# Patient Record
Sex: FEMALE | Race: WHITE | NOT HISPANIC OR LATINO | Employment: OTHER | URBAN - NONMETROPOLITAN AREA
[De-identification: names, ages, dates, MRNs, and addresses within clinical notes are randomized per-mention and may not be internally consistent; named-entity substitution may affect disease eponyms.]

---

## 2021-12-03 PROCEDURE — U0004 COV-19 TEST NON-CDC HGH THRU: HCPCS | Performed by: FAMILY MEDICINE

## 2022-01-08 ENCOUNTER — TELEPHONE (OUTPATIENT)
Dept: URGENT CARE | Facility: CLINIC | Age: 62
End: 2022-01-08

## 2022-01-08 DIAGNOSIS — J42 CHRONIC BRONCHITIS, UNSPECIFIED CHRONIC BRONCHITIS TYPE: ICD-10-CM

## 2022-01-08 RX ORDER — IPRATROPIUM BROMIDE AND ALBUTEROL SULFATE 2.5; .5 MG/3ML; MG/3ML
SOLUTION RESPIRATORY (INHALATION)
Qty: 360 ML | Refills: 0 | OUTPATIENT
Start: 2022-01-08 | End: 2022-01-08

## 2022-01-08 RX ORDER — IPRATROPIUM BROMIDE AND ALBUTEROL SULFATE 2.5; .5 MG/3ML; MG/3ML
SOLUTION RESPIRATORY (INHALATION)
Qty: 360 ML | Refills: 0 | Status: ON HOLD | OUTPATIENT
Start: 2022-01-08

## 2022-07-27 ENCOUNTER — HOSPITAL ENCOUNTER (EMERGENCY)
Facility: HOSPITAL | Age: 62
Discharge: HOME | End: 2022-07-27
Attending: EMERGENCY MEDICINE | Admitting: EMERGENCY MEDICINE

## 2022-07-27 VITALS
WEIGHT: 250 LBS | TEMPERATURE: 97.9 F | HEIGHT: 65 IN | SYSTOLIC BLOOD PRESSURE: 227 MMHG | RESPIRATION RATE: 20 BRPM | BODY MASS INDEX: 41.65 KG/M2 | OXYGEN SATURATION: 97 % | DIASTOLIC BLOOD PRESSURE: 92 MMHG | HEART RATE: 92 BPM

## 2022-07-27 DIAGNOSIS — S71.111A LACERATION OF RIGHT THIGH, INITIAL ENCOUNTER: Primary | ICD-10-CM

## 2022-07-27 PROCEDURE — 99282 EMERGENCY DEPT VISIT SF MDM: CPT

## 2022-07-27 PROCEDURE — 0 LIDOCAINE 1 % SOLUTION: Performed by: PHYSICIAN ASSISTANT

## 2022-07-27 RX ORDER — LIDOCAINE HYDROCHLORIDE 10 MG/ML
10 INJECTION, SOLUTION INFILTRATION; PERINEURAL ONCE
Status: COMPLETED | OUTPATIENT
Start: 2022-07-27 | End: 2022-07-27

## 2022-07-27 RX ADMIN — LIDOCAINE HYDROCHLORIDE 10 ML: 10 INJECTION, SOLUTION INFILTRATION; PERINEURAL at 09:48

## 2022-11-17 ENCOUNTER — TRANSCRIBE ORDERS (OUTPATIENT)
Dept: ADMINISTRATIVE | Facility: HOSPITAL | Age: 62
End: 2022-11-17

## 2022-11-17 DIAGNOSIS — R94.5 ABNORMAL RESULTS OF LIVER FUNCTION STUDIES: Primary | ICD-10-CM

## 2023-01-09 ENCOUNTER — HOSPITAL ENCOUNTER (OUTPATIENT)
Dept: ULTRASOUND IMAGING | Facility: HOSPITAL | Age: 63
End: 2023-01-09
Payer: MEDICARE

## 2023-04-04 ENCOUNTER — APPOINTMENT (OUTPATIENT)
Dept: GENERAL RADIOLOGY | Facility: HOSPITAL | Age: 63
DRG: 377 | End: 2023-04-04
Payer: MEDICARE

## 2023-04-04 ENCOUNTER — HOSPITAL ENCOUNTER (INPATIENT)
Facility: HOSPITAL | Age: 63
LOS: 8 days | Discharge: NURSING, REHAB OR LONG-TERM CARE | DRG: 377 | End: 2023-04-12
Attending: EMERGENCY MEDICINE | Admitting: STUDENT IN AN ORGANIZED HEALTH CARE EDUCATION/TRAINING PROGRAM
Payer: MEDICARE

## 2023-04-04 ENCOUNTER — APPOINTMENT (OUTPATIENT)
Dept: CT IMAGING | Facility: HOSPITAL | Age: 63
DRG: 377 | End: 2023-04-04
Payer: MEDICARE

## 2023-04-04 DIAGNOSIS — K74.60 CIRRHOSIS OF LIVER WITH ASCITES, UNSPECIFIED HEPATIC CIRRHOSIS TYPE: ICD-10-CM

## 2023-04-04 DIAGNOSIS — R18.8 CIRRHOSIS OF LIVER WITH ASCITES, UNSPECIFIED HEPATIC CIRRHOSIS TYPE: ICD-10-CM

## 2023-04-04 DIAGNOSIS — J18.9 PNEUMONIA OF LEFT LUNG DUE TO INFECTIOUS ORGANISM, UNSPECIFIED PART OF LUNG: Primary | ICD-10-CM

## 2023-04-04 DIAGNOSIS — K74.69 OTHER CIRRHOSIS OF LIVER: ICD-10-CM

## 2023-04-04 DIAGNOSIS — N18.9 CHRONIC KIDNEY DISEASE, UNSPECIFIED CKD STAGE: ICD-10-CM

## 2023-04-04 DIAGNOSIS — K92.2 GASTROINTESTINAL HEMORRHAGE, UNSPECIFIED GASTROINTESTINAL HEMORRHAGE TYPE: ICD-10-CM

## 2023-04-04 LAB
ABO GROUP BLD: NORMAL
ALBUMIN SERPL-MCNC: 1.6 G/DL (ref 3.5–5.2)
ALBUMIN/GLOB SERPL: 0.3 G/DL
ALP SERPL-CCNC: 100 U/L (ref 39–117)
ALT SERPL W P-5'-P-CCNC: 19 U/L (ref 1–33)
ANION GAP SERPL CALCULATED.3IONS-SCNC: 8.5 MMOL/L (ref 5–15)
AST SERPL-CCNC: 42 U/L (ref 1–32)
BASOPHILS # BLD AUTO: 0.06 10*3/MM3 (ref 0–0.2)
BASOPHILS NFR BLD AUTO: 0.9 % (ref 0–1.5)
BILIRUB SERPL-MCNC: 1 MG/DL (ref 0–1.2)
BUN SERPL-MCNC: 23 MG/DL (ref 8–23)
BUN/CREAT SERPL: 15.8 (ref 7–25)
CALCIUM SPEC-SCNC: 8.2 MG/DL (ref 8.6–10.5)
CHLORIDE SERPL-SCNC: 109 MMOL/L (ref 98–107)
CO2 SERPL-SCNC: 24.5 MMOL/L (ref 22–29)
CREAT SERPL-MCNC: 1.46 MG/DL (ref 0.57–1)
DEPRECATED RDW RBC AUTO: 54.3 FL (ref 37–54)
EGFRCR SERPLBLD CKD-EPI 2021: 40.3 ML/MIN/1.73
EOSINOPHIL # BLD AUTO: 0.2 10*3/MM3 (ref 0–0.4)
EOSINOPHIL NFR BLD AUTO: 3.2 % (ref 0.3–6.2)
ERYTHROCYTE [DISTWIDTH] IN BLOOD BY AUTOMATED COUNT: 16 % (ref 12.3–15.4)
GLOBULIN UR ELPH-MCNC: 4.7 GM/DL
GLUCOSE SERPL-MCNC: 105 MG/DL (ref 65–99)
HCT VFR BLD AUTO: 25.3 % (ref 34–46.6)
HEMOCCULT STL QL: POSITIVE
HGB BLD-MCNC: 8.5 G/DL (ref 12–15.9)
IMM GRANULOCYTES # BLD AUTO: 0.02 10*3/MM3 (ref 0–0.05)
IMM GRANULOCYTES NFR BLD AUTO: 0.3 % (ref 0–0.5)
LIPASE SERPL-CCNC: 15 U/L (ref 13–60)
LYMPHOCYTES # BLD AUTO: 0.7 10*3/MM3 (ref 0.7–3.1)
LYMPHOCYTES NFR BLD AUTO: 11.1 % (ref 19.6–45.3)
MCH RBC QN AUTO: 33.1 PG (ref 26.6–33)
MCHC RBC AUTO-ENTMCNC: 33.6 G/DL (ref 31.5–35.7)
MCV RBC AUTO: 98.4 FL (ref 79–97)
MONOCYTES # BLD AUTO: 0.77 10*3/MM3 (ref 0.1–0.9)
MONOCYTES NFR BLD AUTO: 12.2 % (ref 5–12)
NEUTROPHILS NFR BLD AUTO: 4.57 10*3/MM3 (ref 1.7–7)
NEUTROPHILS NFR BLD AUTO: 72.3 % (ref 42.7–76)
NRBC BLD AUTO-RTO: 0 /100 WBC (ref 0–0.2)
PLATELET # BLD AUTO: 120 10*3/MM3 (ref 140–450)
PMV BLD AUTO: 11.6 FL (ref 6–12)
POTASSIUM SERPL-SCNC: 3.9 MMOL/L (ref 3.5–5.2)
PROT SERPL-MCNC: 6.3 G/DL (ref 6–8.5)
RBC # BLD AUTO: 2.57 10*6/MM3 (ref 3.77–5.28)
RH BLD: POSITIVE
SODIUM SERPL-SCNC: 142 MMOL/L (ref 136–145)
WBC NRBC COR # BLD: 6.32 10*3/MM3 (ref 3.4–10.8)

## 2023-04-04 PROCEDURE — P9047 ALBUMIN (HUMAN), 25%, 50ML: HCPCS | Performed by: INTERNAL MEDICINE

## 2023-04-04 PROCEDURE — 83690 ASSAY OF LIPASE: CPT | Performed by: PHYSICIAN ASSISTANT

## 2023-04-04 PROCEDURE — 82272 OCCULT BLD FECES 1-3 TESTS: CPT | Performed by: PHYSICIAN ASSISTANT

## 2023-04-04 PROCEDURE — 87040 BLOOD CULTURE FOR BACTERIA: CPT | Performed by: FAMILY MEDICINE

## 2023-04-04 PROCEDURE — 80053 COMPREHEN METABOLIC PANEL: CPT | Performed by: PHYSICIAN ASSISTANT

## 2023-04-04 PROCEDURE — 85025 COMPLETE CBC W/AUTO DIFF WBC: CPT | Performed by: PHYSICIAN ASSISTANT

## 2023-04-04 PROCEDURE — 86901 BLOOD TYPING SEROLOGIC RH(D): CPT

## 2023-04-04 PROCEDURE — 25010000002 OCTREOTIDE PER 25 MCG: Performed by: PHYSICIAN ASSISTANT

## 2023-04-04 PROCEDURE — 86920 COMPATIBILITY TEST SPIN: CPT

## 2023-04-04 PROCEDURE — 25010000002 ALBUMIN HUMAN 25% PER 50 ML: Performed by: INTERNAL MEDICINE

## 2023-04-04 PROCEDURE — 85018 HEMOGLOBIN: CPT | Performed by: FAMILY MEDICINE

## 2023-04-04 PROCEDURE — 99222 1ST HOSP IP/OBS MODERATE 55: CPT | Performed by: INTERNAL MEDICINE

## 2023-04-04 PROCEDURE — 86850 RBC ANTIBODY SCREEN: CPT | Performed by: FAMILY MEDICINE

## 2023-04-04 PROCEDURE — 99285 EMERGENCY DEPT VISIT HI MDM: CPT

## 2023-04-04 PROCEDURE — 99223 1ST HOSP IP/OBS HIGH 75: CPT | Performed by: FAMILY MEDICINE

## 2023-04-04 PROCEDURE — 86900 BLOOD TYPING SEROLOGIC ABO: CPT

## 2023-04-04 PROCEDURE — 86900 BLOOD TYPING SEROLOGIC ABO: CPT | Performed by: FAMILY MEDICINE

## 2023-04-04 PROCEDURE — 74176 CT ABD & PELVIS W/O CONTRAST: CPT

## 2023-04-04 PROCEDURE — 86901 BLOOD TYPING SEROLOGIC RH(D): CPT | Performed by: FAMILY MEDICINE

## 2023-04-04 PROCEDURE — 71045 X-RAY EXAM CHEST 1 VIEW: CPT

## 2023-04-04 PROCEDURE — 25010000002 CEFTRIAXONE SODIUM-DEXTROSE 1-3.74 GM-%(50ML) RECONSTITUTED SOLUTION: Performed by: PHYSICIAN ASSISTANT

## 2023-04-04 RX ORDER — CEFTRIAXONE 1 G/50ML
1 INJECTION, SOLUTION INTRAVENOUS EVERY 24 HOURS
Status: DISCONTINUED | OUTPATIENT
Start: 2023-04-05 | End: 2023-04-06

## 2023-04-04 RX ORDER — DEXTROSE MONOHYDRATE 25 G/50ML
25 INJECTION, SOLUTION INTRAVENOUS
Status: DISCONTINUED | OUTPATIENT
Start: 2023-04-04 | End: 2023-04-06

## 2023-04-04 RX ORDER — ACETAMINOPHEN 325 MG/1
650 TABLET ORAL EVERY 4 HOURS PRN
Status: DISCONTINUED | OUTPATIENT
Start: 2023-04-04 | End: 2023-04-12 | Stop reason: HOSPADM

## 2023-04-04 RX ORDER — ALBUMIN (HUMAN) 12.5 G/50ML
25 SOLUTION INTRAVENOUS 3 TIMES DAILY
Status: COMPLETED | OUTPATIENT
Start: 2023-04-04 | End: 2023-04-06

## 2023-04-04 RX ORDER — ACETAMINOPHEN 650 MG/1
650 SUPPOSITORY RECTAL EVERY 4 HOURS PRN
Status: DISCONTINUED | OUTPATIENT
Start: 2023-04-04 | End: 2023-04-12 | Stop reason: HOSPADM

## 2023-04-04 RX ORDER — PANTOPRAZOLE SODIUM 40 MG/10ML
80 INJECTION, POWDER, LYOPHILIZED, FOR SOLUTION INTRAVENOUS ONCE
Status: COMPLETED | OUTPATIENT
Start: 2023-04-04 | End: 2023-04-04

## 2023-04-04 RX ORDER — NICOTINE POLACRILEX 4 MG
15 LOZENGE BUCCAL
Status: DISCONTINUED | OUTPATIENT
Start: 2023-04-04 | End: 2023-04-06

## 2023-04-04 RX ORDER — OCTREOTIDE ACETATE 100 UG/ML
50 INJECTION, SOLUTION INTRAVENOUS; SUBCUTANEOUS ONCE
Status: COMPLETED | OUTPATIENT
Start: 2023-04-04 | End: 2023-04-04

## 2023-04-04 RX ORDER — IPRATROPIUM BROMIDE AND ALBUTEROL SULFATE 2.5; .5 MG/3ML; MG/3ML
3 SOLUTION RESPIRATORY (INHALATION) EVERY 6 HOURS PRN
Status: DISCONTINUED | OUTPATIENT
Start: 2023-04-04 | End: 2023-04-12 | Stop reason: HOSPADM

## 2023-04-04 RX ORDER — INSULIN ASPART 100 [IU]/ML
0-9 INJECTION, SOLUTION INTRAVENOUS; SUBCUTANEOUS
Status: DISCONTINUED | OUTPATIENT
Start: 2023-04-05 | End: 2023-04-06

## 2023-04-04 RX ORDER — ONDANSETRON 2 MG/ML
4 INJECTION INTRAMUSCULAR; INTRAVENOUS EVERY 6 HOURS PRN
Status: DISCONTINUED | OUTPATIENT
Start: 2023-04-04 | End: 2023-04-12 | Stop reason: HOSPADM

## 2023-04-04 RX ORDER — BUPROPION HYDROCHLORIDE 150 MG/1
150 TABLET ORAL DAILY
Status: DISCONTINUED | OUTPATIENT
Start: 2023-04-05 | End: 2023-04-12 | Stop reason: HOSPADM

## 2023-04-04 RX ORDER — ALBUTEROL SULFATE 2.5 MG/3ML
2.5 SOLUTION RESPIRATORY (INHALATION) EVERY 6 HOURS PRN
Status: DISCONTINUED | OUTPATIENT
Start: 2023-04-04 | End: 2023-04-12 | Stop reason: HOSPADM

## 2023-04-04 RX ORDER — SODIUM CHLORIDE 0.9 % (FLUSH) 0.9 %
10 SYRINGE (ML) INJECTION AS NEEDED
Status: DISCONTINUED | OUTPATIENT
Start: 2023-04-04 | End: 2023-04-12 | Stop reason: HOSPADM

## 2023-04-04 RX ORDER — ACETAMINOPHEN 160 MG/5ML
650 SOLUTION ORAL EVERY 4 HOURS PRN
Status: DISCONTINUED | OUTPATIENT
Start: 2023-04-04 | End: 2023-04-12 | Stop reason: HOSPADM

## 2023-04-04 RX ORDER — DONEPEZIL HYDROCHLORIDE 5 MG/1
10 TABLET, FILM COATED ORAL NIGHTLY
Status: DISCONTINUED | OUTPATIENT
Start: 2023-04-04 | End: 2023-04-12 | Stop reason: HOSPADM

## 2023-04-04 RX ORDER — CEFTRIAXONE 1 G/50ML
1 INJECTION, SOLUTION INTRAVENOUS ONCE
Status: COMPLETED | OUTPATIENT
Start: 2023-04-04 | End: 2023-04-04

## 2023-04-04 RX ORDER — ATORVASTATIN CALCIUM 40 MG/1
40 TABLET, FILM COATED ORAL DAILY
Status: DISCONTINUED | OUTPATIENT
Start: 2023-04-05 | End: 2023-04-12 | Stop reason: HOSPADM

## 2023-04-04 RX ADMIN — SODIUM CHLORIDE 1000 ML: 9 INJECTION, SOLUTION INTRAVENOUS at 14:34

## 2023-04-04 RX ADMIN — PANTOPRAZOLE SODIUM 8 MG/HR: 40 INJECTION, POWDER, FOR SOLUTION INTRAVENOUS at 19:00

## 2023-04-04 RX ADMIN — CEFTRIAXONE 1 G: 1 INJECTION, SOLUTION INTRAVENOUS at 17:35

## 2023-04-04 RX ADMIN — PANTOPRAZOLE SODIUM 80 MG: 40 INJECTION, POWDER, FOR SOLUTION INTRAVENOUS at 18:58

## 2023-04-04 RX ADMIN — ALBUMIN (HUMAN) 25 G: 0.25 INJECTION, SOLUTION INTRAVENOUS at 20:50

## 2023-04-04 RX ADMIN — OCTREOTIDE ACETATE 50 MCG: 100 INJECTION, SOLUTION INTRAVENOUS; SUBCUTANEOUS at 18:59

## 2023-04-04 RX ADMIN — OCTREOTIDE ACETATE 25 MCG/HR: 100 INJECTION, SOLUTION INTRAVENOUS; SUBCUTANEOUS at 21:28

## 2023-04-04 NOTE — ED PROVIDER NOTES
Subjective  History of Present Illness:    Chief Complaint:   Chief Complaint   Patient presents with   • Weakness - Generalized      History of Present Illness: Ivania Harris is a 62 y.o. female who presents to the emergency department complaining of generalized weakness and diarrhea with nausea.  Patient presents to the ED via ambulance.  She states she had 1 watery nonbloody diarrheal bowel movement daily for the past 3 days with nausea intermittently and no vomiting.  No abdominal pain.  No urinary symptoms.  She complains mainly of just feeling overall generally weak.  She does have a mild dry cough  Onset: 3 days ago  Duration: Intermittent  Exacerbating / Alleviating factors: None  Associated symptoms: None      Nurses Notes reviewed and agree, including vitals, allergies, social history and prior medical history.     Review of Systems   Constitutional: Negative.         Generalized weakness   HENT: Negative.    Eyes: Negative.    Respiratory: Negative.    Cardiovascular: Negative.    Gastrointestinal: Positive for diarrhea and nausea. Negative for blood in stool.   Genitourinary: Negative.    Musculoskeletal: Negative.    Skin: Negative.    Neurological: Negative.    Psychiatric/Behavioral: Negative.        Past Medical History:   Diagnosis Date   • Anemia    • Diabetes mellitus    • Hyperlipidemia    • Hypertension    • Hypothyroidism    • Short-term memory loss        Allergies:    Patient has no known allergies.      Past Surgical History:   Procedure Laterality Date   • CHOLECYSTECTOMY           Social History     Socioeconomic History   • Marital status:    Tobacco Use   • Smoking status: Never   • Smokeless tobacco: Never   Vaping Use   • Vaping Use: Never used   Substance and Sexual Activity   • Alcohol use: Yes     Comment: a couple margaritas a year   • Drug use: Never   • Sexual activity: Defer         History reviewed. No pertinent family history.    Objective  Physical Exam:  BP  "125/62   Pulse 84   Temp 98.7 °F (37.1 °C) (Oral)   Resp 18   Ht 165.1 cm (65\")   Wt 113 kg (250 lb)   SpO2 99%   BMI 41.60 kg/m²      Physical Exam  Vitals and nursing note reviewed.   Constitutional:       General: She is not in acute distress.     Appearance: Normal appearance. She is obese. She is not ill-appearing, toxic-appearing or diaphoretic.   HENT:      Head: Normocephalic and atraumatic.      Nose: Nose normal.   Eyes:      Extraocular Movements: Extraocular movements intact.   Cardiovascular:      Rate and Rhythm: Normal rate and regular rhythm.      Comments: Systolic murmur noted  Pulmonary:      Effort: Pulmonary effort is normal.   Abdominal:      General: Abdomen is flat.   Musculoskeletal:         General: Normal range of motion.      Cervical back: Normal range of motion.   Skin:     General: Skin is warm and dry.      Comments: Excoriated lower extremities with stasis dermatitis and onychomycosis bilaterally   Neurological:      General: No focal deficit present.      Mental Status: She is alert. Mental status is at baseline.   Psychiatric:         Mood and Affect: Mood normal.         Behavior: Behavior normal.           Procedures    ED Course:    ED Course as of 04/04/23 1909 Tue Apr 04, 2023   1622 Fecal Occult Blood(!): Positive [TM]   1622 ALT (SGPT): 19 [TM]   1727 AST (SGOT)(!): 42 [TM]   1727 Alkaline Phosphatase: 100 [TM]   1727 Total Bilirubin: 1.0 [TM]      ED Course User Index  [TM] Gerardo Rogers PA-C       Lab Results (last 24 hours)     Procedure Component Value Units Date/Time    CBC & Differential [[id number]]  (Abnormal) Collected: 04/04/23 1431    Specimen: Blood Updated: 04/04/23 1437    Narrative:      The following orders were created for panel order CBC & Differential.  Procedure                               Abnormality         Status                     ---------                               -----------         ------                     CBC Auto " Differential[[id number]]        Abnormal            Final result                 Please view results for these tests on the individual orders.    Comprehensive Metabolic Panel [[id number]]  (Abnormal) Collected: 04/04/23 1431    Specimen: Blood Updated: 04/04/23 1500     Glucose 105 mg/dL      BUN 23 mg/dL      Creatinine 1.46 mg/dL      Sodium 142 mmol/L      Potassium 3.9 mmol/L      Chloride 109 mmol/L      CO2 24.5 mmol/L      Calcium 8.2 mg/dL      Total Protein 6.3 g/dL      Albumin 1.6 g/dL      ALT (SGPT) 19 U/L      AST (SGOT) 42 U/L      Alkaline Phosphatase 100 U/L      Total Bilirubin 1.0 mg/dL      Globulin 4.7 gm/dL      A/G Ratio 0.3 g/dL      BUN/Creatinine Ratio 15.8     Anion Gap 8.5 mmol/L      eGFR 40.3 mL/min/1.73     Narrative:      GFR Normal >60  Chronic Kidney Disease <60  Kidney Failure <15      CBC Auto Differential [[id number]]  (Abnormal) Collected: 04/04/23 1431    Specimen: Blood Updated: 04/04/23 1437     WBC 6.32 10*3/mm3      RBC 2.57 10*6/mm3      Hemoglobin 8.5 g/dL      Hematocrit 25.3 %      MCV 98.4 fL      MCH 33.1 pg      MCHC 33.6 g/dL      RDW 16.0 %      RDW-SD 54.3 fl      MPV 11.6 fL      Platelets 120 10*3/mm3      Neutrophil % 72.3 %      Lymphocyte % 11.1 %      Monocyte % 12.2 %      Eosinophil % 3.2 %      Basophil % 0.9 %      Immature Grans % 0.3 %      Neutrophils, Absolute 4.57 10*3/mm3      Lymphocytes, Absolute 0.70 10*3/mm3      Monocytes, Absolute 0.77 10*3/mm3      Eosinophils, Absolute 0.20 10*3/mm3      Basophils, Absolute 0.06 10*3/mm3      Immature Grans, Absolute 0.02 10*3/mm3      nRBC 0.0 /100 WBC     Lipase [[id number]]  (Normal) Collected: 04/04/23 1431    Specimen: Blood Updated: 04/04/23 1742     Lipase 15 U/L     Occult Blood X 1, Stool - Stool, Per Rectum [[id number]]  (Abnormal) Collected: 04/04/23 1505    Specimen: Stool from Per Rectum Updated: 04/04/23 1511     Fecal Occult Blood Positive           CT Abdomen Pelvis Without Contrast    Result  Date: 4/4/2023  FINAL REPORT TECHNIQUE: Routine axial images through the abdomen and pelvis were obtained. CLINICAL HISTORY: diarrhea, abd discomfort FINDINGS: Lower chest: There are left greater than right pleural effusions.  Abdomen:  The gallbladder has been removed.  There is cirrhosis with mild splenomegaly.  There are upper abdominal venous collaterals indicative of portal hypertension.  There are 2 small nonobstructing right renal calculi, the largest measuring 4 mm in diameter.  The other solid abdominal organs and ureters are unremarkable.  The GI tract is unremarkable, including the appendix.  Pelvis: The uterus, ovaries, and urinary bladder are normal.  There is large volume ascites and anasarca.  There is no pelvic or abdominal adenopathy or acute osseous abnormality.     Impression: Cirrhosis with portal hypertension and ascites.  Small nonobstructing right renal calculi.  Bilateral pleural effusions. Authenticated and Electronically Signed by Salomon Cantu M.D. on 04/04/2023 06:20:35 PM    XR Chest 1 View    Result Date: 4/4/2023   PROCEDURE: XR CHEST 1 VW-  HISTORY: generalized weakness  COMPARISON: 12/03/2021.  FINDINGS: The heart is mildly enlarged.. The mediastinum is unremarkable. Decreased inspiratory effort noted with crowding of the lung markings in both lung bases. There is new left basilar airspace disease, possible pneumonia. Right lung is clear. Calcified granuloma identified on the left... There is no pneumothorax.  There are no acute osseous abnormalities.      Impression: New left basilar airspace disease suggesting pneumonia, recommend follow-up..  .  This report was signed and finalized on 4/4/2023 3:16 PM by Katt Muse MD.         BRYCE Harris is a 62 y.o. female who presents to the emergency department for evaluation of generalized weakness, cough, diarrhea    Differential diagnosis includes pneumonia, electrolyte abnormality, dehydration, anemia among other  etiologies.    CBC, CMP, lipase, urinalysis, stool GI PCR, CT scan abdomen pelvis without contrast ordered for further evaluation of the patient's presentation.    Chart review if available included outside testing, previous visits, prior labs, prior imaging, available notes from prior evaluations or visits with specialists, medication list, allergies, past medical history, past surgical history when applicable.    Patient was treated with IV fluids, octreotide, Protonix, Rocephin    Spoke with Dr. Mandujano, GI, recommends octreotide and Protonix bolus and drip of both, we will plan for an EGD in the morning.  Did make him aware of her occult positive stool and hemoglobin of 8.5.  Dr. Altman graciously excepted the patient for admission.    Plan for disposition is patient to the hospital.  Patient/family comfortable with and understanding of the plan.    30 minutes of critical care provided. This time excludes other billable procedures. Time does include preparation of documents, medical consultations, review of old records, and direct bedside care. Patient is at high risk for life-threatening deterioration due to gi bleeding requiring iv protonix and octreotide and admission, gi consultation.         Final diagnoses:   Pneumonia of left lung due to infectious organism, unspecified part of lung   Gastrointestinal hemorrhage, unspecified gastrointestinal hemorrhage type   Other cirrhosis of liver        Gerardo Rogers PA-C  04/04/23 1906       Gerardo Rogers PA-C  04/04/23 1909

## 2023-04-04 NOTE — H&P
[facility]      Name:  Ivania Harris   Age:  62 y.o.  Sex:  female  :  1960  MRN:  [id number]   Visit Number:  [id number]  Admission Date:  2023  Date Of Service:  23  Primary Care Physician:  Alessandro Mercer MD    Chief Complaint:     Generalized weakness, diarrhea    History Of Presenting Illness:      Patient 62 years old female with a past medical history of diabetes mellitus, anemia, hyperlipidemia, hypertension and hypothyroidism who presented to the ER with a chief complaint of generalized weakness along with diarrhea.  Patient reports that she has had watery dark-colored diarrhea over the past 3 days associated with nausea but no vomiting.  Patient denies any abdominal pain or urinary symptoms.  Patient reports feeling weak all over which promoted her to come to the ER.  Patient also reporting mild cough that is nonproductive.  Patient denies any previous diagnosis of liver disease that she is aware of.     On ER evaluation, patient was hemodynamically stable, her work-up showed creatinine of 1.46, hemoglobin of 8.5, platelets of 120.  Hemoccult blood was positive.  Chest x-ray showed New left basilar airspace disease suggesting pneumonia. Cirrhosis with portal hypertension and ascites. Small nonobstructing right renal calculi.  Bilateral pleural effusions.  Case was discussed with gastroenterology Dr. Collado who recommended octreotide and Protonix drip and admission for the patient.  Patient received Rocephin, normal saline bolus in addition to Protonix and octreotide while in the ER.  Hospitalist consulted for admission, further evaluation and treatment.     Review Of Systems:    All systems were reviewed and negative except as mentioned in history of presenting illness, assessment and plan.    Past Medical History: Patient  has a past medical history of Anemia, Diabetes mellitus, Hyperlipidemia, Hypertension, Hypothyroidism, and  Short-term memory loss.    Past Surgical History: Patient  has a past surgical history that includes Cholecystectomy.    Social History: Patient  reports that she has never smoked. She has never used smokeless tobacco. She reports current alcohol use. She reports that she does not use drugs.    Family History:  Patient's family history has been reviewed and found to be noncontributory.    Allergies:      Patient has no known allergies.    Home Medications:    Prior to Admission Medications     Prescriptions Last Dose Informant Patient Reported? Taking?    albuterol sulfate  (90 Base) MCG/ACT inhaler   No No    Inhale 2 puffs Every 4 (Four) Hours As Needed for Wheezing.    Aspirin 325 MG capsule   Yes No    Take 325 mg by mouth Daily.    atorvastatin (LIPITOR) 40 MG tablet   Yes No    Take 40 mg by mouth Daily.    azithromycin (ZITHROMAX) 500 MG tablet   No No    1 po daily    buPROPion XL (WELLBUTRIN XL) 150 MG 24 hr tablet   Yes No    Take 150 mg by mouth Daily.    donepezil (ARICEPT) 10 MG tablet   Yes No    Take 10 mg by mouth Every Night.    insulin aspart (NovoLOG) 100 UNIT/ML injection   Yes No    Inject  under the skin into the appropriate area as directed 3 (Three) Times a Day Before Meals.    insulin glargine (Lantus) 100 UNIT/ML injection   Yes No    Inject 60 Units under the skin into the appropriate area as directed 2 (Two) Times a Day.    ipratropium-albuterol (DUO-NEB) 0.5-2.5 mg/3 ml nebulizer   No No    Nebulize q 4 h prn wheezing / shortness of breath    levothyroxine (SYNTHROID, LEVOTHROID) 100 MCG tablet   Yes No    Take 300 mcg by mouth Daily.    lisinopril (PRINIVIL,ZESTRIL) 10 MG tablet   Yes No    Take 10 mg by mouth Daily.    minocycline (MINOCIN,DYNACIN) 100 MG capsule   Yes No    Take 100 mg by mouth Daily.    multivitamin with minerals (SENIOR MULTIVITAMIN PLUS PO)   Yes No    Take 1 tablet by mouth Daily.    predniSONE (DELTASONE) 20 MG tablet   No No    3 po daily for 3 days, 2 po  "daily for 3 days, 1 po daily for 3 days    SITagliptin (JANUVIA) 50 MG tablet   Yes No    Take 50 mg by mouth Daily.        ED Medications:    Medications   sodium chloride 0.9 % flush 10 mL (has no administration in time range)   pantoprazole (PROTONIX) 40 mg in 100mL NS IVPB (8 mg/hr Intravenous New Bag 4/4/23 1900)   octreotide (sandoSTATIN) 500 mcg in sodium chloride 0.9 % 100 mL (5 mcg/mL) infusion (has no administration in time range)   sodium chloride 0.9 % bolus 1,000 mL (0 mL Intravenous Stopped 4/4/23 1736)   cefTRIAXone (ROCEPHIN) IVPB 1 g/50ml dextrose (premix) (0 g Intravenous Stopped 4/4/23 1845)   pantoprazole (PROTONIX) injection 80 mg (80 mg Intravenous Given 4/4/23 1858)   octreotide (sandoSTATIN) injection 50 mcg (50 mcg Intravenous Given 4/4/23 1859)     Vital Signs:  Temp:  [98.7 °F (37.1 °C)] 98.7 °F (37.1 °C)  Heart Rate:  [74-92] 81  Resp:  [18] 18  BP: ()/(49-64) 128/60        04/04/23  1409   Weight: 113 kg (250 lb)     Body mass index is 41.6 kg/m².    Physical Exam:     Most recent vital Signs: /60   Pulse 81   Temp 98.7 °F (37.1 °C) (Oral)   Resp 18   Ht 165.1 cm (65\")   Wt 113 kg (250 lb)   SpO2 99%   BMI 41.60 kg/m²     Physical Exam  Vitals and nursing note reviewed.   Constitutional:       General: She is not in acute distress.     Appearance: She is obese. She is ill-appearing.   HENT:      Head: Normocephalic and atraumatic.      Right Ear: External ear normal.      Left Ear: External ear normal.      Nose: Nose normal.      Mouth/Throat:      Mouth: Mucous membranes are moist.   Eyes:      Extraocular Movements: Extraocular movements intact.      Conjunctiva/sclera: Conjunctivae normal.      Pupils: Pupils are equal, round, and reactive to light.   Cardiovascular:      Rate and Rhythm: Normal rate and regular rhythm.      Pulses: Normal pulses.      Heart sounds: Normal heart sounds.   Pulmonary:      Effort: Pulmonary effort is normal. No respiratory distress. "      Breath sounds: Normal breath sounds. Decreased air movement present. No wheezing or rhonchi.   Abdominal:      General: Bowel sounds are normal. There is distension.      Palpations: Abdomen is soft.      Tenderness: There is no abdominal tenderness. There is no guarding or rebound.      Comments: Obese abdomen   Musculoskeletal:         General: Normal range of motion.      Cervical back: Normal range of motion and neck supple.      Right lower leg: No edema.      Left lower leg: No edema.   Skin:     General: Skin is warm and dry.      Findings: Rash present.      Comments: Chronic venous stasis in bilateral lower extremities.   Neurological:      General: No focal deficit present.      Mental Status: She is alert and oriented to person, place, and time. Mental status is at baseline.      Motor: No weakness.   Psychiatric:         Mood and Affect: Mood normal.         Behavior: Behavior normal.         Thought Content: Thought content normal.         Laboratory data:    I have reviewed the labs done in the emergency room.    Results from last 7 days   Lab Units 04/04/23  1431   SODIUM mmol/L 142   POTASSIUM mmol/L 3.9   CHLORIDE mmol/L 109*   CO2 mmol/L 24.5   BUN mg/dL 23   CREATININE mg/dL 1.46*   CALCIUM mg/dL 8.2*   BILIRUBIN mg/dL 1.0   ALK PHOS U/L 100   ALT (SGPT) U/L 19   AST (SGOT) U/L 42*   GLUCOSE mg/dL 105*     Results from last 7 days   Lab Units 04/04/23  1431   WBC 10*3/mm3 6.32   HEMOGLOBIN g/dL 8.5*   HEMATOCRIT % 25.3*   PLATELETS 10*3/mm3 120*                     Results from last 7 days   Lab Units 04/04/23  1431   LIPASE U/L 15               Invalid input(s): USDES,  BLOODU, NITRITITE, BACT, EP    Pain Management Panel    There is no flowsheet data to display.         Radiology:    CT Abdomen Pelvis Without Contrast    Result Date: 4/4/2023  FINAL REPORT TECHNIQUE: Routine axial images through the abdomen and pelvis were obtained. CLINICAL HISTORY: diarrhea, abd discomfort FINDINGS: Lower  chest: There are left greater than right pleural effusions.  Abdomen:  The gallbladder has been removed.  There is cirrhosis with mild splenomegaly.  There are upper abdominal venous collaterals indicative of portal hypertension.  There are 2 small nonobstructing right renal calculi, the largest measuring 4 mm in diameter.  The other solid abdominal organs and ureters are unremarkable.  The GI tract is unremarkable, including the appendix.  Pelvis: The uterus, ovaries, and urinary bladder are normal.  There is large volume ascites and anasarca.  There is no pelvic or abdominal adenopathy or acute osseous abnormality.     Cirrhosis with portal hypertension and ascites.  Small nonobstructing right renal calculi.  Bilateral pleural effusions. Authenticated and Electronically Signed by Salomon Cantu M.D. on 04/04/2023 06:20:35 PM    XR Chest 1 View    Result Date: 4/4/2023   PROCEDURE: XR CHEST 1 VW-  HISTORY: generalized weakness  COMPARISON: 12/03/2021.  FINDINGS: The heart is mildly enlarged.. The mediastinum is unremarkable. Decreased inspiratory effort noted with crowding of the lung markings in both lung bases. There is new left basilar airspace disease, possible pneumonia. Right lung is clear. Calcified granuloma identified on the left... There is no pneumothorax.  There are no acute osseous abnormalities.      New left basilar airspace disease suggesting pneumonia, recommend follow-up..  .  This report was signed and finalized on 4/4/2023 3:16 PM by Katt Muse MD.      Assessment:    Left lower lobe pneumonia, unspecified further, POA  Suspected GI bleed, POA  Acute on chronic anemia, POA  Acute kidney injury, POA  Suspected decompensated cirrhosis with ascites  Morbid obesity, BMI 41.60  Thrombocytopenia associated with the cirrhosis  Diabetes mellitus  Hyperlipidemia  Hypertension  Hypothyroidism    Plan:    Patient is admitted for further evaluation and treatment.    Suspected GI bleed  Acute on chronic  anemia  Suspected decompensated cirrhosis with ascites  -Started on Protonix and octreotide drip.  -Dr. Collado consulted, appreciate his recommendations.  -We will hold aspirin  -We will monitor hemoglobin and transfuse as indicated to keep hemoglobin above 7.  - EGD per Dr. Collado recommendations for possible variceal bleed.  -We will keep patient on clear liquid diet.  -Ultrasound check for ascites, will consult with IR in a.m. for paracentesis  -Hepatitis panel and GI panel ordered.    Left lower lobe pneumonia  -On Rocephin  -Blood cultures obtained after patient received first dose of Rocephin in the ER.  Will follow.    SILVINA  -Could be hepatorenal  -Avoid nephrotoxic drugs, holding lisinopril  -Continue to monitor renal function  -Urinalysis pending.    Sliding-scale insulin, will check hemoglobin A1c, continue home meds otherwise as warranted.  Further orders as indicated per clinical course.    Risk Assessment: Moderate to high  DVT Prophylaxis: SCDs, avoid chemoprophylaxis due to suspected GI bleed  Code Status: Full  Diet: Clear liquid diet    Advance Care Planning   ACP discussion was held with the patient during this visit. Patient does not have an advance directive, information provided.       Marcos Altman MD  04/04/23  19:59 EDT    Dictated utilizing Dragon dictation.

## 2023-04-05 ENCOUNTER — ANESTHESIA (OUTPATIENT)
Dept: GASTROENTEROLOGY | Facility: HOSPITAL | Age: 63
DRG: 377 | End: 2023-04-05
Payer: MEDICARE

## 2023-04-05 ENCOUNTER — ANESTHESIA EVENT (OUTPATIENT)
Dept: GASTROENTEROLOGY | Facility: HOSPITAL | Age: 63
DRG: 377 | End: 2023-04-05
Payer: MEDICARE

## 2023-04-05 ENCOUNTER — APPOINTMENT (OUTPATIENT)
Dept: ULTRASOUND IMAGING | Facility: HOSPITAL | Age: 63
DRG: 377 | End: 2023-04-05
Payer: MEDICARE

## 2023-04-05 PROBLEM — K92.2 GASTROINTESTINAL HEMORRHAGE: Status: ACTIVE | Noted: 2023-04-04

## 2023-04-05 PROBLEM — R18.8 CIRRHOSIS OF LIVER WITH ASCITES: Status: ACTIVE | Noted: 2023-04-04

## 2023-04-05 PROBLEM — K74.60 CIRRHOSIS OF LIVER WITH ASCITES: Status: ACTIVE | Noted: 2023-04-04

## 2023-04-05 LAB
ABO GROUP BLD: NORMAL
ALPHA1 GLOB MFR UR ELPH: 126 MG/DL (ref 90–200)
ANION GAP SERPL CALCULATED.3IONS-SCNC: 9.8 MMOL/L (ref 5–15)
APPEARANCE FLD: ABNORMAL
APTT PPP: 21.9 SECONDS (ref 23.5–35.5)
BASOPHILS # BLD AUTO: 0.03 10*3/MM3 (ref 0–0.2)
BASOPHILS NFR BLD AUTO: 1 % (ref 0–1.5)
BLD GP AB SCN SERPL QL: NEGATIVE
BUN SERPL-MCNC: 26 MG/DL (ref 8–23)
BUN/CREAT SERPL: 17 (ref 7–25)
CALCIUM SPEC-SCNC: 8.1 MG/DL (ref 8.6–10.5)
CERULOPLASMIN SERPL-MCNC: 13 MG/DL (ref 19–39)
CHLORIDE SERPL-SCNC: 112 MMOL/L (ref 98–107)
CO2 SERPL-SCNC: 24.2 MMOL/L (ref 22–29)
COLOR FLD: ABNORMAL
CREAT SERPL-MCNC: 1.53 MG/DL (ref 0.57–1)
DEPRECATED RDW RBC AUTO: 51.7 FL (ref 37–54)
EGFRCR SERPLBLD CKD-EPI 2021: 38.1 ML/MIN/1.73
EOSINOPHIL # BLD AUTO: 0.1 10*3/MM3 (ref 0–0.4)
EOSINOPHIL NFR BLD AUTO: 3.5 % (ref 0.3–6.2)
ERYTHROCYTE [DISTWIDTH] IN BLOOD BY AUTOMATED COUNT: 16.3 % (ref 12.3–15.4)
GLUCOSE BLDC GLUCOMTR-MCNC: 102 MG/DL (ref 70–130)
GLUCOSE BLDC GLUCOMTR-MCNC: 105 MG/DL (ref 70–130)
GLUCOSE BLDC GLUCOMTR-MCNC: 109 MG/DL (ref 70–130)
GLUCOSE BLDC GLUCOMTR-MCNC: 114 MG/DL (ref 70–130)
GLUCOSE SERPL-MCNC: 95 MG/DL (ref 65–99)
HBA1C MFR BLD: 5.5 % (ref 4.8–5.6)
HBV SURFACE AB SER RIA-ACNC: NORMAL
HBV SURFACE AG SERPL QL IA: NORMAL
HCT VFR BLD AUTO: 18.7 % (ref 34–46.6)
HCV AB SER DONR QL: NORMAL
HGB BLD-MCNC: 6.7 G/DL (ref 12–15.9)
HGB BLD-MCNC: 7.6 G/DL (ref 12–15.9)
HGB BLD-MCNC: 7.8 G/DL (ref 12–15.9)
HGB BLD-MCNC: 9.1 G/DL (ref 12–15.9)
IMM GRANULOCYTES # BLD AUTO: 0.01 10*3/MM3 (ref 0–0.05)
IMM GRANULOCYTES NFR BLD AUTO: 0.3 % (ref 0–0.5)
INR PPP: 1.48 (ref 0.9–1.1)
LYMPHOCYTES # BLD AUTO: 0.54 10*3/MM3 (ref 0.7–3.1)
LYMPHOCYTES NFR BLD AUTO: 18.9 % (ref 19.6–45.3)
LYMPHOCYTES NFR FLD MANUAL: 84 %
MCH RBC QN AUTO: 35.3 PG (ref 26.6–33)
MCHC RBC AUTO-ENTMCNC: 35.8 G/DL (ref 31.5–35.7)
MCV RBC AUTO: 98.4 FL (ref 79–97)
MONOCYTES # BLD AUTO: 0.4 10*3/MM3 (ref 0.1–0.9)
MONOCYTES NFR BLD AUTO: 14 % (ref 5–12)
NEUTROPHILS NFR BLD AUTO: 1.78 10*3/MM3 (ref 1.7–7)
NEUTROPHILS NFR BLD AUTO: 62.3 % (ref 42.7–76)
NEUTROPHILS NFR FLD MANUAL: 16 %
NRBC BLD AUTO-RTO: 0 /100 WBC (ref 0–0.2)
PLATELET # BLD AUTO: 87 10*3/MM3 (ref 140–450)
PMV BLD AUTO: 11.3 FL (ref 6–12)
POTASSIUM SERPL-SCNC: 3.9 MMOL/L (ref 3.5–5.2)
PROCALCITONIN SERPL-MCNC: 0.37 NG/ML (ref 0–0.25)
PROTHROMBIN TIME: 18.4 SECONDS (ref 12.5–14.5)
RBC # BLD AUTO: 1.9 10*6/MM3 (ref 3.77–5.28)
RBC # FLD AUTO: 9000 /MM3 (ref 0–200000)
RH BLD: POSITIVE
SODIUM SERPL-SCNC: 146 MMOL/L (ref 136–145)
T&S EXPIRATION DATE: NORMAL
WBC # FLD AUTO: 58 /MM3 (ref 0–1000)
WBC NRBC COR # BLD: 2.86 10*3/MM3 (ref 3.4–10.8)

## 2023-04-05 PROCEDURE — 25010000002 OCTREOTIDE PER 25 MCG: Performed by: FAMILY MEDICINE

## 2023-04-05 PROCEDURE — 82103 ALPHA-1-ANTITRYPSIN TOTAL: CPT | Performed by: FAMILY MEDICINE

## 2023-04-05 PROCEDURE — 82042 OTHER SOURCE ALBUMIN QUAN EA: CPT | Performed by: INTERNAL MEDICINE

## 2023-04-05 PROCEDURE — 99232 SBSQ HOSP IP/OBS MODERATE 35: CPT | Performed by: FAMILY MEDICINE

## 2023-04-05 PROCEDURE — 85025 COMPLETE CBC W/AUTO DIFF WBC: CPT | Performed by: FAMILY MEDICINE

## 2023-04-05 PROCEDURE — 85730 THROMBOPLASTIN TIME PARTIAL: CPT | Performed by: FAMILY MEDICINE

## 2023-04-05 PROCEDURE — 87070 CULTURE OTHR SPECIMN AEROBIC: CPT | Performed by: INTERNAL MEDICINE

## 2023-04-05 PROCEDURE — P9016 RBC LEUKOCYTES REDUCED: HCPCS

## 2023-04-05 PROCEDURE — 86381 MITOCHONDRIAL ANTIBODY EACH: CPT | Performed by: FAMILY MEDICINE

## 2023-04-05 PROCEDURE — 86708 HEPATITIS A ANTIBODY: CPT | Performed by: FAMILY MEDICINE

## 2023-04-05 PROCEDURE — 85018 HEMOGLOBIN: CPT | Performed by: FAMILY MEDICINE

## 2023-04-05 PROCEDURE — 82962 GLUCOSE BLOOD TEST: CPT

## 2023-04-05 PROCEDURE — 86038 ANTINUCLEAR ANTIBODIES: CPT | Performed by: FAMILY MEDICINE

## 2023-04-05 PROCEDURE — 84145 PROCALCITONIN (PCT): CPT | Performed by: FAMILY MEDICINE

## 2023-04-05 PROCEDURE — 86900 BLOOD TYPING SEROLOGIC ABO: CPT

## 2023-04-05 PROCEDURE — 76942 ECHO GUIDE FOR BIOPSY: CPT

## 2023-04-05 PROCEDURE — 87205 SMEAR GRAM STAIN: CPT | Performed by: INTERNAL MEDICINE

## 2023-04-05 PROCEDURE — 82390 ASSAY OF CERULOPLASMIN: CPT | Performed by: FAMILY MEDICINE

## 2023-04-05 PROCEDURE — 80048 BASIC METABOLIC PNL TOTAL CA: CPT | Performed by: FAMILY MEDICINE

## 2023-04-05 PROCEDURE — 86015 ACTIN ANTIBODY EACH: CPT | Performed by: FAMILY MEDICINE

## 2023-04-05 PROCEDURE — 84157 ASSAY OF PROTEIN OTHER: CPT | Performed by: INTERNAL MEDICINE

## 2023-04-05 PROCEDURE — P9047 ALBUMIN (HUMAN), 25%, 50ML: HCPCS | Performed by: INTERNAL MEDICINE

## 2023-04-05 PROCEDURE — 87340 HEPATITIS B SURFACE AG IA: CPT | Performed by: FAMILY MEDICINE

## 2023-04-05 PROCEDURE — 83036 HEMOGLOBIN GLYCOSYLATED A1C: CPT | Performed by: FAMILY MEDICINE

## 2023-04-05 PROCEDURE — 89051 BODY FLUID CELL COUNT: CPT | Performed by: INTERNAL MEDICINE

## 2023-04-05 PROCEDURE — 85610 PROTHROMBIN TIME: CPT | Performed by: FAMILY MEDICINE

## 2023-04-05 PROCEDURE — 25010000002 CEFTRIAXONE SODIUM-DEXTROSE 1-3.74 GM-%(50ML) RECONSTITUTED SOLUTION

## 2023-04-05 PROCEDURE — 86803 HEPATITIS C AB TEST: CPT | Performed by: FAMILY MEDICINE

## 2023-04-05 PROCEDURE — 86706 HEP B SURFACE ANTIBODY: CPT | Performed by: FAMILY MEDICINE

## 2023-04-05 PROCEDURE — 36430 TRANSFUSION BLD/BLD COMPNT: CPT

## 2023-04-05 PROCEDURE — 86704 HEP B CORE ANTIBODY TOTAL: CPT | Performed by: FAMILY MEDICINE

## 2023-04-05 PROCEDURE — 25010000002 ALBUMIN HUMAN 25% PER 50 ML: Performed by: INTERNAL MEDICINE

## 2023-04-05 RX ORDER — MORPHINE SULFATE 2 MG/ML
2 INJECTION, SOLUTION INTRAMUSCULAR; INTRAVENOUS EVERY 4 HOURS PRN
Status: DISCONTINUED | OUTPATIENT
Start: 2023-04-05 | End: 2023-04-10

## 2023-04-05 RX ORDER — IBUPROFEN 200 MG
200 TABLET ORAL EVERY 6 HOURS PRN
COMMUNITY
End: 2023-04-12 | Stop reason: HOSPADM

## 2023-04-05 RX ORDER — SODIUM CHLORIDE 9 MG/ML
50 INJECTION, SOLUTION INTRAVENOUS CONTINUOUS
Status: ACTIVE | OUTPATIENT
Start: 2023-04-05 | End: 2023-04-06

## 2023-04-05 RX ADMIN — CEFTRIAXONE 1 G: 1 INJECTION, SOLUTION INTRAVENOUS at 17:02

## 2023-04-05 RX ADMIN — LEVOTHYROXINE SODIUM 300 MCG: 0.05 TABLET ORAL at 12:00

## 2023-04-05 RX ADMIN — PANTOPRAZOLE SODIUM 8 MG/HR: 40 INJECTION, POWDER, FOR SOLUTION INTRAVENOUS at 13:58

## 2023-04-05 RX ADMIN — ALBUMIN (HUMAN) 25 G: 0.25 INJECTION, SOLUTION INTRAVENOUS at 17:45

## 2023-04-05 RX ADMIN — PANTOPRAZOLE SODIUM 8 MG/HR: 40 INJECTION, POWDER, FOR SOLUTION INTRAVENOUS at 20:53

## 2023-04-05 RX ADMIN — BUPROPION HYDROCHLORIDE 150 MG: 150 TABLET, FILM COATED, EXTENDED RELEASE ORAL at 12:00

## 2023-04-05 RX ADMIN — ALBUMIN (HUMAN) 25 G: 0.25 INJECTION, SOLUTION INTRAVENOUS at 20:46

## 2023-04-05 RX ADMIN — ALBUMIN (HUMAN) 25 G: 0.25 INJECTION, SOLUTION INTRAVENOUS at 12:09

## 2023-04-05 RX ADMIN — OCTREOTIDE ACETATE 25 MCG/HR: 100 INJECTION, SOLUTION INTRAVENOUS; SUBCUTANEOUS at 07:27

## 2023-04-05 RX ADMIN — ATORVASTATIN CALCIUM 40 MG: 40 TABLET, FILM COATED ORAL at 12:00

## 2023-04-05 RX ADMIN — OCTREOTIDE ACETATE 25 MCG/HR: 100 INJECTION, SOLUTION INTRAVENOUS; SUBCUTANEOUS at 13:14

## 2023-04-05 RX ADMIN — PANTOPRAZOLE SODIUM 8 MG/HR: 40 INJECTION, POWDER, FOR SOLUTION INTRAVENOUS at 01:32

## 2023-04-05 RX ADMIN — OCTREOTIDE ACETATE 25 MCG/HR: 200 INJECTION, SOLUTION INTRAVENOUS; SUBCUTANEOUS at 20:54

## 2023-04-05 RX ADMIN — SODIUM CHLORIDE 50 ML/HR: 9 INJECTION, SOLUTION INTRAVENOUS at 20:54

## 2023-04-05 RX ADMIN — ACETAMINOPHEN 650 MG: 325 TABLET, FILM COATED ORAL at 12:00

## 2023-04-05 NOTE — THERAPY EVALUATION
Pt on hold for OT evaluation today d/t low hgb 6.7 and pt going down for procedure later today.  Will follow up with pt tomorrow.

## 2023-04-05 NOTE — PROGRESS NOTES
[facility]    Name:  Ivania Harris   Age:  62 y.o.  Sex:  female  :  1960  MRN:  [id number]   Visit Number:  [id number]  Admission Date:  2023  Date Of Service:  23  Primary Care Physician:  Alessandro Mercer MD     LOS: 1 day :    Chief Complaint:      Weakness, diarrhea, concern for GI bleed, liver disease    Subjective:    Patient seen at bedside this morning.  No acute changes overnight.  Discussed likely cirrhosis and possibly having GI bleed related to this.  GI asked to see her in consult and will likely do an EGD.  She has noted abdominal distention.  Discussed her diarrhea was likely blood.    Hospital Course:    The patient is a chronically ill-appearing morbidly obese female who is 62 years old with a medical history of type 2 diabetes, apparent anemia unknown type, hyperlipidemia, hypertension, hypothyroidism, who presented to the emergency room with multiple complaints.    Upon ER work-up, she had a creatinine of 1.46 with a hemoglobin of 8.5 and platelets of 120.  She had positive FOBT testing.  A chest x-ray was possibly showing a left basilar pneumonia.  There was evidence of cirrhosis and portal hypertension with ascites with a nonobstructing right renal calculi.  There was bilateral effusions.  GI was consulted from the emergency room and she was started on octreotide and Protonix.  She received Rocephin and a normal saline bolus in addition.  She was admitted to the hospital service.    Patient had evidence of worsening anemia, was ordered 1 unit PRBCs on morning of 2023.  Plans for EGD today.    Review of Systems:     All systems were reviewed and negative except as mentioned in subjective, assessment and plan.    Vital Signs:    Temp:  [97.9 °F (36.6 °C)-98.7 °F (37.1 °C)] 98 °F (36.7 °C)  Heart Rate:  [74-96] 81  Resp:  [16-18] 18  BP: ()/(37-64) 120/42    Intake and output:    I/O last 3 completed shifts:  In: 100  [IV Piggyback:100]  Out: 150 [Urine:150]  No intake/output data recorded.    Physical Examination:    General Appearance:  Alert and cooperative.  Chronically ill-appearing   Head:  Atraumatic and normocephalic.   Eyes: Conjunctivae and sclerae normal, no icterus. No pallor.   Throat: No oral lesions, no thrush, oral mucosa moist.   Neck: Supple, trachea midline, no thyromegaly.   Lungs:    Crackles at the bases bilaterally.  Nontachypneic   Heart:  Normal S1 and S2, no murmur, no gallop, no rub. No JVD.   Abdomen:    Distended, positive fluid wave, no significant pain upon palpation.  Obese   Extremities: Supple, 1-2+ pitting lower extremity edema, no cyanosis, no clubbing.   Skin: No bleeding or rash.   Neurologic: Alert and oriented x 3. No facial asymmetry. Moves all four limbs. No tremors.      Laboratory results:    Results from last 7 days   Lab Units 04/05/23  0529 04/04/23  1431   SODIUM mmol/L 146* 142   POTASSIUM mmol/L 3.9 3.9   CHLORIDE mmol/L 112* 109*   CO2 mmol/L 24.2 24.5   BUN mg/dL 26* 23   CREATININE mg/dL 1.53* 1.46*   CALCIUM mg/dL 8.1* 8.2*   BILIRUBIN mg/dL  --  1.0   ALK PHOS U/L  --  100   ALT (SGPT) U/L  --  19   AST (SGOT) U/L  --  42*   GLUCOSE mg/dL 95 105*     Results from last 7 days   Lab Units 04/05/23  0529 04/04/23  2315 04/04/23  1431   WBC 10*3/mm3 2.86*  --  6.32   HEMOGLOBIN g/dL 6.7* 9.1* 8.5*   HEMATOCRIT % 18.7*  --  25.3*   PLATELETS 10*3/mm3 87*  --  120*     Results from last 7 days   Lab Units 04/05/23  0529   INR  1.48*             No results for input(s): PHART, ITE6PIM, PO2ART, HFI2HES, BASEEXCESS in the last 8760 hours.   I have reviewed the patient's laboratory results.    Radiology results:    CT Abdomen Pelvis Without Contrast    Result Date: 4/4/2023  FINAL REPORT TECHNIQUE: Routine axial images through the abdomen and pelvis were obtained. CLINICAL HISTORY: diarrhea, abd discomfort FINDINGS: Lower chest: There are left greater than right pleural effusions.   Abdomen:  The gallbladder has been removed.  There is cirrhosis with mild splenomegaly.  There are upper abdominal venous collaterals indicative of portal hypertension.  There are 2 small nonobstructing right renal calculi, the largest measuring 4 mm in diameter.  The other solid abdominal organs and ureters are unremarkable.  The GI tract is unremarkable, including the appendix.  Pelvis: The uterus, ovaries, and urinary bladder are normal.  There is large volume ascites and anasarca.  There is no pelvic or abdominal adenopathy or acute osseous abnormality.     Impression: Cirrhosis with portal hypertension and ascites.  Small nonobstructing right renal calculi.  Bilateral pleural effusions. Authenticated and Electronically Signed by Salomon Cantu M.D. on 04/04/2023 06:20:35 PM    XR Chest 1 View    Result Date: 4/4/2023   PROCEDURE: XR CHEST 1 VW-  HISTORY: generalized weakness  COMPARISON: 12/03/2021.  FINDINGS: The heart is mildly enlarged.. The mediastinum is unremarkable. Decreased inspiratory effort noted with crowding of the lung markings in both lung bases. There is new left basilar airspace disease, possible pneumonia. Right lung is clear. Calcified granuloma identified on the left... There is no pneumothorax.  There are no acute osseous abnormalities.      Impression: New left basilar airspace disease suggesting pneumonia, recommend follow-up..  .  This report was signed and finalized on 4/4/2023 3:16 PM by Katt Muse MD.    I have reviewed the patient's radiology reports.    Medication Review:     I have reviewed the patient's active and prn medications.     Problem List:      Pneumonia of left lung due to infectious organism, unspecified part of lung    Gastrointestinal hemorrhage    Cirrhosis of liver with ascites      Assessment:    Left lower lobe pneumonia, unspecified further, POA  Suspected GI bleed, POA  Acute on chronic anemia, POA  Acute kidney injury, POA  Suspected decompensated cirrhosis with  ascites  Morbid obesity, BMI 41.60  Thrombocytopenia associated with the cirrhosis  Diabetes mellitus  Hyperlipidemia  Hypertension  Hypothyroidism    Plan:    Suspected upper GI bleed  Acute on chronic anemia  Suspected decompensated cirrhosis with ascites, likely nonalcoholic fatty liver disease  Remains on octreotide and Protonix per GI consult.  Holding any anticoagulation and her aspirin.  And given 1 unit of PRBCs thus far we will repeat hemoglobin thereafter.  Will need EGD to rule out variceal bleeding.      Paracentesis has been ordered and we will follow-up on cultures and cell count.  Hepatic testing for causes of cirrhosis of already been ordered by GI.    Left lower lobe pneumonia  -On Rocephin  -Blood cultures obtained after patient received first dose of Rocephin in the ER.  Will follow.  Likely needs SBP prophylaxis regardless.     SILVINA  -Could be hepatorenal  -Avoid nephrotoxic drugs, holding lisinopril  -Continue to monitor renal function  -Urinalysis pending.  Added UDS    Unsure why patient is on Aricept?  No documented dementia per report.  She may actually be dealing with underlying hepatic encephalopathy if she has had confusion before.  May need to be placed on lactulose    Anticipate multiple days    DVT Prophylaxis: SCDs  Code Status: Full  Diet: N.p.o. currently  Discharge Plan: MARILYN Ross DO  04/05/23  11:32 EDT    Dictated utilizing Dragon dictation.

## 2023-04-05 NOTE — ANESTHESIA PREPROCEDURE EVALUATION
Anesthesia Evaluation     Patient summary reviewed and Nursing notes reviewed   no history of anesthetic complications:  NPO Solid Status: > 8 hours  NPO Liquid Status: > 8 hours           Airway   Mallampati: II  TM distance: >3 FB  Neck ROM: full  Possible difficult intubation  Dental - normal exam     Pulmonary - normal exam   (+) pneumonia resolved ,   Cardiovascular - normal exam    Rhythm: regular  Rate: normal    (+) hypertension, hyperlipidemia,       Neuro/Psych- negative ROS  GI/Hepatic/Renal/Endo    (+)  GI bleeding , liver disease cirrhosis, diabetes mellitus using insulin, thyroid problem hypothyroidism    Musculoskeletal (-) negative ROS    Abdominal    Substance History - negative use     OB/GYN negative ob/gyn ROS         Other - negative ROS                       Anesthesia Plan    ASA 3     MAC     (Pt told that intravenous sedation will be used as the primary anesthetic along with local anesthesia if necessary. Every effort will be made to make sure the patient is comfortable.     The patient was told they may or may not have recall for the procedure. It was further explained that if the MAC was not adequate that a general anesthetic with either an LMA or endotracheal tube would be required.     Will proceed with the plan of care.)  intravenous induction     Anesthetic plan, risks, benefits, and alternatives have been provided, discussed and informed consent has been obtained with: patient.        CODE STATUS:    Code Status (Patient has no pulse and is not breathing): CPR (Attempt to Resuscitate)  Medical Interventions (Patient has pulse or is breathing): Full Support

## 2023-04-05 NOTE — CONSULTS
In Patient Consult      Date of Consultation: 2023  Patient Name: Ivania Harris  MRN: [id number]  : 1960     Referring provider: Abhilash Valdivia MD    Primary care provider:  Alessandro Mercer MD    Reason for consultation: Cirrhosis, ascites, suspected GI bleed    History of Present Illness: This is a 62-year-old morbidly obese female patient with a prior history of hypertension, hyperlipidemia, hypothyroidism, diabetes mellitus was seen in the emergency room today on 2023 with complaints of generalized weakness since last few days and, nausea and diarrhea since 1 day duration    Patient states that she has been having loose stools past week or so with the associated nausea without any vomiting.  She did not see any blood in the stool however bowel movement she had in the emergency room was dark as per nursing staff.  She denies any fever chills.  She generally felt very weak and came to emergency room for evaluation.  She has some associated recent cough since few days.  She states that her bowel movement normally will be regular except occasional constipation.  She is currently on aspirin 325 mg p.o. daily    She had some blood work done by PCP's office recently and had a ultrasound ordered for further evaluation that has not been done.  She was not been told of any cirrhosis or chronic liver disease in the past.  No family history of any liver cancer or liver cirrhosis.    No prior history of an EGD or colonoscopy no family history of any colon cancer or GI malignancy.  She is nonalcoholic and non-smoker.    In the emergency room she had a CT abdomen pelvis done which revealed signs of cirrhosis with portal hypertension with a large ascites.  She also had a small nonobstructing right renal calculi and bilateral pleural effusions and possible pneumonia.  Stool occult blood test was positive.  Her hemoglobin was 8.5 g/dL with MCV of 98 and platelets 120,000.  No prior lab  work to compare.  Lab work also revealed creatinine of 1.46 with a BUN of 23.  Her alkaline phosphatase 100 ALT 19 AST 42 total bilirubin 1 albumin is 1.6.  Given her significant CT findings and suspicion of GI bleed and anemia, GI was consulted.      Subjective     Past Medical History:   Diagnosis Date   • Anemia    • Diabetes mellitus    • Hyperlipidemia    • Hypertension    • Hypothyroidism    • Short-term memory loss        Past Surgical History:   Procedure Laterality Date   • CHOLECYSTECTOMY         History reviewed. No pertinent family history.    Social History     Socioeconomic History   • Marital status:    Tobacco Use   • Smoking status: Never   • Smokeless tobacco: Never   Vaping Use   • Vaping Use: Never used   Substance and Sexual Activity   • Alcohol use: Yes     Comment: a couple margaritas a year   • Drug use: Never   • Sexual activity: Defer         Current Facility-Administered Medications:   •  octreotide (sandoSTATIN) 500 mcg in sodium chloride 0.9 % 100 mL (5 mcg/mL) infusion, 25 mcg/hr, Intravenous, Continuous, Meccariello Gerardo Oswaldo PA-C  •  pantoprazole (PROTONIX) 40 mg in 100mL NS IVPB, 8 mg/hr, Intravenous, Continuous, Meccariello Gerardo Oswaldo PA-C, Last Rate: 20 mL/hr at 04/04/23 1900, 8 mg/hr at 04/04/23 1900  •  [COMPLETED] Insert Peripheral IV, , , Once **AND** sodium chloride 0.9 % flush 10 mL, 10 mL, Intravenous, PRN, Meccariello Gerardo Oswaldo, PA-C    Current Outpatient Medications:   •  albuterol sulfate  (90 Base) MCG/ACT inhaler, Inhale 2 puffs Every 4 (Four) Hours As Needed for Wheezing., Disp: 18 g, Rfl: 0  •  Aspirin 325 MG capsule, Take 325 mg by mouth Daily., Disp: , Rfl:   •  atorvastatin (LIPITOR) 40 MG tablet, Take 40 mg by mouth Daily., Disp: , Rfl:   •  azithromycin (ZITHROMAX) 500 MG tablet, 1 po daily, Disp: 10 tablet, Rfl: 0  •  buPROPion XL (WELLBUTRIN XL) 150 MG 24 hr tablet, Take 150 mg by mouth Daily., Disp: , Rfl:   •  donepezil (ARICEPT) 10  MG tablet, Take 10 mg by mouth Every Night., Disp: , Rfl:   •  insulin aspart (NovoLOG) 100 UNIT/ML injection, Inject  under the skin into the appropriate area as directed 3 (Three) Times a Day Before Meals., Disp: , Rfl:   •  insulin glargine (Lantus) 100 UNIT/ML injection, Inject 60 Units under the skin into the appropriate area as directed 2 (Two) Times a Day., Disp: , Rfl:   •  ipratropium-albuterol (DUO-NEB) 0.5-2.5 mg/3 ml nebulizer, Nebulize q 4 h prn wheezing / shortness of breath, Disp: 360 mL, Rfl: 0  •  levothyroxine (SYNTHROID, LEVOTHROID) 100 MCG tablet, Take 300 mcg by mouth Daily., Disp: , Rfl:   •  lisinopril (PRINIVIL,ZESTRIL) 10 MG tablet, Take 10 mg by mouth Daily., Disp: , Rfl:   •  minocycline (MINOCIN,DYNACIN) 100 MG capsule, Take 100 mg by mouth Daily., Disp: , Rfl:   •  multivitamin with minerals (SENIOR MULTIVITAMIN PLUS PO), Take 1 tablet by mouth Daily., Disp: , Rfl:   •  predniSONE (DELTASONE) 20 MG tablet, 3 po daily for 3 days, 2 po daily for 3 days, 1 po daily for 3 days, Disp: 18 tablet, Rfl: 0  •  SITagliptin (JANUVIA) 50 MG tablet, Take 50 mg by mouth Daily., Disp: , Rfl:     No Known Allergies    Review of Systems   Constitutional: Positive for fatigue. Negative for fever.   HENT: Negative for sore throat and trouble swallowing.    Eyes: Negative for visual disturbance.   Respiratory: Negative for cough, chest tightness and shortness of breath.    Cardiovascular: Negative for chest pain, palpitations and leg swelling.   Gastrointestinal: Positive for diarrhea and nausea. Negative for abdominal pain, blood in stool, constipation, vomiting and indigestion.   Endocrine: Negative for polyphagia.   Genitourinary: Negative for dysuria and hematuria.   Musculoskeletal: Negative for back pain, joint swelling and neck pain.   Skin: Negative for rash, skin lesions and wound.   Neurological: Positive for weakness (Generalized weakness). Negative for dizziness, seizures, speech difficulty,  numbness and confusion.   Hematological: Negative for adenopathy. Does not bruise/bleed easily.   Psychiatric/Behavioral: Negative for hallucinations and depressed mood.        The following portions of the patient's history were reviewed and updated as appropriate: allergies, current medications, past family history, past medical history, past social history, past surgical history and problem list.    Objective     Vitals:    04/04/23 1830 04/04/23 1900 04/04/23 1930 04/04/23 1931   BP: 112/50 131/53 128/60    Pulse: 80 80  81   Resp:       Temp:       TempSrc:       SpO2: 97% 99%  99%   Weight:       Height:           Physical Exam  Vitals and nursing note reviewed.   Constitutional:       Appearance: She is well-developed. She is obese.   HENT:      Head: Normocephalic and atraumatic.      Right Ear: External ear normal.      Left Ear: External ear normal.   Eyes:      Comments: Pallor present   Neck:      Thyroid: No thyromegaly.      Trachea: No tracheal deviation.   Cardiovascular:      Rate and Rhythm: Normal rate and regular rhythm.      Heart sounds: No murmur heard.  Pulmonary:      Effort: Pulmonary effort is normal. No respiratory distress.      Breath sounds: Normal breath sounds.   Abdominal:      General: Abdomen is flat. Bowel sounds are normal. There is distension (Moderate abdominal distention partly gaseous in the center).      Palpations: Abdomen is soft. There is no mass.      Tenderness: There is no abdominal tenderness. There is no guarding or rebound.      Hernia: A hernia (Reducible umbilical hernia) is present.      Comments: Difficult to assess for free fluid due to large abdominal wall   Musculoskeletal:         General: Normal range of motion.      Cervical back: Normal range of motion and neck supple.   Skin:     General: Skin is warm and dry.      Findings: Rash present.      Comments: Chronic eczematoid changes in both lower extremities below the knee and also around the groin    Neurological:      General: No focal deficit present.      Mental Status: She is alert and oriented to person, place, and time.      Cranial Nerves: No cranial nerve deficit.      Sensory: No sensory deficit.   Psychiatric:         Mood and Affect: Mood normal.         Behavior: Behavior normal.         Thought Content: Thought content normal.         Judgment: Judgment normal.         Results from last 7 days   Lab Units 04/04/23  1431   SODIUM mmol/L 142   POTASSIUM mmol/L 3.9   CHLORIDE mmol/L 109*   CO2 mmol/L 24.5   BUN mg/dL 23   CREATININE mg/dL 1.46*   CALCIUM mg/dL 8.2*   ALBUMIN g/dL 1.6*   BILIRUBIN mg/dL 1.0   ALK PHOS U/L 100   ALT (SGPT) U/L 19   AST (SGOT) U/L 42*   GLUCOSE mg/dL 105*   WBC 10*3/mm3 6.32   HEMOGLOBIN g/dL 8.5*   PLATELETS 10*3/mm3 120*       Imaging Results (Last 24 Hours)     Procedure Component Value Units Date/Time    CT Abdomen Pelvis Without Contrast [[id number]] Collected: 04/04/23 1820     Updated: 04/04/23 1821    Narrative:      FINAL REPORT    TECHNIQUE:  Routine axial images through the abdomen and pelvis were  obtained.    CLINICAL HISTORY:  diarrhea, abd discomfort    FINDINGS:  Lower chest: There are left greater than right pleural  effusions.  Abdomen:  The gallbladder has been removed.  There  is cirrhosis with mild splenomegaly.  There are upper abdominal  venous collaterals indicative of portal hypertension.  There are  2 small nonobstructing right renal calculi, the largest  measuring 4 mm in diameter.  The other solid abdominal organs  and ureters are unremarkable.  The GI tract is unremarkable,  including the appendix.  Pelvis: The uterus, ovaries, and  urinary bladder are normal.  There is large volume ascites and  anasarca.  There is no pelvic or abdominal adenopathy or acute  osseous abnormality.      Impression:      Cirrhosis with portal hypertension and ascites.  Small  nonobstructing right renal calculi.  Bilateral pleural effusions.    Authenticated and  Electronically Signed by Salomon Cantu M.D. on  04/04/2023 06:20:35 PM    XR Chest 1 View [[id number]] Collected: 04/04/23 1515     Updated: 04/04/23 1518    Narrative:       PROCEDURE: XR CHEST 1 VW-     HISTORY: generalized weakness     COMPARISON: 12/03/2021.     FINDINGS: The heart is mildly enlarged.. The mediastinum is  unremarkable. Decreased inspiratory effort noted with crowding of the  lung markings in both lung bases. There is new left basilar airspace  disease, possible pneumonia. Right lung is clear. Calcified granuloma  identified on the left... There is no pneumothorax.  There are no acute  osseous abnormalities.       Impression:      New left basilar airspace disease suggesting pneumonia,  recommend follow-up..     .     This report was signed and finalized on 4/4/2023 3:16 PM by Katt Muse MD.          Assessment / Plan      Assessment/Recommendations:     1.  Suspected viral illness with cough and gastroenteritis/suspected pneumonia  2.  Suspected decompensated Leo cirrhosis with ascites; MELD 15 (4/2023)  3.  Suspected chronic normocytic anemia  4.  Occult  blood positive stool  5.  Morbid obesity with a BMI 41.60 with a serious comorbidity  6.  Acute kidney injury versus CKD vs hepatorenal syndrome  7.  Severe hypoalbuminemia  8.  Acquired thrombocytopenia associated with the cirrhosis    Patient history is more in favor of possible mild viral illness with a cough loose stool and nausea without any vomiting.  No history suggestive any overt GI bleed.  Patient likely has a chronic anemia and minimal mucosal bleeding with possible melena cannot be ruled out.  No prior hemoglobin to compare.  She does take ibuprofen intermittently and peptic ulcer disease needs to be ruled out    Her creatinine is 1.4.  No prior values to compare.  With a dehydration patient may have some prerenal component and some degree of hepatorenal syndrome suspected.    She appears to have a decompensated Leo cirrhosis with  ascites.  History is not suggestive any variceal bleeding however that cannot be ruled out.  I have discussed the risk and benefits involved with the procedure and patient is agreeable to proceed with the procedure.    She needs a full liver work-up  Patient clinically appears dehydrated with acute kidney injury  Clear liquids p.o.  Gentle IV fluid hydration findable x  IR guided diagnostic and therapeutic paracentesis  Protonix IV for 48 hours  Octreotide drip for now for 48 hours  Albumin 25% 3 times daily for 2 days  Rocephin 1 g IV daily  Transfuse to keep the Hgb more than 7 g/dL  Patient is currently on high-dose aspirin, will hold aspirin.  If patient is stable without any signs of overt GI bleed we will consider EGD on Friday with a possible variceal band ligation if present.  If any overt bleeding we will schedule her for endoscopy anytime interim.        Thank you very much for letting me participate in the care of this patient.  Please do not hesitate to call me if you have any questions.    Jens Mandujano MD  [facility] [place]  4/4/2023  20:09 EDT    Please note that portions of this note may have been completed with a voice recognition program.

## 2023-04-05 NOTE — PAYOR COMM NOTE
"Ivania King (62 y.o. Female)     Date of Birth   1960    Social Security Number       Address   [address] [place] [address] [place], [place] [address]    Home Phone   [phone]    MRN   [id number]       Caodaism   Christincarla    Marital Status                               Admission Date   23    Admission Type   Emergency    Admitting Provider   Marcos Altman MD    Attending Provider   Gabriella Ross DO    Department, Room/Bed   [facility] TELEMETRY        Discharge Date       Discharge Disposition       Discharge Destination                               Attending Provider: Gabriella Ross DO    Allergies: No Known Allergies    Isolation: None   Infection: None   Code Status: CPR    Ht: 165.1 cm (65\")   Wt: 141 kg (310 lb 13.6 oz)    Admission Cmt: None   Principal Problem: Pneumonia of left lung due to infectious organism, unspecified part of lung [J18.9]                 Active Insurance as of 2023     Primary Coverage     Payor Plan Insurance Group Employer/Plan Group    HUMANA MEDICARE REPLACEMENT HUMANA MEDICARE REPLACEMENT [id number]     Payor Plan Address Payor Plan Phone Number Payor Plan Fax Number Effective Dates    [address] [address] [phone]  2021 - None Entered    [facility] [id number]       Subscriber Name Subscriber Birth Date Member ID       IVANIA KING 1960 [id number]                 Emergency Contacts      (Rel.) Home Phone Work Phone Mobile Phone    GENARO BUCHANAN (Brother) [phone] -- --               History & Physical      Marcos Altman MD at [facility]            [facility] HOSPITALIST   HISTORY AND PHYSICAL      Name:  Ivania King   Age:  62 y.o.  Sex:  female  :  1960  MRN:  [id number]   Visit Number:  [id number]  Admission Date:  2023  Date Of Service:  23  Primary Care Physician:  Alessandro Mercer MD    Chief Complaint:     Generalized " weakness, diarrhea    History Of Presenting Illness:      Patient 62 years old female with a past medical history of diabetes mellitus, anemia, hyperlipidemia, hypertension and hypothyroidism who presented to the ER with a chief complaint of generalized weakness along with diarrhea.  Patient reports that she has had watery dark-colored diarrhea over the past 3 days associated with nausea but no vomiting.  Patient denies any abdominal pain or urinary symptoms.  Patient reports feeling weak all over which promoted her to come to the ER.  Patient also reporting mild cough that is nonproductive.  Patient denies any previous diagnosis of liver disease that she is aware of.     On ER evaluation, patient was hemodynamically stable, her work-up showed creatinine of 1.46, hemoglobin of 8.5, platelets of 120.  Hemoccult blood was positive.  Chest x-ray showed New left basilar airspace disease suggesting pneumonia. Cirrhosis with portal hypertension and ascites. Small nonobstructing right renal calculi.  Bilateral pleural effusions.  Case was discussed with gastroenterology Dr. Collado who recommended octreotide and Protonix drip and admission for the patient.  Patient received Rocephin, normal saline bolus in addition to Protonix and octreotide while in the ER.  Hospitalist consulted for admission, further evaluation and treatment.     Review Of Systems:    All systems were reviewed and negative except as mentioned in history of presenting illness, assessment and plan.    Past Medical History: Patient  has a past medical history of Anemia, Diabetes mellitus, Hyperlipidemia, Hypertension, Hypothyroidism, and Short-term memory loss.    Past Surgical History: Patient  has a past surgical history that includes Cholecystectomy.    Social History: Patient  reports that she has never smoked. She has never used smokeless tobacco. She reports current alcohol use. She reports that she does not use drugs.    Family History:  Patient's  family history has been reviewed and found to be noncontributory.    Allergies:      Patient has no known allergies.    Home Medications:    Prior to Admission Medications     Prescriptions Last Dose Informant Patient Reported? Taking?    albuterol sulfate  (90 Base) MCG/ACT inhaler   No No    Inhale 2 puffs Every 4 (Four) Hours As Needed for Wheezing.    Aspirin 325 MG capsule   Yes No    Take 325 mg by mouth Daily.    atorvastatin (LIPITOR) 40 MG tablet   Yes No    Take 40 mg by mouth Daily.    azithromycin (ZITHROMAX) 500 MG tablet   No No    1 po daily    buPROPion XL (WELLBUTRIN XL) 150 MG 24 hr tablet   Yes No    Take 150 mg by mouth Daily.    donepezil (ARICEPT) 10 MG tablet   Yes No    Take 10 mg by mouth Every Night.    insulin aspart (NovoLOG) 100 UNIT/ML injection   Yes No    Inject  under the skin into the appropriate area as directed 3 (Three) Times a Day Before Meals.    insulin glargine (Lantus) 100 UNIT/ML injection   Yes No    Inject 60 Units under the skin into the appropriate area as directed 2 (Two) Times a Day.    ipratropium-albuterol (DUO-NEB) 0.5-2.5 mg/3 ml nebulizer   No No    Nebulize q 4 h prn wheezing / shortness of breath    levothyroxine (SYNTHROID, LEVOTHROID) 100 MCG tablet   Yes No    Take 300 mcg by mouth Daily.    lisinopril (PRINIVIL,ZESTRIL) 10 MG tablet   Yes No    Take 10 mg by mouth Daily.    minocycline (MINOCIN,DYNACIN) 100 MG capsule   Yes No    Take 100 mg by mouth Daily.    multivitamin with minerals (SENIOR MULTIVITAMIN PLUS PO)   Yes No    Take 1 tablet by mouth Daily.    predniSONE (DELTASONE) 20 MG tablet   No No    3 po daily for 3 days, 2 po daily for 3 days, 1 po daily for 3 days    SITagliptin (JANUVIA) 50 MG tablet   Yes No    Take 50 mg by mouth Daily.        ED Medications:    Medications   sodium chloride 0.9 % flush 10 mL (has no administration in time range)   pantoprazole (PROTONIX) 40 mg in 100mL NS IVPB (8 mg/hr Intravenous New Bag 4/4/23 1900)  "  octreotide (sandoSTATIN) 500 mcg in sodium chloride 0.9 % 100 mL (5 mcg/mL) infusion (has no administration in time range)   sodium chloride 0.9 % bolus 1,000 mL (0 mL Intravenous Stopped 4/4/23 1736)   cefTRIAXone (ROCEPHIN) IVPB 1 g/50ml dextrose (premix) (0 g Intravenous Stopped 4/4/23 1845)   pantoprazole (PROTONIX) injection 80 mg (80 mg Intravenous Given 4/4/23 1858)   octreotide (sandoSTATIN) injection 50 mcg (50 mcg Intravenous Given 4/4/23 1859)     Vital Signs:  Temp:  [98.7 °F (37.1 °C)] 98.7 °F (37.1 °C)  Heart Rate:  [74-92] 81  Resp:  [18] 18  BP: ()/(49-64) 128/60        04/04/23  1409   Weight: 113 kg (250 lb)     Body mass index is 41.6 kg/m².    Physical Exam:     Most recent vital Signs: /60   Pulse 81   Temp 98.7 °F (37.1 °C) (Oral)   Resp 18   Ht 165.1 cm (65\")   Wt 113 kg (250 lb)   SpO2 99%   BMI 41.60 kg/m²     Physical Exam  Vitals and nursing note reviewed.   Constitutional:       General: She is not in acute distress.     Appearance: She is obese. She is ill-appearing.   HENT:      Head: Normocephalic and atraumatic.      Right Ear: External ear normal.      Left Ear: External ear normal.      Nose: Nose normal.      Mouth/Throat:      Mouth: Mucous membranes are moist.   Eyes:      Extraocular Movements: Extraocular movements intact.      Conjunctiva/sclera: Conjunctivae normal.      Pupils: Pupils are equal, round, and reactive to light.   Cardiovascular:      Rate and Rhythm: Normal rate and regular rhythm.      Pulses: Normal pulses.      Heart sounds: Normal heart sounds.   Pulmonary:      Effort: Pulmonary effort is normal. No respiratory distress.      Breath sounds: Normal breath sounds. Decreased air movement present. No wheezing or rhonchi.   Abdominal:      General: Bowel sounds are normal. There is distension.      Palpations: Abdomen is soft.      Tenderness: There is no abdominal tenderness. There is no guarding or rebound.      Comments: Obese abdomen "   Musculoskeletal:         General: Normal range of motion.      Cervical back: Normal range of motion and neck supple.      Right lower leg: No edema.      Left lower leg: No edema.   Skin:     General: Skin is warm and dry.      Findings: Rash present.      Comments: Chronic venous stasis in bilateral lower extremities.   Neurological:      General: No focal deficit present.      Mental Status: She is alert and oriented to person, place, and time. Mental status is at baseline.      Motor: No weakness.   Psychiatric:         Mood and Affect: Mood normal.         Behavior: Behavior normal.         Thought Content: Thought content normal.         Laboratory data:    I have reviewed the labs done in the emergency room.    Results from last 7 days   Lab Units 04/04/23  1431   SODIUM mmol/L 142   POTASSIUM mmol/L 3.9   CHLORIDE mmol/L 109*   CO2 mmol/L 24.5   BUN mg/dL 23   CREATININE mg/dL 1.46*   CALCIUM mg/dL 8.2*   BILIRUBIN mg/dL 1.0   ALK PHOS U/L 100   ALT (SGPT) U/L 19   AST (SGOT) U/L 42*   GLUCOSE mg/dL 105*     Results from last 7 days   Lab Units 04/04/23  1431   WBC 10*3/mm3 6.32   HEMOGLOBIN g/dL 8.5*   HEMATOCRIT % 25.3*   PLATELETS 10*3/mm3 120*                     Results from last 7 days   Lab Units 04/04/23  1431   LIPASE U/L 15               Invalid input(s): USDES,  BLOODU, NITRITITE, BACT, EP    Pain Management Panel    There is no flowsheet data to display.         Radiology:    CT Abdomen Pelvis Without Contrast    Result Date: 4/4/2023  FINAL REPORT TECHNIQUE: Routine axial images through the abdomen and pelvis were obtained. CLINICAL HISTORY: diarrhea, abd discomfort FINDINGS: Lower chest: There are left greater than right pleural effusions.  Abdomen:  The gallbladder has been removed.  There is cirrhosis with mild splenomegaly.  There are upper abdominal venous collaterals indicative of portal hypertension.  There are 2 small nonobstructing right renal calculi, the largest measuring 4 mm in  diameter.  The other solid abdominal organs and ureters are unremarkable.  The GI tract is unremarkable, including the appendix.  Pelvis: The uterus, ovaries, and urinary bladder are normal.  There is large volume ascites and anasarca.  There is no pelvic or abdominal adenopathy or acute osseous abnormality.     Cirrhosis with portal hypertension and ascites.  Small nonobstructing right renal calculi.  Bilateral pleural effusions. Authenticated and Electronically Signed by Salomon Cantu M.D. on 04/04/2023 06:20:35 PM    XR Chest 1 View    Result Date: 4/4/2023   PROCEDURE: XR CHEST 1 VW-  HISTORY: generalized weakness  COMPARISON: 12/03/2021.  FINDINGS: The heart is mildly enlarged.. The mediastinum is unremarkable. Decreased inspiratory effort noted with crowding of the lung markings in both lung bases. There is new left basilar airspace disease, possible pneumonia. Right lung is clear. Calcified granuloma identified on the left... There is no pneumothorax.  There are no acute osseous abnormalities.      New left basilar airspace disease suggesting pneumonia, recommend follow-up..  .  This report was signed and finalized on 4/4/2023 3:16 PM by Katt Muse MD.      Assessment:    Left lower lobe pneumonia, unspecified further, POA  Suspected GI bleed, POA  Acute on chronic anemia, POA  Acute kidney injury, POA  Suspected decompensated cirrhosis with ascites  Morbid obesity, BMI 41.60  Thrombocytopenia associated with the cirrhosis  Diabetes mellitus  Hyperlipidemia  Hypertension  Hypothyroidism    Plan:    Patient is admitted for further evaluation and treatment.    Suspected GI bleed  Acute on chronic anemia  Suspected decompensated cirrhosis with ascites  -Started on Protonix and octreotide drip.  -Dr. Collado consulted, appreciate his recommendations.  -We will hold aspirin  -We will monitor hemoglobin and transfuse as indicated to keep hemoglobin above 7.  - EGD per Dr. Collado recommendations for possible  variceal bleed.  -We will keep patient on clear liquid diet.  -Ultrasound check for ascites, will consult with IR in a.m. for paracentesis  -Hepatitis panel and GI panel ordered.    Left lower lobe pneumonia  -On Rocephin  -Blood cultures obtained after patient received first dose of Rocephin in the ER.  Will follow.    SILVINA  -Could be hepatorenal  -Avoid nephrotoxic drugs, holding lisinopril  -Continue to monitor renal function  -Urinalysis pending.    Sliding-scale insulin, will check hemoglobin A1c, continue home meds otherwise as warranted.  Further orders as indicated per clinical course.    Risk Assessment: Moderate to high  DVT Prophylaxis: SCDs, avoid chemoprophylaxis due to suspected GI bleed  Code Status: Full  Diet: Clear liquid diet    Advance Care Planning   ACP discussion was held with the patient during this visit. Patient does not have an advance directive, information provided.      Marcos Altman MD  04/04/23  19:59 EDT    Dictated utilizing Dragon dictation.    Electronically signed by Marcos Altman MD at 04/05/23 0719          Emergency Department Notes      Gerardo Rogers PA-C at 04/04/23 1420          Subjective  History of Present Illness:    Chief Complaint:   Chief Complaint   Patient presents with   • Weakness - Generalized      History of Present Illness: Ivania Harris is a 62 y.o. female who presents to the emergency department complaining of generalized weakness and diarrhea with nausea.  Patient presents to the ED via ambulance.  She states she had 1 watery nonbloody diarrheal bowel movement daily for the past 3 days with nausea intermittently and no vomiting.  No abdominal pain.  No urinary symptoms.  She complains mainly of just feeling overall generally weak.  She does have a mild dry cough  Onset: 3 days ago  Duration: Intermittent  Exacerbating / Alleviating factors: None  Associated symptoms: None      Nurses Notes reviewed and agree, including vitals,  "allergies, social history and prior medical history.     Review of Systems   Constitutional: Negative.         Generalized weakness   HENT: Negative.    Eyes: Negative.    Respiratory: Negative.    Cardiovascular: Negative.    Gastrointestinal: Positive for diarrhea and nausea. Negative for blood in stool.   Genitourinary: Negative.    Musculoskeletal: Negative.    Skin: Negative.    Neurological: Negative.    Psychiatric/Behavioral: Negative.        Past Medical History:   Diagnosis Date   • Anemia    • Diabetes mellitus    • Hyperlipidemia    • Hypertension    • Hypothyroidism    • Short-term memory loss        Allergies:    Patient has no known allergies.      Past Surgical History:   Procedure Laterality Date   • CHOLECYSTECTOMY           Social History     Socioeconomic History   • Marital status:    Tobacco Use   • Smoking status: Never   • Smokeless tobacco: Never   Vaping Use   • Vaping Use: Never used   Substance and Sexual Activity   • Alcohol use: Yes     Comment: a couple margaritas a year   • Drug use: Never   • Sexual activity: Defer         History reviewed. No pertinent family history.    Objective  Physical Exam:  /62   Pulse 84   Temp 98.7 °F (37.1 °C) (Oral)   Resp 18   Ht 165.1 cm (65\")   Wt 113 kg (250 lb)   SpO2 99%   BMI 41.60 kg/m²      Physical Exam  Vitals and nursing note reviewed.   Constitutional:       General: She is not in acute distress.     Appearance: Normal appearance. She is obese. She is not ill-appearing, toxic-appearing or diaphoretic.   HENT:      Head: Normocephalic and atraumatic.      Nose: Nose normal.   Eyes:      Extraocular Movements: Extraocular movements intact.   Cardiovascular:      Rate and Rhythm: Normal rate and regular rhythm.      Comments: Systolic murmur noted  Pulmonary:      Effort: Pulmonary effort is normal.   Abdominal:      General: Abdomen is flat.   Musculoskeletal:         General: Normal range of motion.      Cervical back: " Normal range of motion.   Skin:     General: Skin is warm and dry.      Comments: Excoriated lower extremities with stasis dermatitis and onychomycosis bilaterally   Neurological:      General: No focal deficit present.      Mental Status: She is alert. Mental status is at baseline.   Psychiatric:         Mood and Affect: Mood normal.         Behavior: Behavior normal.           Procedures    ED Course:    ED Course as of 04/04/23 1909 Tue Apr 04, 2023   1622 Fecal Occult Blood(!): Positive [TM]   1622 ALT (SGPT): 19 [TM]   1727 AST (SGOT)(!): 42 [TM]   1727 Alkaline Phosphatase: 100 [TM]   1727 Total Bilirubin: 1.0 [TM]      ED Course User Index  [TM] Gerardo Rogers PA-C       Lab Results (last 24 hours)     Procedure Component Value Units Date/Time    CBC & Differential [[id number]]  (Abnormal) Collected: 04/04/23 1431    Specimen: Blood Updated: 04/04/23 1437    Narrative:      The following orders were created for panel order CBC & Differential.  Procedure                               Abnormality         Status                     ---------                               -----------         ------                     CBC Auto Differential[[id number]]        Abnormal            Final result                 Please view results for these tests on the individual orders.    Comprehensive Metabolic Panel [[id number]]  (Abnormal) Collected: 04/04/23 1431    Specimen: Blood Updated: 04/04/23 1500     Glucose 105 mg/dL      BUN 23 mg/dL      Creatinine 1.46 mg/dL      Sodium 142 mmol/L      Potassium 3.9 mmol/L      Chloride 109 mmol/L      CO2 24.5 mmol/L      Calcium 8.2 mg/dL      Total Protein 6.3 g/dL      Albumin 1.6 g/dL      ALT (SGPT) 19 U/L      AST (SGOT) 42 U/L      Alkaline Phosphatase 100 U/L      Total Bilirubin 1.0 mg/dL      Globulin 4.7 gm/dL      A/G Ratio 0.3 g/dL      BUN/Creatinine Ratio 15.8     Anion Gap 8.5 mmol/L      eGFR 40.3 mL/min/1.73     Narrative:      GFR Normal >60  Chronic  Kidney Disease <60  Kidney Failure <15      CBC Auto Differential [[id number]]  (Abnormal) Collected: 04/04/23 1431    Specimen: Blood Updated: 04/04/23 1437     WBC 6.32 10*3/mm3      RBC 2.57 10*6/mm3      Hemoglobin 8.5 g/dL      Hematocrit 25.3 %      MCV 98.4 fL      MCH 33.1 pg      MCHC 33.6 g/dL      RDW 16.0 %      RDW-SD 54.3 fl      MPV 11.6 fL      Platelets 120 10*3/mm3      Neutrophil % 72.3 %      Lymphocyte % 11.1 %      Monocyte % 12.2 %      Eosinophil % 3.2 %      Basophil % 0.9 %      Immature Grans % 0.3 %      Neutrophils, Absolute 4.57 10*3/mm3      Lymphocytes, Absolute 0.70 10*3/mm3      Monocytes, Absolute 0.77 10*3/mm3      Eosinophils, Absolute 0.20 10*3/mm3      Basophils, Absolute 0.06 10*3/mm3      Immature Grans, Absolute 0.02 10*3/mm3      nRBC 0.0 /100 WBC     Lipase [[id number]]  (Normal) Collected: 04/04/23 1431    Specimen: Blood Updated: 04/04/23 1742     Lipase 15 U/L     Occult Blood X 1, Stool - Stool, Per Rectum [[id number]]  (Abnormal) Collected: 04/04/23 1505    Specimen: Stool from Per Rectum Updated: 04/04/23 1511     Fecal Occult Blood Positive           CT Abdomen Pelvis Without Contrast    Result Date: 4/4/2023  FINAL REPORT TECHNIQUE: Routine axial images through the abdomen and pelvis were obtained. CLINICAL HISTORY: diarrhea, abd discomfort FINDINGS: Lower chest: There are left greater than right pleural effusions.  Abdomen:  The gallbladder has been removed.  There is cirrhosis with mild splenomegaly.  There are upper abdominal venous collaterals indicative of portal hypertension.  There are 2 small nonobstructing right renal calculi, the largest measuring 4 mm in diameter.  The other solid abdominal organs and ureters are unremarkable.  The GI tract is unremarkable, including the appendix.  Pelvis: The uterus, ovaries, and urinary bladder are normal.  There is large volume ascites and anasarca.  There is no pelvic or abdominal adenopathy or acute osseous  abnormality.     Impression: Cirrhosis with portal hypertension and ascites.  Small nonobstructing right renal calculi.  Bilateral pleural effusions. Authenticated and Electronically Signed by Salomon Cantu M.D. on 04/04/2023 06:20:35 PM    XR Chest 1 View    Result Date: 4/4/2023   PROCEDURE: XR CHEST 1 VW-  HISTORY: generalized weakness  COMPARISON: 12/03/2021.  FINDINGS: The heart is mildly enlarged.. The mediastinum is unremarkable. Decreased inspiratory effort noted with crowding of the lung markings in both lung bases. There is new left basilar airspace disease, possible pneumonia. Right lung is clear. Calcified granuloma identified on the left... There is no pneumothorax.  There are no acute osseous abnormalities.      Impression: New left basilar airspace disease suggesting pneumonia, recommend follow-up..  .  This report was signed and finalized on 4/4/2023 3:16 PM by Katt Muse MD.         BRYCE Harris is a 62 y.o. female who presents to the emergency department for evaluation of generalized weakness, cough, diarrhea    Differential diagnosis includes pneumonia, electrolyte abnormality, dehydration, anemia among other etiologies.    CBC, CMP, lipase, urinalysis, stool GI PCR, CT scan abdomen pelvis without contrast ordered for further evaluation of the patient's presentation.    Chart review if available included outside testing, previous visits, prior labs, prior imaging, available notes from prior evaluations or visits with specialists, medication list, allergies, past medical history, past surgical history when applicable.    Patient was treated with IV fluids, octreotide, Protonix, Rocephin    Spoke with Dr. Mandujano GI, recommends octreotide and Protonix bolus and drip of both, we will plan for an EGD in the morning.  Did make him aware of her occult positive stool and hemoglobin of 8.5.  Dr. Altman graciously excepted the patient for admission.    Plan for disposition is patient to the  hospital.  Patient/family comfortable with and understanding of the plan.    30 minutes of critical care provided. This time excludes other billable procedures. Time does include preparation of documents, medical consultations, review of old records, and direct bedside care. Patient is at high risk for life-threatening deterioration due to gi bleeding requiring iv protonix and octreotide and admission, gi consultation.         Final diagnoses:   Pneumonia of left lung due to infectious organism, unspecified part of lung   Gastrointestinal hemorrhage, unspecified gastrointestinal hemorrhage type   Other cirrhosis of liver        Gerardo Rogers PA-C  04/04/23 1906       Gerardo Rogers PA-C  04/04/23 1909      Electronically signed by Gerardo Rogers PA-C at 04/04/23 1909     Joyce Suarez at 04/04/23 1850     Summary:Hospitalist             At this time,  contacted and transferred to YE Carrillo.      Electronically signed by Joyce Suarez at 04/04/23 1850     Andres Jaeger at 04/04/23 1919        Bed assignment will be given once other pt is moved off the floor and room is cleaned     Electronically signed by Andres Jaeger at 04/04/23 1928     Andres Jaeger at 04/04/23 2130        Room 423 assigned     Electronically signed by Andres Jaeger at 04/04/23 2130       Lab Results (last 48 hours)     Procedure Component Value Units Date/Time    Hepatitis C Antibody [[id number]]  (Normal) Collected: 04/05/23 0528    Specimen: Blood Updated: 04/05/23 0707     Hepatitis C Ab Non-Reactive    Narrative:      Results may be falsely decreased if patient taking Biotin.      Procalcitonin [[id number]]  (Abnormal) Collected: 04/05/23 0529    Specimen: Blood Updated: 04/05/23 0706     Procalcitonin 0.37 ng/mL     Narrative:      As a Marker for Sepsis (Non-Neonates):    1. <0.5 ng/mL represents a low risk of severe sepsis and/or septic shock.  2. >2 ng/mL represents a high risk of  "severe sepsis and/or septic shock.    As a Marker for Lower Respiratory Tract Infections that require antibiotic therapy:    PCT on Admission    Antibiotic Therapy       6-12 Hrs later    >0.5                Strongly Recommended  >0.25 - <0.5        Recommended   0.1 - 0.25          Discouraged              Remeasure/reassess PCT  <0.1                Strongly Discouraged     Remeasure/reassess PCT    As 28 day mortality risk marker: \"Change in Procalcitonin Result\" (>80% or <=80%) if Day 0 (or Day 1) and Day 4 values are available. Refer to http://www.[facility]-pct-calculator.com    Change in PCT <=80%  A decrease of PCT levels below or equal to 80% defines a positive change in PCT test result representing a higher risk for 28-day all-cause mortality of patients diagnosed with severe sepsis for septic shock.    Change in PCT >80%  A decrease of PCT levels of more than 80% defines a negative change in PCT result representing a lower risk for 28-day all-cause mortality of patients diagnosed with severe sepsis or septic shock.       CBC & Differential [[id number]]  (Abnormal) Collected: 04/05/23 0529    Specimen: Blood Updated: 04/05/23 0618    Narrative:      The following orders were created for panel order CBC & Differential.  Procedure                               Abnormality         Status                     ---------                               -----------         ------                     CBC Auto Differential[[id number]]        Abnormal            Final result                 Please view results for these tests on the individual orders.    CBC Auto Differential [[id number]]  (Abnormal) Collected: 04/05/23 0529    Specimen: Blood Updated: 04/05/23 0618     WBC 2.86 10*3/mm3      RBC 1.90 10*6/mm3      Hemoglobin 6.7 g/dL      Hematocrit 18.7 %      MCV 98.4 fL      MCH 35.3 pg      MCHC 35.8 g/dL      RDW 16.3 %      RDW-SD 51.7 fl      MPV 11.3 fL      Platelets 87 10*3/mm3      Neutrophil % 62.3 %      " Lymphocyte % 18.9 %      Monocyte % 14.0 %      Eosinophil % 3.5 %      Basophil % 1.0 %      Immature Grans % 0.3 %      Neutrophils, Absolute 1.78 10*3/mm3      Lymphocytes, Absolute 0.54 10*3/mm3      Monocytes, Absolute 0.40 10*3/mm3      Eosinophils, Absolute 0.10 10*3/mm3      Basophils, Absolute 0.03 10*3/mm3      Immature Grans, Absolute 0.01 10*3/mm3      nRBC 0.0 /100 WBC     aPTT [[id number]]  (Abnormal) Collected: 04/05/23 0529    Specimen: Blood Updated: 04/05/23 0618     PTT 21.9 seconds     Protime-INR [[id number]]  (Abnormal) Collected: 04/05/23 0529    Specimen: Blood Updated: 04/05/23 0618     Protime 18.4 Seconds      INR 1.48    Narrative:      Suggested INR therapeutic range for stable oral anticoagulant therapy:    Low Intensity therapy:   1.5-2.0  Moderate Intensity therapy:   2.0-3.0  High Intensity therapy:   2.5-4.0    POC Glucose Once [[id number]]  (Normal) Collected: 04/05/23 0611    Specimen: Blood Updated: 04/05/23 0618     Glucose 105 mg/dL      Comment: Serial Number: [id number]:  [id number]       Hemoglobin A1c [[id number]]  (Normal) Collected: 04/05/23 0528    Specimen: Blood Updated: 04/05/23 0615     Hemoglobin A1C 5.50 %     Narrative:      Hemoglobin A1C Ranges:    Increased Risk for Diabetes  5.7% to 6.4%  Diabetes                     >= 6.5%  Diabetic Goal                < 7.0%    Basic Metabolic Panel [[id number]]  (Abnormal) Collected: 04/05/23 0529    Specimen: Blood Updated: 04/05/23 0607     Glucose 95 mg/dL      BUN 26 mg/dL      Creatinine 1.53 mg/dL      Sodium 146 mmol/L      Potassium 3.9 mmol/L      Chloride 112 mmol/L      CO2 24.2 mmol/L      Calcium 8.1 mg/dL      BUN/Creatinine Ratio 17.0     Anion Gap 9.8 mmol/L      eGFR 38.1 mL/min/1.73     Narrative:      GFR Normal >60  Chronic Kidney Disease <60  Kidney Failure <15      Hepatitis B Surface Antibody [[id number]] Collected: 04/05/23 0528    Specimen: Blood Updated: 04/05/23 0543    Alpha - 1 - Antitrypsin  [[id number]] Collected: 04/05/23 0528    Specimen: Blood Updated: 04/05/23 0543    Ceruloplasmin [[id number]] Collected: 04/05/23 0528    Specimen: Blood Updated: 04/05/23 0543    Antinuclear Antigen Antibody, IFA [[id number]] Collected: 04/05/23 0528    Specimen: Blood Updated: 04/05/23 0543    Anti-Smooth Muscle Antibody Titer [[id number]] Collected: 04/05/23 0528    Specimen: Blood Updated: 04/05/23 0543    Mitochondrial Antibodies, M2 [[id number]] Collected: 04/05/23 0528    Specimen: Blood Updated: 04/05/23 0543    Hepatitis B Core Antibody, Total [[id number]] Collected: 04/05/23 0528    Specimen: Blood Updated: 04/05/23 0543    Hepatitis B Surface Antigen [[id number]] Collected: 04/05/23 0528    Specimen: Blood Updated: 04/05/23 0543    Hepatitis A Antibody, Total [[id number]] Collected: 04/05/23 0528    Specimen: Blood Updated: 04/05/23 0543    Hemoglobin [[id number]]  (Abnormal) Collected: 04/04/23 2315    Specimen: Blood Updated: 04/05/23 0001     Hemoglobin 9.1 g/dL     Blood Culture With WENDY - Blood, Hand, Right [[id number]] Collected: 04/04/23 2300    Specimen: Blood from Hand, Right Updated: 04/04/23 2350    Blood Culture With WENDY - Blood, Hand, Left [[id number]] Collected: 04/04/23 2315    Specimen: Blood from Hand, Left Updated: 04/04/23 2344    Lipase [[id number]]  (Normal) Collected: 04/04/23 1431    Specimen: Blood Updated: 04/04/23 1742     Lipase 15 U/L     Occult Blood X 1, Stool - Stool, Per Rectum [[id number]]  (Abnormal) Collected: 04/04/23 1505    Specimen: Stool from Per Rectum Updated: 04/04/23 1511     Fecal Occult Blood Positive    Comprehensive Metabolic Panel [[id number]]  (Abnormal) Collected: 04/04/23 1431    Specimen: Blood Updated: 04/04/23 1500     Glucose 105 mg/dL      BUN 23 mg/dL      Creatinine 1.46 mg/dL      Sodium 142 mmol/L      Potassium 3.9 mmol/L      Chloride 109 mmol/L      CO2 24.5 mmol/L      Calcium 8.2 mg/dL      Total Protein 6.3 g/dL      Albumin 1.6 g/dL      ALT (SGPT) 19  U/L      AST (SGOT) 42 U/L      Alkaline Phosphatase 100 U/L      Total Bilirubin 1.0 mg/dL      Globulin 4.7 gm/dL      A/G Ratio 0.3 g/dL      BUN/Creatinine Ratio 15.8     Anion Gap 8.5 mmol/L      eGFR 40.3 mL/min/1.73     Narrative:      GFR Normal >60  Chronic Kidney Disease <60  Kidney Failure <15      CBC & Differential [[id number]]  (Abnormal) Collected: 04/04/23 1431    Specimen: Blood Updated: 04/04/23 1437    Narrative:      The following orders were created for panel order CBC & Differential.  Procedure                               Abnormality         Status                     ---------                               -----------         ------                     CBC Auto Differential[[id number]]        Abnormal            Final result                 Please view results for these tests on the individual orders.    CBC Auto Differential [[id number]]  (Abnormal) Collected: 04/04/23 1431    Specimen: Blood Updated: 04/04/23 1437     WBC 6.32 10*3/mm3      RBC 2.57 10*6/mm3      Hemoglobin 8.5 g/dL      Hematocrit 25.3 %      MCV 98.4 fL      MCH 33.1 pg      MCHC 33.6 g/dL      RDW 16.0 %      RDW-SD 54.3 fl      MPV 11.6 fL      Platelets 120 10*3/mm3      Neutrophil % 72.3 %      Lymphocyte % 11.1 %      Monocyte % 12.2 %      Eosinophil % 3.2 %      Basophil % 0.9 %      Immature Grans % 0.3 %      Neutrophils, Absolute 4.57 10*3/mm3      Lymphocytes, Absolute 0.70 10*3/mm3      Monocytes, Absolute 0.77 10*3/mm3      Eosinophils, Absolute 0.20 10*3/mm3      Basophils, Absolute 0.06 10*3/mm3      Immature Grans, Absolute 0.02 10*3/mm3      nRBC 0.0 /100 WBC           Imaging Results (Last 24 Hours)     Procedure Component Value Units Date/Time    CT Abdomen Pelvis Without Contrast [[id number]] Collected: 04/04/23 1820     Updated: 04/04/23 1821    Narrative:      FINAL REPORT    TECHNIQUE:  Routine axial images through the abdomen and pelvis were  obtained.    CLINICAL HISTORY:  diarrhea, abd  discomfort    FINDINGS:  Lower chest: There are left greater than right pleural  effusions.  Abdomen:  The gallbladder has been removed.  There  is cirrhosis with mild splenomegaly.  There are upper abdominal  venous collaterals indicative of portal hypertension.  There are  2 small nonobstructing right renal calculi, the largest  measuring 4 mm in diameter.  The other solid abdominal organs  and ureters are unremarkable.  The GI tract is unremarkable,  including the appendix.  Pelvis: The uterus, ovaries, and  urinary bladder are normal.  There is large volume ascites and  anasarca.  There is no pelvic or abdominal adenopathy or acute  osseous abnormality.      Impression:      Cirrhosis with portal hypertension and ascites.  Small  nonobstructing right renal calculi.  Bilateral pleural effusions.    Authenticated and Electronically Signed by Salomon Cantu M.D. on  04/04/2023 06:20:35 PM    XR Chest 1 View [[id number]] Collected: 04/04/23 1515     Updated: 04/04/23 1518    Narrative:       PROCEDURE: XR CHEST 1 VW-     HISTORY: generalized weakness     COMPARISON: 12/03/2021.     FINDINGS: The heart is mildly enlarged.. The mediastinum is  unremarkable. Decreased inspiratory effort noted with crowding of the  lung markings in both lung bases. There is new left basilar airspace  disease, possible pneumonia. Right lung is clear. Calcified granuloma  identified on the left... There is no pneumothorax.  There are no acute  osseous abnormalities.       Impression:      New left basilar airspace disease suggesting pneumonia,  recommend follow-up..     .     This report was signed and finalized on 4/4/2023 3:16 PM by Katt Muse MD.        Physician Progress Notes (last 24 hours)  Notes from 04/04/23 0955 through 04/05/23 0955   No notes of this type exist for this encounter.         Consult Notes (last 24 hours)  Notes from 04/04/23 0955 through 04/05/23 0955   No notes of this type exist for this encounter.

## 2023-04-05 NOTE — THERAPY EVALUATION
PT order received this date. PT evaluation was held at this time d/t pt's hemoglobin being 6.7 and pt going down for a procedure later today. PT will follow up w/ pt tomorrow.

## 2023-04-05 NOTE — H&P (VIEW-ONLY)
In Patient Consult      Date of Consultation: 2023  Patient Name: Ivania Harris  MRN: [id number]  : 1960     Referring provider: Abhilash Valdivia MD    Primary care provider:  Alessandro Mercer MD    Reason for consultation: Cirrhosis, ascites, suspected GI bleed    History of Present Illness: This is a 62-year-old morbidly obese female patient with a prior history of hypertension, hyperlipidemia, hypothyroidism, diabetes mellitus was seen in the emergency room today on 2023 with complaints of generalized weakness since last few days and, nausea and diarrhea since 1 day duration    Patient states that she has been having loose stools past week or so with the associated nausea without any vomiting.  She did not see any blood in the stool however bowel movement she had in the emergency room was dark as per nursing staff.  She denies any fever chills.  She generally felt very weak and came to emergency room for evaluation.  She has some associated recent cough since few days.  She states that her bowel movement normally will be regular except occasional constipation.  She is currently on aspirin 325 mg p.o. daily    She had some blood work done by PCP's office recently and had a ultrasound ordered for further evaluation that has not been done.  She was not been told of any cirrhosis or chronic liver disease in the past.  No family history of any liver cancer or liver cirrhosis.    No prior history of an EGD or colonoscopy no family history of any colon cancer or GI malignancy.  She is nonalcoholic and non-smoker.    In the emergency room she had a CT abdomen pelvis done which revealed signs of cirrhosis with portal hypertension with a large ascites.  She also had a small nonobstructing right renal calculi and bilateral pleural effusions and possible pneumonia.  Stool occult blood test was positive.  Her hemoglobin was 8.5 g/dL with MCV of 98 and platelets 120,000.  No prior lab  work to compare.  Lab work also revealed creatinine of 1.46 with a BUN of 23.  Her alkaline phosphatase 100 ALT 19 AST 42 total bilirubin 1 albumin is 1.6.  Given her significant CT findings and suspicion of GI bleed and anemia, GI was consulted.      Subjective     Past Medical History:   Diagnosis Date   • Anemia    • Diabetes mellitus    • Hyperlipidemia    • Hypertension    • Hypothyroidism    • Short-term memory loss        Past Surgical History:   Procedure Laterality Date   • CHOLECYSTECTOMY         History reviewed. No pertinent family history.    Social History     Socioeconomic History   • Marital status:    Tobacco Use   • Smoking status: Never   • Smokeless tobacco: Never   Vaping Use   • Vaping Use: Never used   Substance and Sexual Activity   • Alcohol use: Yes     Comment: a couple margaritas a year   • Drug use: Never   • Sexual activity: Defer         Current Facility-Administered Medications:   •  octreotide (sandoSTATIN) 500 mcg in sodium chloride 0.9 % 100 mL (5 mcg/mL) infusion, 25 mcg/hr, Intravenous, Continuous, Meccariello Gerardo Oswaldo PA-C  •  pantoprazole (PROTONIX) 40 mg in 100mL NS IVPB, 8 mg/hr, Intravenous, Continuous, Meccariello Gerardo Oswaldo PA-C, Last Rate: 20 mL/hr at 04/04/23 1900, 8 mg/hr at 04/04/23 1900  •  [COMPLETED] Insert Peripheral IV, , , Once **AND** sodium chloride 0.9 % flush 10 mL, 10 mL, Intravenous, PRN, Meccariello Gerardo Oswaldo, PA-C    Current Outpatient Medications:   •  albuterol sulfate  (90 Base) MCG/ACT inhaler, Inhale 2 puffs Every 4 (Four) Hours As Needed for Wheezing., Disp: 18 g, Rfl: 0  •  Aspirin 325 MG capsule, Take 325 mg by mouth Daily., Disp: , Rfl:   •  atorvastatin (LIPITOR) 40 MG tablet, Take 40 mg by mouth Daily., Disp: , Rfl:   •  azithromycin (ZITHROMAX) 500 MG tablet, 1 po daily, Disp: 10 tablet, Rfl: 0  •  buPROPion XL (WELLBUTRIN XL) 150 MG 24 hr tablet, Take 150 mg by mouth Daily., Disp: , Rfl:   •  donepezil (ARICEPT) 10  MG tablet, Take 10 mg by mouth Every Night., Disp: , Rfl:   •  insulin aspart (NovoLOG) 100 UNIT/ML injection, Inject  under the skin into the appropriate area as directed 3 (Three) Times a Day Before Meals., Disp: , Rfl:   •  insulin glargine (Lantus) 100 UNIT/ML injection, Inject 60 Units under the skin into the appropriate area as directed 2 (Two) Times a Day., Disp: , Rfl:   •  ipratropium-albuterol (DUO-NEB) 0.5-2.5 mg/3 ml nebulizer, Nebulize q 4 h prn wheezing / shortness of breath, Disp: 360 mL, Rfl: 0  •  levothyroxine (SYNTHROID, LEVOTHROID) 100 MCG tablet, Take 300 mcg by mouth Daily., Disp: , Rfl:   •  lisinopril (PRINIVIL,ZESTRIL) 10 MG tablet, Take 10 mg by mouth Daily., Disp: , Rfl:   •  minocycline (MINOCIN,DYNACIN) 100 MG capsule, Take 100 mg by mouth Daily., Disp: , Rfl:   •  multivitamin with minerals (SENIOR MULTIVITAMIN PLUS PO), Take 1 tablet by mouth Daily., Disp: , Rfl:   •  predniSONE (DELTASONE) 20 MG tablet, 3 po daily for 3 days, 2 po daily for 3 days, 1 po daily for 3 days, Disp: 18 tablet, Rfl: 0  •  SITagliptin (JANUVIA) 50 MG tablet, Take 50 mg by mouth Daily., Disp: , Rfl:     No Known Allergies    Review of Systems   Constitutional: Positive for fatigue. Negative for fever.   HENT: Negative for sore throat and trouble swallowing.    Eyes: Negative for visual disturbance.   Respiratory: Negative for cough, chest tightness and shortness of breath.    Cardiovascular: Negative for chest pain, palpitations and leg swelling.   Gastrointestinal: Positive for diarrhea and nausea. Negative for abdominal pain, blood in stool, constipation, vomiting and indigestion.   Endocrine: Negative for polyphagia.   Genitourinary: Negative for dysuria and hematuria.   Musculoskeletal: Negative for back pain, joint swelling and neck pain.   Skin: Negative for rash, skin lesions and wound.   Neurological: Positive for weakness (Generalized weakness). Negative for dizziness, seizures, speech difficulty,  numbness and confusion.   Hematological: Negative for adenopathy. Does not bruise/bleed easily.   Psychiatric/Behavioral: Negative for hallucinations and depressed mood.        The following portions of the patient's history were reviewed and updated as appropriate: allergies, current medications, past family history, past medical history, past social history, past surgical history and problem list.    Objective     Vitals:    04/04/23 1830 04/04/23 1900 04/04/23 1930 04/04/23 1931   BP: 112/50 131/53 128/60    Pulse: 80 80  81   Resp:       Temp:       TempSrc:       SpO2: 97% 99%  99%   Weight:       Height:           Physical Exam  Vitals and nursing note reviewed.   Constitutional:       Appearance: She is well-developed. She is obese.   HENT:      Head: Normocephalic and atraumatic.      Right Ear: External ear normal.      Left Ear: External ear normal.   Eyes:      Comments: Pallor present   Neck:      Thyroid: No thyromegaly.      Trachea: No tracheal deviation.   Cardiovascular:      Rate and Rhythm: Normal rate and regular rhythm.      Heart sounds: No murmur heard.  Pulmonary:      Effort: Pulmonary effort is normal. No respiratory distress.      Breath sounds: Normal breath sounds.   Abdominal:      General: Abdomen is flat. Bowel sounds are normal. There is distension (Moderate abdominal distention partly gaseous in the center).      Palpations: Abdomen is soft. There is no mass.      Tenderness: There is no abdominal tenderness. There is no guarding or rebound.      Hernia: A hernia (Reducible umbilical hernia) is present.      Comments: Difficult to assess for free fluid due to large abdominal wall   Musculoskeletal:         General: Normal range of motion.      Cervical back: Normal range of motion and neck supple.   Skin:     General: Skin is warm and dry.      Findings: Rash present.      Comments: Chronic eczematoid changes in both lower extremities below the knee and also around the groin    Neurological:      General: No focal deficit present.      Mental Status: She is alert and oriented to person, place, and time.      Cranial Nerves: No cranial nerve deficit.      Sensory: No sensory deficit.   Psychiatric:         Mood and Affect: Mood normal.         Behavior: Behavior normal.         Thought Content: Thought content normal.         Judgment: Judgment normal.         Results from last 7 days   Lab Units 04/04/23  1431   SODIUM mmol/L 142   POTASSIUM mmol/L 3.9   CHLORIDE mmol/L 109*   CO2 mmol/L 24.5   BUN mg/dL 23   CREATININE mg/dL 1.46*   CALCIUM mg/dL 8.2*   ALBUMIN g/dL 1.6*   BILIRUBIN mg/dL 1.0   ALK PHOS U/L 100   ALT (SGPT) U/L 19   AST (SGOT) U/L 42*   GLUCOSE mg/dL 105*   WBC 10*3/mm3 6.32   HEMOGLOBIN g/dL 8.5*   PLATELETS 10*3/mm3 120*       Imaging Results (Last 24 Hours)     Procedure Component Value Units Date/Time    CT Abdomen Pelvis Without Contrast [[id number]] Collected: 04/04/23 1820     Updated: 04/04/23 1821    Narrative:      FINAL REPORT    TECHNIQUE:  Routine axial images through the abdomen and pelvis were  obtained.    CLINICAL HISTORY:  diarrhea, abd discomfort    FINDINGS:  Lower chest: There are left greater than right pleural  effusions.  Abdomen:  The gallbladder has been removed.  There  is cirrhosis with mild splenomegaly.  There are upper abdominal  venous collaterals indicative of portal hypertension.  There are  2 small nonobstructing right renal calculi, the largest  measuring 4 mm in diameter.  The other solid abdominal organs  and ureters are unremarkable.  The GI tract is unremarkable,  including the appendix.  Pelvis: The uterus, ovaries, and  urinary bladder are normal.  There is large volume ascites and  anasarca.  There is no pelvic or abdominal adenopathy or acute  osseous abnormality.      Impression:      Cirrhosis with portal hypertension and ascites.  Small  nonobstructing right renal calculi.  Bilateral pleural effusions.    Authenticated and  Electronically Signed by Salomon Cantu M.D. on  04/04/2023 06:20:35 PM    XR Chest 1 View [[id number]] Collected: 04/04/23 1515     Updated: 04/04/23 1518    Narrative:       PROCEDURE: XR CHEST 1 VW-     HISTORY: generalized weakness     COMPARISON: 12/03/2021.     FINDINGS: The heart is mildly enlarged.. The mediastinum is  unremarkable. Decreased inspiratory effort noted with crowding of the  lung markings in both lung bases. There is new left basilar airspace  disease, possible pneumonia. Right lung is clear. Calcified granuloma  identified on the left... There is no pneumothorax.  There are no acute  osseous abnormalities.       Impression:      New left basilar airspace disease suggesting pneumonia,  recommend follow-up..     .     This report was signed and finalized on 4/4/2023 3:16 PM by Katt Muse MD.          Assessment / Plan      Assessment/Recommendations:     1.  Suspected viral illness with cough and gastroenteritis/suspected pneumonia  2.  Suspected decompensated Leo cirrhosis with ascites; MELD 15 (4/2023)  3.  Suspected chronic normocytic anemia  4.  Occult  blood positive stool  5.  Morbid obesity with a BMI 41.60 with a serious comorbidity  6.  Acute kidney injury versus CKD vs hepatorenal syndrome  7.  Severe hypoalbuminemia  8.  Acquired thrombocytopenia associated with the cirrhosis    Patient history is more in favor of possible mild viral illness with a cough loose stool and nausea without any vomiting.  No history suggestive any overt GI bleed.  Patient likely has a chronic anemia and minimal mucosal bleeding with possible melena cannot be ruled out.  No prior hemoglobin to compare.  She does take ibuprofen intermittently and peptic ulcer disease needs to be ruled out    Her creatinine is 1.4.  No prior values to compare.  With a dehydration patient may have some prerenal component and some degree of hepatorenal syndrome suspected.    She appears to have a decompensated Leo cirrhosis with  ascites.  History is not suggestive any variceal bleeding however that cannot be ruled out.  I have discussed the risk and benefits involved with the procedure and patient is agreeable to proceed with the procedure.    She needs a full liver work-up  Patient clinically appears dehydrated with acute kidney injury  Clear liquids p.o.  Gentle IV fluid hydration findable x  IR guided diagnostic and therapeutic paracentesis  Protonix IV for 48 hours  Octreotide drip for now for 48 hours  Albumin 25% 3 times daily for 2 days  Rocephin 1 g IV daily  Transfuse to keep the Hgb more than 7 g/dL  Patient is currently on high-dose aspirin, will hold aspirin.  If patient is stable without any signs of overt GI bleed we will consider EGD on Friday with a possible variceal band ligation if present.  If any overt bleeding we will schedule her for endoscopy anytime interim.        Thank you very much for letting me participate in the care of this patient.  Please do not hesitate to call me if you have any questions.    Jens Mandujano MD  [facility] [place]  4/4/2023  20:09 EDT    Please note that portions of this note may have been completed with a voice recognition program.

## 2023-04-05 NOTE — CASE MANAGEMENT/SOCIAL WORK
Discharge Planning Assessment   [facility]     Patient Name: Ivania Harris  MRN: [id number]  Today's Date: 4/5/2023    Admit Date: 4/4/2023    Plan: pt resting in bed; noted multiple decayed teeth; stated she moved to this state a couple of years ago because her brother lives here and she had fallen at home and unable to get up for three days; nothing broken but issues with blood sugar and just couldn't get up; now lives near him and he is poa, stated would bring copy for chart; stated cares for self at home, does have someone do some housework, private pay, but still drives and cares for self; no financial needs; utilizes a quad cane, and doesn't feel needs any medical equipment or home health   Discharge Needs Assessment     Row Name 04/05/23 1112       Living Environment    People in Home alone    Current Living Arrangements apartment    Primary Care Provided by self    Provides Primary Care For no one    Family Caregiver if Needed sibling(s)    Quality of Family Relationships supportive    Able to Return to Prior Arrangements yes    Living Arrangement Comments plans home on d/c; brother will transport       Resource/Environmental Concerns    Resource/Environmental Concerns none    Transportation Concerns none       Food Insecurity    Within the past 12 months, you worried that your food would run out before you got the money to buy more. Never true    Within the past 12 months, the food you bought just didn't last and you didn't have money to get more. Never true       Transition Planning    Patient/Family Anticipates Transition to home    Patient/Family Anticipated Services at Transition none    Transportation Anticipated family or friend will provide       Discharge Needs Assessment    Readmission Within the Last 30 Days no previous admission in last 30 days    Equipment Currently Used at Home cane, quad tip;other (see comments)  diabetic supplies    Concerns to be Addressed discharge planning     Anticipated Changes Related to Illness none    Equipment Needed After Discharge none    Provided Post Acute Provider List? N/A    Provided Post Acute Provider Quality & Resource List? N/A    Current Discharge Risk lives alone;physical impairment               Discharge Plan     Row Name 04/05/23 1115       Plan    Plan pt resting in bed; noted multiple decayed teeth; stated she moved to this state a couple of years ago because her brother lives here and she had fallen at home and unable to get up for three days; nothing broken but issues with blood sugar and just couldn't get up; now lives near him and he is poa, stated would bring copy for chart; stated cares for self at home, does have someone do some housework, private pay, but still drives and cares for self; no financial needs; utilizes a quad cane, and doesn't feel needs any medical equipment or home health              Continued Care and Services - Admitted Since 4/4/2023    Coordination has not been started for this encounter.          Demographic Summary     Row Name 04/05/23 1109       General Information    Admission Type inpatient    Arrived From emergency department    Referral Source admission list    Reason for Consult discharge planning    Preferred Language English       Contact Information    Permission Granted to Share Info With family/designee  stated brother Jak is POA; and we can speak with if needed               Functional Status     Row Name 04/05/23 1111       Functional Status    Usual Activity Tolerance moderate    Current Activity Tolerance fair       Functional Status, IADL    Medications independent    Meal Preparation independent    Housekeeping assistive person    Laundry independent    Shopping independent    IADL Comments stated she drives and has auto       Mental Status    General Appearance WDL X  teeth decay       Mental Status Summary    Recent Changes in Mental Status/Cognitive Functioning no changes        Employment/    Employment Status retired                  Leonarda Kennedy, RN

## 2023-04-06 LAB
ALBUMIN FLD-MCNC: <0.2 G/DL
AMPHET+METHAMPHET UR QL: NEGATIVE
AMPHETAMINES UR QL: NEGATIVE
ANA SER QL IF: NEGATIVE
BACTERIA UR QL AUTO: ABNORMAL /HPF
BARBITURATES UR QL SCN: NEGATIVE
BENZODIAZ UR QL SCN: NEGATIVE
BH BB BLOOD EXPIRATION DATE: NORMAL
BH BB BLOOD TYPE BARCODE: 6200
BH BB DISPENSE STATUS: NORMAL
BH BB PRODUCT CODE: NORMAL
BH BB UNIT NUMBER: NORMAL
BILIRUB UR QL STRIP: NEGATIVE
BUPRENORPHINE SERPL-MCNC: NEGATIVE NG/ML
CANNABINOIDS SERPL QL: NEGATIVE
CLARITY UR: ABNORMAL
COCAINE UR QL: NEGATIVE
COLOR UR: ABNORMAL
CROSSMATCH INTERPRETATION: NORMAL
GLUCOSE BLDC GLUCOMTR-MCNC: 115 MG/DL (ref 70–130)
GLUCOSE BLDC GLUCOMTR-MCNC: 117 MG/DL (ref 70–130)
GLUCOSE BLDC GLUCOMTR-MCNC: 122 MG/DL (ref 70–130)
GLUCOSE BLDC GLUCOMTR-MCNC: 208 MG/DL (ref 70–130)
GLUCOSE UR STRIP-MCNC: NEGATIVE MG/DL
HAV AB SER QL IA: POSITIVE
HBV CORE AB SERPL QL IA: NEGATIVE
HGB UR QL STRIP.AUTO: ABNORMAL
HYALINE CASTS UR QL AUTO: ABNORMAL /LPF
KETONES UR QL STRIP: ABNORMAL
LEUKOCYTE ESTERASE UR QL STRIP.AUTO: ABNORMAL
METHADONE UR QL SCN: NEGATIVE
MITOCHONDRIA M2 IGG SER-ACNC: <20 UNITS (ref 0–20)
NITRITE UR QL STRIP: NEGATIVE
OPIATES UR QL: NEGATIVE
OXYCODONE UR QL SCN: NEGATIVE
PCP UR QL SCN: NEGATIVE
PH UR STRIP.AUTO: 5.5 [PH] (ref 5–8)
PROPOXYPH UR QL: NEGATIVE
PROT FLD-MCNC: <1 G/DL
PROT UR QL STRIP: ABNORMAL
RBC # UR STRIP: ABNORMAL /HPF
REF LAB TEST METHOD: ABNORMAL
SMA IGG SER-ACNC: 13 UNITS (ref 0–19)
SP GR UR STRIP: 1.02 (ref 1–1.03)
SQUAMOUS #/AREA URNS HPF: ABNORMAL /HPF
TRICYCLICS UR QL SCN: NEGATIVE
UNIT  ABO: NORMAL
UNIT  RH: NORMAL
UROBILINOGEN UR QL STRIP: ABNORMAL
WBC # UR STRIP: ABNORMAL /HPF

## 2023-04-06 PROCEDURE — 43239 EGD BIOPSY SINGLE/MULTIPLE: CPT | Performed by: INTERNAL MEDICINE

## 2023-04-06 PROCEDURE — 25010000002 ALBUMIN HUMAN 25% PER 50 ML: Performed by: INTERNAL MEDICINE

## 2023-04-06 PROCEDURE — P9047 ALBUMIN (HUMAN), 25%, 50ML: HCPCS | Performed by: INTERNAL MEDICINE

## 2023-04-06 PROCEDURE — 25010000002 METOCLOPRAMIDE PER 10 MG: Performed by: NURSE ANESTHETIST, CERTIFIED REGISTERED

## 2023-04-06 PROCEDURE — 25010000002 PROPOFOL 10 MG/ML EMULSION: Performed by: NURSE ANESTHETIST, CERTIFIED REGISTERED

## 2023-04-06 PROCEDURE — 97162 PT EVAL MOD COMPLEX 30 MIN: CPT

## 2023-04-06 PROCEDURE — 88305 TISSUE EXAM BY PATHOLOGIST: CPT

## 2023-04-06 PROCEDURE — 80306 DRUG TEST PRSMV INSTRMNT: CPT | Performed by: FAMILY MEDICINE

## 2023-04-06 PROCEDURE — 97166 OT EVAL MOD COMPLEX 45 MIN: CPT

## 2023-04-06 PROCEDURE — 82962 GLUCOSE BLOOD TEST: CPT

## 2023-04-06 PROCEDURE — 81001 URINALYSIS AUTO W/SCOPE: CPT | Performed by: FAMILY MEDICINE

## 2023-04-06 PROCEDURE — 0DB78ZX EXCISION OF STOMACH, PYLORUS, VIA NATURAL OR ARTIFICIAL OPENING ENDOSCOPIC, DIAGNOSTIC: ICD-10-PCS | Performed by: INTERNAL MEDICINE

## 2023-04-06 PROCEDURE — 99233 SBSQ HOSP IP/OBS HIGH 50: CPT | Performed by: NURSE PRACTITIONER

## 2023-04-06 RX ORDER — LIDOCAINE HYDROCHLORIDE 20 MG/ML
INJECTION, SOLUTION INTRAVENOUS AS NEEDED
Status: DISCONTINUED | OUTPATIENT
Start: 2023-04-06 | End: 2023-04-06 | Stop reason: SURG

## 2023-04-06 RX ORDER — METOCLOPRAMIDE HYDROCHLORIDE 5 MG/ML
INJECTION INTRAMUSCULAR; INTRAVENOUS AS NEEDED
Status: DISCONTINUED | OUTPATIENT
Start: 2023-04-06 | End: 2023-04-06 | Stop reason: SURG

## 2023-04-06 RX ORDER — HYDROCODONE BITARTRATE AND ACETAMINOPHEN 5; 325 MG/1; MG/1
1 TABLET ORAL EVERY 8 HOURS PRN
Status: DISCONTINUED | OUTPATIENT
Start: 2023-04-06 | End: 2023-04-12 | Stop reason: HOSPADM

## 2023-04-06 RX ORDER — ONDANSETRON 2 MG/ML
4 INJECTION INTRAMUSCULAR; INTRAVENOUS ONCE AS NEEDED
Status: DISCONTINUED | OUTPATIENT
Start: 2023-04-06 | End: 2023-04-06 | Stop reason: HOSPADM

## 2023-04-06 RX ORDER — SODIUM CHLORIDE 9 MG/ML
70 INJECTION, SOLUTION INTRAVENOUS CONTINUOUS PRN
Status: DISCONTINUED | OUTPATIENT
Start: 2023-04-06 | End: 2023-04-08

## 2023-04-06 RX ORDER — CEFUROXIME AXETIL 250 MG/1
500 TABLET ORAL EVERY 12 HOURS SCHEDULED
Status: COMPLETED | OUTPATIENT
Start: 2023-04-06 | End: 2023-04-08

## 2023-04-06 RX ORDER — PANTOPRAZOLE SODIUM 40 MG/1
40 TABLET, DELAYED RELEASE ORAL
Status: DISCONTINUED | OUTPATIENT
Start: 2023-04-06 | End: 2023-04-12 | Stop reason: HOSPADM

## 2023-04-06 RX ORDER — KETAMINE HYDROCHLORIDE 50 MG/ML
INJECTION, SOLUTION, CONCENTRATE INTRAMUSCULAR; INTRAVENOUS AS NEEDED
Status: DISCONTINUED | OUTPATIENT
Start: 2023-04-06 | End: 2023-04-06 | Stop reason: SURG

## 2023-04-06 RX ORDER — SODIUM CHLORIDE 9 MG/ML
INJECTION, SOLUTION INTRAVENOUS CONTINUOUS PRN
Status: DISCONTINUED | OUTPATIENT
Start: 2023-04-06 | End: 2023-04-06 | Stop reason: SURG

## 2023-04-06 RX ADMIN — SODIUM CHLORIDE 70 ML/HR: 9 INJECTION, SOLUTION INTRAVENOUS at 06:43

## 2023-04-06 RX ADMIN — ALBUMIN (HUMAN) 25 G: 0.25 INJECTION, SOLUTION INTRAVENOUS at 10:13

## 2023-04-06 RX ADMIN — PANTOPRAZOLE SODIUM 8 MG/HR: 40 INJECTION, POWDER, FOR SOLUTION INTRAVENOUS at 10:29

## 2023-04-06 RX ADMIN — SODIUM CHLORIDE: 9 INJECTION, SOLUTION INTRAVENOUS at 07:39

## 2023-04-06 RX ADMIN — KETAMINE HYDROCHLORIDE 10 MG: 50 INJECTION, SOLUTION INTRAMUSCULAR; INTRAVENOUS at 07:39

## 2023-04-06 RX ADMIN — LIDOCAINE HYDROCHLORIDE 40 MG: 20 INJECTION, SOLUTION INTRAVENOUS at 07:39

## 2023-04-06 RX ADMIN — ATORVASTATIN CALCIUM 40 MG: 40 TABLET, FILM COATED ORAL at 10:18

## 2023-04-06 RX ADMIN — ALBUMIN (HUMAN) 25 G: 0.25 INJECTION, SOLUTION INTRAVENOUS at 15:26

## 2023-04-06 RX ADMIN — HYDROCODONE BITARTRATE AND ACETAMINOPHEN 1 TABLET: 5; 325 TABLET ORAL at 13:58

## 2023-04-06 RX ADMIN — PROPOFOL 100 MCG/KG/MIN: 10 INJECTION, EMULSION INTRAVENOUS at 07:39

## 2023-04-06 RX ADMIN — HYDROCODONE BITARTRATE AND ACETAMINOPHEN 1 TABLET: 5; 325 TABLET ORAL at 03:15

## 2023-04-06 RX ADMIN — CEFUROXIME AXETIL 500 MG: 250 TABLET ORAL at 21:41

## 2023-04-06 RX ADMIN — BUPROPION HYDROCHLORIDE 150 MG: 150 TABLET, FILM COATED, EXTENDED RELEASE ORAL at 10:19

## 2023-04-06 RX ADMIN — LEVOTHYROXINE SODIUM 300 MCG: 0.05 TABLET ORAL at 10:18

## 2023-04-06 RX ADMIN — METOCLOPRAMIDE 10 MG: 5 INJECTION, SOLUTION INTRAMUSCULAR; INTRAVENOUS at 07:39

## 2023-04-06 RX ADMIN — DONEPEZIL HYDROCHLORIDE 10 MG: 5 TABLET ORAL at 21:28

## 2023-04-06 RX ADMIN — PANTOPRAZOLE SODIUM 8 MG/HR: 40 INJECTION, POWDER, FOR SOLUTION INTRAVENOUS at 01:58

## 2023-04-06 RX ADMIN — PANTOPRAZOLE SODIUM 40 MG: 40 TABLET, DELAYED RELEASE ORAL at 14:44

## 2023-04-06 RX ADMIN — CEFUROXIME AXETIL 500 MG: 250 TABLET ORAL at 14:44

## 2023-04-06 NOTE — THERAPY EVALUATION
Patient Name: Ivania Harris  : 1960    MRN: [id number]                              Today's Date: 2023       Admit Date: 2023    Visit Dx:     ICD-10-CM ICD-9-CM   1. Pneumonia of left lung due to infectious organism, unspecified part of lung  J18.9 486   2. Gastrointestinal hemorrhage, unspecified gastrointestinal hemorrhage type  K92.2 578.9   3. Other cirrhosis of liver  K74.69 571.5   4. Cirrhosis of liver with ascites, unspecified hepatic cirrhosis type  K74.60 571.5    R18.8      Patient Active Problem List   Diagnosis   • Pneumonia of left lung due to infectious organism, unspecified part of lung   • Gastrointestinal hemorrhage   • Cirrhosis of liver with ascites     Past Medical History:   Diagnosis Date   • Anemia    • Diabetes mellitus    • Hyperlipidemia    • Hypertension    • Hypothyroidism    • Impaired mobility    • Short-term memory loss      Past Surgical History:   Procedure Laterality Date   • CHOLECYSTECTOMY        General Information     Row Name 23 164          Physical Therapy Time and Intention    Document Type evaluation (P)   -LG     Mode of Treatment physical therapy (P)   -LG     Row Name 23 1642          General Information    Patient Profile Reviewed yes (P)   -LG     Prior Level of Function independent:;ADL's;all household mobility;driving (P)   -LG     Existing Precautions/Restrictions fall (P)   -LG     Barriers to Rehab previous functional deficit (P)   -LG     Row Name 23 164          Living Environment    People in Home alone (P)   -LG     Row Name 23 1642          Home Main Entrance    Number of Stairs, Main Entrance none (P)   -LG     Row Name 23 1642          Stairs Within Home, Primary    Number of Stairs, Within Home, Primary none (P)   -LG     Row Name 23 164          Cognition    Orientation Status (Cognition) oriented x 4 (P)   -LG     Row Name 23 1642          Safety Issues, Functional Mobility    Safety  Issues Affecting Function (Mobility) friction/shear risk;safety precaution awareness;insight into deficits/self-awareness;safety precautions follow-through/compliance;awareness of need for assistance (P)   -LG     Impairments Affecting Function (Mobility) balance;postural/trunk control;endurance/activity tolerance;shortness of breath;strength (P)   -LG           User Key  (r) = Recorded By, (t) = Taken By, (c) = Cosigned By    Initials Name Provider Type    LG Haydee Red, PT Student PT Student               Mobility     Row Name 04/06/23 1643          Bed Mobility    Bed Mobility supine-sit;sit-supine (P)   -LG     Supine-Sit [place] (Bed Mobility) maximum assist (25% patient effort);2 person assist (P)   -LG     Sit-Supine [place] (Bed Mobility) maximum assist (25% patient effort);2 person assist (P)   -LG     Assistive Device (Bed Mobility) bed rails;draw sheet;head of bed elevated (P)   -LG     [facility] Name 04/06/23 1643          Bed-Chair Transfer    Bed-Chair [place] (Transfers) unable to assess (P)   -LG     [facility] Name 04/06/23 1643          Sit-Stand Transfer    Sit-Stand [place] (Transfers) unable to assess (P)   -LG     [facility] Name 04/06/23 1643          Gait/Stairs (Locomotion)    [place] Level (Gait) unable to assess (P)   -LG     [place] Level (Stairs) unable to assess (P)   -LG           User Key  (r) = Recorded By, (t) = Taken By, (c) = Cosigned By    Initials Name Provider Type    Haydee Persaud, PT Student PT Student               Obj/Interventions     Row Name 04/06/23 1645          Range of Motion Comprehensive    General Range of Motion bilateral lower extremity ROM WFL (P)   -LG     Row Name 04/06/23 1645          Strength Comprehensive (MMT)    General Manual Muscle Testing (MMT) Assessment lower extremity strength deficits identified (P)   -LG     Comment, General Manual Muscle Testing (MMT) Assessment BLE grossly -3/5 MMT (P)   -LG     [facility] Name 04/06/23 1645           Balance    Balance Assessment sitting static balance (P)   -LG     Static Sitting Balance moderate assist (P)   -LG     Position, Sitting Balance unsupported;sitting edge of bed (P)   -LG           User Key  (r) = Recorded By, (t) = Taken By, (c) = Cosigned By    Initials Name Provider Type    Haydee Persaud, PT Student PT Student               Goals/Plan     Row Name 04/06/23 1654          Bed Mobility Goal 1 (PT)    Activity/Assistive Device (Bed Mobility Goal 1, PT) bed mobility activities, all (P)   -LG     [place] Level/Cues Needed (Bed Mobility Goal 1, PT) contact guard required (P)   -LG     Time Frame (Bed Mobility Goal 1, PT) long term goal (LTG);2 weeks (P)   -LG     Row Name 04/06/23 1654          Transfer Goal 1 (PT)    Activity/Assistive Device (Transfer Goal 1, PT) bed-to-chair/chair-to-bed;sit-to-stand/stand-to-sit;walker, rolling (P)   -LG     [place] Level/Cues Needed (Transfer Goal 1, PT) contact guard required (P)   -LG     Time Frame (Transfer Goal 1, PT) long term goal (LTG);2 weeks (P)   -LG     Row Name 04/06/23 1654          Gait Training Goal 1 (PT)    Activity/Assistive Device (Gait Training Goal 1, PT) gait (walking locomotion);assistive device use;increase endurance/gait distance;decrease fall risk;walker, rolling (P)   -LG     [place] Level (Gait Training Goal 1, PT) contact guard required (P)   -LG     Distance (Gait Training Goal 1, PT) 50' (P)   -LG     Time Frame (Gait Training Goal 1, PT) long term goal (LTG);2 weeks (P)   -LG     Row Name 04/06/23 1654          Patient Education Goal (PT)    Activity (Patient Education Goal, PT) BLE ther ex AROM 15 reps (P)   -LG     [place]/Cues/Accuracy (Memory Goal 2, PT) demonstrates adequately (P)   -LG     Time Frame (Patient Education Goal, PT) short term goal (STG);1 week (P)   -LG     Row Name 04/06/23 1654          Therapy Assessment/Plan (PT)    Planned Therapy Interventions (PT) balance training;ROM (range  of motion);neuromuscular re-education;bed mobility training;gait training;strengthening;stretching;transfer training;postural re-education;patient/family education;home exercise program;stair training (P)   -LG           User Key  (r) = Recorded By, (t) = Taken By, (c) = Cosigned By    Initials Name Provider Type    LG Haydee Red, PT Student PT Student               Clinical Impression     Row Name 04/06/23 1645          Pain    Pretreatment Pain Rating 0/10 - no pain (P)   -LG     Posttreatment Pain Rating 0/10 - no pain (P)   -LG     Row Name 04/06/23 1645          Plan of Care Review    Plan of Care Reviewed With patient;sibling (P)   -LG     Progress no change (P)   -LG     Outcome Evaluation Pt was agreeable and participated in IE this date. Pt was supine in the bed on room air w/ brother present in the room for IE. Pt reports she lives alone in an apartment at baseline. Pt reports she is independent w/ ADL's and household mobility at baseline. Pt reports she uses a quad cane for household mobility at baseline. Pt reports she has a shower bench she also uses. Pt perfomed supine to sit w/ Max A x2. Pt was unable to scoot to the EOB and required Max A x2 for scooting. Pt sat EOB ~2 minutes. Pt was Max A x2 for performing sit to supine. Pt would benefit from PT during inpatient stay in order to increase BLE strength, endurance, and functional mobility. PT would recommend STR at discharge before returning to independent living. (P)   -LG     Row Name 04/06/23 1645          Therapy Assessment/Plan (PT)    Patient/Family Therapy Goals Statement (PT) To get stronger (P)   -LG     Rehab Potential (PT) fair, will monitor progress closely (P)   -LG     Criteria for Skilled Interventions Met (PT) yes;meets criteria (P)   -LG     Therapy Frequency (PT) 5 times/wk (P)   -LG     Row Name 04/06/23 1645          Vital Signs    O2 Delivery Pre Treatment room air (P)   -LG     O2 Delivery Intra Treatment room air (P)   -LG      O2 Delivery Post Treatment room air (P)   -LG     Pre Patient Position Supine (P)   -LG     Intra Patient Position Sitting (P)   -LG     Post Patient Position Supine (P)   -LG     Row Name 04/06/23 1645          Positioning and Restraints    Pre-Treatment Position in bed (P)   -LG     Post Treatment Position bed (P)   -LG     In Bed supine;with family/caregiver;notified nsg;call light within reach;encouraged to call for assist (P)   -LG           User Key  (r) = Recorded By, (t) = Taken By, (c) = Cosigned By    Initials Name Provider Type    Haydee Persaud, PT Student PT Student               Outcome Measures     Row Name 04/06/23 1655 04/06/23 0930       How much help from another person do you currently need...    Turning from your back to your side while in flat bed without using bedrails? 2 (P)   -LG 3  -KJ    Moving from lying on back to sitting on the side of a flat bed without bedrails? 2 (P)   -LG 1  -KJ    Moving to and from a bed to a chair (including a wheelchair)? 1 (P)   -LG 1  -KJ    Standing up from a chair using your arms (e.g., wheelchair, bedside chair)? 1 (P)   -LG 1  -KJ    Climbing 3-5 steps with a railing? 1 (P)   -LG 1  -KJ    To walk in hospital room? 1 (P)   -LG 1  -KJ    AM-PAC 6 Clicks Score (PT) 8 (P)   -LG 8  -KJ          User Key  (r) = Recorded By, (t) = Taken By, (c) = Cosigned By    Initials Name Provider Type    Joann Rosas, RN Registered Nurse    Haydee Persaud, PT Student PT Student                             Physical Therapy Education     Title: PT OT SLP Therapies (In Progress)     Topic: Physical Therapy (In Progress)     Point: Mobility training (Done)     Learning Progress Summary           Patient AYAH Vásquez VU by GRETCHEN at 4/6/2023 3532    Comment: Importance of mobility                   Point: Home exercise program (Not Started)     Learner Progress:  Not documented in this visit.          Point: Body mechanics (Not Started)     Learner Progress:  Not  documented in this visit.          Point: Precautions (Not Started)     Learner Progress:  Not documented in this visit.                      User Key     Initials Effective Dates Name Provider Type Discipline    LG 12/29/22 -  Haydee Red, PT Student PT Student PT              PT Recommendation and Plan  Planned Therapy Interventions (PT): (P) balance training, ROM (range of motion), neuromuscular re-education, bed mobility training, gait training, strengthening, stretching, transfer training, postural re-education, patient/family education, home exercise program, stair training  Plan of Care Reviewed With: (P) patient, sibling  Progress: (P) no change  Outcome Evaluation: (P) Pt was agreeable and participated in IE this date. Pt was supine in the bed on room air w/ brother present in the room for IE. Pt reports she lives alone in an apartment at baseline. Pt reports she is independent w/ ADL's and household mobility at baseline. Pt reports she uses a quad cane for household mobility at baseline. Pt reports she has a shower bench she also uses. Pt perfomed supine to sit w/ Max A x2. Pt was unable to scoot to the EOB and required Max A x2 for scooting. Pt sat EOB ~2 minutes. Pt was Max A x2 for performing sit to supine. Pt would benefit from PT during inpatient stay in order to increase BLE strength, endurance, and functional mobility. PT would recommend STR at discharge before returning to independent living.     Time Calculation:    PT Charges     Row Name 04/06/23 165             Time Calculation    Start Time 1130 (P)   -LG      PT Received On 04/06/23 (P)   -LG      PT Goal Re-Cert Due Date 04/16/23 (P)   -LG         Untimed Charges    PT Eval/Re-eval Minutes 55 (P)   -LG         Total Minutes    Untimed Charges Total Minutes 55 (P)   -LG       Total Minutes 55 (P)   -LG            User Key  (r) = Recorded By, (t) = Taken By, (c) = Cosigned By    Initials Name Provider Type    LG Haydee Red, PT  Student PT Student              Therapy Charges for Today     Code Description Service Date Service Provider Modifiers Qty    24899642751 HC PT EVAL MOD COMPLEXITY 4 4/6/2023 Haydee Red, PT Student GP 1          PT G-Codes  AM-PAC 6 Clicks Score (PT): (P) 8  PT Discharge Summary  Anticipated Discharge Disposition (PT): (P) skilled nursing facility, inpatient rehabilitation facility    Haydee Red, PT Student  4/6/2023

## 2023-04-06 NOTE — CASE MANAGEMENT/SOCIAL WORK
Discharge Planning Assessment  [facility]     Patient Name: Ivania Harris  MRN: [id number]  Today's Date: 4/6/2023    Admit Date: 4/4/2023    Plan: pt resting in bed; noted multiple decayed teeth; stated she moved to this state a couple of years ago because her brother lives here and she had fallen at home and unable to get up for three days; nothing broken but issues with blood sugar and just couldn't get up; now lives near him and he is poa, stated would bring copy for chart; stated cares for self at home, does have someone do some housework, private pay, but still drives and cares for self; no financial needs; utilizes a quad cane, and doesn't feel needs any medical equipment or home health   Discharge Needs Assessment    No documentation.                Discharge Plan     Row Name 04/06/23 1436       Plan    Plan Comments patient  reuesting  short term rehab.   [facility] [place]  first choice  after that  whoever has a private room.     Private room is  a must for her              Continued Care and Services - Admitted Since 4/4/2023     Destination     Service Provider Request Status Selected Services Address Phone Fax Patient Preferred    [facility] Pending - [facility] N/A [address] [address] [phone] --    [facility] Pending - Request Sent N/A [address] [facility] [address] [phone] --    [facility] Pending - Request Sent N/A [address] [facility] [address] [phone] --    [facility] Pending - Request Sent N/A [address] [facility] [address] [phone] --    [facility] Pending - Request Sent N/A [address] [facility] [address] [phone] --              Expected Discharge Date and Time     Expected Discharge Date Expected Discharge Time    Apr 9, 2023           Demographic Summary    No documentation.                Functional Status    No documentation.                Psychosocial    No documentation.                Abuse/Neglect    No documentation.                Legal    No documentation.                Substance Abuse    No documentation.                Patient Forms    No documentation.                   Nancy Boston RN

## 2023-04-06 NOTE — PLAN OF CARE
Problem: Adult Inpatient Plan of Care  Goal: Plan of Care Review  Outcome: Ongoing, Progressing  Flowsheets  Taken 4/6/2023 1540 by Joann Lynch RN  Progress: improving  Taken 4/6/2023 0830 by Rosa Liriano RN  Plan of Care Reviewed With: patient  Goal: Patient-Specific Goal (Individualized)  Outcome: Ongoing, Progressing  Goal: Absence of Hospital-Acquired Illness or Injury  Outcome: Ongoing, Progressing  Intervention: Identify and Manage Fall Risk  Recent Flowsheet Documentation  Taken 4/6/2023 1537 by Joann Lynch RN  Safety Promotion/Fall Prevention:   activity supervised   assistive device/personal items within reach   clutter free environment maintained   fall prevention program maintained   lighting adjusted   toileting scheduled   safety round/check completed   room organization consistent   nonskid shoes/slippers when out of bed  Taken 4/6/2023 1437 by Joann Lynch RN  Safety Promotion/Fall Prevention:   activity supervised   clutter free environment maintained   assistive device/personal items within reach   fall prevention program maintained   lighting adjusted   toileting scheduled   safety round/check completed   room organization consistent   nonskid shoes/slippers when out of bed  Taken 4/6/2023 1210 by Joann Lynch RN  Safety Promotion/Fall Prevention:   activity supervised   assistive device/personal items within reach   clutter free environment maintained   fall prevention program maintained   toileting scheduled   safety round/check completed   room organization consistent   lighting adjusted  Taken 4/6/2023 0930 by Joann Lynch RN  Safety Promotion/Fall Prevention:   activity supervised   assistive device/personal items within reach   clutter free environment maintained   fall prevention program maintained   lighting adjusted   safety round/check completed   toileting scheduled   room organization consistent  Taken 4/6/2023 0800 by Joann Lynch RN  Safety Promotion/Fall  Prevention: patient off unit  Intervention: Prevent Skin Injury  Recent Flowsheet Documentation  Taken 4/6/2023 1537 by Joann Lynch RN  Body Position: position changed independently  Taken 4/6/2023 1437 by Joann Lynch RN  Body Position: position changed independently  Skin Protection:   adhesive use limited   tubing/devices free from skin contact   protective footwear used   incontinence pads utilized  Taken 4/6/2023 1210 by Joann Lynch RN  Body Position: position changed independently  Taken 4/6/2023 0930 by Joann Lynch RN  Body Position: position changed independently  Skin Protection:   adhesive use limited   tubing/devices free from skin contact   protective footwear used  Intervention: Prevent and Manage VTE (Venous Thromboembolism) Risk  Recent Flowsheet Documentation  Taken 4/6/2023 1537 by Joann Lynch RN  Activity Management: activity encouraged  Taken 4/6/2023 1437 by Joann Lynch RN  Activity Management: bedrest  VTE Prevention/Management:   bilateral   sequential compression devices off   patient refused intervention  Taken 4/6/2023 1210 by Joann Lynch RN  Activity Management: bedrest  Taken 4/6/2023 1013 by Joann Lynch RN  Activity Management: (Pt cannot get out of bed, pt evaluated.) unable to complete activity (see comments)  Taken 4/6/2023 0930 by Joann Lynch RN  Activity Management: bedrest  Intervention: Prevent Infection  Recent Flowsheet Documentation  Taken 4/6/2023 1537 by Joann Lynch RN  Infection Prevention:   single patient room provided   rest/sleep promoted   hand hygiene promoted   equipment surfaces disinfected   environmental surveillance performed  Taken 4/6/2023 1437 by Joann Lynch RN  Infection Prevention:   single patient room provided   rest/sleep promoted   hand hygiene promoted   equipment surfaces disinfected   environmental surveillance performed  Taken 4/6/2023 1210 by Joann Lynch RN  Infection Prevention:   single patient room provided    rest/sleep promoted   hand hygiene promoted   equipment surfaces disinfected   environmental surveillance performed  Goal: Optimal Comfort and Wellbeing  Outcome: Ongoing, Progressing  Intervention: Monitor Pain and Promote Comfort  Recent Flowsheet Documentation  Taken 4/6/2023 1537 by Joann Lynch RN  Pain Management Interventions:   position adjusted   pillow support provided  Taken 4/6/2023 1210 by Joann Lynch RN  Pain Management Interventions:   position adjusted   pillow support provided  Taken 4/6/2023 0930 by Joann Lynch RN  Pain Management Interventions:   position adjusted   pillow support provided  Intervention: Provide Person-Centered Care  Recent Flowsheet Documentation  Taken 4/6/2023 0930 by Joann Lynch RN  Trust Relationship/Rapport:   care explained   choices provided   emotional support provided   empathic listening provided   questions answered   questions encouraged   reassurance provided   thoughts/feelings acknowledged  Goal: Readiness for Transition of Care  Outcome: Ongoing, Progressing     Problem: Fall Injury Risk  Goal: Absence of Fall and Fall-Related Injury  Outcome: Ongoing, Progressing  Intervention: Identify and Manage Contributors  Recent Flowsheet Documentation  Taken 4/6/2023 1537 by Joann Lynch RN  Medication Review/Management: medications reviewed  Self-Care Promotion:   independence encouraged   BADL personal objects within reach   BADL personal routines maintained   meal set-up provided  Taken 4/6/2023 1437 by Joann Lynch RN  Medication Review/Management: medications reviewed  Taken 4/6/2023 1210 by Joann Lynch RN  Medication Review/Management: medications reviewed  Self-Care Promotion:   BADL personal objects within reach   independence encouraged   BADL personal routines maintained   meal set-up provided  Taken 4/6/2023 0930 by Joann Lynch RN  Medication Review/Management: medications reviewed  Self-Care Promotion:   independence encouraged   BADL  personal objects within reach   BADL personal routines maintained   meal set-up provided  Intervention: Promote Injury-Free Environment  Recent Flowsheet Documentation  Taken 4/6/2023 1537 by Joann Lynch RN  Safety Promotion/Fall Prevention:   activity supervised   assistive device/personal items within reach   clutter free environment maintained   fall prevention program maintained   lighting adjusted   toileting scheduled   safety round/check completed   room organization consistent   nonskid shoes/slippers when out of bed  Taken 4/6/2023 1437 by Joann Lynch RN  Safety Promotion/Fall Prevention:   activity supervised   clutter free environment maintained   assistive device/personal items within reach   fall prevention program maintained   lighting adjusted   toileting scheduled   safety round/check completed   room organization consistent   nonskid shoes/slippers when out of bed  Taken 4/6/2023 1210 by Joann Lynch RN  Safety Promotion/Fall Prevention:   activity supervised   assistive device/personal items within reach   clutter free environment maintained   fall prevention program maintained   toileting scheduled   safety round/check completed   room organization consistent   lighting adjusted  Taken 4/6/2023 0930 by Joann Lynch RN  Safety Promotion/Fall Prevention:   activity supervised   assistive device/personal items within reach   clutter free environment maintained   fall prevention program maintained   lighting adjusted   safety round/check completed   toileting scheduled   room organization consistent  Taken 4/6/2023 0800 by Joann Lynch RN  Safety Promotion/Fall Prevention: patient off unit     Problem: Skin Injury Risk Increased  Goal: Skin Health and Integrity  Outcome: Ongoing, Progressing  Intervention: Optimize Skin Protection  Recent Flowsheet Documentation  Taken 4/6/2023 1537 by Joann Lynch RN  Head of Bed (HOB) Positioning: HOB elevated  Taken 4/6/2023 1437 by Joann Lynch  RN  Head of Bed ([facility]) Positioning: [facility] elevated  Pressure Reduction Devices: pressure-redistributing mattress utilized  Skin Protection:   adhesive use limited   tubing/devices free from skin contact   protective footwear used   incontinence pads utilized  Taken 4/6/2023 1210 by Joann Lynch RN  Head of Bed ([facility]) Positioning: HOB elevated  Taken 4/6/2023 0930 by Joann Lynch RN  Head of Bed ([facility]) Positioning: [facility] elevated  Skin Protection:   adhesive use limited   tubing/devices free from skin contact   protective footwear used   Goal Outcome Evaluation:      VSS. Pt NSR on telemetry. Pt maintaining o2 >90% on RA. Medications administered per MAR. Pt completed EGD during shift. Discharge is pending.      Progress: improving

## 2023-04-06 NOTE — PROGRESS NOTES
[facility]    Name:  Ivania Harris   Age:  62 y.o.  Sex:  female  :  1960  MRN:  [id number]   Visit Number:  [id number]  Admission Date:  2023  Date Of Service:  23  Primary Care Physician:  Alessandro Mercer MD     LOS: 2 days :    Chief Complaint:      Weakness, diarrhea, concern for GI bleed, liver disease    Subjective:  Reports feeling better, no complaints     Hospital Course:    The patient is a chronically ill-appearing morbidly obese female who is 62 years old with a medical history of type 2 diabetes, apparent anemia unknown type, hyperlipidemia, hypertension, hypothyroidism, who presented to the emergency room with multiple complaints.    Upon ER work-up, she had a creatinine of 1.46 with a hemoglobin of 8.5 and platelets of 120.  She had positive FOBT testing.  A chest x-ray was possibly showing a left basilar pneumonia.  There was evidence of cirrhosis and portal hypertension with ascites with a nonobstructing right renal calculi.  There was bilateral effusions.  GI was consulted from the emergency room and she was started on octreotide and Protonix.  She received Rocephin and a normal saline bolus in addition.  She was admitted to the hospital service.    Patient had evidence of worsening anemia, was ordered 1 unit PRBCs on morning of 2023.   EGD on     Review of Systems:     All systems were reviewed and negative except as mentioned in subjective, assessment and plan.    Vital Signs:    Temp:  [98.1 °F (36.7 °C)-99.6 °F (37.6 °C)] 98.5 °F (36.9 °C)  Heart Rate:  [75-95] 75  Resp:  [15-18] 16  BP: (105-150)/(47-64) 129/47    Intake and output:    I/O last 3 completed shifts:  In: 400 [Blood:300; IV Piggyback:100]  Out: 650 [Urine:650]  I/O this shift:  In: 680 [P.O.:480; I.V.:200]  Out: 50 [Urine:50]    Physical Examination:    General Appearance:  Alert and cooperative.  Chronically ill-appearing   Head:  Atraumatic and  normocephalic.   Eyes: Conjunctivae and sclerae normal, no icterus. No pallor.       Neck: Supple, trachea midline, no thyromegaly.   Lungs:    CTAB, normal WOB   Heart:  Normal S1 and S2, no murmur, no gallop, no rub. No JVD.   Abdomen:    Distended, no tenderness   Extremities: Supple, 1-2+ pitting lower extremity edema,    Skin: Both feet very dry with callus on feet   Neurologic: Alert and oriented x 3. No focal deficits     Laboratory results:    Results from last 7 days   Lab Units 04/05/23  0529 04/04/23  1431   SODIUM mmol/L 146* 142   POTASSIUM mmol/L 3.9 3.9   CHLORIDE mmol/L 112* 109*   CO2 mmol/L 24.2 24.5   BUN mg/dL 26* 23   CREATININE mg/dL 1.53* 1.46*   CALCIUM mg/dL 8.1* 8.2*   BILIRUBIN mg/dL  --  1.0   ALK PHOS U/L  --  100   ALT (SGPT) U/L  --  19   AST (SGOT) U/L  --  42*   GLUCOSE mg/dL 95 105*     Results from last 7 days   Lab Units 04/05/23  2202 04/05/23  1331 04/05/23  0529 04/04/23  2315 04/04/23  1431   WBC 10*3/mm3  --   --  2.86*  --  6.32   HEMOGLOBIN g/dL 7.6* 7.8* 6.7*   < > 8.5*   HEMATOCRIT %  --   --  18.7*  --  25.3*   PLATELETS 10*3/mm3  --   --  87*  --  120*    < > = values in this interval not displayed.     Results from last 7 days   Lab Units 04/05/23  0529   INR  1.48*         Results from last 7 days   Lab Units 04/04/23  2315 04/04/23  2300   BLOODCX  No growth at 24 hours No growth at 24 hours     No results for input(s): PHART, VMQ9RMW, PO2ART, JZC9AEP, BASEEXCESS in the last 8760 hours.   I have reviewed the patient's laboratory results.    Radiology results:    CT Abdomen Pelvis Without Contrast    Result Date: 4/4/2023  FINAL REPORT TECHNIQUE: Routine axial images through the abdomen and pelvis were obtained. CLINICAL HISTORY: diarrhea, abd discomfort FINDINGS: Lower chest: There are left greater than right pleural effusions.  Abdomen:  The gallbladder has been removed.  There is cirrhosis with mild splenomegaly.  There are upper abdominal venous collaterals  indicative of portal hypertension.  There are 2 small nonobstructing right renal calculi, the largest measuring 4 mm in diameter.  The other solid abdominal organs and ureters are unremarkable.  The GI tract is unremarkable, including the appendix.  Pelvis: The uterus, ovaries, and urinary bladder are normal.  There is large volume ascites and anasarca.  There is no pelvic or abdominal adenopathy or acute osseous abnormality.     Impression: Cirrhosis with portal hypertension and ascites.  Small nonobstructing right renal calculi.  Bilateral pleural effusions. Authenticated and Electronically Signed by Salomon Cantu M.D. on 04/04/2023 06:20:35 PM    XR Chest 1 View    Result Date: 4/4/2023   PROCEDURE: XR CHEST 1 VW-  HISTORY: generalized weakness  COMPARISON: 12/03/2021.  FINDINGS: The heart is mildly enlarged.. The mediastinum is unremarkable. Decreased inspiratory effort noted with crowding of the lung markings in both lung bases. There is new left basilar airspace disease, possible pneumonia. Right lung is clear. Calcified granuloma identified on the left... There is no pneumothorax.  There are no acute osseous abnormalities.      Impression: New left basilar airspace disease suggesting pneumonia, recommend follow-up..  .  This report was signed and finalized on 4/4/2023 3:16 PM by Katt Muse MD.    US Paracentesis    Result Date: 4/5/2023  ULTRASOUND-GUIDED PARACENTESIS  HISTORY: Ascites.  FINDINGS: After informed consent was obtained and time-out procedure performed, the patient was placed sitting upright in the ultrasound suite. Fluid was localized in the right lower quadrant under ultrasound guidance and marked on the skin appropriately. The patient was then prepped and draped in the usual sterile fashion and the skin was anesthetized with 1% lidocaine.  An ultrasound-guided paracentesis was then performed using a Turkel needle. The pocket was localized and needle was introduced. Approximately 2 to 3 cc of  fluid was obtained and the no more could be removed. The pocket was read localized and a new site was anesthetized and small incision was made. Surgical needle was introduced and approximately 8 cc of clear yellow fluid was removed. Catheter was then advanced and needle was removed and no more fluid could be obtained. Findings discussed with the patient. The patient tolerated the procedure well and there were no immediate complications.      Impression: Technically difficult ultrasound guided right lower quadrant paracentesis, as discussed above with approximately 10 cc of fluid removed in total; this was sent to the laboratory for analysis..  This report was signed and finalized on 4/5/2023 4:06 PM by Katt Muse MD.    I have reviewed the patient's radiology reports.    Medication Review:     I have reviewed the patient's active and prn medications.     Problem List:      Pneumonia of left lung due to infectious organism, unspecified part of lung    Gastrointestinal hemorrhage    Cirrhosis of liver with ascites      Assessment:    Left lower lobe pneumonia, unspecified further, POA  Suspected GI bleed, POA  Acute on chronic anemia, POA  Acute kidney injury, POA  Suspected decompensated cirrhosis with ascites  Morbid obesity, BMI 41.60  Thrombocytopenia associated with the cirrhosis  Diabetes mellitus  Hyperlipidemia  Hypertension  Hypothyroidism    Plan:    Suspected upper GI bleed  Acute on chronic anemia  Suspected decompensated cirrhosis with ascites, likely nonalcoholic fatty liver disease  -- GI following  -- EGD 4/6 with Dr. Mandujano.   -- PPI daily  -- Avoid NSAIDS  -- Hold ASA for 2 weeks  -- [place]ills daily  -- Repeat upper endoscopy in 6 months for surveillance. Outpatient GI follow up  -- ok to stop IV ocetrotide and IV PPI  --Hepatic testing for causes of cirrhosis of already been ordered by GI.  -- peritoneum fluid with no growth    Left lower lobe pneumonia  -On Rocephin, transition to oral  ceftin  -Blood cultures obtained after patient received first dose of Rocephin in the ER.  Will follow.      SILVINA  -Could be hepatorenal  -Avoid nephrotoxic drugs, holding lisinopril  -Continue to monitor renal function  -UA with pyria, no culture, already on cephalosporin    Debility  -- lives alone, PT/OT recommends rehab, patient agreeable     DVT Prophylaxis: SCDs  Code Status: Full  Diet: N.p.o. currently  Discharge Plan: SNF in 2-3 days    MITRA Deleon  04/06/23  13:45 EDT    Dictated utilizing Dragon dictation.

## 2023-04-06 NOTE — THERAPY EVALUATION
Patient Name: Ivania Harris  : 1960    MRN: [id number]                              Today's Date: 2023       Admit Date: 2023    Visit Dx:     ICD-10-CM ICD-9-CM   1. Pneumonia of left lung due to infectious organism, unspecified part of lung  J18.9 486   2. Gastrointestinal hemorrhage, unspecified gastrointestinal hemorrhage type  K92.2 578.9   3. Other cirrhosis of liver  K74.69 571.5   4. Cirrhosis of liver with ascites, unspecified hepatic cirrhosis type  K74.60 571.5    R18.8      Patient Active Problem List   Diagnosis   • Pneumonia of left lung due to infectious organism, unspecified part of lung   • Gastrointestinal hemorrhage   • Cirrhosis of liver with ascites     Past Medical History:   Diagnosis Date   • Anemia    • Diabetes mellitus    • Hyperlipidemia    • Hypertension    • Hypothyroidism    • Impaired mobility    • Short-term memory loss      Past Surgical History:   Procedure Laterality Date   • CHOLECYSTECTOMY        General Information     Row Name 23          OT Time and Intention    Document Type evaluation  -     Mode of Treatment occupational therapy  -     Row Name 23          General Information    Patient Profile Reviewed yes  -     Prior Level of Function independent:;ADL's;all household mobility  -     Existing Precautions/Restrictions fall  -     Barriers to Rehab previous functional deficit  -     Row Name 23          Occupational Profile    Reason for Services/Referral (Occupational Profile) ADL decline  -     Row Name 23          Living Environment    People in Home alone  -     Row Name 23 180          Home Main Entrance    Number of Stairs, Main Entrance none  -     Row Name 23 180          Stairs Within Home, Primary    Number of Stairs, Within Home, Primary none  -     Row Name 23          Cognition    Orientation Status (Cognition) oriented x 4  -     Row Name 23  1809          Safety Issues, Functional Mobility    Safety Issues Affecting Function (Mobility) awareness of need for assistance;insight into deficits/self-awareness;friction/shear risk;safety precaution awareness;safety precautions follow-through/compliance  -     Impairments Affecting Function (Mobility) balance;postural/trunk control;endurance/activity tolerance;shortness of breath;strength  -           User Key  (r) = Recorded By, (t) = Taken By, (c) = Cosigned By    Initials Name Provider Type     Kala Landa Occupational Therapist                 Mobility/ADL's     Row Name 04/06/23 1809          Bed Mobility    Bed Mobility supine-sit;sit-supine;scooting/bridging  -     Scooting/Bridging [place] (Bed Mobility) maximum assist (25% patient effort);2 person assist  -     Supine-Sit [place] (Bed Mobility) maximum assist (25% patient effort);2 person assist  -     Sit-Supine [place] (Bed Mobility) maximum assist (25% patient effort);2 person assist  -     Assistive Device (Bed Mobility) bed rails;draw sheet;head of bed elevated  -[facility] Name 04/06/23 1809          Bed-Chair Transfer    Bed-Chair [place] (Transfers) unable to assess  -[facility] Name 04/06/23 1809          Sit-Stand Transfer    Sit-Stand [place] (Transfers) unable to assess  -[facility] Name 04/06/23 1809          Activities of Daily Living    BADL Assessment/Intervention bathing;upper body dressing;lower body dressing;grooming;feeding;toileting  -[facility] Name 04/06/23 1809          Bathing Assessment/Intervention    [place] Level (Bathing) maximum assist (25% patient effort)  -[facility] Name 04/06/23 1809          Upper Body Dressing Assessment/Training    [place] Level (Upper Body Dressing) minimum assist (75% patient effort)  -[facility] Name 04/06/23 1809          Lower Body Dressing Assessment/Training    [place] Level (Lower Body Dressing) maximum assist (25% patient effort)   -[facility] Name 04/06/23 1809          Grooming Assessment/Training    [place] Level (Grooming) set up  -[facility] Name 04/06/23 1809          Self-Feeding Assessment/Training    [place] Level (Feeding) set up  -[facility] Name 04/06/23 1809          Toileting Assessment/Training    [place] Level (Toileting) maximum assist (25% patient effort)  -           User Key  (r) = Recorded By, (t) = Taken By, (c) = Cosigned By    Initials Name Provider Type     Kala Landa Occupational Therapist               Obj/Interventions     [facility] Name 04/06/23 1810          Sensory Assessment (Somatosensory)    Sensory Assessment (Somatosensory) sensation intact  -[facility] Name 04/06/23 1810          Vision Assessment/Intervention    Visual Impairment/Limitations WFL  -[facility] Name 04/06/23 1810          Range of Motion Comprehensive    General Range of Motion bilateral upper extremity ROM [facility]  -AH     Row Name 04/06/23 1810          Strength Comprehensive (MMT)    Comment, General Manual Muscle Testing (MMT) Assessment BUE [facility]           User Key  (r) = Recorded By, (t) = Taken By, (c) = Cosigned By    Initials Name Provider Type     Kala Landa Occupational Therapist               Goals/Plan     Row Name 04/06/23 1817          Bed Mobility Goal 1 (OT)    Activity/Assistive Device (Bed Mobility Goal 1, OT) bed mobility activities, all  -     [place] Level/Cues Needed (Bed Mobility Goal 1, OT) minimum assist (75% or more patient effort)  -     Time Frame (Bed Mobility Goal 1, OT) by discharge  -     Progress/Outcomes (Bed Mobility Goal 1, OT) goal ongoing  -AH     Row Name 04/06/23 1817          Transfer Goal 1 (OT)    Activity/Assistive Device (Transfer Goal 1, OT) sit-to-stand/stand-to-sit;walker, rolling  -     [place] Level/Cues Needed (Transfer Goal 1, OT) minimum assist (75% or more patient effort)  -     Time Frame (Transfer Goal 1, OT) long term goal (LTG);5 days  -      Progress/Outcome (Transfer Goal 1, OT) goal ongoing  -[facility] Name 04/06/23 1817          Bathing Goal 1 (OT)    Activity/Device (Bathing Goal 1, OT) bathing skills, all  -     [place] Level/Cues Needed (Bathing Goal 1, OT) moderate assist (50-74% patient effort)  -     Time Frame (Bathing Goal 1, OT) by discharge  -     Progress/Outcomes (Bathing Goal 1, OT) goal ongoing  -[facility] Name 04/06/23 1817          Dressing Goal 1 (OT)    Activity/Device (Dressing Goal 1, OT) lower body dressing;reacher;sock-aid  -     [place]/Cues Needed (Dressing Goal 1, OT) minimum assist (75% or more patient effort)  -     Time Frame (Dressing Goal 1, OT) long term goal (LTG);1 week  -     Progress/Outcome (Dressing Goal 1, OT) goal ongoing  -[facility] Name 04/06/23 1817          Strength Goal 1 (OT)    Strength Goal 1 (OT) Pt will perform UB strengthening ex using theraband for resistance.  -     Time Frame (Strength Goal 1, OT) by discharge  -     Progress/Outcome (Strength Goal 1, OT) goal ongoing  -[facility] Name 04/06/23 1817          Therapy Assessment/Plan (OT)    Planned Therapy Interventions (OT) activity tolerance training;BADL retraining;patient/caregiver education/training;transfer/mobility retraining;strengthening exercise  -           User Key  (r) = Recorded By, (t) = Taken By, (c) = Cosigned By    Initials Name Provider Type     Kala Landa Occupational Therapist               Clinical Impression     [facility] Name 04/06/23 1810          Pain Assessment    Pretreatment Pain Rating 0/10 - no pain  -     Posttreatment Pain Rating 0/10 - no pain  -     Pain Intervention(s) Repositioned;Ambulation/increased activity  -[facility] Name 04/06/23 1810          Plan of Care Review    Plan of Care Reviewed With patient  -     Progress no change  -     Outcome Evaluation Pt seen for OT evaluation today.  Pt presents with weakness and decreased independence with self care and functional  mobility tasks.  Pt max assist of 2 to sit eob, with mod assist for sitting balance.  Pt is in a bariatric bed which sits high in the air and she was unable to get her feet to the ground.  Pt is expected to benefit from skilled OT to improve her strength activity tolerance and independence with ADL tasks.  Pt needs inpatient rehab upon d/c from [facility].  -     Row Name 04/06/23 1810          Therapy Assessment/Plan (OT)    Patient/Family Therapy Goal Statement (OT) d/c rehab  -     Rehab Potential (OT) good, to achieve stated therapy goals  -     Criteria for Skilled Therapeutic Interventions Met (OT) yes;skilled treatment is necessary  -     Therapy Frequency (OT) 3 times/wk  -     Row Name 04/06/23 1810          Therapy Plan Review/Discharge Plan (OT)    Anticipated Discharge Disposition (OT) inpatient rehabilitation facility  -     Row Name 04/06/23 1810          Positioning and Restraints    Pre-Treatment Position in bed  -     Post Treatment Position bed  -     In Bed supine;call light within reach;encouraged to call for assist;with family/caregiver  -           User Key  (r) = Recorded By, (t) = Taken By, (c) = Cosigned By    Initials Name Provider Type    Kala Carr Occupational Therapist               Outcome Measures     Row Name 04/06/23 1818          How much help from another is currently needed...    Putting on and taking off regular lower body clothing? 2  -AH     Bathing (including washing, rinsing, and drying) 2  -AH     Toileting (which includes using toilet bed pan or urinal) 2  -AH     Putting on and taking off regular upper body clothing 3  -AH     Taking care of personal grooming (such as brushing teeth) 3  -AH     Eating meals 4  -AH     AM-PAC 6 Clicks Score (OT) 16  -     Row Name 04/06/23 1655 04/06/23 0930       How much help from another person do you currently need...    Turning from your back to your side while in flat bed without using bedrails? 2  -JR (r) LG (t)  JR (c) 3  -KJ    Moving from lying on back to sitting on the side of a flat bed without bedrails? 2  -JR (r) LG (t) JR (c) 1  -KJ    Moving to and from a bed to a chair (including a wheelchair)? 1  -JR (r) LG (t) JR (c) 1  -KJ    Standing up from a chair using your arms (e.g., wheelchair, bedside chair)? 1  -JR (r) LG (t) JR (c) 1  -KJ    Climbing 3-5 steps with a railing? 1  -JR (r) LG (t) JR (c) 1  -KJ    To walk in hospital room? 1  -JR (r) LG (t) JR (c) 1  -KJ    AM-PAC 6 Clicks Score (PT) 8  -JR (r) LG (t) 8  -KJ    Row Name 04/06/23 1818          Functional Assessment    Outcome Measure Options AM-PAC 6 Clicks Daily Activity (OT)  -           User Key  (r) = Recorded By, (t) = Taken By, (c) = Cosigned By    Initials Name Provider Type     Kala Landa Occupational Therapist    Mary Bowens, PT Physical Therapist    Joann Rosas, RN Registered Nurse    Haydee Persaud, PT Student PT Student                Occupational Therapy Education     Title: PT OT SLP Therapies (In Progress)     Topic: Occupational Therapy (In Progress)     Point: ADL training (Done)     Description:   Instruct learner(s) on proper safety adaptation and remediation techniques during self care or transfers.   Instruct in proper use of assistive devices.              Learning Progress Summary           Patient Acceptance, E,TB, VU by  at 4/6/2023 8609    Comment: Role of OT/POC                   Point: Home exercise program (Not Started)     Description:   Instruct learner(s) on appropriate technique for monitoring, assisting and/or progressing therapeutic exercises/activities.              Learner Progress:  Not documented in this visit.          Point: Precautions (Not Started)     Description:   Instruct learner(s) on prescribed precautions during self-care and functional transfers.              Learner Progress:  Not documented in this visit.          Point: Body mechanics (Not Started)     Description:   Instruct  learner(s) on proper positioning and spine alignment during self-care, functional mobility activities and/or exercises.              Learner Progress:  Not documented in this visit.                      User Key     Initials Effective Dates Name Provider Type Discipline     06/16/21 -  Kala Landa Occupational Therapist OT              OT Recommendation and Plan  Planned Therapy Interventions (OT): activity tolerance training, BADL retraining, patient/caregiver education/training, transfer/mobility retraining, strengthening exercise  Therapy Frequency (OT): 3 times/wk  Plan of Care Review  Plan of Care Reviewed With: patient  Progress: no change  Outcome Evaluation: Pt seen for OT evaluation today.  Pt presents with weakness and decreased independence with self care and functional mobility tasks.  Pt max assist of 2 to sit eob, with mod assist for sitting balance.  Pt is in a bariatric bed which sits high in the air and she was unable to get her feet to the ground.  Pt is expected to benefit from skilled OT to improve her strength activity tolerance and independence with ADL tasks.  Pt needs inpatient rehab upon d/c from [facility].     Time Calculation:    Time Calculation- OT     Row Name 04/06/23 1819             Time Calculation- OT    OT Start Time 1031  -AH      OT Received On 04/06/23  -      OT Goal Re-Cert Due Date 04/16/23  -         Untimed Charges    OT Eval/Re-eval Minutes 65  -AH         Total Minutes    Untimed Charges Total Minutes 65  -AH       Total Minutes 65  -AH            User Key  (r) = Recorded By, (t) = Taken By, (c) = Cosigned By    Initials Name Provider Type     Kala Landa Occupational Therapist              Therapy Charges for Today     Code Description Service Date Service Provider Modifiers Qty    [id number] HC OT EVAL MOD COMPLEXITY 4 4/6/2023 Kala Landa GO 1               Kala Landa  4/6/2023

## 2023-04-06 NOTE — PLAN OF CARE
Goal Outcome Evaluation:  Plan of Care Reviewed With: patient        Progress: no change  Outcome Evaluation: Pt seen for OT evaluation today.  Pt presents with weakness and decreased independence with self care and functional mobility tasks.  Pt max assist of 2 to sit eob, with mod assist for sitting balance.  Pt is in a bariatric bed which sits high in the air and she was unable to get her feet to the ground.  Pt is expected to benefit from skilled OT to improve her strength activity tolerance and independence with ADL tasks.  Pt needs inpatient rehab upon d/c from [facility].

## 2023-04-06 NOTE — ANESTHESIA POSTPROCEDURE EVALUATION
Patient: Ivania Harris    Procedure Summary     Date: 04/06/23 Room / Location: [facility] ENDOSCOPY 2 / [facility] ENDOSCOPY    Anesthesia Start: 0731 Anesthesia Stop: 0752    Procedure: ESOPHAGOGASTRODUODENOSCOPY WITH BIOPSY Diagnosis:       Gastrointestinal hemorrhage, unspecified gastrointestinal hemorrhage type      Cirrhosis of liver with ascites, unspecified hepatic cirrhosis type      (Gastrointestinal hemorrhage, unspecified gastrointestinal hemorrhage type [K92.2])      (Cirrhosis of liver with ascites, unspecified hepatic cirrhosis type [K74.60, R18.8])    Surgeons: Jens Mandujano MD Provider: Chip Young CRNA    Anesthesia Type: MAC ASA Status: 3          Anesthesia Type: MAC    Vitals  No vitals data found for the desired time range.          Post Anesthesia Care and Evaluation    Patient location during evaluation: PACU  Patient participation: complete - patient participated  Level of consciousness: awake and alert  Pain score: 0  Pain management: satisfactory to patient    Airway patency: patent  Anesthetic complications: No anesthetic complications  PONV Status: none  Cardiovascular status: acceptable and stable  Respiratory status: acceptable, nonlabored ventilation, spontaneous ventilation and unassisted  Hydration status: acceptable    Comments: Vitals signs as noted in nursing documentation as per protocol.

## 2023-04-06 NOTE — DISCHARGE PLACEMENT REQUEST
"Ivania King (62 y.o. Female)     Date of Birth   1960    Social Security Number       Address   [address] [place] [address] [place], [place] [address]    Home Phone   [phone]    MRN   [id number]       Jainism   Gini    Marital Status                               Admission Date   23    Admission Type   Emergency    Admitting Provider   Marcos Altman MD    Attending Provider   Gabriella Ross DO    Department, Room/Bed   [facility] TELEMETRY        Discharge Date       Discharge Disposition       Discharge Destination                               Attending Provider: Gabriella Ross DO    Allergies: No Known Allergies    Isolation: None   Infection: None   Code Status: CPR    Ht: 165.1 cm (65\")   Wt: 142 kg (313 lb 0.9 oz)    Admission Cmt: None   Principal Problem: Pneumonia of left lung due to infectious organism, unspecified part of lung [J18.9]                 Active Insurance as of 2023     Primary Coverage     Payor Plan Insurance Group Employer/Plan Group    HUMANA MEDICARE REPLACEMENT HUMANA MEDICARE REPLACEMENT [id number]     Payor Plan Address Payor Plan Phone Number Payor Plan Fax Number Effective Dates    [address] [address] [phone]  2021 - None Entered    [facility] [id number]       Subscriber Name Subscriber Birth Date Member ID       IVANIA KING 1960 [id number]                 Emergency Contacts      (Rel.) Home Phone Work Phone Mobile Phone    GENARO BUCHANAN (Brother) [phone] -- --               History & Physical      Jens Mandujano MD at 23 0738          H&P reviewed. The patient was examined and there are no changes to the H&P.          Electronically signed by Jens Mandujano MD at 23 0738   Source Note               In Patient Consult      Date of Consultation: 2023  Patient Name: Ivania King  MRN: [id number]  : 1960 "     Referring provider: Abhilash Valdivia MD    Primary care provider:  Alessandro Mercer MD    Reason for consultation: Cirrhosis, ascites, suspected GI bleed    History of Present Illness: This is a 62-year-old morbidly obese female patient with a prior history of hypertension, hyperlipidemia, hypothyroidism, diabetes mellitus was seen in the emergency room today on 04/04/2023 with complaints of generalized weakness since last few days and, nausea and diarrhea since 1 day duration    Patient states that she has been having loose stools past week or so with the associated nausea without any vomiting.  She did not see any blood in the stool however bowel movement she had in the emergency room was dark as per nursing staff.  She denies any fever chills.  She generally felt very weak and came to emergency room for evaluation.  She has some associated recent cough since few days.  She states that her bowel movement normally will be regular except occasional constipation.  She is currently on aspirin 325 mg p.o. daily    She had some blood work done by PCP's office recently and had a ultrasound ordered for further evaluation that has not been done.  She was not been told of any cirrhosis or chronic liver disease in the past.  No family history of any liver cancer or liver cirrhosis.    No prior history of an EGD or colonoscopy no family history of any colon cancer or GI malignancy.  She is nonalcoholic and non-smoker.    In the emergency room she had a CT abdomen pelvis done which revealed signs of cirrhosis with portal hypertension with a large ascites.  She also had a small nonobstructing right renal calculi and bilateral pleural effusions and possible pneumonia.  Stool occult blood test was positive.  Her hemoglobin was 8.5 g/dL with MCV of 98 and platelets 120,000.  No prior lab work to compare.  Lab work also revealed creatinine of 1.46 with a BUN of 23.  Her alkaline phosphatase 100 ALT 19 AST 42 total  bilirubin 1 albumin is 1.6.  Given her significant CT findings and suspicion of GI bleed and anemia, GI was consulted.      Subjective     Past Medical History:   Diagnosis Date   • Anemia    • Diabetes mellitus    • Hyperlipidemia    • Hypertension    • Hypothyroidism    • Short-term memory loss        Past Surgical History:   Procedure Laterality Date   • CHOLECYSTECTOMY         History reviewed. No pertinent family history.    Social History     Socioeconomic History   • Marital status:    Tobacco Use   • Smoking status: Never   • Smokeless tobacco: Never   Vaping Use   • Vaping Use: Never used   Substance and Sexual Activity   • Alcohol use: Yes     Comment: a couple margaritas a year   • Drug use: Never   • Sexual activity: Defer         Current Facility-Administered Medications:   •  octreotide (sandoSTATIN) 500 mcg in sodium chloride 0.9 % 100 mL (5 mcg/mL) infusion, 25 mcg/hr, Intravenous, Continuous, MeccarielGerardo winter PA-C  •  pantoprazole (PROTONIX) 40 mg in 100mL NS IVPB, 8 mg/hr, Intravenous, Continuous, MeccarielGerardo winter PA-C, Last Rate: 20 mL/hr at 04/04/23 1900, 8 mg/hr at 04/04/23 1900  •  [COMPLETED] Insert Peripheral IV, , , Once **AND** sodium chloride 0.9 % flush 10 mL, 10 mL, Intravenous, PRN, MeccarielGerardo winter PA-C    Current Outpatient Medications:   •  albuterol sulfate  (90 Base) MCG/ACT inhaler, Inhale 2 puffs Every 4 (Four) Hours As Needed for Wheezing., Disp: 18 g, Rfl: 0  •  Aspirin 325 MG capsule, Take 325 mg by mouth Daily., Disp: , Rfl:   •  atorvastatin (LIPITOR) 40 MG tablet, Take 40 mg by mouth Daily., Disp: , Rfl:   •  azithromycin (ZITHROMAX) 500 MG tablet, 1 po daily, Disp: 10 tablet, Rfl: 0  •  buPROPion XL (WELLBUTRIN XL) 150 MG 24 hr tablet, Take 150 mg by mouth Daily., Disp: , Rfl:   •  donepezil (ARICEPT) 10 MG tablet, Take 10 mg by mouth Every Night., Disp: , Rfl:   •  insulin aspart (NovoLOG) 100 UNIT/ML injection, Inject  under  the skin into the appropriate area as directed 3 (Three) Times a Day Before Meals., Disp: , Rfl:   •  insulin glargine (Lantus) 100 UNIT/ML injection, Inject 60 Units under the skin into the appropriate area as directed 2 (Two) Times a Day., Disp: , Rfl:   •  ipratropium-albuterol (DUO-NEB) 0.5-2.5 mg/3 ml nebulizer, Nebulize q 4 h prn wheezing / shortness of breath, Disp: 360 mL, Rfl: 0  •  levothyroxine (SYNTHROID, LEVOTHROID) 100 MCG tablet, Take 300 mcg by mouth Daily., Disp: , Rfl:   •  lisinopril (PRINIVIL,ZESTRIL) 10 MG tablet, Take 10 mg by mouth Daily., Disp: , Rfl:   •  minocycline (MINOCIN,DYNACIN) 100 MG capsule, Take 100 mg by mouth Daily., Disp: , Rfl:   •  multivitamin with minerals (SENIOR MULTIVITAMIN PLUS PO), Take 1 tablet by mouth Daily., Disp: , Rfl:   •  predniSONE (DELTASONE) 20 MG tablet, 3 po daily for 3 days, 2 po daily for 3 days, 1 po daily for 3 days, Disp: 18 tablet, Rfl: 0  •  SITagliptin (JANUVIA) 50 MG tablet, Take 50 mg by mouth Daily., Disp: , Rfl:     No Known Allergies    Review of Systems   Constitutional: Positive for fatigue. Negative for fever.   HENT: Negative for sore throat and trouble swallowing.    Eyes: Negative for visual disturbance.   Respiratory: Negative for cough, chest tightness and shortness of breath.    Cardiovascular: Negative for chest pain, palpitations and leg swelling.   Gastrointestinal: Positive for diarrhea and nausea. Negative for abdominal pain, blood in stool, constipation, vomiting and indigestion.   Endocrine: Negative for polyphagia.   Genitourinary: Negative for dysuria and hematuria.   Musculoskeletal: Negative for back pain, joint swelling and neck pain.   Skin: Negative for rash, skin lesions and wound.   Neurological: Positive for weakness (Generalized weakness). Negative for dizziness, seizures, speech difficulty, numbness and confusion.   Hematological: Negative for adenopathy. Does not bruise/bleed easily.   Psychiatric/Behavioral:  Negative for hallucinations and depressed mood.        The following portions of the patient's history were reviewed and updated as appropriate: allergies, current medications, past family history, past medical history, past social history, past surgical history and problem list.    Objective     Vitals:    04/04/23 1830 04/04/23 1900 04/04/23 1930 04/04/23 1931   BP: 112/50 131/53 128/60    Pulse: 80 80  81   Resp:       Temp:       TempSrc:       SpO2: 97% 99%  99%   Weight:       Height:           Physical Exam  Vitals and nursing note reviewed.   Constitutional:       Appearance: She is well-developed. She is obese.   HENT:      Head: Normocephalic and atraumatic.      Right Ear: External ear normal.      Left Ear: External ear normal.   Eyes:      Comments: Pallor present   Neck:      Thyroid: No thyromegaly.      Trachea: No tracheal deviation.   Cardiovascular:      Rate and Rhythm: Normal rate and regular rhythm.      Heart sounds: No murmur heard.  Pulmonary:      Effort: Pulmonary effort is normal. No respiratory distress.      Breath sounds: Normal breath sounds.   Abdominal:      General: Abdomen is flat. Bowel sounds are normal. There is distension (Moderate abdominal distention partly gaseous in the center).      Palpations: Abdomen is soft. There is no mass.      Tenderness: There is no abdominal tenderness. There is no guarding or rebound.      Hernia: A hernia (Reducible umbilical hernia) is present.      Comments: Difficult to assess for free fluid due to large abdominal wall   Musculoskeletal:         General: Normal range of motion.      Cervical back: Normal range of motion and neck supple.   Skin:     General: Skin is warm and dry.      Findings: Rash present.      Comments: Chronic eczematoid changes in both lower extremities below the knee and also around the groin   Neurological:      General: No focal deficit present.      Mental Status: She is alert and oriented to person, place, and time.       Cranial Nerves: No cranial nerve deficit.      Sensory: No sensory deficit.   Psychiatric:         Mood and Affect: Mood normal.         Behavior: Behavior normal.         Thought Content: Thought content normal.         Judgment: Judgment normal.         Results from last 7 days   Lab Units 04/04/23  1431   SODIUM mmol/L 142   POTASSIUM mmol/L 3.9   CHLORIDE mmol/L 109*   CO2 mmol/L 24.5   BUN mg/dL 23   CREATININE mg/dL 1.46*   CALCIUM mg/dL 8.2*   ALBUMIN g/dL 1.6*   BILIRUBIN mg/dL 1.0   ALK PHOS U/L 100   ALT (SGPT) U/L 19   AST (SGOT) U/L 42*   GLUCOSE mg/dL 105*   WBC 10*3/mm3 6.32   HEMOGLOBIN g/dL 8.5*   PLATELETS 10*3/mm3 120*       Imaging Results (Last 24 Hours)     Procedure Component Value Units Date/Time    CT Abdomen Pelvis Without Contrast [[id number]] Collected: 04/04/23 1820     Updated: 04/04/23 1821    Narrative:      FINAL REPORT    TECHNIQUE:  Routine axial images through the abdomen and pelvis were  obtained.    CLINICAL HISTORY:  diarrhea, abd discomfort    FINDINGS:  Lower chest: There are left greater than right pleural  effusions.  Abdomen:  The gallbladder has been removed.  There  is cirrhosis with mild splenomegaly.  There are upper abdominal  venous collaterals indicative of portal hypertension.  There are  2 small nonobstructing right renal calculi, the largest  measuring 4 mm in diameter.  The other solid abdominal organs  and ureters are unremarkable.  The GI tract is unremarkable,  including the appendix.  Pelvis: The uterus, ovaries, and  urinary bladder are normal.  There is large volume ascites and  anasarca.  There is no pelvic or abdominal adenopathy or acute  osseous abnormality.      Impression:      Cirrhosis with portal hypertension and ascites.  Small  nonobstructing right renal calculi.  Bilateral pleural effusions.    Authenticated and Electronically Signed by Salomon Cantu M.D. on  04/04/2023 06:20:35 PM    XR Chest 1 View [[id number]] Collected: 04/04/23 2605      Updated: 04/04/23 1518    Narrative:       PROCEDURE: XR CHEST 1 VW-     HISTORY: generalized weakness     COMPARISON: 12/03/2021.     FINDINGS: The heart is mildly enlarged.. The mediastinum is  unremarkable. Decreased inspiratory effort noted with crowding of the  lung markings in both lung bases. There is new left basilar airspace  disease, possible pneumonia. Right lung is clear. Calcified granuloma  identified on the left... There is no pneumothorax.  There are no acute  osseous abnormalities.       Impression:      New left basilar airspace disease suggesting pneumonia,  recommend follow-up..     .     This report was signed and finalized on 4/4/2023 3:16 PM by Katt Muse MD.          Assessment / Plan      Assessment/Recommendations:     1.  Suspected viral illness with cough and gastroenteritis/suspected pneumonia  2.  Suspected decompensated Leo cirrhosis with ascites; MELD 15 (4/2023)  3.  Suspected chronic normocytic anemia  4.  Occult  blood positive stool  5.  Morbid obesity with a BMI 41.60 with a serious comorbidity  6.  Acute kidney injury versus CKD vs hepatorenal syndrome  7.  Severe hypoalbuminemia  8.  Acquired thrombocytopenia associated with the cirrhosis    Patient history is more in favor of possible mild viral illness with a cough loose stool and nausea without any vomiting.  No history suggestive any overt GI bleed.  Patient likely has a chronic anemia and minimal mucosal bleeding with possible melena cannot be ruled out.  No prior hemoglobin to compare.  She does take ibuprofen intermittently and peptic ulcer disease needs to be ruled out    Her creatinine is 1.4.  No prior values to compare.  With a dehydration patient may have some prerenal component and some degree of hepatorenal syndrome suspected.    She appears to have a decompensated Leo cirrhosis with ascites.  History is not suggestive any variceal bleeding however that cannot be ruled out.  I have discussed the risk and  benefits involved with the procedure and patient is agreeable to proceed with the procedure.    She needs a full liver work-up  Patient clinically appears dehydrated with acute kidney injury  Clear liquids p.o.  Gentle IV fluid hydration findable x  IR guided diagnostic and therapeutic paracentesis  Protonix IV for 48 hours  Octreotide drip for now for 48 hours  Albumin 25% 3 times daily for 2 days  Rocephin 1 g IV daily  Transfuse to keep the Hgb more than 7 g/dL  Patient is currently on high-dose aspirin, will hold aspirin.  If patient is stable without any signs of overt GI bleed we will consider EGD on Friday with a possible variceal band ligation if present.  If any overt bleeding we will schedule her for endoscopy anytime interim.        Thank you very much for letting me participate in the care of this patient.  Please do not hesitate to call me if you have any questions.    Jens Mandujano MD  [facility] [place]  2023  20:09 EDT    Please note that portions of this note may have been completed with a voice recognition program.     Electronically signed by Jens Mandujano MD at 23             Jens Mandujano MD at 23               In Patient Consult      Date of Consultation: 2023  Patient Name: Ivania Harris  MRN: [id number]  : 1960     Referring provider: Abhilash Vladivia MD    Primary care provider:  Alessandro Mercer MD    Reason for consultation: Cirrhosis, ascites, suspected GI bleed    History of Present Illness: This is a 62-year-old morbidly obese female patient with a prior history of hypertension, hyperlipidemia, hypothyroidism, diabetes mellitus was seen in the emergency room today on 2023 with complaints of generalized weakness since last few days and, nausea and diarrhea since 1 day duration    Patient states that she has been having loose stools past week or so with the associated nausea without any  vomiting.  She did not see any blood in the stool however bowel movement she had in the emergency room was dark as per nursing staff.  She denies any fever chills.  She generally felt very weak and came to emergency room for evaluation.  She has some associated recent cough since few days.  She states that her bowel movement normally will be regular except occasional constipation.  She is currently on aspirin 325 mg p.o. daily    She had some blood work done by PCP's office recently and had a ultrasound ordered for further evaluation that has not been done.  She was not been told of any cirrhosis or chronic liver disease in the past.  No family history of any liver cancer or liver cirrhosis.    No prior history of an EGD or colonoscopy no family history of any colon cancer or GI malignancy.  She is nonalcoholic and non-smoker.    In the emergency room she had a CT abdomen pelvis done which revealed signs of cirrhosis with portal hypertension with a large ascites.  She also had a small nonobstructing right renal calculi and bilateral pleural effusions and possible pneumonia.  Stool occult blood test was positive.  Her hemoglobin was 8.5 g/dL with MCV of 98 and platelets 120,000.  No prior lab work to compare.  Lab work also revealed creatinine of 1.46 with a BUN of 23.  Her alkaline phosphatase 100 ALT 19 AST 42 total bilirubin 1 albumin is 1.6.  Given her significant CT findings and suspicion of GI bleed and anemia, GI was consulted.      Subjective     Past Medical History:   Diagnosis Date   • Anemia    • Diabetes mellitus    • Hyperlipidemia    • Hypertension    • Hypothyroidism    • Short-term memory loss        Past Surgical History:   Procedure Laterality Date   • CHOLECYSTECTOMY         History reviewed. No pertinent family history.    Social History     Socioeconomic History   • Marital status:    Tobacco Use   • Smoking status: Never   • Smokeless tobacco: Never   Vaping Use   • Vaping Use: Never  used   Substance and Sexual Activity   • Alcohol use: Yes     Comment: a couple margaritas a year   • Drug use: Never   • Sexual activity: Defer         Current Facility-Administered Medications:   •  octreotide (sandoSTATIN) 500 mcg in sodium chloride 0.9 % 100 mL (5 mcg/mL) infusion, 25 mcg/hr, Intravenous, Continuous, MeccarielGerardo winter PA-C  •  pantoprazole (PROTONIX) 40 mg in 100mL NS IVPB, 8 mg/hr, Intravenous, Continuous, MeccarielGerardo winter PA-BAKARI, Last Rate: 20 mL/hr at 04/04/23 1900, 8 mg/hr at 04/04/23 1900  •  [COMPLETED] Insert Peripheral IV, , , Once **AND** sodium chloride 0.9 % flush 10 mL, 10 mL, Intravenous, PRN, MecGerardo andrea PA-C    Current Outpatient Medications:   •  albuterol sulfate  (90 Base) MCG/ACT inhaler, Inhale 2 puffs Every 4 (Four) Hours As Needed for Wheezing., Disp: 18 g, Rfl: 0  •  Aspirin 325 MG capsule, Take 325 mg by mouth Daily., Disp: , Rfl:   •  atorvastatin (LIPITOR) 40 MG tablet, Take 40 mg by mouth Daily., Disp: , Rfl:   •  azithromycin (ZITHROMAX) 500 MG tablet, 1 po daily, Disp: 10 tablet, Rfl: 0  •  buPROPion XL (WELLBUTRIN XL) 150 MG 24 hr tablet, Take 150 mg by mouth Daily., Disp: , Rfl:   •  donepezil (ARICEPT) 10 MG tablet, Take 10 mg by mouth Every Night., Disp: , Rfl:   •  insulin aspart (NovoLOG) 100 UNIT/ML injection, Inject  under the skin into the appropriate area as directed 3 (Three) Times a Day Before Meals., Disp: , Rfl:   •  insulin glargine (Lantus) 100 UNIT/ML injection, Inject 60 Units under the skin into the appropriate area as directed 2 (Two) Times a Day., Disp: , Rfl:   •  ipratropium-albuterol (DUO-NEB) 0.5-2.5 mg/3 ml nebulizer, Nebulize q 4 h prn wheezing / shortness of breath, Disp: 360 mL, Rfl: 0  •  levothyroxine (SYNTHROID, LEVOTHROID) 100 MCG tablet, Take 300 mcg by mouth Daily., Disp: , Rfl:   •  lisinopril (PRINIVIL,ZESTRIL) 10 MG tablet, Take 10 mg by mouth Daily., Disp: , Rfl:   •  minocycline  (MINOCIN,DYNACIN) 100 MG capsule, Take 100 mg by mouth Daily., Disp: , Rfl:   •  multivitamin with minerals (SENIOR MULTIVITAMIN PLUS PO), Take 1 tablet by mouth Daily., Disp: , Rfl:   •  predniSONE (DELTASONE) 20 MG tablet, 3 po daily for 3 days, 2 po daily for 3 days, 1 po daily for 3 days, Disp: 18 tablet, Rfl: 0  •  SITagliptin (JANUVIA) 50 MG tablet, Take 50 mg by mouth Daily., Disp: , Rfl:     No Known Allergies    Review of Systems   Constitutional: Positive for fatigue. Negative for fever.   HENT: Negative for sore throat and trouble swallowing.    Eyes: Negative for visual disturbance.   Respiratory: Negative for cough, chest tightness and shortness of breath.    Cardiovascular: Negative for chest pain, palpitations and leg swelling.   Gastrointestinal: Positive for diarrhea and nausea. Negative for abdominal pain, blood in stool, constipation, vomiting and indigestion.   Endocrine: Negative for polyphagia.   Genitourinary: Negative for dysuria and hematuria.   Musculoskeletal: Negative for back pain, joint swelling and neck pain.   Skin: Negative for rash, skin lesions and wound.   Neurological: Positive for weakness (Generalized weakness). Negative for dizziness, seizures, speech difficulty, numbness and confusion.   Hematological: Negative for adenopathy. Does not bruise/bleed easily.   Psychiatric/Behavioral: Negative for hallucinations and depressed mood.        The following portions of the patient's history were reviewed and updated as appropriate: allergies, current medications, past family history, past medical history, past social history, past surgical history and problem list.    Objective     Vitals:    04/04/23 1830 04/04/23 1900 04/04/23 1930 04/04/23 1931   BP: 112/50 131/53 128/60    Pulse: 80 80  81   Resp:       Temp:       TempSrc:       SpO2: 97% 99%  99%   Weight:       Height:           Physical Exam  Vitals and nursing note reviewed.   Constitutional:       Appearance: She is  well-developed. She is obese.   HENT:      Head: Normocephalic and atraumatic.      Right Ear: External ear normal.      Left Ear: External ear normal.   Eyes:      Comments: Pallor present   Neck:      Thyroid: No thyromegaly.      Trachea: No tracheal deviation.   Cardiovascular:      Rate and Rhythm: Normal rate and regular rhythm.      Heart sounds: No murmur heard.  Pulmonary:      Effort: Pulmonary effort is normal. No respiratory distress.      Breath sounds: Normal breath sounds.   Abdominal:      General: Abdomen is flat. Bowel sounds are normal. There is distension (Moderate abdominal distention partly gaseous in the center).      Palpations: Abdomen is soft. There is no mass.      Tenderness: There is no abdominal tenderness. There is no guarding or rebound.      Hernia: A hernia (Reducible umbilical hernia) is present.      Comments: Difficult to assess for free fluid due to large abdominal wall   Musculoskeletal:         General: Normal range of motion.      Cervical back: Normal range of motion and neck supple.   Skin:     General: Skin is warm and dry.      Findings: Rash present.      Comments: Chronic eczematoid changes in both lower extremities below the knee and also around the groin   Neurological:      General: No focal deficit present.      Mental Status: She is alert and oriented to person, place, and time.      Cranial Nerves: No cranial nerve deficit.      Sensory: No sensory deficit.   Psychiatric:         Mood and Affect: Mood normal.         Behavior: Behavior normal.         Thought Content: Thought content normal.         Judgment: Judgment normal.         Results from last 7 days   Lab Units 04/04/23  1431   SODIUM mmol/L 142   POTASSIUM mmol/L 3.9   CHLORIDE mmol/L 109*   CO2 mmol/L 24.5   BUN mg/dL 23   CREATININE mg/dL 1.46*   CALCIUM mg/dL 8.2*   ALBUMIN g/dL 1.6*   BILIRUBIN mg/dL 1.0   ALK PHOS U/L 100   ALT (SGPT) U/L 19   AST (SGOT) U/L 42*   GLUCOSE mg/dL 105*   WBC 10*3/mm3  6.32   HEMOGLOBIN g/dL 8.5*   PLATELETS 10*3/mm3 120*       Imaging Results (Last 24 Hours)     Procedure Component Value Units Date/Time    CT Abdomen Pelvis Without Contrast [[id number]] Collected: 04/04/23 1820     Updated: 04/04/23 1821    Narrative:      FINAL REPORT    TECHNIQUE:  Routine axial images through the abdomen and pelvis were  obtained.    CLINICAL HISTORY:  diarrhea, abd discomfort    FINDINGS:  Lower chest: There are left greater than right pleural  effusions.  Abdomen:  The gallbladder has been removed.  There  is cirrhosis with mild splenomegaly.  There are upper abdominal  venous collaterals indicative of portal hypertension.  There are  2 small nonobstructing right renal calculi, the largest  measuring 4 mm in diameter.  The other solid abdominal organs  and ureters are unremarkable.  The GI tract is unremarkable,  including the appendix.  Pelvis: The uterus, ovaries, and  urinary bladder are normal.  There is large volume ascites and  anasarca.  There is no pelvic or abdominal adenopathy or acute  osseous abnormality.      Impression:      Cirrhosis with portal hypertension and ascites.  Small  nonobstructing right renal calculi.  Bilateral pleural effusions.    Authenticated and Electronically Signed by Salomon Cantu M.D. on  04/04/2023 06:20:35 PM    XR Chest 1 View [[id number]] Collected: 04/04/23 1515     Updated: 04/04/23 1518    Narrative:       PROCEDURE: XR CHEST 1 VW-     HISTORY: generalized weakness     COMPARISON: 12/03/2021.     FINDINGS: The heart is mildly enlarged.. The mediastinum is  unremarkable. Decreased inspiratory effort noted with crowding of the  lung markings in both lung bases. There is new left basilar airspace  disease, possible pneumonia. Right lung is clear. Calcified granuloma  identified on the left... There is no pneumothorax.  There are no acute  osseous abnormalities.       Impression:      New left basilar airspace disease suggesting pneumonia,  recommend  follow-up..     .     This report was signed and finalized on 4/4/2023 3:16 PM by Katt Muse MD.          Assessment / Plan      Assessment/Recommendations:     1.  Suspected viral illness with cough and gastroenteritis/suspected pneumonia  2.  Suspected decompensated Leo cirrhosis with ascites; MELD 15 (4/2023)  3.  Suspected chronic normocytic anemia  4.  Occult  blood positive stool  5.  Morbid obesity with a BMI 41.60 with a serious comorbidity  6.  Acute kidney injury versus CKD vs hepatorenal syndrome  7.  Severe hypoalbuminemia  8.  Acquired thrombocytopenia associated with the cirrhosis    Patient history is more in favor of possible mild viral illness with a cough loose stool and nausea without any vomiting.  No history suggestive any overt GI bleed.  Patient likely has a chronic anemia and minimal mucosal bleeding with possible melena cannot be ruled out.  No prior hemoglobin to compare.  She does take ibuprofen intermittently and peptic ulcer disease needs to be ruled out    Her creatinine is 1.4.  No prior values to compare.  With a dehydration patient may have some prerenal component and some degree of hepatorenal syndrome suspected.    She appears to have a decompensated Leo cirrhosis with ascites.  History is not suggestive any variceal bleeding however that cannot be ruled out.  I have discussed the risk and benefits involved with the procedure and patient is agreeable to proceed with the procedure.    She needs a full liver work-up  Patient clinically appears dehydrated with acute kidney injury  Clear liquids p.o.  Gentle IV fluid hydration findable x  IR guided diagnostic and therapeutic paracentesis  Protonix IV for 48 hours  Octreotide drip for now for 48 hours  Albumin 25% 3 times daily for 2 days  Rocephin 1 g IV daily  Transfuse to keep the Hgb more than 7 g/dL  Patient is currently on high-dose aspirin, will hold aspirin.  If patient is stable without any signs of overt GI bleed we will  consider EGD on Friday with a possible variceal band ligation if present.  If any overt bleeding we will schedule her for endoscopy anytime interim.        Thank you very much for letting me participate in the care of this patient.  Please do not hesitate to call me if you have any questions.    Jens Mandujano MD  [facility]  2023  20:09 EDT    Please note that portions of this note may have been completed with a voice recognition program.     Electronically signed by Jens Mandujano MD at 23     Marcos Altman MD at 23            [facility]   HISTORY AND PHYSICAL      Name:  Ivania Harris   Age:  62 y.o.  Sex:  female  :  1960  MRN:  [id number]   Visit Number:  [id number]  Admission Date:  2023  Date Of Service:  23  Primary Care Physician:  Alessandro Mercer MD    Chief Complaint:     Generalized weakness, diarrhea    History Of Presenting Illness:      Patient 62 years old female with a past medical history of diabetes mellitus, anemia, hyperlipidemia, hypertension and hypothyroidism who presented to the ER with a chief complaint of generalized weakness along with diarrhea.  Patient reports that she has had watery dark-colored diarrhea over the past 3 days associated with nausea but no vomiting.  Patient denies any abdominal pain or urinary symptoms.  Patient reports feeling weak all over which promoted her to come to the ER.  Patient also reporting mild cough that is nonproductive.  Patient denies any previous diagnosis of liver disease that she is aware of.     On ER evaluation, patient was hemodynamically stable, her work-up showed creatinine of 1.46, hemoglobin of 8.5, platelets of 120.  Hemoccult blood was positive.  Chest x-ray showed New left basilar airspace disease suggesting pneumonia. Cirrhosis with portal hypertension and ascites. Small nonobstructing right renal calculi.  Bilateral pleural  effusions.  Case was discussed with gastroenterology Dr. Collado who recommended octreotide and Protonix drip and admission for the patient.  Patient received Rocephin, normal saline bolus in addition to Protonix and octreotide while in the ER.  Hospitalist consulted for admission, further evaluation and treatment.     Review Of Systems:    All systems were reviewed and negative except as mentioned in history of presenting illness, assessment and plan.    Past Medical History: Patient  has a past medical history of Anemia, Diabetes mellitus, Hyperlipidemia, Hypertension, Hypothyroidism, and Short-term memory loss.    Past Surgical History: Patient  has a past surgical history that includes Cholecystectomy.    Social History: Patient  reports that she has never smoked. She has never used smokeless tobacco. She reports current alcohol use. She reports that she does not use drugs.    Family History:  Patient's family history has been reviewed and found to be noncontributory.    Allergies:      Patient has no known allergies.    Home Medications:    Prior to Admission Medications     Prescriptions Last Dose Informant Patient Reported? Taking?    albuterol sulfate  (90 Base) MCG/ACT inhaler   No No    Inhale 2 puffs Every 4 (Four) Hours As Needed for Wheezing.    Aspirin 325 MG capsule   Yes No    Take 325 mg by mouth Daily.    atorvastatin (LIPITOR) 40 MG tablet   Yes No    Take 40 mg by mouth Daily.    azithromycin (ZITHROMAX) 500 MG tablet   No No    1 po daily    buPROPion XL (WELLBUTRIN XL) 150 MG 24 hr tablet   Yes No    Take 150 mg by mouth Daily.    donepezil (ARICEPT) 10 MG tablet   Yes No    Take 10 mg by mouth Every Night.    insulin aspart (NovoLOG) 100 UNIT/ML injection   Yes No    Inject  under the skin into the appropriate area as directed 3 (Three) Times a Day Before Meals.    insulin glargine (Lantus) 100 UNIT/ML injection   Yes No    Inject 60 Units under the skin into the appropriate area as  "directed 2 (Two) Times a Day.    ipratropium-albuterol (DUO-NEB) 0.5-2.5 mg/3 ml nebulizer   No No    Nebulize q 4 h prn wheezing / shortness of breath    levothyroxine (SYNTHROID, LEVOTHROID) 100 MCG tablet   Yes No    Take 300 mcg by mouth Daily.    lisinopril (PRINIVIL,ZESTRIL) 10 MG tablet   Yes No    Take 10 mg by mouth Daily.    minocycline (MINOCIN,DYNACIN) 100 MG capsule   Yes No    Take 100 mg by mouth Daily.    multivitamin with minerals (SENIOR MULTIVITAMIN PLUS PO)   Yes No    Take 1 tablet by mouth Daily.    predniSONE (DELTASONE) 20 MG tablet   No No    3 po daily for 3 days, 2 po daily for 3 days, 1 po daily for 3 days    SITagliptin (JANUVIA) 50 MG tablet   Yes No    Take 50 mg by mouth Daily.        ED Medications:    Medications   sodium chloride 0.9 % flush 10 mL (has no administration in time range)   pantoprazole (PROTONIX) 40 mg in 100mL NS IVPB (8 mg/hr Intravenous New Bag 4/4/23 1900)   octreotide (sandoSTATIN) 500 mcg in sodium chloride 0.9 % 100 mL (5 mcg/mL) infusion (has no administration in time range)   sodium chloride 0.9 % bolus 1,000 mL (0 mL Intravenous Stopped 4/4/23 1736)   cefTRIAXone (ROCEPHIN) IVPB 1 g/50ml dextrose (premix) (0 g Intravenous Stopped 4/4/23 1845)   pantoprazole (PROTONIX) injection 80 mg (80 mg Intravenous Given 4/4/23 1858)   octreotide (sandoSTATIN) injection 50 mcg (50 mcg Intravenous Given 4/4/23 1859)     Vital Signs:  Temp:  [98.7 °F (37.1 °C)] 98.7 °F (37.1 °C)  Heart Rate:  [74-92] 81  Resp:  [18] 18  BP: ()/(49-64) 128/60        04/04/23  1409   Weight: 113 kg (250 lb)     Body mass index is 41.6 kg/m².    Physical Exam:     Most recent vital Signs: /60   Pulse 81   Temp 98.7 °F (37.1 °C) (Oral)   Resp 18   Ht 165.1 cm (65\")   Wt 113 kg (250 lb)   SpO2 99%   BMI 41.60 kg/m²     Physical Exam  Vitals and nursing note reviewed.   Constitutional:       General: She is not in acute distress.     Appearance: She is obese. She is " ill-appearing.   HENT:      Head: Normocephalic and atraumatic.      Right Ear: External ear normal.      Left Ear: External ear normal.      Nose: Nose normal.      Mouth/Throat:      Mouth: Mucous membranes are moist.   Eyes:      Extraocular Movements: Extraocular movements intact.      Conjunctiva/sclera: Conjunctivae normal.      Pupils: Pupils are equal, round, and reactive to light.   Cardiovascular:      Rate and Rhythm: Normal rate and regular rhythm.      Pulses: Normal pulses.      Heart sounds: Normal heart sounds.   Pulmonary:      Effort: Pulmonary effort is normal. No respiratory distress.      Breath sounds: Normal breath sounds. Decreased air movement present. No wheezing or rhonchi.   Abdominal:      General: Bowel sounds are normal. There is distension.      Palpations: Abdomen is soft.      Tenderness: There is no abdominal tenderness. There is no guarding or rebound.      Comments: Obese abdomen   Musculoskeletal:         General: Normal range of motion.      Cervical back: Normal range of motion and neck supple.      Right lower leg: No edema.      Left lower leg: No edema.   Skin:     General: Skin is warm and dry.      Findings: Rash present.      Comments: Chronic venous stasis in bilateral lower extremities.   Neurological:      General: No focal deficit present.      Mental Status: She is alert and oriented to person, place, and time. Mental status is at baseline.      Motor: No weakness.   Psychiatric:         Mood and Affect: Mood normal.         Behavior: Behavior normal.         Thought Content: Thought content normal.         Laboratory data:    I have reviewed the labs done in the emergency room.    Results from last 7 days   Lab Units 04/04/23  1431   SODIUM mmol/L 142   POTASSIUM mmol/L 3.9   CHLORIDE mmol/L 109*   CO2 mmol/L 24.5   BUN mg/dL 23   CREATININE mg/dL 1.46*   CALCIUM mg/dL 8.2*   BILIRUBIN mg/dL 1.0   ALK PHOS U/L 100   ALT (SGPT) U/L 19   AST (SGOT) U/L 42*   GLUCOSE  mg/dL 105*     Results from last 7 days   Lab Units 04/04/23  1431   WBC 10*3/mm3 6.32   HEMOGLOBIN g/dL 8.5*   HEMATOCRIT % 25.3*   PLATELETS 10*3/mm3 120*                     Results from last 7 days   Lab Units 04/04/23  1431   LIPASE U/L 15               Invalid input(s): USDES,  BLOODU, NITRITITE, BACT, EP    Pain Management Panel    There is no flowsheet data to display.         Radiology:    CT Abdomen Pelvis Without Contrast    Result Date: 4/4/2023  FINAL REPORT TECHNIQUE: Routine axial images through the abdomen and pelvis were obtained. CLINICAL HISTORY: diarrhea, abd discomfort FINDINGS: Lower chest: There are left greater than right pleural effusions.  Abdomen:  The gallbladder has been removed.  There is cirrhosis with mild splenomegaly.  There are upper abdominal venous collaterals indicative of portal hypertension.  There are 2 small nonobstructing right renal calculi, the largest measuring 4 mm in diameter.  The other solid abdominal organs and ureters are unremarkable.  The GI tract is unremarkable, including the appendix.  Pelvis: The uterus, ovaries, and urinary bladder are normal.  There is large volume ascites and anasarca.  There is no pelvic or abdominal adenopathy or acute osseous abnormality.     Cirrhosis with portal hypertension and ascites.  Small nonobstructing right renal calculi.  Bilateral pleural effusions. Authenticated and Electronically Signed by Salomon Cantu M.D. on 04/04/2023 06:20:35 PM    XR Chest 1 View    Result Date: 4/4/2023   PROCEDURE: XR CHEST 1 VW-  HISTORY: generalized weakness  COMPARISON: 12/03/2021.  FINDINGS: The heart is mildly enlarged.. The mediastinum is unremarkable. Decreased inspiratory effort noted with crowding of the lung markings in both lung bases. There is new left basilar airspace disease, possible pneumonia. Right lung is clear. Calcified granuloma identified on the left... There is no pneumothorax.  There are no acute osseous abnormalities.      New  left basilar airspace disease suggesting pneumonia, recommend follow-up..  .  This report was signed and finalized on 4/4/2023 3:16 PM by Katt Muse MD.      Assessment:    Left lower lobe pneumonia, unspecified further, POA  Suspected GI bleed, POA  Acute on chronic anemia, POA  Acute kidney injury, POA  Suspected decompensated cirrhosis with ascites  Morbid obesity, BMI 41.60  Thrombocytopenia associated with the cirrhosis  Diabetes mellitus  Hyperlipidemia  Hypertension  Hypothyroidism    Plan:    Patient is admitted for further evaluation and treatment.    Suspected GI bleed  Acute on chronic anemia  Suspected decompensated cirrhosis with ascites  -Started on Protonix and octreotide drip.  -Dr. Collado consulted, appreciate his recommendations.  -We will hold aspirin  -We will monitor hemoglobin and transfuse as indicated to keep hemoglobin above 7.  - EGD per Dr. Collado recommendations for possible variceal bleed.  -We will keep patient on clear liquid diet.  -Ultrasound check for ascites, will consult with IR in a.m. for paracentesis  -Hepatitis panel and GI panel ordered.    Left lower lobe pneumonia  -On Rocephin  -Blood cultures obtained after patient received first dose of Rocephin in the ER.  Will follow.    SILVINA  -Could be hepatorenal  -Avoid nephrotoxic drugs, holding lisinopril  -Continue to monitor renal function  -Urinalysis pending.    Sliding-scale insulin, will check hemoglobin A1c, continue home meds otherwise as warranted.  Further orders as indicated per clinical course.    Risk Assessment: Moderate to high  DVT Prophylaxis: SCDs, avoid chemoprophylaxis due to suspected GI bleed  Code Status: Full  Diet: Clear liquid diet    Advance Care Planning   ACP discussion was held with the patient during this visit. Patient does not have an advance directive, information provided.      Marcos Altman MD  04/04/23  19:59 EDT    Dictated utilizing Dragon dictation.    Electronically signed by  Marcos Altman MD at 04/05/23 0719         Current Facility-Administered Medications   Medication Dose Route Frequency Provider Last Rate Last Admin   • acetaminophen (TYLENOL) tablet 650 mg  650 mg Oral Q4H PRN Jens Mandujano MD   650 mg at 04/05/23 1200    Or   • acetaminophen (TYLENOL) 160 MG/5ML solution 650 mg  650 mg Oral Q4H PRN Jens Mandujano MD        Or   • acetaminophen (TYLENOL) suppository 650 mg  650 mg Rectal Q4H PRN Jens Mandujano MD       • albumin human 25 % IV SOLN 25 g  25 g Intravenous TID Jens Mandujano MD 0 mL/hr at 04/04/23 2127 25 g at 04/06/23 1013   • albuterol (PROVENTIL) nebulizer solution 0.083% 2.5 mg/3mL  2.5 mg Nebulization Q6H PRN Jens Mandujano MD       • atorvastatin (LIPITOR) tablet 40 mg  40 mg Oral Daily Jens Mandujano MD   40 mg at 04/06/23 1018   • buPROPion XL (WELLBUTRIN XL) 24 hr tablet 150 mg  150 mg Oral Daily Jens Mandujano MD   150 mg at 04/06/23 1019   • cefuroxime (CEFTIN) tablet 500 mg  500 mg Oral Q12H Esperanza Garrison APRN       • donepezil (ARICEPT) tablet 10 mg  10 mg Oral Nightly Jens Mandujano MD       • HYDROcodone-acetaminophen (NORCO) 5-325 MG per tablet 1 tablet  1 tablet Oral Q8H PRN Jens Mandujano MD   1 tablet at 04/06/23 1358   • ipratropium-albuterol (DUO-NEB) nebulizer solution 3 mL  3 mL Nebulization Q6H PRN Jens Mandujano MD       • levothyroxine (SYNTHROID, LEVOTHROID) tablet 300 mcg  300 mcg Oral Daily Jens Mandujano MD   300 mcg at 04/06/23 1018   • Morphine sulfate (PF) injection 2 mg  2 mg Intravenous Q4H PRN Jens Mandujano MD       • ondansetron (ZOFRAN) injection 4 mg  4 mg Intravenous Q6H PRN Jens Mandujano MD       • pantoprazole (PROTONIX) EC tablet 40 mg  40 mg Oral Q AM Esperanza Garrison, APRN       • Pharmacy to dose ceftriaxone   Does not apply Continuous PRN Jens Mandujano MD       • sodium chloride 0.9 % flush 10 mL   10 mL Intravenous PRN Jens Mandujano MD       • sodium chloride 0.9 % infusion  70 mL/hr Intravenous Continuous PRN Jens Mandujano MD 70 mL/hr at 04/06/23 1021 70 mL/hr at 04/06/23 1021     Physical Therapy Notes (last 24 hours)  Notes from 04/05/23 1428 through 04/06/23 1428   No notes exist for this encounter.            Occupational Therapy Notes (last 72 hours)      Kala Landa at 04/05/23 1148        Pt on hold for OT evaluation today d/t low hgb 6.7 and pt going down for procedure later today.  Will follow up with pt tomorrow.    Electronically signed by Kala Landa at 04/05/23 7226

## 2023-04-06 NOTE — PLAN OF CARE
Goal Outcome Evaluation:  Plan of Care Reviewed With: (P) patient, sibling        Progress: (P) no change  Outcome Evaluation: (P) Pt was agreeable and participated in IE this date. Pt was supine in the bed on room air w/ brother present in the room for IE. Pt reports she lives alone in an apartment at baseline. Pt reports she is independent w/ ADL's and household mobility at baseline. Pt reports she uses a quad cane for household mobility at baseline. Pt reports she has a shower bench she also uses. Pt perfomed supine to sit w/ Max A x2. Pt was unable to scoot to the EOB and required Max A x2 for scooting. Pt sat EOB ~2 minutes. Pt was Max A x2 for performing sit to supine. Pt would benefit from PT during inpatient stay in order to increase BLE strength, endurance, and functional mobility. PT would recommend STR at discharge before returning to independent living.

## 2023-04-07 LAB
ALBUMIN SERPL-MCNC: 2.8 G/DL (ref 3.5–5.2)
ALBUMIN/GLOB SERPL: 0.8 G/DL
ALP SERPL-CCNC: 77 U/L (ref 39–117)
ALT SERPL W P-5'-P-CCNC: 19 U/L (ref 1–33)
ANION GAP SERPL CALCULATED.3IONS-SCNC: 8.4 MMOL/L (ref 5–15)
AST SERPL-CCNC: 55 U/L (ref 1–32)
BASOPHILS # BLD AUTO: 0.04 10*3/MM3 (ref 0–0.2)
BASOPHILS NFR BLD AUTO: 0.7 % (ref 0–1.5)
BILIRUB SERPL-MCNC: 1 MG/DL (ref 0–1.2)
BUN SERPL-MCNC: 29 MG/DL (ref 8–23)
BUN/CREAT SERPL: 16.9 (ref 7–25)
CALCIUM SPEC-SCNC: 8.1 MG/DL (ref 8.6–10.5)
CHLORIDE SERPL-SCNC: 109 MMOL/L (ref 98–107)
CO2 SERPL-SCNC: 23.6 MMOL/L (ref 22–29)
CREAT SERPL-MCNC: 1.72 MG/DL (ref 0.57–1)
DEPRECATED RDW RBC AUTO: 56.7 FL (ref 37–54)
EGFRCR SERPLBLD CKD-EPI 2021: 33.1 ML/MIN/1.73
EOSINOPHIL # BLD AUTO: 0.19 10*3/MM3 (ref 0–0.4)
EOSINOPHIL NFR BLD AUTO: 3.4 % (ref 0.3–6.2)
ERYTHROCYTE [DISTWIDTH] IN BLOOD BY AUTOMATED COUNT: 17 % (ref 12.3–15.4)
GLOBULIN UR ELPH-MCNC: 3.6 GM/DL
GLUCOSE BLDC GLUCOMTR-MCNC: 172 MG/DL (ref 70–130)
GLUCOSE BLDC GLUCOMTR-MCNC: 191 MG/DL (ref 70–130)
GLUCOSE BLDC GLUCOMTR-MCNC: 199 MG/DL (ref 70–130)
GLUCOSE SERPL-MCNC: 160 MG/DL (ref 65–99)
HCT VFR BLD AUTO: 25.2 % (ref 34–46.6)
HGB BLD-MCNC: 8.5 G/DL (ref 12–15.9)
IMM GRANULOCYTES # BLD AUTO: 0.03 10*3/MM3 (ref 0–0.05)
IMM GRANULOCYTES NFR BLD AUTO: 0.5 % (ref 0–0.5)
IRON 24H UR-MRATE: 22 MCG/DL (ref 37–145)
IRON SATN MFR SERPL: 21 % (ref 20–50)
LYMPHOCYTES # BLD AUTO: 0.65 10*3/MM3 (ref 0.7–3.1)
LYMPHOCYTES NFR BLD AUTO: 11.6 % (ref 19.6–45.3)
MCH RBC QN AUTO: 32.8 PG (ref 26.6–33)
MCHC RBC AUTO-ENTMCNC: 33.7 G/DL (ref 31.5–35.7)
MCV RBC AUTO: 97.3 FL (ref 79–97)
MONOCYTES # BLD AUTO: 0.72 10*3/MM3 (ref 0.1–0.9)
MONOCYTES NFR BLD AUTO: 12.9 % (ref 5–12)
NEUTROPHILS NFR BLD AUTO: 3.97 10*3/MM3 (ref 1.7–7)
NEUTROPHILS NFR BLD AUTO: 70.9 % (ref 42.7–76)
NRBC BLD AUTO-RTO: 0 /100 WBC (ref 0–0.2)
PLATELET # BLD AUTO: 81 10*3/MM3 (ref 140–450)
PMV BLD AUTO: 11.4 FL (ref 6–12)
POTASSIUM SERPL-SCNC: 3.7 MMOL/L (ref 3.5–5.2)
PROT SERPL-MCNC: 6.4 G/DL (ref 6–8.5)
RBC # BLD AUTO: 2.59 10*6/MM3 (ref 3.77–5.28)
REF LAB TEST METHOD: NORMAL
SODIUM SERPL-SCNC: 141 MMOL/L (ref 136–145)
TIBC SERPL-MCNC: 107 MCG/DL (ref 298–536)
TRANSFERRIN SERPL-MCNC: 72 MG/DL (ref 200–360)
WBC NRBC COR # BLD: 5.6 10*3/MM3 (ref 3.4–10.8)

## 2023-04-07 PROCEDURE — 83540 ASSAY OF IRON: CPT | Performed by: INTERNAL MEDICINE

## 2023-04-07 PROCEDURE — 84466 ASSAY OF TRANSFERRIN: CPT | Performed by: INTERNAL MEDICINE

## 2023-04-07 PROCEDURE — 85025 COMPLETE CBC W/AUTO DIFF WBC: CPT | Performed by: NURSE PRACTITIONER

## 2023-04-07 PROCEDURE — 97530 THERAPEUTIC ACTIVITIES: CPT

## 2023-04-07 PROCEDURE — 97112 NEUROMUSCULAR REEDUCATION: CPT

## 2023-04-07 PROCEDURE — 94799 UNLISTED PULMONARY SVC/PX: CPT

## 2023-04-07 PROCEDURE — 82962 GLUCOSE BLOOD TEST: CPT

## 2023-04-07 PROCEDURE — 80053 COMPREHEN METABOLIC PANEL: CPT | Performed by: NURSE PRACTITIONER

## 2023-04-07 PROCEDURE — 99232 SBSQ HOSP IP/OBS MODERATE 35: CPT | Performed by: STUDENT IN AN ORGANIZED HEALTH CARE EDUCATION/TRAINING PROGRAM

## 2023-04-07 PROCEDURE — 63710000001 INSULIN ASPART PER 5 UNITS: Performed by: STUDENT IN AN ORGANIZED HEALTH CARE EDUCATION/TRAINING PROGRAM

## 2023-04-07 RX ORDER — INSULIN ASPART 100 [IU]/ML
0-14 INJECTION, SOLUTION INTRAVENOUS; SUBCUTANEOUS
Status: DISCONTINUED | OUTPATIENT
Start: 2023-04-07 | End: 2023-04-12 | Stop reason: HOSPADM

## 2023-04-07 RX ORDER — NICOTINE POLACRILEX 4 MG
15 LOZENGE BUCCAL
Status: DISCONTINUED | OUTPATIENT
Start: 2023-04-07 | End: 2023-04-12 | Stop reason: HOSPADM

## 2023-04-07 RX ORDER — DEXTROSE MONOHYDRATE 25 G/50ML
25 INJECTION, SOLUTION INTRAVENOUS
Status: DISCONTINUED | OUTPATIENT
Start: 2023-04-07 | End: 2023-04-12 | Stop reason: HOSPADM

## 2023-04-07 RX ADMIN — Medication 10 ML: at 21:10

## 2023-04-07 RX ADMIN — ATORVASTATIN CALCIUM 40 MG: 40 TABLET, FILM COATED ORAL at 10:18

## 2023-04-07 RX ADMIN — BUPROPION HYDROCHLORIDE 150 MG: 150 TABLET, FILM COATED, EXTENDED RELEASE ORAL at 10:18

## 2023-04-07 RX ADMIN — INSULIN ASPART 3 UNITS: 100 INJECTION, SOLUTION INTRAVENOUS; SUBCUTANEOUS at 16:49

## 2023-04-07 RX ADMIN — DONEPEZIL HYDROCHLORIDE 10 MG: 5 TABLET ORAL at 21:10

## 2023-04-07 RX ADMIN — CEFUROXIME AXETIL 500 MG: 250 TABLET ORAL at 21:09

## 2023-04-07 RX ADMIN — LEVOTHYROXINE SODIUM 300 MCG: 0.05 TABLET ORAL at 10:17

## 2023-04-07 RX ADMIN — CEFUROXIME AXETIL 500 MG: 250 TABLET ORAL at 11:04

## 2023-04-07 RX ADMIN — INSULIN ASPART 3 UNITS: 100 INJECTION, SOLUTION INTRAVENOUS; SUBCUTANEOUS at 11:04

## 2023-04-07 RX ADMIN — Medication 10 ML: at 10:18

## 2023-04-07 RX ADMIN — PANTOPRAZOLE SODIUM 40 MG: 40 TABLET, DELAYED RELEASE ORAL at 06:38

## 2023-04-07 NOTE — PROGRESS NOTES
[facility]    Name:  Ivania Harris   Age:  62 y.o.  Sex:  female  :  1960  MRN:  [id number]   Visit Number:  [id number]  Admission Date:  2023  Date Of Service:  23  Primary Care Physician:  Alessandro Mercer MD     LOS: 3 days :    Chief Complaint:      Follow-up; generalized weakness, diarrhea    Subjective:    Feeling much better today.  Asked for regular consistency for her meat, did not want to ground meat.  Thorough discussion with patient and  at bedside of GI findings, plan to follow-up with GI for likely cirrhosis.  He denied any abdominal pain.  Patient also reported she may have a history of CKD, was told that her creatinine was elevated.  She has not followed with a nephrologist.    Hospital Course:    The patient is a 62-year-old woman with past medical history of obesity BMI 53, type 2 diabetes, apparent anemia unknown type, hyperlipidemia, hypertension, hypothyroidism, who presented to the emergency room with concern for generalized weakness and diarrhea for 3 days with no vomiting.     Upon ER work-up, she had a creatinine of 1.46 with a hemoglobin of 8.5 and platelets of 120.  She had positive FOBT testing.  A chest x-ray was possibly showing a left basilar pneumonia.  There was evidence of cirrhosis and portal hypertension with ascites with a nonobstructing right renal calculi.  There was bilateral effusions.  GI was consulted from the emergency room and she was started on octreotide and Protonix.  She received Rocephin and a normal saline bolus in addition.  She was admitted to the hospital service.     Patient had evidence of worsening anemia, was ordered 1 unit PRBCs on morning of 2023.      EGD  by gastroenterology-Dr. Mandujano; no gross lesions entire esophagus, no varices, small amount of food residue in stomach suggesting gastroparesis, nonbleeding gastric ulcer with a flat pigmented spot, nonbleeding gastric  ulcer with no stigmata of bleeding, ulcer scarring gastric antrum, erythematous mucosa gastric body, antrum, prepyloric region of stomach biopsied, mild portal hypertensive gastropathy, normal duodenal portions, no current bleeding, likely NSAID/ASA induced ulcer.    Gastroenterology recommends low fiber diet, low-salt small meals, PPI daily, avoid NSAIDs, hold ASA 2 weeks, iron pills daily, await pathology results, repeat upper endoscopy in 6 months for surveillance, return to GI office in 8 weeks.    Review of Systems:     All systems were reviewed and negative except as mentioned in subjective, assessment and plan.    Vital Signs:    Temp:  [97.9 °F (36.6 °C)-98.3 °F (36.8 °C)] 97.9 °F (36.6 °C)  Heart Rate:  [70-80] 73  Resp:  [16-20] 18  BP: (126-170)/(42-62) 170/62    Intake and output:    I/O last 3 completed shifts:  In: 3317 [P.O.:840; I.V.:2477]  Out: 650 [Urine:650]  I/O this shift:  In: 240 [P.O.:240]  Out: -     Physical Examination:    General Appearance:  Alert and cooperative.  Obese   Head:  Atraumatic and normocephalic.   Eyes: Conjunctivae and sclerae normal, no icterus. No pallor.   Throat: No oral lesions, no thrush, oral mucosa moist.   Neck: Supple, trachea midline, no thyromegaly.   Lungs:   Breath sounds heard bilaterally equally.  No wheezing or crackles. No Pleural rub or bronchial breathing.   Heart:  Normal S1 and S2, no murmur, no gallop, no rub. No JVD.   Abdomen:    Obese.  Normal bowel sounds, no masses, no organomegaly. Soft, nontender, nondistended, no rebound tenderness.   Extremities:  Dark chronic venous insufficiency and scaled changes bilateral lower extremities with trace edema   Skin:  Jaundice.   Neurologic: Alert and oriented x 3. No facial asymmetry. Moves all four limbs. No tremors.      Laboratory results:    Results from last 7 days   Lab Units 04/07/23  0520 04/05/23  0529 04/04/23  1431   SODIUM mmol/L 141 146* 142   POTASSIUM mmol/L 3.7 3.9 3.9   CHLORIDE mmol/L  109* 112* 109*   CO2 mmol/L 23.6 24.2 24.5   BUN mg/dL 29* 26* 23   CREATININE mg/dL 1.72* 1.53* 1.46*   CALCIUM mg/dL 8.1* 8.1* 8.2*   BILIRUBIN mg/dL 1.0  --  1.0   ALK PHOS U/L 77  --  100   ALT (SGPT) U/L 19  --  19   AST (SGOT) U/L 55*  --  42*   GLUCOSE mg/dL 160* 95 105*     Results from last 7 days   Lab Units 04/07/23  0520 04/05/23  2202 04/05/23  1331 04/05/23  0529 04/04/23  2315 04/04/23  1431   WBC 10*3/mm3 5.60  --   --  2.86*  --  6.32   HEMOGLOBIN g/dL 8.5* 7.6* 7.8* 6.7*   < > 8.5*   HEMATOCRIT % 25.2*  --   --  18.7*  --  25.3*   PLATELETS 10*3/mm3 81*  --   --  87*  --  120*    < > = values in this interval not displayed.     Results from last 7 days   Lab Units 04/05/23  0529   INR  1.48*         Results from last 7 days   Lab Units 04/04/23  2315 04/04/23  2300   BLOODCX  No growth at 2 days No growth at 2 days     No results for input(s): PHART, JJA8UED, PO2ART, DMI3XUR, BASEEXCESS in the last 8760 hours.   I have reviewed the patient's laboratory results.    Radiology results:    US Paracentesis    Result Date: 4/5/2023  ULTRASOUND-GUIDED PARACENTESIS  HISTORY: Ascites.  FINDINGS: After informed consent was obtained and time-out procedure performed, the patient was placed sitting upright in the ultrasound suite. Fluid was localized in the right lower quadrant under ultrasound guidance and marked on the skin appropriately. The patient was then prepped and draped in the usual sterile fashion and the skin was anesthetized with 1% lidocaine.  An ultrasound-guided paracentesis was then performed using a Turkel needle. The pocket was localized and needle was introduced. Approximately 2 to 3 cc of fluid was obtained and the no more could be removed. The pocket was read localized and a new site was anesthetized and small incision was made. Surgical needle was introduced and approximately 8 cc of clear yellow fluid was removed. Catheter was then advanced and needle was removed and no more fluid could  be obtained. Findings discussed with the patient. The patient tolerated the procedure well and there were no immediate complications.      Impression: Technically difficult ultrasound guided right lower quadrant paracentesis, as discussed above with approximately 10 cc of fluid removed in total; this was sent to the laboratory for analysis..  This report was signed and finalized on 4/5/2023 4:06 PM by Katt Muse MD.    I have reviewed the patient's radiology reports.    Medication Review:     I have reviewed the patient's active and prn medications.     Problem List:      Pneumonia of left lung due to infectious organism, unspecified part of lung    Gastrointestinal hemorrhage    Cirrhosis of liver with ascites      Assessment/Plan:    Afebrile, vital signs stable on room air.  Appeared comfortable during encounter.  Family at bedside.  Creatinine increased to 1.72, patient reported that she may have a history of CKD.  Consulting nephrology.  She may need to follow with a nephrologist here, she is only been in [place] for a year and a half.  Hemoglobin stable 8.5    Suspected upper GI bleed  Acute on chronic anemia  Suspected decompensated cirrhosis with ascites, likely nonalcoholic fatty liver disease  -- GI following  -- EGD 4/6 with Dr. Mandujano.   -- PPI daily  -- Avoid NSAIDS  -- Hold ASA for 2 weeks  -- Iron Pills daily  -- Repeat upper endoscopy in 6 months for surveillance. Outpatient GI follow up 8-week  -- ok to stop IV ocetrotide and IV PPI  --Hepatic testing for causes of cirrhosis of already been ordered by GI.  -- peritoneum fluid with no growth  -Suspected gastroparesis     Left lower lobe pneumonia  -On Rocephin, transition to oral ceftin  -Blood cultures obtained after patient received first dose of Rocephin in the ER.  Will follow.      SILVINA  -Could be hepatorenal  -Avoid nephrotoxic drugs, holding lisinopril  -Continue to monitor renal function  -UA with pyria, no culture, already on  cephalosporin  -Nephrology consultation, recommendations appreciated.     Debility  -- lives alone, PT/OT recommends rehab, patient agreeable     Wound care  Per wound care consult; dry flaking skin on back, hyperkeratotic skin to bilateral lower extremities and lower panus. Skin folds moist red. Left lower skin fold with redness, blisters and open blister. Was not able to lay patient flat to assess all skin folds due to patient complaints of difficulty breathing.  Orders written per standing order sets for dry skin and red skin folds.   Recommendations for care include a turning schedule, barrier cream twice daily and prn after cleansing, using dry sheets in folds, float heels off bed with pillows, encourage increase mobility as appropriate and tolerated to reduce pressure, for dry skin apply lotion/cream and a nutrition consult for dietary needs.      DVT Prophylaxis: SCDs  Code Status: Full  Diet:  Carbohydrate controlled diet, low-sodium, fluid restriction  Discharge Plan:  Ongoing renal evaluation and treatment, SNF when stable and available    Brian Joseph Kerley, DO  04/07/23  12:25 EDT    Dictated utilizing Dragon dictation.

## 2023-04-07 NOTE — PLAN OF CARE
Goal Outcome Evaluation:  Plan of Care Reviewed With: patient, son        Progress: improving  Outcome Evaluation: Patient willing to participate in therapy.  Patient was minAx2 for bed mobility tasks of rolling, scooting, and bridging and modAx2 for sit to stand X2.  Patient required moderate verbal cuing for positioning and for deep breathing to decrease anxiety following therapy.  Continue with OT focusing on increasing ADL independence.

## 2023-04-07 NOTE — PLAN OF CARE
Goal Outcome Evaluation:  Plan of Care Reviewed With: patient, family        Progress: improving  Outcome Evaluation: Pt agreeable to therapy. Performed bed mob supine to sit min A for B LE OOB, mod A to come to upright sitting position, sat EOB x8 mins without LOB, sit <->stand x2 reps with RW and mod A x2. Attempted side stepping without success. Performed sit to supine with mod A for B LE and min A for UE with VC, scooting to center of bed min A. Reviewed B LE AP, QS, glut sets and heelslides with pt. Con't with PT POC and progress as tolerated

## 2023-04-07 NOTE — NURSING NOTE
Seen for wound consult. Pleasant cooperative lady with dry flaking skin on back, hyperkeratotic skin to bilateral lower extremities and lower panus. Skin folds moist red. Left lower skin fold with redness, blisters and open blister. Was not able to lay patient flat to assess all skin folds due to patient complaints of difficulty breathing.  Orders written per standing order sets for dry skin and red skin folds.   Recommendations for care include a turning schedule, barrier cream twice daily and prn after cleansing, using dry sheets in folds, float heels off bed with pillows, encourage increase mobility as appropriate and tolerated to reduce pressure, for dry skin apply lotion/cream and a nutrition consult for dietary needs. Staff to contact provider and re-consult wound nurse for new skin issues or lack of improvement with current recommendations. Thank you for the consult. If you have questions or concerns do not hesitate to contact me.

## 2023-04-07 NOTE — THERAPY TREATMENT NOTE
Patient Name: Ivania Harris  : 1960    MRN: [id number]                              Today's Date: 2023       Admit Date: 2023    Visit Dx:     ICD-10-CM ICD-9-CM   1. Pneumonia of left lung due to infectious organism, unspecified part of lung  J18.9 486   2. Gastrointestinal hemorrhage, unspecified gastrointestinal hemorrhage type  K92.2 578.9   3. Other cirrhosis of liver  K74.69 571.5   4. Cirrhosis of liver with ascites, unspecified hepatic cirrhosis type  K74.60 571.5    R18.8      Patient Active Problem List   Diagnosis   • Pneumonia of left lung due to infectious organism, unspecified part of lung   • Gastrointestinal hemorrhage   • Cirrhosis of liver with ascites     Past Medical History:   Diagnosis Date   • Anemia    • Diabetes mellitus    • Hyperlipidemia    • Hypertension    • Hypothyroidism    • Impaired mobility    • Short-term memory loss      Past Surgical History:   Procedure Laterality Date   • CHOLECYSTECTOMY     • ENDOSCOPY W/ BANDING N/A 2023    Procedure: ESOPHAGOGASTRODUODENOSCOPY WITH BIOPSY;  Surgeon: Jens Mandujano MD;  Location: [facility] ENDOSCOPY;  Service: Gastroenterology;  Laterality: N/A;      General Information     Row Name 23 1228          OT Time and Intention    Document Type therapy note (daily note)  -TL     Mode of Treatment occupational therapy  -TL     Row Name 23 1228          General Information    Patient Profile Reviewed yes  -TL     Existing Precautions/Restrictions fall  -TL     Barriers to Rehab previous functional deficit  -TL     Row Name 23 1228          Cognition    Orientation Status (Cognition) oriented x 4  -TL     Row Name 23 1228          Safety Issues, Functional Mobility    Safety Issues Affecting Function (Mobility) awareness of need for assistance;friction/shear risk;insight into deficits/self-awareness;safety precautions follow-through/compliance;safety precaution awareness  -TL           User Key   (r) = Recorded By, (t) = Taken By, (c) = Cosigned By    Initials Name Provider Type    TL Savage, Toshia, OTR Occupational Therapist                 Mobility/ADL's     Row Name 04/07/23 1229          Bed Mobility    Bed Mobility bed mobility (all) activities  -TL     All Activities, Wolfe (Bed Mobility) minimum assist (75% patient effort)  -TL     Scooting/Bridging Wolfe (Bed Mobility) minimum assist (75% patient effort);2 person assist  -TL     Supine-Sit Wolfe (Bed Mobility) minimum assist (75% patient effort);2 person assist  -TL     Sit-Supine Wolfe (Bed Mobility) minimum assist (75% patient effort);2 person assist  -TL     Assistive Device (Bed Mobility) bed rails;head of bed elevated;draw sheet  -TL     Row Name 04/07/23 1241 04/07/23 1229       Transfers    Transfers -- stand-sit transfer;sit-stand transfer  -TL    Comment, (Transfers) Patient completed sit to stand X 2 EOB to stadning with modA X 2.  -TL Patient completed sit to stand X 2 EOB to standing with Geoff X 2.  -TL    Row Name 04/07/23 1229          Sit-Stand Transfer    Sit-Stand Wolfe (Transfers) minimum assist (75% patient effort);2 person assist  -TL     Assistive Device (Sit-Stand Transfers) walker, front-wheeled  -TL     Row Name 04/07/23 1229          Stand-Sit Transfer    Stand-Sit Wolfe (Transfers) minimum assist (75% patient effort);2 person assist  -TL     Assistive Device (Stand-Sit Transfers) walker, front-wheeled  -TL     Comment, (Stand-Sit Transfer) Patient scooted a few steps towards the head of bed to get in better position to come from sitting to supine.  -TL     Row Name 04/07/23 1229          Lower Body Dressing Assessment/Training    Wolfe Level (Lower Body Dressing) maximum assist (25% patient effort)  -TL     Position (Lower Body Dressing) supine  -TL           User Key  (r) = Recorded By, (t) = Taken By, (c) = Cosigned By    Initials Name Provider Type    TL Savage, Toshia, OTR  Occupational Therapist               Obj/Interventions    No documentation.                Goals/Plan    No documentation.                Clinical Impression     Row Name 04/07/23 1234          Pain Assessment    Pretreatment Pain Rating 0/10 - no pain  -TL     Row Name 04/07/23 1234          Plan of Care Review    Plan of Care Reviewed With patient;son  -TL     Progress improving  -TL     Outcome Evaluation Patient willing to participate in therapy.  Patient was minAx2 for bed mobility tasks of rolling, scooting, and bridging and modAx2 for sit to stand X2.  Patient required moderate verbal cuing for positioning and for deep breathing to decrease anxiety following therapy.  Continue with OT focusing on increasing ADL independence.  -TL     Row Name 04/07/23 1234          Therapy Assessment/Plan (OT)    Rehab Potential (OT) good, to achieve stated therapy goals  -TL     Criteria for Skilled Therapeutic Interventions Met (OT) yes  -TL     Row Name 04/07/23 1234          Therapy Plan Review/Discharge Plan (OT)    Anticipated Discharge Disposition (OT) inpatient rehabilitation facility  -TL     Row Name 04/07/23 1234          Vital Signs    Pre SpO2 (%) 98  -TL     O2 Delivery Pre Treatment room air  -TL     Intra SpO2 (%) 92  -TL     O2 Delivery Intra Treatment room air  -TL     Post SpO2 (%) 97  -TL     O2 Delivery Post Treatment room air  -TL     Pre Patient Position Supine  -TL     Intra Patient Position Standing  -TL     Post Patient Position Supine  -TL     Row Name 04/07/23 1234          Positioning and Restraints    Pre-Treatment Position in bed  -TL     Post Treatment Position bed  -TL     In Bed call light within reach;encouraged to call for assist;with family/caregiver  -TL           User Key  (r) = Recorded By, (t) = Taken By, (c) = Cosigned By    Initials Name Provider Type    TL Toshia Wan OTR Occupational Therapist               Outcome Measures     Row Name 04/07/23 1243          How much help from  another is currently needed...    Putting on and taking off regular lower body clothing? 2  -TL     Bathing (including washing, rinsing, and drying) 2  -TL     Toileting (which includes using toilet bed pan or urinal) 2  -TL     Putting on and taking off regular upper body clothing 3  -TL     Taking care of personal grooming (such as brushing teeth) 3  -TL     Eating meals 4  -TL     AM-PAC 6 Clicks Score (OT) 16  -TL     Row Name 04/07/23 1243          Functional Assessment    Outcome Measure Options AM-PAC 6 Clicks Daily Activity (OT)  -TL           User Key  (r) = Recorded By, (t) = Taken By, (c) = Cosigned By    Initials Name Provider Type    TL Toshia Wan OTR Occupational Therapist                Occupational Therapy Education     Title: PT OT SLP Therapies (In Progress)     Topic: Occupational Therapy (In Progress)     Point: ADL training (Done)     Description:   Instruct learner(s) on proper safety adaptation and remediation techniques during self care or transfers.   Instruct in proper use of assistive devices.              Learning Progress Summary           Patient Acceptance, E,TB, VU by  at 4/6/2023 1585    Comment: Role of OT/POC                   Point: Home exercise program (Not Started)     Description:   Instruct learner(s) on appropriate technique for monitoring, assisting and/or progressing therapeutic exercises/activities.              Learner Progress:  Not documented in this visit.          Point: Precautions (Done)     Description:   Instruct learner(s) on prescribed precautions during self-care and functional transfers.              Learning Progress Summary           Patient Acceptance, E,D, DU,NR by  at 4/7/2023 1244    Comment: Patient educated on deep breathing with movement to decrease anxiety but required moderate cuing.   Family Acceptance, E,D, DU,NR by  at 4/7/2023 1244    Comment: Patient educated on deep breathing with movement to decrease anxiety but required moderate  cuing.                   Point: Body mechanics (Done)     Description:   Instruct learner(s) on proper positioning and spine alignment during self-care, functional mobility activities and/or exercises.              Learning Progress Summary           Patient Acceptance, E,D, DU,NR by  at 4/7/2023 1244    Comment: Patient educated on deep breathing with movement to decrease anxiety but required moderate cuing.   Family Acceptance, E,D, DU,NR by  at 4/7/2023 1244    Comment: Patient educated on deep breathing with movement to decrease anxiety but required moderate cuing.                               User Key     Initials Effective Dates Name Provider Type Discipline     06/16/21 -  Kala Landa Occupational Therapist OT     06/16/21 -  Toshia Wan OTR Occupational Therapist OT              OT Recommendation and Plan     Plan of Care Review  Plan of Care Reviewed With: patient, son  Progress: improving  Outcome Evaluation: Patient willing to participate in therapy.  Patient was minAx2 for bed mobility tasks of rolling, scooting, and bridging and modAx2 for sit to stand X2.  Patient required moderate verbal cuing for positioning and for deep breathing to decrease anxiety following therapy.  Continue with OT focusing on increasing ADL independence.     Time Calculation:    Time Calculation- OT     Row Name 04/07/23 1245             Time Calculation- OT    OT Start Time 0949  -TL      OT Stop Time 1023  -TL      OT Time Calculation (min) 34 min  -TL         Timed Charges    57104 - OT Therapeutic Activity Minutes 34  -TL         Total Minutes    Timed Charges Total Minutes 34  -TL       Total Minutes 34  -TL            User Key  (r) = Recorded By, (t) = Taken By, (c) = Cosigned By    Initials Name Provider Type     Toshia Wan OTR Occupational Therapist              Therapy Charges for Today     Code Description Service Date Service Provider Modifiers Qty    42113262127  OT THERAPEUTIC ACT EA 15 MIN  4/7/2023 Toshia Wan, OTR GO 2               Toshia Wan, OTR  4/7/2023

## 2023-04-07 NOTE — PLAN OF CARE
Goal Outcome Evaluation:         VSS. Pt NSR on telemetry. Pt maintaining o2 >90% on RA. Medications administered per MAR. FSBS monitored per orders, insulin administered per MAR. Discharge is pending.

## 2023-04-07 NOTE — CONSULTS
"Adult Nutrition  Assessment/PES    Patient Name:  Ivania Harris  YOB: 1960  MRN: [id number]  Admit Date:  4/4/2023    Assessment Date:  4/7/2023    Comments:      1. Continue current diet order as medically appropriate.   2. Encourage PO intake to meet 50% of estimated needs.   3. Will order Boost Glucose Control and Arginaid daily.   4. Encourage intake of supplement ordered.     RD to follow-up and available PRN.    Reason for Assessment     Row Name 04/07/23 1410          Reason for Assessment    Reason For Assessment identified at risk by screening criteria;nurse/nurse practitioner consult;per organizational policy     Diagnosis pulmonary disease;liver disease     Identified At Risk by Screening Criteria large or nonhealing wound, burn or pressure injury                  Labs/Tests/Procedures/Meds     Row Name 04/07/23 1410          Labs/Procedures/Meds    Lab Results Reviewed reviewed, pertinent     Lab Results Comments High: glucose, BUN, Cr        Medications    Pertinent Medications Reviewed reviewed, pertinent     Pertinent Medications Comments levothyroxine, insulin, protonix                Physical Findings     Row Name 04/07/23 1411          Physical Findings    Overall Physical Appearance morbidly obese, coccyx wound                Estimated/Assessed Needs - Anthropometrics     Row Name 04/07/23 1411          Anthropometrics    Age for Calculations 62     Height for Calculation 1.651 m (5' 5\")     Weight for Calculation 145 kg (319 lb)        Estimated/Assessed Needs    Additional Documentation Estimated Calorie Needs (Group);KCAL/KG (Group);Protein Requirements (Group);Fluid Requirements (Group)        Estimated Calorie Needs    Estimated Calorie Requirement (kcal/day) 2025        KCAL/KG    KCAL/KG 25 Kcal/Kg (kcal);14 Kcal/Kg (kcal)     14 Kcal/Kg (kcal) 2025.758     25 Kcal/Kg (kcal) 3617.425        Protein Requirements    Weight Used For Protein Calculations 84.4 kg (186 lb)  "    Est Protein Requirement Amount (gms/kg) 1.2 gm protein     Estimated Protein Requirements (gms/day) 101.24        Fluid Requirements    Fluid Requirements (mL/day) 1500  fluid restriction     RDA Method (mL) 1500                Nutrition Prescription Ordered     Row Name 04/07/23 1418          Nutrition Prescription PO    Current PO Diet Regular     Fluid Consistency Thin     Common Modifiers Renal;Fluid Restriction;Consistent Carbohydrate;Low Sodium     Fluid Restriction mL per Tray 250 mL     Fluid Restriction mL per Day 1500 mL                Evaluation of Received Nutrient/Fluid Intake     Row Name 04/07/23 1419          Intake Assessment    Energy/Calorie Requirement Assessment meeting needs     Protein Requirement Assessment meeting needs        PO Evaluation    Number of Meals 3     % PO Intake 58                   Problem/Interventions:   Problem 1     Row Name 04/07/23 1419          Nutrition Diagnoses Problem 1    Problem 1 Overweight/Obesity     Etiology (related to) --  diet and physical activity imbalance     Signs/Symptoms (evidenced by) BMI     BMI Greater than 40                      Intervention Goal     Row Name 04/07/23 1419          Intervention Goal    General Maintain nutrition;Improved nutrition related lab(s);Reduce/improve symptoms;Meet nutritional needs for age/condition;Disease management/therapy     PO Establish PO;Tolerate PO;Increase intake;Maintain intake;Continue positive trend;Meet estimated needs     Weight Maintain weight                Nutrition Intervention     Row Name 04/07/23 1428          Nutrition Intervention    RD/Tech Action Follow Tx progress;Care plan reviewd;Recommend/ordered;Encourage intake;Await begin PO     Recommended/Ordered Supplement                Nutrition Prescription     Row Name 04/07/23 1428          Nutrition Prescription PO    PO Prescription Begin/change supplement     Supplement Boost Glucose Control  arginaid     Supplement Frequency 2 times a  day;Daily     New PO Prescription Ordered? Yes        Other Orders    Obtain Weight Daily     Obtain Weight Ordered? No, recommended     Supplement Vitamin mineral supplement     Supplement Ordered? No, recommended     Labs Mg++;Na+;K+     Labs Ordered? No, recommended                Education/Evaluation     Row Name 04/07/23 1429          Education    Education Will Instruct as appropriate        Monitor/Evaluation    Monitor Per protocol;PO intake;Supplement intake;Pertinent labs;Weight;Skin status;Symptoms                 Electronically signed by:  Marjorie Waller RD  04/07/23 14:30 EDT

## 2023-04-07 NOTE — CONSULTS
"Patient: Ivania Harris  Procedure(s):  ESOPHAGOGASTRODUODENOSCOPY WITH BIOPSY  Anesthesia type: Monitored Anesthesia Care    Patient location: Telemetry/Step Down Unit  Last vitals: /68   Pulse 73   Temp 97.9 °F (36.6 °C) (Oral)   Resp 18   Ht 165.1 cm (65\")   Wt (!) 145 kg (319 lb 0.1 oz)   SpO2 96%   BMI 53.09 kg/m²   Level of consciousness: awake, alert and oriented    Post-anesthesia pain: adequate analgesia  Airway patency: patent  Respiratory: unassisted  Cardiovascular: stable and blood pressure at baseline  Hydration: euvolemic    Anesthetic complications: no  "

## 2023-04-07 NOTE — CASE MANAGEMENT/SOCIAL WORK
CM met with pt and spouse at bedside. IMM signed. Their facility preference is [facility], followed by [facility]. VM left with [facility] to confirm referral received and is being reviewed.

## 2023-04-07 NOTE — PLAN OF CARE
Goal Outcome Evaluation:  Plan of Care Reviewed With: patient        Progress: no change  Outcome Evaluation: no acute events during shift. VSS, SR on tele. 2L nc. no other changes in pt condition. will continue to monitor.

## 2023-04-07 NOTE — DISCHARGE PLACEMENT REQUEST
"Ivania King (62 y.o. Female)     Date of Birth   1960    Social Security Number       Address   [address] [place] [address] [place] [address]    Home Phone   [phone]    MRN   [id number]       Caodaism   Nawilfridrene    Marital Status                               Admission Date   4/4/23    Admission Type   Emergency    Admitting Provider   Marcos Altman MD    Attending Provider   Kerley, Brian Joseph, DO    Department, Room/Bed   [facility] TELEMETRY 4, 423/1       Discharge Date       Discharge Disposition       Discharge Destination                               Attending Provider: Kerley, Brian Joseph, DO    Allergies: No Known Allergies    Isolation: None   Infection: None   Code Status: CPR    Ht: 165.1 cm (65\")   Wt: 145 kg (319 lb 0.1 oz)    Admission Cmt: None   Principal Problem: Pneumonia of left lung due to infectious organism, unspecified part of lung [J18.9]                 Active Insurance as of 4/4/2023     Primary Coverage     Payor Plan Insurance Group Employer/Plan Group    HUMANA MEDICARE REPLACEMENT HUMANA MEDICARE REPLACEMENT [id number]     Payor Plan Address Payor Plan Phone Number Payor Plan Fax Number Effective Dates    [address] [address] [phone]  12/1/2021 - None Entered    [facility] [id number]       Subscriber Name Subscriber Birth Date Member ID       IVANIA KING 1960 [id number]                 Emergency Contacts      (Rel.) Home Phone Work Phone Mobile Phone    GENARO BUCHANAN (Brother) [phone] -- --            Prior to Admission Medications     Prescriptions Last Dose Informant Patient Reported? Taking?    albuterol sulfate  (90 Base) MCG/ACT inhaler Not Taking  No No    Inhale 2 puffs Every 4 (Four) Hours As Needed for Wheezing.    Patient not taking:  Reported on 4/5/2023    aspirin 81 MG EC tablet 4/3/2023  Yes Yes    Take 1 tablet by mouth Daily.    atorvastatin (LIPITOR) 20 MG tablet 4/3/2023  " Yes Yes    Take 1 tablet by mouth Daily.    buPROPion XL (WELLBUTRIN XL) 150 MG 24 hr tablet 4/3/2023  Yes Yes    Take 1 tablet by mouth Daily.    donepezil (ARICEPT) 10 MG tablet 4/3/2023  Yes Yes    Take 1 tablet by mouth Every Night.    ibuprofen (ADVIL,MOTRIN) 200 MG tablet Past Month  Yes Yes    Take 1 tablet by mouth Every 6 (Six) Hours As Needed for Mild Pain.    insulin aspart (novoLOG) 100 UNIT/ML injection   Yes Yes    Inject  under the skin into the appropriate area as directed 3 (Three) Times a Day Before Meals. Sliding Scale as needed    insulin glargine (Lantus) 100 UNIT/ML injection   Yes Yes    Inject 60 Units under the skin into the appropriate area as directed 2 (Two) Times a Day.    ipratropium-albuterol (DUO-NEB) 0.5-2.5 mg/3 ml nebulizer Not Taking  No No    Nebulize q 4 h prn wheezing / shortness of breath    Patient not taking:  Reported on 4/5/2023    levothyroxine (SYNTHROID, LEVOTHROID) 100 MCG tablet 4/3/2023  Yes Yes    Take 3 tablets by mouth Daily.    lisinopril (PRINIVIL,ZESTRIL) 10 MG tablet 4/3/2023  Yes Yes    Take 1 tablet by mouth Daily.    minocycline (MINOCIN,DYNACIN) 100 MG capsule Unknown  Yes No    Take 1 capsule by mouth Daily.    multivitamin with minerals tablet tablet   Yes Yes    Take 1 tablet by mouth Daily.    SITagliptin (JANUVIA) 50 MG tablet 4/3/2023  Yes Yes    Take 1 tablet by mouth Daily.             Physical Therapy Notes (last 24 hours)      Haydee Red, PT Student at 04/06/23 6581  Version 1 of 1    Attestation signed by Mary Ford, PT at 04/06/23 2901    I attest to the accuracy and completeness of this note.               Goal Outcome Evaluation:  Plan of Care Reviewed With: (P) patient, sibling        Progress: (P) no change  Outcome Evaluation: (P) Pt was agreeable and participated in IE this date. Pt was supine in the bed on room air w/ brother present in the room for IE. Pt reports she lives alone in an apartment at baseline. Pt reports she is  independent w/ ADL's and household mobility at baseline. Pt reports she uses a quad cane for household mobility at baseline. Pt reports she has a shower bench she also uses. Pt perfomed supine to sit w/ Max A x2. Pt was unable to scoot to the EOB and required Max A x2 for scooting. Pt sat EOB ~2 minutes. Pt was Max A x2 for performing sit to supine. Pt would benefit from PT during inpatient stay in order to increase BLE strength, endurance, and functional mobility. PT would recommend STR at discharge before returning to independent living.    Electronically signed by Mary Ford, PT at 23 1921     Haydee Red, PT Student at 23 6080  Version 1 of 1    Attestation signed by Mary Ford PT at 23 6127    I attest to the accuracy and completeness of this note.                 Patient Name: Ivania Harris  : 1960    MRN: [id number]                              Today's Date: 2023       Admit Date: 2023    Visit Dx:     ICD-10-CM ICD-9-CM   1. Pneumonia of left lung due to infectious organism, unspecified part of lung  J18.9 486   2. Gastrointestinal hemorrhage, unspecified gastrointestinal hemorrhage type  K92.2 578.9   3. Other cirrhosis of liver  K74.69 571.5   4. Cirrhosis of liver with ascites, unspecified hepatic cirrhosis type  K74.60 571.5    R18.8      Patient Active Problem List   Diagnosis   • Pneumonia of left lung due to infectious organism, unspecified part of lung   • Gastrointestinal hemorrhage   • Cirrhosis of liver with ascites     Past Medical History:   Diagnosis Date   • Anemia    • Diabetes mellitus    • Hyperlipidemia    • Hypertension    • Hypothyroidism    • Impaired mobility    • Short-term memory loss      Past Surgical History:   Procedure Laterality Date   • CHOLECYSTECTOMY        General Information     Row Name 23 7471          Physical Therapy Time and Intention    Document Type evaluation (P)   -LG     Mode of Treatment physical therapy  (P)   -LG     Row Name 04/06/23 1642          General Information    Patient Profile Reviewed yes (P)   -LG     Prior Level of Function independent:;ADL's;all household mobility;driving (P)   -LG     Existing Precautions/Restrictions fall (P)   -LG     Barriers to Rehab previous functional deficit (P)   -LG     Row Name 04/06/23 1642          Living Environment    People in Home alone (P)   -LG     Row Name 04/06/23 1642          Home Main Entrance    Number of Stairs, Main Entrance none (P)   -LG     Row Name 04/06/23 1642          Stairs Within Home, Primary    Number of Stairs, Within Home, Primary none (P)   -LG     Row Name 04/06/23 1642          Cognition    Orientation Status (Cognition) oriented x 4 (P)   -LG     [facility] Name 04/06/23 1642          Safety Issues, Functional Mobility    Safety Issues Affecting Function (Mobility) friction/shear risk;safety precaution awareness;insight into deficits/self-awareness;safety precautions follow-through/compliance;awareness of need for assistance (P)   -LG     Impairments Affecting Function (Mobility) balance;postural/trunk control;endurance/activity tolerance;shortness of breath;strength (P)   -LG           User Key  (r) = Recorded By, (t) = Taken By, (c) = Cosigned By    Initials Name Provider Type    LG Haydee Red, PT Student PT Student               Mobility     [facility] Name 04/06/23 1643          Bed Mobility    Bed Mobility supine-sit;sit-supine (P)   -LG     Supine-Sit [place] (Bed Mobility) maximum assist (25% patient effort);2 person assist (P)   -LG     Sit-Supine [place] (Bed Mobility) maximum assist (25% patient effort);2 person assist (P)   -LG     Assistive Device (Bed Mobility) bed rails;draw sheet;head of bed elevated (P)   -LG     Row Name 04/06/23 1643          Bed-Chair Transfer    Bed-Chair [place] (Transfers) unable to assess (P)   -LG     Row Name 04/06/23 1643          Sit-Stand Transfer    Sit-Stand [place] (Transfers)  unable to assess (P)   -LG     Row Name 04/06/23 1643          Gait/Stairs (Locomotion)    [place] Level (Gait) unable to assess (P)   -LG     [place] Level (Stairs) unable to assess (P)   -LG           User Key  (r) = Recorded By, (t) = Taken By, (c) = Cosigned By    Initials Name Provider Type    Haydee Persaud, PT Student PT Student               Obj/Interventions     [facility] Name 04/06/23 1645          Range of Motion Comprehensive    General Range of Motion bilateral lower extremity ROM WFL (P)   -LG     Row Name 04/06/23 1645          Strength Comprehensive (MMT)    General Manual Muscle Testing (MMT) Assessment lower extremity strength deficits identified (P)   -LG     Comment, General Manual Muscle Testing (MMT) Assessment BLE grossly -3/5 MMT (P)   -LG     Row Name 04/06/23 1645          Balance    Balance Assessment sitting static balance (P)   -LG     Static Sitting Balance moderate assist (P)   -LG     Position, Sitting Balance unsupported;sitting edge of bed (P)   -LG           User Key  (r) = Recorded By, (t) = Taken By, (c) = Cosigned By    Initials Name Provider Type    Haydee Persaud, PT Student PT Student               Goals/Plan     Row Name 04/06/23 1654          Bed Mobility Goal 1 (PT)    Activity/Assistive Device (Bed Mobility Goal 1, PT) bed mobility activities, all (P)   -LG     [place] Level/Cues Needed (Bed Mobility Goal 1, PT) contact guard required (P)   -LG     Time Frame (Bed Mobility Goal 1, PT) long term goal (LTG);2 weeks (P)   -LG     Row Name 04/06/23 1654          Transfer Goal 1 (PT)    Activity/Assistive Device (Transfer Goal 1, PT) bed-to-chair/chair-to-bed;sit-to-stand/stand-to-sit;walker, rolling (P)   -LG     [place] Level/Cues Needed (Transfer Goal 1, PT) contact guard required (P)   -LG     Time Frame (Transfer Goal 1, PT) long term goal (LTG);2 weeks (P)   -LG     Row Name 04/06/23 1654          Gait Training Goal 1 (PT)    Activity/Assistive  Device (Gait Training Goal 1, PT) gait (walking locomotion);assistive device use;increase endurance/gait distance;decrease fall risk;walker, rolling (P)   -LG     [place] Level (Gait Training Goal 1, PT) contact guard required (P)   -LG     Distance (Gait Training Goal 1, PT) 50' (P)   -LG     Time Frame (Gait Training Goal 1, PT) long term goal (LTG);2 weeks (P)   -LG     Row Name 04/06/23 1650          Patient Education Goal (PT)    Activity (Patient Education Goal, PT) BLE ther ex AROM 15 reps (P)   -LG     [place]/Cues/Accuracy (Memory Goal 2, PT) demonstrates adequately (P)   -LG     Time Frame (Patient Education Goal, PT) short term goal (STG);1 week (P)   -LG     Row Name 04/06/23 8842          Therapy Assessment/Plan (PT)    Planned Therapy Interventions (PT) balance training;ROM (range of motion);neuromuscular re-education;bed mobility training;gait training;strengthening;stretching;transfer training;postural re-education;patient/family education;home exercise program;stair training (P)   -LG           User Key  (r) = Recorded By, (t) = Taken By, (c) = Cosigned By    Initials Name Provider Type    LG Haydee Red, PT Student PT Student               Clinical Impression     Row Name 04/06/23 1642          Pain    Pretreatment Pain Rating 0/10 - no pain (P)   -LG     Posttreatment Pain Rating 0/10 - no pain (P)   -LG     Row Name 04/06/23 1643          Plan of Care Review    Plan of Care Reviewed With patient;sibling (P)   -LG     Progress no change (P)   -LG     Outcome Evaluation Pt was agreeable and participated in IE this date. Pt was supine in the bed on room air w/ brother present in the room for IE. Pt reports she lives alone in an apartment at baseline. Pt reports she is independent w/ ADL's and household mobility at baseline. Pt reports she uses a quad cane for household mobility at baseline. Pt reports she has a shower bench she also uses. Pt perfomed supine to sit w/ Max A x2. Pt was  unable to scoot to the EOB and required Max A x2 for scooting. Pt sat EOB ~2 minutes. Pt was Max A x2 for performing sit to supine. Pt would benefit from PT during inpatient stay in order to increase BLE strength, endurance, and functional mobility. PT would recommend STR at discharge before returning to independent living. (P)   -LG     Row Name 04/06/23 1645          Therapy Assessment/Plan (PT)    Patient/Family Therapy Goals Statement (PT) To get stronger (P)   -LG     Rehab Potential (PT) fair, will monitor progress closely (P)   -LG     Criteria for Skilled Interventions Met (PT) yes;meets criteria (P)   -LG     Therapy Frequency (PT) 5 times/wk (P)   -LG     Row Name 04/06/23 1645          Vital Signs    O2 Delivery Pre Treatment room air (P)   -LG     O2 Delivery Intra Treatment room air (P)   -LG     O2 Delivery Post Treatment room air (P)   -LG     Pre Patient Position Supine (P)   -LG     Intra Patient Position Sitting (P)   -LG     Post Patient Position Supine (P)   -LG     Row Name 04/06/23 164          Positioning and Restraints    Pre-Treatment Position in bed (P)   -LG     Post Treatment Position bed (P)   -LG     In Bed supine;with family/caregiver;notified nsg;call light within reach;encouraged to call for assist (P)   -LG           User Key  (r) = Recorded By, (t) = Taken By, (c) = Cosigned By    Initials Name Provider Type    LG Haydee Red, PT Student PT Student               Outcome Measures     Row Name 04/06/23 1655 04/06/23 0930       How much help from another person do you currently need...    Turning from your back to your side while in flat bed without using bedrails? 2 (P)   -LG 3  -KJ    Moving from lying on back to sitting on the side of a flat bed without bedrails? 2 (P)   -LG 1  -KJ    Moving to and from a bed to a chair (including a wheelchair)? 1 (P)   -LG 1  -KJ    Standing up from a chair using your arms (e.g., wheelchair, bedside chair)? 1 (P)   -LG 1  -KJ    Climbing 3-5  steps with a railing? 1 (P)   - 1  -KJ    To walk in hospital room? 1 (P)   - 1  -KJ    AM-PAC 6 Clicks Score (PT) 8 (P)   - 8  -KJ          User Key  (r) = Recorded By, (t) = Taken By, (c) = Cosigned By    Initials Name Provider Type    Joann Rosas, RN Registered Nurse    Haydee Persaud, PT Student PT Student                             Physical Therapy Education     Title: PT OT SLP Therapies (In Progress)     Topic: Physical Therapy (In Progress)     Point: Mobility training (Done)     Learning Progress Summary           Patient Acceptance, E, VU by  at 4/6/2023 8080    Comment: Importance of mobility                   Point: Home exercise program (Not Started)     Learner Progress:  Not documented in this visit.          Point: Body mechanics (Not Started)     Learner Progress:  Not documented in this visit.          Point: Precautions (Not Started)     Learner Progress:  Not documented in this visit.                      User Key     Initials Effective Dates Name Provider Type Discipline     12/29/22 -  Haydee Red, PT Student PT Student PT              PT Recommendation and Plan  Planned Therapy Interventions (PT): (P) balance training, ROM (range of motion), neuromuscular re-education, bed mobility training, gait training, strengthening, stretching, transfer training, postural re-education, patient/family education, home exercise program, stair training  Plan of Care Reviewed With: (P) patient, sibling  Progress: (P) no change  Outcome Evaluation: (P) Pt was agreeable and participated in IE this date. Pt was supine in the bed on room air w/ brother present in the room for IE. Pt reports she lives alone in an apartment at baseline. Pt reports she is independent w/ ADL's and household mobility at baseline. Pt reports she uses a quad cane for household mobility at baseline. Pt reports she has a shower bench she also uses. Pt perfomed supine to sit w/ Max A x2. Pt was unable to scoot to  the EOB and required Max A x2 for scooting. Pt sat EOB ~2 minutes. Pt was Max A x2 for performing sit to supine. Pt would benefit from PT during inpatient stay in order to increase BLE strength, endurance, and functional mobility. PT would recommend STR at discharge before returning to independent living.     Time Calculation:    PT Charges     Row Name 04/06/23 1656             Time Calculation    Start Time 1130 (P)   -LG      PT Received On 04/06/23 (P)   -LG      PT Goal Re-Cert Due Date 04/16/23 (P)   -LG         Untimed Charges    PT Eval/Re-eval Minutes 55 (P)   -LG         Total Minutes    Untimed Charges Total Minutes 55 (P)   -LG       Total Minutes 55 (P)   -LG            User Key  (r) = Recorded By, (t) = Taken By, (c) = Cosigned By    Initials Name Provider Type    LG Haydee Red, PT Student PT Student              Therapy Charges for Today     Code Description Service Date Service Provider Modifiers Qty    02751993412 HC PT EVAL MOD COMPLEXITY 4 4/6/2023 Haydee Red, PT Student GP 1          PT G-Codes  AM-PAC 6 Clicks Score (PT): (P) 8  PT Discharge Summary  Anticipated Discharge Disposition (PT): (P) skilled nursing facility, inpatient rehabilitation facility    Haydee Red, PT Student  4/6/2023      Electronically signed by Mary Ford, PT at 04/06/23 4186     Taisha La, PTA at 04/07/23 1527  Version 1 of 1       Goal Outcome Evaluation:  Plan of Care Reviewed With: patient, family        Progress: improving  Outcome Evaluation: Pt agreeable to therapy. Performed bed mob supine to sit min A for B LE OOB, mod A to come to upright sitting position, sat EOB x8 mins without LOB, sit <->stand x2 reps with RW and mod A x2. Attempted side stepping without success. Performed sit to supine with mod A for B LE and min A for UE with VC, scooting to center of bed min A. Reviewed B LE AP, QS, glut sets and heelslides with pt. Con't with PT POC and progress as tolerated    Electronically  signed by Taisha La PTA at 23 1527     Taisha La PTA at 23 1528  Version 1 of 1         Patient Name: Ivania Harris  : 1960    MRN: [id number]                              Today's Date: 2023       Admit Date: 2023    Visit Dx:     ICD-10-CM ICD-9-CM   1. Pneumonia of left lung due to infectious organism, unspecified part of lung  J18.9 486   2. Gastrointestinal hemorrhage, unspecified gastrointestinal hemorrhage type  K92.2 578.9   3. Other cirrhosis of liver  K74.69 571.5   4. Cirrhosis of liver with ascites, unspecified hepatic cirrhosis type  K74.60 571.5    R18.8      Patient Active Problem List   Diagnosis   • Pneumonia of left lung due to infectious organism, unspecified part of lung   • Gastrointestinal hemorrhage   • Cirrhosis of liver with ascites     Past Medical History:   Diagnosis Date   • Anemia    • Diabetes mellitus    • Hyperlipidemia    • Hypertension    • Hypothyroidism    • Impaired mobility    • Short-term memory loss      Past Surgical History:   Procedure Laterality Date   • CHOLECYSTECTOMY     • ENDOSCOPY W/ BANDING N/A 2023    Procedure: ESOPHAGOGASTRODUODENOSCOPY WITH BIOPSY;  Surgeon: Jens Mandujano MD;  Location[facility] ENDOSCOPY;  Service: Gastroenterology;  Laterality: N/A;      General Information     Row Name 23 1514          Physical Therapy Time and Intention    Document Type therapy note (daily note)  -CC     Mode of Treatment physical therapy  -CC     Row Name 23 1514          General Information    Patient Profile Reviewed yes  -CC     Existing Precautions/Restrictions fall  -CC     Row Name 23 1514          Safety Issues, Functional Mobility    Safety Issues Affecting Function (Mobility) awareness of need for assistance;friction/shear risk;insight into deficits/self-awareness;safety precautions follow-through/compliance  -CC     Impairments Affecting Function (Mobility) balance;postural/trunk  control;endurance/activity tolerance;shortness of breath;strength  -           User Key  (r) = Recorded By, (t) = Taken By, (c) = Cosigned By    Initials Name Provider Type     Taisha La, SHARMILA Physical Therapist Assistant               Mobility     Row Name 04/07/23 1517          Bed Mobility    Bed Mobility supine-sit;sit-supine;scooting/bridging  -     Scooting/Bridging [place] (Bed Mobility) minimum assist (75% patient effort);2 person assist;moderate assist (50% patient effort)  -CC     Supine-Sit [place] (Bed Mobility) minimum assist (75% patient effort);2 person assist  -CC     Sit-Supine [place] (Bed Mobility) 2 person assist;moderate assist (50% patient effort)  -     Assistive Device (Bed Mobility) bed rails;head of bed elevated;draw sheet  -     Comment, (Bed Mobility) Min A for B LE OOB, mod A to come to upright sitting position  -     Row Name 04/07/23 1517          Transfers    Comment, (Transfers) Sit <->stand x2 reps with mod A x2  -     Row Name 04/07/23 1517          Sit-Stand Transfer    Sit-Stand [place] (Transfers) 2 person assist;moderate assist (50% patient effort)  -     Assistive Device (Sit-Stand Transfers) walker, front-wheeled  -           User Key  (r) = Recorded By, (t) = Taken By, (c) = Cosigned By    Initials Name Provider Type     Taisha La, SHARMILA Physical Therapist Assistant               Obj/Interventions    No documentation.                Goals/Plan    No documentation.                Clinical Impression     Row Name 04/07/23 1519          Pain    Pretreatment Pain Rating 0/10 - no pain  -CC     Posttreatment Pain Rating 0/10 - no pain  -     Additional Documentation Pain Scale: Numbers Pre/Post-Treatment (Group)  -     Row Name 04/07/23 1519          Plan of Care Review    Plan of Care Reviewed With patient;family  -     Progress improving  -     Outcome Evaluation Pt agreeable to therapy. Performed bed mob supine to  sit min A for B LE OOB, mod A to come to upright sitting position, sat EOB x8 mins without LOB, sit <->stand x2 reps with RW and mod A x2. Attempted side stepping without success. Performed sit to supine with mod A for B LE and min A for UE with VC, scooting to center of bed min A. Reviewed B LE AP, QS, glut sets and heelslides with pt. Con't with PT POC and progress as tolerated  -CC     Row Name 04/07/23 1519          Positioning and Restraints    Pre-Treatment Position in bed  -CC     Post Treatment Position bed  -CC     In Bed supine;fowlers;call light within reach;encouraged to call for assist  -CC           User Key  (r) = Recorded By, (t) = Taken By, (c) = Cosigned By    Initials Name Provider Type    Taisha Alvarado, SHARMILA Physical Therapist Assistant               Outcome Measures     Row Name 04/07/23 1525          How much help from another person do you currently need...    Turning from your back to your side while in flat bed without using bedrails? 2  -CC     Moving from lying on back to sitting on the side of a flat bed without bedrails? 2  -CC     Moving to and from a bed to a chair (including a wheelchair)? 1  -CC     Standing up from a chair using your arms (e.g., wheelchair, bedside chair)? 2  -CC     Climbing 3-5 steps with a railing? 1  -CC     To walk in hospital room? 1  -CC     AM-PAC 6 Clicks Score (PT) 9  -CC     Highest level of mobility 3 --> Sat at edge of bed  -CC     Row Name 04/07/23 1525 04/07/23 1243       Functional Assessment    Outcome Measure Options AM-PAC 6 Clicks Basic Mobility (PT)  -CC AM-PAC 6 Clicks Daily Activity (OT)  -TL          User Key  (r) = Recorded By, (t) = Taken By, (c) = Cosigned By    Initials Name Provider Type    Toshia Patiño OTR Occupational Therapist    Taisha Alvarado PTA Physical Therapist Assistant                             Physical Therapy Education     Title: PT OT SLP Therapies (In Progress)     Topic: Physical Therapy (In Progress)      Point: Mobility training (Done)     Learning Progress Summary           Patient Acceptance, E, VU by  at 4/6/2023 1656    Comment: Importance of mobility                   Point: Home exercise program (Not Started)     Learner Progress:  Not documented in this visit.          Point: Body mechanics (Done)     Learning Progress Summary           Patient Acceptance, E,TB, VU,NR by  at 4/7/2023 1526    Comment: Upright posture in standing                   Point: Precautions (Not Started)     Learner Progress:  Not documented in this visit.                      User Key     Initials Effective Dates Name Provider Type Discipline    CC 06/16/21 -  Taisha La PTA Physical Therapist Assistant PT     12/29/22 -  Haydee Red, PT Student PT Student PT              PT Recommendation and Plan     Plan of Care Reviewed With: patient, family  Progress: improving  Outcome Evaluation: Pt agreeable to therapy. Performed bed mob supine to sit min A for B LE OOB, mod A to come to upright sitting position, sat EOB x8 mins without LOB, sit <->stand x2 reps with RW and mod A x2. Attempted side stepping without success. Performed sit to supine with mod A for B LE and min A for UE with VC, scooting to center of bed min A. Reviewed B LE AP, QS, glut sets and heelslides with pt. Con't with PT POC and progress as tolerated     Time Calculation:    PT Charges     Row Name 04/07/23 1527             Time Calculation    Start Time 0947  -CC      Stop Time 1030  -CC      Time Calculation (min) 43 min  -CC      PT Received On 04/07/23  -CC      PT Goal Re-Cert Due Date 04/16/23  -CC         Timed Charges    80111 -  PT Neuromuscular Reeducation Minutes 10  -CC      77328 - PT Therapeutic Activity Minutes 33  -CC         Total Minutes    Timed Charges Total Minutes 43  -CC       Total Minutes 43  -CC            User Key  (r) = Recorded By, (t) = Taken By, (c) = Cosigned By    Initials Name Provider Type    CC Taisha La PTA  Physical Therapist Assistant              Therapy Charges for Today     Code Description Service Date Service Provider Modifiers Qty    39226111653 HC PT NEUROMUSC RE EDUCATION EA 15 MIN 2023 Taisha La PTA GP 1    95987781283 HC PT THERAPEUTIC ACT EA 15 MIN 2023 Taisha La PTA GP 2          PT G-Codes  Outcome Measure Options: AM-PAC 6 Clicks Basic Mobility (PT)  AM-PAC 6 Clicks Score (PT): 9  AM-PAC 6 Clicks Score (OT): 16       Taisha La PTA  2023      Electronically signed by Taisha La PTA at 23 1528          Occupational Therapy Notes (last 24 hours)      Toshia Wan OTR at 23 1246          Patient Name: Ivania Harris  : 1960    MRN: [id number]                              Today's Date: 2023       Admit Date: 2023    Visit Dx:     ICD-10-CM ICD-9-CM   1. Pneumonia of left lung due to infectious organism, unspecified part of lung  J18.9 486   2. Gastrointestinal hemorrhage, unspecified gastrointestinal hemorrhage type  K92.2 578.9   3. Other cirrhosis of liver  K74.69 571.5   4. Cirrhosis of liver with ascites, unspecified hepatic cirrhosis type  K74.60 571.5    R18.8      Patient Active Problem List   Diagnosis   • Pneumonia of left lung due to infectious organism, unspecified part of lung   • Gastrointestinal hemorrhage   • Cirrhosis of liver with ascites     Past Medical History:   Diagnosis Date   • Anemia    • Diabetes mellitus    • Hyperlipidemia    • Hypertension    • Hypothyroidism    • Impaired mobility    • Short-term memory loss      Past Surgical History:   Procedure Laterality Date   • CHOLECYSTECTOMY     • ENDOSCOPY W/ BANDING N/A 2023    Procedure: ESOPHAGOGASTRODUODENOSCOPY WITH BIOPSY;  Surgeon: Jens Mandujano MD;  Location: [facility] ENDOSCOPY;  Service: Gastroenterology;  Laterality: N/A;      General Information     Row Name 23 1228          OT Time and Intention    Document Type therapy note (daily  note)  -TL     Mode of Treatment occupational therapy  -TL     Row Name 04/07/23 1228          General Information    Patient Profile Reviewed yes  -TL     Existing Precautions/Restrictions fall  -TL     Barriers to Rehab previous functional deficit  -TL     Row Name 04/07/23 1228          Cognition    Orientation Status (Cognition) oriented x 4  -TL     Row Name 04/07/23 1228          Safety Issues, Functional Mobility    Safety Issues Affecting Function (Mobility) awareness of need for assistance;friction/shear risk;insight into deficits/self-awareness;safety precautions follow-through/compliance;safety precaution awareness  -TL           User Key  (r) = Recorded By, (t) = Taken By, (c) = Cosigned By    Initials Name Provider Type    TL Long, Toshia, OTR Occupational Therapist                 Mobility/ADL's     Row Name 04/07/23 1229          Bed Mobility    Bed Mobility bed mobility (all) activities  -TL     All Activities, [place] (Bed Mobility) minimum assist (75% patient effort)  -TL     Scooting/Bridging [place] (Bed Mobility) minimum assist (75% patient effort);2 person assist  -TL     Supine-Sit [place] (Bed Mobility) minimum assist (75% patient effort);2 person assist  -TL     Sit-Supine [place] (Bed Mobility) minimum assist (75% patient effort);2 person assist  -TL     Assistive Device (Bed Mobility) bed rails;head of bed elevated;draw sheet  -TL     Row Name 04/07/23 1241 04/07/23 1229       Transfers    Transfers -- stand-sit transfer;sit-stand transfer  -TL    Comment, (Transfers) Patient completed sit to stand X 2 EOB to stadning with modA X 2.  -TL Patient completed sit to stand X 2 EOB to standing with Geoff X 2.  -TL    Row Name 04/07/23 1229          Sit-Stand Transfer    Sit-Stand [place] (Transfers) minimum assist (75% patient effort);2 person assist  -TL     Assistive Device (Sit-Stand Transfers) walker, front-wheeled  -TL     Row Name 04/07/23 1229          Stand-Sit  Transfer    Stand-Sit [place] (Transfers) minimum assist (75% patient effort);2 person assist  -TL     Assistive Device (Stand-Sit Transfers) walker, front-wheeled  -TL     Comment, (Stand-Sit Transfer) Patient scooted a few steps towards the head of bed to get in better position to come from sitting to supine.  -TL     Row Name 04/07/23 1229          Lower Body Dressing Assessment/Training    [place] Level (Lower Body Dressing) maximum assist (25% patient effort)  -TL     Position (Lower Body Dressing) supine  -TL           User Key  (r) = Recorded By, (t) = Taken By, (c) = Cosigned By    Initials Name Provider Type    TL Long, Toshia, OTR Occupational Therapist               Obj/Interventions    No documentation.                Goals/Plan    No documentation.                Clinical Impression     Row Name 04/07/23 1234          Pain Assessment    Pretreatment Pain Rating 0/10 - no pain  -TL     Row Name 04/07/23 1234          Plan of Care Review    Plan of Care Reviewed With patient;son  -TL     Progress improving  -TL     Outcome Evaluation Patient willing to participate in therapy.  Patient was minAx2 for bed mobility tasks of rolling, scooting, and bridging and modAx2 for sit to stand X2.  Patient required moderate verbal cuing for positioning and for deep breathing to decrease anxiety following therapy.  Continue with OT focusing on increasing ADL independence.  -TL     Row Name 04/07/23 1234          Therapy Assessment/Plan (OT)    Rehab Potential (OT) good, to achieve stated therapy goals  -TL     Criteria for Skilled Therapeutic Interventions Met (OT) yes  -TL     Row Name 04/07/23 1234          Therapy Plan Review/Discharge Plan (OT)    Anticipated Discharge Disposition (OT) inpatient rehabilitation facility  -TL     Row Name 04/07/23 1234          Vital Signs    Pre SpO2 (%) 98  -TL     O2 Delivery Pre Treatment room air  -TL     Intra SpO2 (%) 92  -TL     O2 Delivery Intra Treatment room air   -TL     Post SpO2 (%) 97  -TL     O2 Delivery Post Treatment room air  -TL     Pre Patient Position Supine  -TL     Intra Patient Position Standing  -TL     Post Patient Position Supine  -TL     Row Name 04/07/23 1234          Positioning and Restraints    Pre-Treatment Position in bed  -TL     Post Treatment Position bed  -TL     In Bed call light within reach;encouraged to call for assist;with family/caregiver  -TL           User Key  (r) = Recorded By, (t) = Taken By, (c) = Cosigned By    Initials Name Provider Type    Toshia Patiño OTR Occupational Therapist               Outcome Measures     Row Name 04/07/23 1243          How much help from another is currently needed...    Putting on and taking off regular lower body clothing? 2  -TL     Bathing (including washing, rinsing, and drying) 2  -TL     Toileting (which includes using toilet bed pan or urinal) 2  -TL     Putting on and taking off regular upper body clothing 3  -TL     Taking care of personal grooming (such as brushing teeth) 3  -TL     Eating meals 4  -TL     AM-PAC 6 Clicks Score (OT) 16  -TL     Row Name 04/07/23 1243          Functional Assessment    Outcome Measure Options AM-PAC 6 Clicks Daily Activity (OT)  -TL           User Key  (r) = Recorded By, (t) = Taken By, (c) = Cosigned By    Initials Name Provider Type    Toshia Patiño OTR Occupational Therapist                Occupational Therapy Education     Title: PT OT SLP Therapies (In Progress)     Topic: Occupational Therapy (In Progress)     Point: ADL training (Done)     Description:   Instruct learner(s) on proper safety adaptation and remediation techniques during self care or transfers.   Instruct in proper use of assistive devices.              Learning Progress Summary           Patient Acceptance, E,TB, VU by  at 4/6/2023 9239    Comment: Role of OT/POC                   Point: Home exercise program (Not Started)     Description:   Instruct learner(s) on appropriate technique for  monitoring, assisting and/or progressing therapeutic exercises/activities.              Learner Progress:  Not documented in this visit.          Point: Precautions (Done)     Description:   Instruct learner(s) on prescribed precautions during self-care and functional transfers.              Learning Progress Summary           Patient Acceptance, E,D, DU,NR by TL at 4/7/2023 1244    Comment: Patient educated on deep breathing with movement to decrease anxiety but required moderate cuing.   Family Acceptance, E,D, DU,NR by TL at 4/7/2023 1244    Comment: Patient educated on deep breathing with movement to decrease anxiety but required moderate cuing.                   Point: Body mechanics (Done)     Description:   Instruct learner(s) on proper positioning and spine alignment during self-care, functional mobility activities and/or exercises.              Learning Progress Summary           Patient Acceptance, E,D, DU,NR by TL at 4/7/2023 1244    Comment: Patient educated on deep breathing with movement to decrease anxiety but required moderate cuing.   Family Acceptance, E,D, DU,NR by TL at 4/7/2023 1244    Comment: Patient educated on deep breathing with movement to decrease anxiety but required moderate cuing.                               User Key     Initials Effective Dates Name Provider Type Discipline     06/16/21 -  Kala Landa Occupational Therapist OT     06/16/21 -  Toshia Wan OTR Occupational Therapist OT              OT Recommendation and Plan     Plan of Care Review  Plan of Care Reviewed With: patient, son  Progress: improving  Outcome Evaluation: Patient willing to participate in therapy.  Patient was minAx2 for bed mobility tasks of rolling, scooting, and bridging and modAx2 for sit to stand X2.  Patient required moderate verbal cuing for positioning and for deep breathing to decrease anxiety following therapy.  Continue with OT focusing on increasing ADL independence.     Time Calculation:     Time Calculation- OT     Row Name 23 1245             Time Calculation- OT    OT Start Time 0949  -TL      OT Stop Time 1023  -TL      OT Time Calculation (min) 34 min  -TL         Timed Charges    51146 - OT Therapeutic Activity Minutes 34  -TL         Total Minutes    Timed Charges Total Minutes 34  -TL       Total Minutes 34  -TL            User Key  (r) = Recorded By, (t) = Taken By, (c) = Cosigned By    Initials Name Provider Type    TL Toshia Wan OTR Occupational Therapist              Therapy Charges for Today     Code Description Service Date Service Provider Modifiers Qty    09320602458  OT THERAPEUTIC ACT EA 15 MIN 2023 Toshia Wan OTR GO 2               JAXON Morgan  2023    Electronically signed by Toshia Wan OTR at 23 1246     Toshia Wan OTR at 23 1245        Goal Outcome Evaluation:  Plan of Care Reviewed With: patient, son        Progress: improving  Outcome Evaluation: Patient willing to participate in therapy.  Patient was minAx2 for bed mobility tasks of rolling, scooting, and bridging and modAx2 for sit to stand X2.  Patient required moderate verbal cuing for positioning and for deep breathing to decrease anxiety following therapy.  Continue with OT focusing on increasing ADL independence.    Electronically signed by Toshia Wan OTR at 23 1245     Kala Landa at 23 1820          Patient Name: Ivania Harris  : 1960    MRN: [id number]                              Today's Date: 2023       Admit Date: 2023    Visit Dx:     ICD-10-CM ICD-9-CM   1. Pneumonia of left lung due to infectious organism, unspecified part of lung  J18.9 486   2. Gastrointestinal hemorrhage, unspecified gastrointestinal hemorrhage type  K92.2 578.9   3. Other cirrhosis of liver  K74.69 571.5   4. Cirrhosis of liver with ascites, unspecified hepatic cirrhosis type  K74.60 571.5    R18.8      Patient Active Problem List   Diagnosis   • Pneumonia of left  lung due to infectious organism, unspecified part of lung   • Gastrointestinal hemorrhage   • Cirrhosis of liver with ascites     Past Medical History:   Diagnosis Date   • Anemia    • Diabetes mellitus    • Hyperlipidemia    • Hypertension    • Hypothyroidism    • Impaired mobility    • Short-term memory loss      Past Surgical History:   Procedure Laterality Date   • CHOLECYSTECTOMY        General Information     [facility] Name 04/06/23 1809          OT Time and Intention    Document Type evaluation  -     Mode of Treatment occupational therapy  -[facility] Name 04/06/23 1809          General Information    Patient Profile Reviewed yes  -     Prior Level of Function independent:;ADL's;all household mobility  -     Existing Precautions/Restrictions fall  -     Barriers to Rehab previous functional deficit  -     Row Name 04/06/23 1809          Occupational Profile    Reason for Services/Referral (Occupational Profile) ADL decline  -[facility] Name 04/06/23 1809          Living Environment    People in Home alone  -[facility] Name 04/06/23 1809          Home Main Entrance    Number of Stairs, Main Entrance none  -[facility] Name 04/06/23 1809          Stairs Within Home, Primary    Number of Stairs, Within Home, Primary none  -[facility] Name 04/06/23 1809          Cognition    Orientation Status (Cognition) oriented x 4  -[facility] Name 04/06/23 1809          Safety Issues, Functional Mobility    Safety Issues Affecting Function (Mobility) awareness of need for assistance;insight into deficits/self-awareness;friction/shear risk;safety precaution awareness;safety precautions follow-through/compliance  -     Impairments Affecting Function (Mobility) balance;postural/trunk control;endurance/activity tolerance;shortness of breath;strength  -           User Key  (r) = Recorded By, (t) = Taken By, (c) = Cosigned By    Initials Name Provider Type     Kala Landa Occupational Therapist                  Mobility/ADL's     [facility] Name 04/06/23 1809          Bed Mobility    Bed Mobility supine-sit;sit-supine;scooting/bridging  -     Scooting/Bridging [place] (Bed Mobility) maximum assist (25% patient effort);2 person assist  -     Supine-Sit [place] (Bed Mobility) maximum assist (25% patient effort);2 person assist  -     Sit-Supine [place] (Bed Mobility) maximum assist (25% patient effort);2 person assist  -     Assistive Device (Bed Mobility) bed rails;draw sheet;head of bed elevated  -[facility] Name 04/06/23 1809          Bed-Chair Transfer    Bed-Chair [place] (Transfers) unable to assess  -AH     Row Name 04/06/23 1809          Sit-Stand Transfer    Sit-Stand [place] (Transfers) unable to assess  -AH     Row Name 04/06/23 1809          Activities of Daily Living    BADL Assessment/Intervention bathing;upper body dressing;lower body dressing;grooming;feeding;toileting  -AH     Row Name 04/06/23 1809          Bathing Assessment/Intervention    [place] Level (Bathing) maximum assist (25% patient effort)  -[facility] Name 04/06/23 1809          Upper Body Dressing Assessment/Training    [place] Level (Upper Body Dressing) minimum assist (75% patient effort)  -[facility] Name 04/06/23 1809          Lower Body Dressing Assessment/Training    [place] Level (Lower Body Dressing) maximum assist (25% patient effort)  -AH     Row Name 04/06/23 1809          Grooming Assessment/Training    [place] Level (Grooming) set up  -AH     Row Name 04/06/23 1809          Self-Feeding Assessment/Training    [place] Level (Feeding) set up  -AH     Row Name 04/06/23 1809          Toileting Assessment/Training    [place] Level (Toileting) maximum assist (25% patient effort)  -           User Key  (r) = Recorded By, (t) = Taken By, (c) = Cosigned By    Initials Name Provider Type     Kala Landa Occupational Therapist               Obj/Interventions     [facility] Name 04/06/23  1810          Sensory Assessment (Somatosensory)    Sensory Assessment (Somatosensory) sensation intact  -AH     Row Name 04/06/23 1810          Vision Assessment/Intervention    Visual Impairment/Limitations WFL  -AH     Row Name 04/06/23 1810          Range of Motion Comprehensive    General Range of Motion bilateral upper extremity ROM WFL  -AH     Row Name 04/06/23 1810          Strength Comprehensive (MMT)    Comment, General Manual Muscle Testing (MMT) Assessment [facility]           User Key  (r) = Recorded By, (t) = Taken By, (c) = Cosigned By    Initials Name Provider Type    Kala Carr Occupational Therapist               Goals/Plan     Row Name 04/06/23 1817          Bed Mobility Goal 1 (OT)    Activity/Assistive Device (Bed Mobility Goal 1, OT) bed mobility activities, all  -     [place] Level/Cues Needed (Bed Mobility Goal 1, OT) minimum assist (75% or more patient effort)  -     Time Frame (Bed Mobility Goal 1, OT) by discharge  -     Progress/Outcomes (Bed Mobility Goal 1, OT) goal ongoing  -AH     Row Name 04/06/23 1817          Transfer Goal 1 (OT)    Activity/Assistive Device (Transfer Goal 1, OT) sit-to-stand/stand-to-sit;walker, rolling  -     [place] Level/Cues Needed (Transfer Goal 1, OT) minimum assist (75% or more patient effort)  -     Time Frame (Transfer Goal 1, OT) long term goal (LTG);5 days  -     Progress/Outcome (Transfer Goal 1, OT) goal ongoing  -AH     Row Name 04/06/23 1817          Bathing Goal 1 (OT)    Activity/Device (Bathing Goal 1, OT) bathing skills, all  -     [place] Level/Cues Needed (Bathing Goal 1, OT) moderate assist (50-74% patient effort)  -     Time Frame (Bathing Goal 1, OT) by discharge  -     Progress/Outcomes (Bathing Goal 1, OT) goal ongoing  -AH     Row Name 04/06/23 1817          Dressing Goal 1 (OT)    Activity/Device (Dressing Goal 1, OT) lower body dressing;reacher;sock-aid  -     [place]/Cues Needed  (Dressing Goal 1, OT) minimum assist (75% or more patient effort)  -     Time Frame (Dressing Goal 1, OT) long term goal (LTG);1 week  -     Progress/Outcome (Dressing Goal 1, OT) goal ongoing  -     Row Name 04/06/23 1817          Strength Goal 1 (OT)    Strength Goal 1 (OT) Pt will perform UB strengthening ex using theraband for resistance.  -     Time Frame (Strength Goal 1, OT) by discharge  -     Progress/Outcome (Strength Goal 1, OT) goal ongoing  -     Row Name 04/06/23 1817          Therapy Assessment/Plan (OT)    Planned Therapy Interventions (OT) activity tolerance training;BADL retraining;patient/caregiver education/training;transfer/mobility retraining;strengthening exercise  -           User Key  (r) = Recorded By, (t) = Taken By, (c) = Cosigned By    Initials Name Provider Type     Kala Landa Occupational Therapist               Clinical Impression     Row Name 04/06/23 1810          Pain Assessment    Pretreatment Pain Rating 0/10 - no pain  -     Posttreatment Pain Rating 0/10 - no pain  -     Pain Intervention(s) Repositioned;Ambulation/increased activity  -[facility] Name 04/06/23 1810          Plan of Care Review    Plan of Care Reviewed With patient  -     Progress no change  -     Outcome Evaluation Pt seen for OT evaluation today.  Pt presents with weakness and decreased independence with self care and functional mobility tasks.  Pt max assist of 2 to sit eob, with mod assist for sitting balance.  Pt is in a bariatric bed which sits high in the air and she was unable to get her feet to the ground.  Pt is expected to benefit from skilled OT to improve her strength activity tolerance and independence with ADL tasks.  Pt needs inpatient rehab upon d/c from [facility].  -     Row Name 04/06/23 1810          Therapy Assessment/Plan (OT)    Patient/Family Therapy Goal Statement (OT) d/c rehab  -     Rehab Potential (OT) good, to achieve stated therapy goals  -     Criteria  for Skilled Therapeutic Interventions Met (OT) yes;skilled treatment is necessary  -     Therapy Frequency (OT) 3 times/wk  -     Row Name 04/06/23 1810          Therapy Plan Review/Discharge Plan (OT)    Anticipated Discharge Disposition (OT) inpatient rehabilitation facility  -     Row Name 04/06/23 1810          Positioning and Restraints    Pre-Treatment Position in bed  -     Post Treatment Position bed  -     In Bed supine;call light within reach;encouraged to call for assist;with family/caregiver  -           User Key  (r) = Recorded By, (t) = Taken By, (c) = Cosigned By    Initials Name Provider Type    Kala Carr Occupational Therapist               Outcome Measures     Row Name 04/06/23 1818          How much help from another is currently needed...    Putting on and taking off regular lower body clothing? 2  -AH     Bathing (including washing, rinsing, and drying) 2  -AH     Toileting (which includes using toilet bed pan or urinal) 2  -AH     Putting on and taking off regular upper body clothing 3  -AH     Taking care of personal grooming (such as brushing teeth) 3  -AH     Eating meals 4  -AH     AM-PAC 6 Clicks Score (OT) 16  -     Row Name 04/06/23 1655 04/06/23 0930       How much help from another person do you currently need...    Turning from your back to your side while in flat bed without using bedrails? 2  -JR (r) LG (t) JR (c) 3  -KJ    Moving from lying on back to sitting on the side of a flat bed without bedrails? 2  -JR (r) LG (t) JR (c) 1  -KJ    Moving to and from a bed to a chair (including a wheelchair)? 1  -JR (r) LG (t) JR (c) 1  -KJ    Standing up from a chair using your arms (e.g., wheelchair, bedside chair)? 1  -JR (r) LG (t) JR (c) 1  -KJ    Climbing 3-5 steps with a railing? 1  -JR (r) LG (t) JR (c) 1  -KJ    To walk in hospital room? 1  -JR (r) LG (t) JR (c) 1  -KJ    AM-PAC 6 Clicks Score (PT) 8  -JR (r) LG (t) 8  -KJ    Row Name 04/06/23 1818           Functional Assessment    Outcome Measure Options AM-PAC 6 Clicks Daily Activity (OT)  -           User Key  (r) = Recorded By, (t) = Taken By, (c) = Cosigned By    Initials Name Provider Type     Kala Landa Occupational Therapist    Mary Bowens, PT Physical Therapist    Joann Rosas, RN Registered Nurse    Haydee Persaud, PT Student PT Student                Occupational Therapy Education     Title: PT OT SLP Therapies (In Progress)     Topic: Occupational Therapy (In Progress)     Point: ADL training (Done)     Description:   Instruct learner(s) on proper safety adaptation and remediation techniques during self care or transfers.   Instruct in proper use of assistive devices.              Learning Progress Summary           Patient Acceptance, E,TB, VU by  at 4/6/2023 8139    Comment: Role of OT/POC                   Point: Home exercise program (Not Started)     Description:   Instruct learner(s) on appropriate technique for monitoring, assisting and/or progressing therapeutic exercises/activities.              Learner Progress:  Not documented in this visit.          Point: Precautions (Not Started)     Description:   Instruct learner(s) on prescribed precautions during self-care and functional transfers.              Learner Progress:  Not documented in this visit.          Point: Body mechanics (Not Started)     Description:   Instruct learner(s) on proper positioning and spine alignment during self-care, functional mobility activities and/or exercises.              Learner Progress:  Not documented in this visit.                      User Key     Initials Effective Dates Name Provider Type [facility] 06/16/21 -  Kala Landa Occupational Therapist OT              OT Recommendation and Plan  Planned Therapy Interventions (OT): activity tolerance training, BADL retraining, patient/caregiver education/training, transfer/mobility retraining, strengthening exercise  Therapy Frequency  (OT): 3 times/wk  Plan of Care Review  Plan of Care Reviewed With: patient  Progress: no change  Outcome Evaluation: Pt seen for OT evaluation today.  Pt presents with weakness and decreased independence with self care and functional mobility tasks.  Pt max assist of 2 to sit eob, with mod assist for sitting balance.  Pt is in a bariatric bed which sits high in the air and she was unable to get her feet to the ground.  Pt is expected to benefit from skilled OT to improve her strength activity tolerance and independence with ADL tasks.  Pt needs inpatient rehab upon d/c from [facility].     Time Calculation:    Time Calculation- OT     Row Name 04/06/23 1819             Time Calculation- OT    OT Start Time 1031  -AH      OT Received On 04/06/23  -      OT Goal Re-Cert Due Date 04/16/23  -         Untimed Charges    OT Eval/Re-eval Minutes 65  -AH         Total Minutes    Untimed Charges Total Minutes 65  -AH       Total Minutes 65  -AH            User Key  (r) = Recorded By, (t) = Taken By, (c) = Cosigned By    Initials Name Provider Type    Kala Carr Occupational Therapist              Therapy Charges for Today     Code Description Service Date Service Provider Modifiers Qty    66079918407  OT EVAL MOD COMPLEXITY 4 4/6/2023 Kala Landa GO 1               Kala Landa  4/6/2023    Electronically signed by Kala Landa at 04/06/23 1820     Kala Landa at 04/06/23 1819        Goal Outcome Evaluation:  Plan of Care Reviewed With: patient        Progress: no change  Outcome Evaluation: Pt seen for OT evaluation today.  Pt presents with weakness and decreased independence with self care and functional mobility tasks.  Pt max assist of 2 to sit eob, with mod assist for sitting balance.  Pt is in a bariatric bed which sits high in the air and she was unable to get her feet to the ground.  Pt is expected to benefit from skilled OT to improve her strength activity tolerance and independence with ADL tasks.   Pt needs inpatient rehab upon d/c from [facility].    Electronically signed by Kala Landa at 04/06/23 7610

## 2023-04-07 NOTE — THERAPY TREATMENT NOTE
Patient Name: Ivania Harris  : 1960    MRN: [id number]                              Today's Date: 2023       Admit Date: 2023    Visit Dx:     ICD-10-CM ICD-9-CM   1. Pneumonia of left lung due to infectious organism, unspecified part of lung  J18.9 486   2. Gastrointestinal hemorrhage, unspecified gastrointestinal hemorrhage type  K92.2 578.9   3. Other cirrhosis of liver  K74.69 571.5   4. Cirrhosis of liver with ascites, unspecified hepatic cirrhosis type  K74.60 571.5    R18.8      Patient Active Problem List   Diagnosis   • Pneumonia of left lung due to infectious organism, unspecified part of lung   • Gastrointestinal hemorrhage   • Cirrhosis of liver with ascites     Past Medical History:   Diagnosis Date   • Anemia    • Diabetes mellitus    • Hyperlipidemia    • Hypertension    • Hypothyroidism    • Impaired mobility    • Short-term memory loss      Past Surgical History:   Procedure Laterality Date   • CHOLECYSTECTOMY     • ENDOSCOPY W/ BANDING N/A 2023    Procedure: ESOPHAGOGASTRODUODENOSCOPY WITH BIOPSY;  Surgeon: Jens Mandujano MD;  Location: [facility] ENDOSCOPY;  Service: Gastroenterology;  Laterality: N/A;      General Information     Row Name 23 1514          Physical Therapy Time and Intention    Document Type therapy note (daily note)  -CC     Mode of Treatment physical therapy  -CC     Row Name 23 1514          General Information    Patient Profile Reviewed yes  -CC     Existing Precautions/Restrictions fall  -CC     Row Name 23 1514          Safety Issues, Functional Mobility    Safety Issues Affecting Function (Mobility) awareness of need for assistance;friction/shear risk;insight into deficits/self-awareness;safety precautions follow-through/compliance  -CC     Impairments Affecting Function (Mobility) balance;postural/trunk control;endurance/activity tolerance;shortness of breath;strength  -CC           User Key  (r) = Recorded By, (t) = Taken  By, (c) = Cosigned By    Initials Name Provider Type    Taisha Alavrado, SHARMILA Physical Therapist Assistant               Mobility     Row Name 04/07/23 1517          Bed Mobility    Bed Mobility supine-sit;sit-supine;scooting/bridging  -     Scooting/Bridging [place] (Bed Mobility) minimum assist (75% patient effort);2 person assist;moderate assist (50% patient effort)  -     Supine-Sit [place] (Bed Mobility) minimum assist (75% patient effort);2 person assist  -CC     Sit-Supine [place] (Bed Mobility) 2 person assist;moderate assist (50% patient effort)  -     Assistive Device (Bed Mobility) bed rails;head of bed elevated;draw sheet  -     Comment, (Bed Mobility) Min A for B LE OOB, mod A to come to upright sitting position  -     Row Name 04/07/23 1517          Transfers    Comment, (Transfers) Sit <->stand x2 reps with mod A x2  -     Row Name 04/07/23 1517          Sit-Stand Transfer    Sit-Stand [place] (Transfers) 2 person assist;moderate assist (50% patient effort)  -     Assistive Device (Sit-Stand Transfers) walker, front-wheeled  -           User Key  (r) = Recorded By, (t) = Taken By, (c) = Cosigned By    Initials Name Provider Type     Taisha La, SHARMILA Physical Therapist Assistant               Obj/Interventions    No documentation.                Goals/Plan    No documentation.                Clinical Impression     Row Name 04/07/23 1519          Pain    Pretreatment Pain Rating 0/10 - no pain  -     Posttreatment Pain Rating 0/10 - no pain  -     Additional Documentation Pain Scale: Numbers Pre/Post-Treatment (Group)  -     Row Name 04/07/23 1519          Plan of Care Review    Plan of Care Reviewed With patient;family  -     Progress improving  -     Outcome Evaluation Pt agreeable to therapy. Performed bed mob supine to sit min A for B LE OOB, mod A to come to upright sitting position, sat EOB x8 mins without LOB, sit <->stand x2 reps with RW  and mod A x2. Attempted side stepping without success. Performed sit to supine with mod A for B LE and min A for UE with VC, scooting to center of bed min A. Reviewed B LE AP, QS, glut sets and heelslides with pt. Con't with PT POC and progress as tolerated  -CC     Row Name 04/07/23 1519          Positioning and Restraints    Pre-Treatment Position in bed  -CC     Post Treatment Position bed  -CC     In Bed supine;fowlers;call light within reach;encouraged to call for assist  -CC           User Key  (r) = Recorded By, (t) = Taken By, (c) = Cosigned By    Initials Name Provider Type    CC Taisha La, SHARMILA Physical Therapist Assistant               Outcome Measures     Row Name 04/07/23 1525          How much help from another person do you currently need...    Turning from your back to your side while in flat bed without using bedrails? 2  -CC     Moving from lying on back to sitting on the side of a flat bed without bedrails? 2  -CC     Moving to and from a bed to a chair (including a wheelchair)? 1  -CC     Standing up from a chair using your arms (e.g., wheelchair, bedside chair)? 2  -CC     Climbing 3-5 steps with a railing? 1  -CC     To walk in hospital room? 1  -CC     AM-PAC 6 Clicks Score (PT) 9  -CC     Highest level of mobility 3 --> Sat at edge of bed  -CC     Row Name 04/07/23 1525 04/07/23 1243       Functional Assessment    Outcome Measure Options AM-PAC 6 Clicks Basic Mobility (PT)  -CC AM-PAC 6 Clicks Daily Activity (OT)  -TL          User Key  (r) = Recorded By, (t) = Taken By, (c) = Cosigned By    Initials Name Provider Type    TL Toshia Wan, OTR Occupational Therapist    CC Taisha La PTA Physical Therapist Assistant                             Physical Therapy Education     Title: PT OT SLP Therapies (In Progress)     Topic: Physical Therapy (In Progress)     Point: Mobility training (Done)     Learning Progress Summary           Patient Acceptance, E, VU by LG at 4/6/2023 2937     Comment: Importance of mobility                   Point: Home exercise program (Not Started)     Learner Progress:  Not documented in this visit.          Point: Body mechanics (Done)     Learning Progress Summary           Patient Acceptance, E,TB, VU,NR by  at 4/7/2023 1526    Comment: Upright posture in standing                   Point: Precautions (Not Started)     Learner Progress:  Not documented in this visit.                      User Key     Initials Effective Dates Name Provider Type Discipline     06/16/21 -  Taisha La PTA Physical Therapist Assistant PT    LG 12/29/22 -  Haydee Red, PT Student PT Student PT              PT Recommendation and Plan     Plan of Care Reviewed With: patient, family  Progress: improving  Outcome Evaluation: Pt agreeable to therapy. Performed bed mob supine to sit min A for B LE OOB, mod A to come to upright sitting position, sat EOB x8 mins without LOB, sit <->stand x2 reps with RW and mod A x2. Attempted side stepping without success. Performed sit to supine with mod A for B LE and min A for UE with VC, scooting to center of bed min A. Reviewed B LE AP, QS, glut sets and heelslides with pt. Con't with PT POC and progress as tolerated     Time Calculation:    PT Charges     Row Name 04/07/23 1527             Time Calculation    Start Time 0947  -CC      Stop Time 1030  -CC      Time Calculation (min) 43 min  -CC      PT Received On 04/07/23  -CC      PT Goal Re-Cert Due Date 04/16/23  -CC         Timed Charges    15523 -  PT Neuromuscular Reeducation Minutes 10  -CC      53116 - PT Therapeutic Activity Minutes 33  -CC         Total Minutes    Timed Charges Total Minutes 43  -CC       Total Minutes 43  -CC            User Key  (r) = Recorded By, (t) = Taken By, (c) = Cosigned By    Initials Name Provider Type     Taisha La PTA Physical Therapist Assistant              Therapy Charges for Today     Code Description Service Date Service Provider  Modifiers Qty    57176378195 HC PT NEUROMUSC RE EDUCATION EA 15 MIN 4/7/2023 Taisha La, PTA GP 1    94058596094 HC PT THERAPEUTIC ACT EA 15 MIN 4/7/2023 Taisha La, SHARMILA GP 2          PT G-Codes  Outcome Measure Options: AM-PAC 6 Clicks Basic Mobility (PT)  AM-PAC 6 Clicks Score (PT): 9  AM-PAC 6 Clicks Score (OT): 16       Taisha La PTA  4/7/2023

## 2023-04-08 PROBLEM — R53.81 DEBILITY: Status: ACTIVE | Noted: 2023-04-08

## 2023-04-08 PROBLEM — G47.30 SLEEP APNEA: Status: ACTIVE | Noted: 2023-04-08

## 2023-04-08 PROBLEM — I10 ESSENTIAL HYPERTENSION: Status: ACTIVE | Noted: 2023-04-08

## 2023-04-08 PROBLEM — I87.2 STASIS DERMATITIS OF BOTH LEGS: Status: ACTIVE | Noted: 2023-04-08

## 2023-04-08 PROBLEM — E78.5 DYSLIPIDEMIA: Status: ACTIVE | Noted: 2023-04-08

## 2023-04-08 PROBLEM — E03.9 HYPOTHYROIDISM (ACQUIRED): Status: ACTIVE | Noted: 2023-04-08

## 2023-04-08 PROBLEM — D50.9 IRON DEFICIENCY ANEMIA: Status: ACTIVE | Noted: 2023-04-08

## 2023-04-08 PROBLEM — E11.9 TYPE 2 DIABETES MELLITUS: Status: ACTIVE | Noted: 2023-04-08

## 2023-04-08 PROBLEM — N17.9 AKI (ACUTE KIDNEY INJURY): Status: ACTIVE | Noted: 2023-04-08

## 2023-04-08 PROBLEM — N18.9 CKD (CHRONIC KIDNEY DISEASE): Status: ACTIVE | Noted: 2023-04-08

## 2023-04-08 LAB
ANION GAP SERPL CALCULATED.3IONS-SCNC: 8.9 MMOL/L (ref 5–15)
BACTERIA FLD CULT: NORMAL
BASOPHILS # BLD AUTO: 0.05 10*3/MM3 (ref 0–0.2)
BASOPHILS NFR BLD AUTO: 0.9 % (ref 0–1.5)
BUN SERPL-MCNC: 27 MG/DL (ref 8–23)
BUN/CREAT SERPL: 15.8 (ref 7–25)
CALCIUM SPEC-SCNC: 7.7 MG/DL (ref 8.6–10.5)
CHLORIDE SERPL-SCNC: 111 MMOL/L (ref 98–107)
CO2 SERPL-SCNC: 23.1 MMOL/L (ref 22–29)
CREAT SERPL-MCNC: 1.71 MG/DL (ref 0.57–1)
CREAT UR-MCNC: 118.1 MG/DL
DEPRECATED RDW RBC AUTO: 52.9 FL (ref 37–54)
EGFRCR SERPLBLD CKD-EPI 2021: 33.3 ML/MIN/1.73
EOSINOPHIL # BLD AUTO: 0.28 10*3/MM3 (ref 0–0.4)
EOSINOPHIL NFR BLD AUTO: 5.1 % (ref 0.3–6.2)
ERYTHROCYTE [DISTWIDTH] IN BLOOD BY AUTOMATED COUNT: 16.8 % (ref 12.3–15.4)
GLUCOSE BLDC GLUCOMTR-MCNC: 148 MG/DL (ref 70–130)
GLUCOSE BLDC GLUCOMTR-MCNC: 155 MG/DL (ref 70–130)
GLUCOSE BLDC GLUCOMTR-MCNC: 161 MG/DL (ref 70–130)
GLUCOSE BLDC GLUCOMTR-MCNC: 180 MG/DL (ref 70–130)
GLUCOSE SERPL-MCNC: 149 MG/DL (ref 65–99)
GRAM STN SPEC: NORMAL
GRAM STN SPEC: NORMAL
HCT VFR BLD AUTO: 22.6 % (ref 34–46.6)
HGB BLD-MCNC: 8 G/DL (ref 12–15.9)
IMM GRANULOCYTES # BLD AUTO: 0.02 10*3/MM3 (ref 0–0.05)
IMM GRANULOCYTES NFR BLD AUTO: 0.4 % (ref 0–0.5)
LYMPHOCYTES # BLD AUTO: 0.84 10*3/MM3 (ref 0.7–3.1)
LYMPHOCYTES NFR BLD AUTO: 15.4 % (ref 19.6–45.3)
MCH RBC QN AUTO: 33.3 PG (ref 26.6–33)
MCHC RBC AUTO-ENTMCNC: 35.4 G/DL (ref 31.5–35.7)
MCV RBC AUTO: 94.2 FL (ref 79–97)
MONOCYTES # BLD AUTO: 0.84 10*3/MM3 (ref 0.1–0.9)
MONOCYTES NFR BLD AUTO: 15.4 % (ref 5–12)
NEUTROPHILS NFR BLD AUTO: 3.41 10*3/MM3 (ref 1.7–7)
NEUTROPHILS NFR BLD AUTO: 62.8 % (ref 42.7–76)
NRBC BLD AUTO-RTO: 0 /100 WBC (ref 0–0.2)
PLATELET # BLD AUTO: 91 10*3/MM3 (ref 140–450)
PMV BLD AUTO: 11.2 FL (ref 6–12)
POTASSIUM SERPL-SCNC: 3.7 MMOL/L (ref 3.5–5.2)
PROT ?TM UR-MCNC: 154.5 MG/DL
PROT/CREAT UR: 1308.2 MG/G CREA (ref 0–200)
RBC # BLD AUTO: 2.4 10*6/MM3 (ref 3.77–5.28)
SODIUM SERPL-SCNC: 143 MMOL/L (ref 136–145)
WBC NRBC COR # BLD: 5.44 10*3/MM3 (ref 3.4–10.8)

## 2023-04-08 PROCEDURE — 85025 COMPLETE CBC W/AUTO DIFF WBC: CPT | Performed by: STUDENT IN AN ORGANIZED HEALTH CARE EDUCATION/TRAINING PROGRAM

## 2023-04-08 PROCEDURE — 94640 AIRWAY INHALATION TREATMENT: CPT

## 2023-04-08 PROCEDURE — 97110 THERAPEUTIC EXERCISES: CPT

## 2023-04-08 PROCEDURE — 94761 N-INVAS EAR/PLS OXIMETRY MLT: CPT

## 2023-04-08 PROCEDURE — 82570 ASSAY OF URINE CREATININE: CPT | Performed by: INTERNAL MEDICINE

## 2023-04-08 PROCEDURE — 82962 GLUCOSE BLOOD TEST: CPT

## 2023-04-08 PROCEDURE — 25010000002 FUROSEMIDE PER 20 MG: Performed by: INTERNAL MEDICINE

## 2023-04-08 PROCEDURE — 94799 UNLISTED PULMONARY SVC/PX: CPT

## 2023-04-08 PROCEDURE — 80048 BASIC METABOLIC PNL TOTAL CA: CPT | Performed by: STUDENT IN AN ORGANIZED HEALTH CARE EDUCATION/TRAINING PROGRAM

## 2023-04-08 PROCEDURE — 84156 ASSAY OF PROTEIN URINE: CPT | Performed by: INTERNAL MEDICINE

## 2023-04-08 PROCEDURE — 94762 N-INVAS EAR/PLS OXIMTRY CONT: CPT

## 2023-04-08 PROCEDURE — 99232 SBSQ HOSP IP/OBS MODERATE 35: CPT | Performed by: STUDENT IN AN ORGANIZED HEALTH CARE EDUCATION/TRAINING PROGRAM

## 2023-04-08 PROCEDURE — 63710000001 INSULIN ASPART PER 5 UNITS: Performed by: STUDENT IN AN ORGANIZED HEALTH CARE EDUCATION/TRAINING PROGRAM

## 2023-04-08 PROCEDURE — 94760 N-INVAS EAR/PLS OXIMETRY 1: CPT

## 2023-04-08 RX ORDER — IRON POLYSACCHARIDE COMPLEX 150 MG
150 CAPSULE ORAL DAILY
Status: DISCONTINUED | OUTPATIENT
Start: 2023-04-08 | End: 2023-04-12 | Stop reason: HOSPADM

## 2023-04-08 RX ORDER — SPIRONOLACTONE 25 MG/1
25 TABLET ORAL DAILY
Status: DISCONTINUED | OUTPATIENT
Start: 2023-04-08 | End: 2023-04-10

## 2023-04-08 RX ORDER — FUROSEMIDE 10 MG/ML
40 INJECTION INTRAMUSCULAR; INTRAVENOUS EVERY 8 HOURS
Status: COMPLETED | OUTPATIENT
Start: 2023-04-08 | End: 2023-04-08

## 2023-04-08 RX ORDER — HYDROXYZINE HYDROCHLORIDE 25 MG/1
50 TABLET, FILM COATED ORAL EVERY 6 HOURS PRN
Status: DISCONTINUED | OUTPATIENT
Start: 2023-04-08 | End: 2023-04-12 | Stop reason: HOSPADM

## 2023-04-08 RX ADMIN — SPIRONOLACTONE 25 MG: 25 TABLET, FILM COATED ORAL at 12:37

## 2023-04-08 RX ADMIN — CEFUROXIME AXETIL 500 MG: 250 TABLET ORAL at 10:32

## 2023-04-08 RX ADMIN — ATORVASTATIN CALCIUM 40 MG: 40 TABLET, FILM COATED ORAL at 10:32

## 2023-04-08 RX ADMIN — INSULIN ASPART 3 UNITS: 100 INJECTION, SOLUTION INTRAVENOUS; SUBCUTANEOUS at 18:23

## 2023-04-08 RX ADMIN — ALBUTEROL SULFATE 2.5 MG: 2.5 SOLUTION RESPIRATORY (INHALATION) at 00:35

## 2023-04-08 RX ADMIN — Medication 150 MG: at 12:37

## 2023-04-08 RX ADMIN — Medication 10 ML: at 12:38

## 2023-04-08 RX ADMIN — BUPROPION HYDROCHLORIDE 150 MG: 150 TABLET, FILM COATED, EXTENDED RELEASE ORAL at 10:32

## 2023-04-08 RX ADMIN — INSULIN ASPART 3 UNITS: 100 INJECTION, SOLUTION INTRAVENOUS; SUBCUTANEOUS at 12:37

## 2023-04-08 RX ADMIN — Medication 10 ML: at 10:41

## 2023-04-08 RX ADMIN — FUROSEMIDE 40 MG: 10 INJECTION, SOLUTION INTRAMUSCULAR; INTRAVENOUS at 21:26

## 2023-04-08 RX ADMIN — FUROSEMIDE 40 MG: 10 INJECTION, SOLUTION INTRAMUSCULAR; INTRAVENOUS at 12:37

## 2023-04-08 RX ADMIN — LEVOTHYROXINE SODIUM 300 MCG: 0.05 TABLET ORAL at 10:41

## 2023-04-08 RX ADMIN — DONEPEZIL HYDROCHLORIDE 10 MG: 5 TABLET ORAL at 21:27

## 2023-04-08 RX ADMIN — HYDROXYZINE HYDROCHLORIDE 50 MG: 25 TABLET ORAL at 21:26

## 2023-04-08 RX ADMIN — HYDROXYZINE HYDROCHLORIDE 50 MG: 25 TABLET ORAL at 13:19

## 2023-04-08 RX ADMIN — PANTOPRAZOLE SODIUM 40 MG: 40 TABLET, DELAYED RELEASE ORAL at 06:34

## 2023-04-08 RX ADMIN — CEFUROXIME AXETIL 500 MG: 250 TABLET ORAL at 21:26

## 2023-04-08 RX ADMIN — IPRATROPIUM BROMIDE AND ALBUTEROL SULFATE 3 ML: .5; 3 SOLUTION RESPIRATORY (INHALATION) at 09:04

## 2023-04-08 RX ADMIN — Medication 10 ML: at 10:32

## 2023-04-08 RX ADMIN — Medication 10 ML: at 21:27

## 2023-04-08 NOTE — THERAPY TREATMENT NOTE
Patient Name: Ivania Harris  : 1960    MRN: [id number]                              Today's Date: 2023       Admit Date: 2023    Visit Dx:     ICD-10-CM ICD-9-CM   1. Pneumonia of left lung due to infectious organism, unspecified part of lung  J18.9 486   2. Gastrointestinal hemorrhage, unspecified gastrointestinal hemorrhage type  K92.2 578.9   3. Other cirrhosis of liver  K74.69 571.5   4. Cirrhosis of liver with ascites, unspecified hepatic cirrhosis type  K74.60 571.5    R18.8      Patient Active Problem List   Diagnosis   • Pneumonia of left lung due to infectious organism, unspecified part of lung   • Gastrointestinal hemorrhage   • Cirrhosis of liver with ascites   • Sleep apnea   • Iron deficiency anemia   • SILVINA (acute kidney injury)   • CKD (chronic kidney disease)   • Stasis dermatitis of both legs   • Debility   • Type 2 diabetes mellitus   • Essential hypertension   • Dyslipidemia   • Hypothyroidism (acquired)     Past Medical History:   Diagnosis Date   • Anemia    • Diabetes mellitus    • Hyperlipidemia    • Hypertension    • Hypothyroidism    • Impaired mobility    • Short-term memory loss      Past Surgical History:   Procedure Laterality Date   • CHOLECYSTECTOMY     • ENDOSCOPY W/ BANDING N/A 2023    Procedure: ESOPHAGOGASTRODUODENOSCOPY WITH BIOPSY;  Surgeon: Jens Mandujano MD;  Location: [facility] ENDOSCOPY;  Service: Gastroenterology;  Laterality: N/A;      General Information     Row Name 23          Physical Therapy Time and Intention    Document Type therapy note (daily note)  -CC     Mode of Treatment physical therapy  -CC     Row Name 23 172          General Information    Patient Profile Reviewed yes  -CC     Existing Precautions/Restrictions fall  -CC     Row Name 23 172          Safety Issues, Functional Mobility    Safety Issues Affecting Function (Mobility) awareness of need for assistance;insight into  deficits/self-awareness;safety precautions follow-through/compliance;sequencing abilities  -CC     Impairments Affecting Function (Mobility) balance;postural/trunk control;endurance/activity tolerance;shortness of breath;strength  -CC           User Key  (r) = Recorded By, (t) = Taken By, (c) = Cosigned By    Initials Name Provider Type    CC Taisha La, SHARMILA Physical Therapist Assistant               Mobility    No documentation.                Obj/Interventions    No documentation.                Goals/Plan    No documentation.                Clinical Impression     Row Name 04/08/23 1727          Pain    Pretreatment Pain Rating 0/10 - no pain  -CC     Posttreatment Pain Rating 0/10 - no pain  -CC     Additional Documentation Pain Scale: Numbers Pre/Post-Treatment (Group)  -CC     Row Name 04/08/23 1727          Plan of Care Review    Plan of Care Reviewed With patient  -CC     Progress no change  -CC     Outcome Evaluation Pt agreeable to ex d/t lasix given. Performed B LE ex in supine AP,QS, GS, 1x20 reps, hip abd, heelslides, SAQ 1x15 reps with occ VC for optimal alignment for correct mm recruitment. Con't with PT POC and progress as tolerated  -CC     [facility] Name 04/08/23 1727          Positioning and Restraints    Pre-Treatment Position in bed  -CC     Post Treatment Position bed  -CC     In Bed supine;call light within reach;encouraged to call for assist  -CC           User Key  (r) = Recorded By, (t) = Taken By, (c) = Cosigned By    Initials Name Provider Type    CC Taisha La PTA Physical Therapist Assistant               Outcome Measures     Row Name 04/08/23 7053          How much help from another person do you currently need...    Turning from your back to your side while in flat bed without using bedrails? 2  -CC     Moving from lying on back to sitting on the side of a flat bed without bedrails? 2  -CC     Moving to and from a bed to a chair (including a wheelchair)? 1  -CC     Standing  up from a chair using your arms (e.g., wheelchair, bedside chair)? 2  -CC     Climbing 3-5 steps with a railing? 1  -CC     To walk in hospital room? 1  -CC     AM-PAC 6 Clicks Score (PT) 9  -CC     Highest level of mobility 3 --> Sat at edge of bed  -     Row Name 04/08/23 1730          Functional Assessment    Outcome Measure Options AM-PAC 6 Clicks Basic Mobility (PT)  -           User Key  (r) = Recorded By, (t) = Taken By, (c) = Cosigned By    Initials Name Provider Type    CC Taisha La PTA Physical Therapist Assistant                             Physical Therapy Education     Title: PT OT SLP Therapies (In Progress)     Topic: Physical Therapy (In Progress)     Point: Mobility training (Done)     Learning Progress Summary           Patient Acceptance, E, VU by  at 4/6/2023 1656    Comment: Importance of mobility                   Point: Home exercise program (Done)     Learning Progress Summary           Patient Acceptance, E,TB, VU by  at 4/8/2023 1731    Comment: Perform B LE ex btw therapy sessions                   Point: Body mechanics (Done)     Learning Progress Summary           Patient Acceptance, E,TB, VU,NR by  at 4/7/2023 1526    Comment: Upright posture in standing                   Point: Precautions (Not Started)     Learner Progress:  Not documented in this visit.                      User Key     Initials Effective Dates Name Provider Type Discipline     06/16/21 -  Taisha La PTA Physical Therapist Assistant PT     12/29/22 -  Haydee Red, BALJEET Student PT Student PT              PT Recommendation and Plan     Plan of Care Reviewed With: patient  Progress: no change  Outcome Evaluation: Pt agreeable to ex d/t lasix given. Performed B LE ex in supine AP,QS, GS, 1x20 reps, hip abd, heelslides, SAQ 1x15 reps with occ VC for optimal alignment for correct mm recruitment. Con't with PT POC and progress as tolerated     Time Calculation:    PT Charges     Row Name  04/08/23 1731             Time Calculation    Start Time 1331  -CC      Stop Time 1349  -CC      Time Calculation (min) 18 min  -CC      PT Received On 04/08/23  -CC      PT Goal Re-Cert Due Date 04/16/23  -CC         Timed Charges    54951 - PT Therapeutic Exercise Minutes 18  -CC         Total Minutes    Timed Charges Total Minutes 18  -CC       Total Minutes 18  -CC            User Key  (r) = Recorded By, (t) = Taken By, (c) = Cosigned By    Initials Name Provider Type    CC Taisha La PTA Physical Therapist Assistant              Therapy Charges for Today     Code Description Service Date Service Provider Modifiers Qty    98262336571 HC PT NEUROMUSC RE EDUCATION EA 15 MIN 4/7/2023 Taisha La, SHARMILA GP 1    65615424293 HC PT THERAPEUTIC ACT EA 15 MIN 4/7/2023 Taisha La PTA GP 2    63116805009 HC PT THER PROC EA 15 MIN 4/8/2023 Taisha La, SHARMILA GP 1          PT G-Codes  Outcome Measure Options: AM-PAC 6 Clicks Basic Mobility (PT)  AM-PAC 6 Clicks Score (PT): 9  AM-PAC 6 Clicks Score (OT): 16       Taisha La PTA  4/8/2023

## 2023-04-08 NOTE — PROGRESS NOTES
[facility]    Name:  Ivania Harris   Age:  62 y.o.  Sex:  female  :  1960  MRN:  [id number]   Visit Number:  [id number]  Admission Date:  2023  Date Of Service:  23  Primary Care Physician:  Alessandro Mercer MD     LOS: 4 days :    Chief Complaint:      Follow-up; generalized weakness, diarrhea    Subjective:    Feeling much better today.  Eating okay, slept okay.  Says she got a breathing treatment overnight which helped a lot.  Denied history of smoking or COPD.  Discussed how she will have evaluation by nephrology, possibly establish for CKD.  Awaiting to hear regarding rehab.  Brother at bedside.  All questions answered.    Hospital Course:    The patient is a 62-year-old woman with past medical history of obesity BMI 53, type 2 diabetes, apparent anemia unknown type, hyperlipidemia, hypertension, hypothyroidism, who presented to the emergency room with concern for generalized weakness and diarrhea for 3 days with no vomiting.     Upon ER work-up, she had a creatinine of 1.46 with a hemoglobin of 8.5 and platelets of 120.  She had positive FOBT testing.  A chest x-ray was possibly showing a left basilar pneumonia.  There was evidence of cirrhosis and portal hypertension with ascites with a nonobstructing right renal calculi.  There was bilateral effusions.  GI was consulted from the emergency room and she was started on octreotide and Protonix.  She received Rocephin and a normal saline bolus in addition.  She was admitted to the hospital service.     Patient had evidence of worsening anemia, was ordered 1 unit PRBCs on morning of 2023.      EGD  by gastroenterology-Dr. Mandujano; no gross lesions entire esophagus, no varices, small amount of food residue in stomach suggesting gastroparesis, nonbleeding gastric ulcer with a flat pigmented spot, nonbleeding gastric ulcer with no stigmata of bleeding, ulcer scarring gastric antrum,  erythematous mucosa gastric body, antrum, prepyloric region of stomach biopsied, mild portal hypertensive gastropathy, normal duodenal portions, no current bleeding, likely NSAID/ASA induced ulcer.    Gastroenterology recommends low fiber diet, low-salt small meals, PPI daily, avoid NSAIDs, hold ASA 2 weeks, iron pills daily, await pathology results, repeat upper endoscopy in 6 months for surveillance, return to GI office in 8 weeks.    Completed 5-day treatment for left lower lobe pneumonia, low suspicion for true active infection, may have been chronic CXR changes.    Nephrology consulted for SILVINA on suspected CKD.    STR pending.    Wound care  Per wound care consult; dry flaking skin on back, hyperkeratotic skin to bilateral lower extremities and lower panus. Skin folds moist red. Left lower skin fold with redness, blisters and open blister. Was not able to lay patient flat to assess all skin folds due to patient complaints of difficulty breathing.  Orders written per standing order sets for dry skin and red skin folds.   Recommendations for care include a turning schedule, barrier cream twice daily and prn after cleansing, using dry sheets in folds, float heels off bed with pillows, encourage increase mobility as appropriate and tolerated to reduce pressure, for dry skin apply lotion/cream and a nutrition consult for dietary needs.     Review of Systems:     All systems were reviewed and negative except as mentioned in subjective, assessment and plan.    Vital Signs:    Temp:  [97.7 °F (36.5 °C)-98 °F (36.7 °C)] 97.8 °F (36.6 °C)  Heart Rate:  [73-80] 75  Resp:  [18-24] 18  BP: (141-162)/(47-68) 158/58    Intake and output:    I/O last 3 completed shifts:  In: 1320 [P.O.:1320]  Out: 800 [Urine:800]  I/O this shift:  In: 240 [P.O.:240]  Out: 200 [Urine:200]    Physical Examination:    General Appearance:  Alert and cooperative.  Obese   Head:  Atraumatic and normocephalic.   Eyes: Conjunctivae and sclerae normal,  no icterus. No pallor.   Throat: No oral lesions, no thrush, oral mucosa moist.   Neck: Supple, trachea midline, no thyromegaly.   Lungs:   Breath sounds heard bilaterally equally.  No wheezing or crackles. No Pleural rub or bronchial breathing.   Heart:  Normal S1 and S2, no murmur, no gallop, no rub. No JVD.   Abdomen:    Obese.  Normal bowel sounds, no masses, no organomegaly. Soft, nontender, nondistended, no rebound tenderness.   Extremities:  Dark chronic venous insufficiency and scaled changes bilateral lower extremities with trace edema   Skin:  Jaundice.   Neurologic: Alert and oriented x 3. No facial asymmetry. Moves all four limbs. No tremors.      Laboratory results:    Results from last 7 days   Lab Units 04/08/23  0542 04/07/23  0520 04/05/23  0529 04/04/23  1431   SODIUM mmol/L 143 141 146* 142   POTASSIUM mmol/L 3.7 3.7 3.9 3.9   CHLORIDE mmol/L 111* 109* 112* 109*   CO2 mmol/L 23.1 23.6 24.2 24.5   BUN mg/dL 27* 29* 26* 23   CREATININE mg/dL 1.71* 1.72* 1.53* 1.46*   CALCIUM mg/dL 7.7* 8.1* 8.1* 8.2*   BILIRUBIN mg/dL  --  1.0  --  1.0   ALK PHOS U/L  --  77  --  100   ALT (SGPT) U/L  --  19  --  19   AST (SGOT) U/L  --  55*  --  42*   GLUCOSE mg/dL 149* 160* 95 105*     Results from last 7 days   Lab Units 04/08/23  0542 04/07/23  0520 04/05/23  2202 04/05/23  1331 04/05/23  0529   WBC 10*3/mm3 5.44 5.60  --   --  2.86*   HEMOGLOBIN g/dL 8.0* 8.5* 7.6*   < > 6.7*   HEMATOCRIT % 22.6* 25.2*  --   --  18.7*   PLATELETS 10*3/mm3 91* 81*  --   --  87*    < > = values in this interval not displayed.     Results from last 7 days   Lab Units 04/05/23  0529   INR  1.48*         Results from last 7 days   Lab Units 04/04/23  2315 04/04/23  2300   BLOODCX  No growth at 3 days No growth at 3 days     No results for input(s): PHART, EVH9JGA, PO2ART, URM7RFU, BASEEXCESS in the last 8760 hours.   I have reviewed the patient's laboratory results.    Radiology results:    No radiology results from the last 24  hrs  I have reviewed the patient's radiology reports.    Medication Review:     I have reviewed the patient's active and prn medications.     Problem List:      Pneumonia of left lung due to infectious organism, unspecified part of lung    Gastrointestinal hemorrhage    Cirrhosis of liver with ascites      Assessment/Plan:    Afebrile, vital signs stable on room air.  Needs low level oxygen while sleeping.  Creatinine 1.71, may have plateaued.  Hemoglobin stable at 8.0.  Appeared well at bedside, pleasantly conversational.    Sleep apnea, suspected  Oxygen while sleeping.  Recommend outpatient sleep study.    Iron deficiency  Continue iron pills daily.  Iron 22, iron saturation 21, transferrin 72, TIBC 107.    Suspected upper GI bleed, resolved  Acute on chronic anemia, resolved  Suspected decompensated cirrhosis with ascites, likely nonalcoholic fatty liver disease  -- GI following  -- EGD 4/6 with Dr. Mandujano.   -- PPI daily  -- Avoid NSAIDS  -- Hold ASA for 2 weeks  -- Iron Pills daily  -- Repeat upper endoscopy in 6 months for surveillance. Outpatient GI follow up 8-week  -- ok to stop IV ocetrotide and IV PPI  --Hepatic testing for causes of cirrhosis of already been ordered by GI.  -- peritoneum fluid with no growth  -Suspected gastroparesis     Left lower lobe pneumonia, resolved  -On Rocephin, transition to oral ceftin, completed 5-day treatment.  -Blood cultures obtained after patient received first dose of Rocephin in the ER.  Will follow.      SILVINA  -Could be hepatorenal  -Avoid nephrotoxic drugs, holding lisinopril  -Continue to monitor renal function  -UA with pyria, no culture, already on cephalosporin  -Nephrology consultation, recommendations appreciated.     Debility  -- lives alone, PT/OT recommends rehab, patient agreeable     Wound care  Per wound care consult; dry flaking skin on back, hyperkeratotic skin to bilateral lower extremities and lower panus. Skin folds moist red. Left lower skin fold  with redness, blisters and open blister. Was not able to lay patient flat to assess all skin folds due to patient complaints of difficulty breathing.  Orders written per standing order sets for dry skin and red skin folds.   Recommendations for care include a turning schedule, barrier cream twice daily and prn after cleansing, using dry sheets in folds, float heels off bed with pillows, encourage increase mobility as appropriate and tolerated to reduce pressure, for dry skin apply lotion/cream and a nutrition consult for dietary needs.      DVT Prophylaxis: SCDs  Code Status: Full  Diet:  Carbohydrate controlled diet, low-sodium, fluid restriction  Discharge Plan:  Ongoing renal evaluation and treatment, SNF when stable and available    Brian Joseph Kerley, DO  04/08/23  12:22 EDT    Dictated utilizing Dragon dictation.

## 2023-04-08 NOTE — PLAN OF CARE
Goal Outcome Evaluation:  Plan of Care Reviewed With: patient        Progress: no change  Outcome Evaluation: Pt agreeable to ex d/t lasix given. Performed B LE ex in supine AP,QS, GS, 1x20 reps, hip abd, heelslides, SAQ 1x15 reps with occ VC for optimal alignment for correct mm recruitment. Con't with PT POC and progress as tolerated

## 2023-04-08 NOTE — PLAN OF CARE
Goal Outcome Evaluation:  Plan of Care Reviewed With: patient        Progress: improving  Outcome Evaluation: no acute events during shift. VSS, SR on tele. 1L nc. PRN breathing tx. no other changes in pt condition. will continue to monitor.

## 2023-04-08 NOTE — PAYOR COMM NOTE
"To:  [employer]  From: Cecile Dennis RN  Phone: [phone]  Fax: [phone]  NPI: [id number]  TIN: [id number]  Member ID: [id number]  MRN: [id number]    Ivania King (62 y.o. Female)     Date of Birth   1960    Social Security Number       Address   [address] [facility] [address] [place] [address]    Home Phone   [phone]    MRN   [id number]       Restorationism   Nazarene    Marital Status                               Admission Date   4/4/23    Admission Type   Emergency    Admitting Provider   Marcos Altman MD    Attending Provider   Kerley, Brian Joseph, DO    Department, Room/Bed   [facility] TELEMETRY 4, 423/1       Discharge Date       Discharge Disposition       Discharge Destination                               Attending Provider: Kerley, Brian Joseph, DO    Allergies: No Known Allergies    Isolation: None   Infection: None   Code Status: CPR    Ht: 165.1 cm (65\")   Wt: 147 kg (324 lb 8.3 oz)    Admission Cmt: None   Principal Problem: Pneumonia of left lung due to infectious organism, unspecified part of lung [J18.9]                 Active Insurance as of 4/4/2023     Primary Coverage     Payor Plan Insurance Group Employer/Plan Group    HU[employer]NA MEDICARE REPLACEMENT HU[employer]NA MEDICARE REPLACEMENT [id number]     Payor Plan Address Payor Plan Phone Number Payor Plan Fax Number Effective Dates    [address] [address] [phone]  12/1/2021 - None Entered    [facility] [id number]       Subscriber Name Subscriber Birth Date Member ID       IVANIA KING 1960 [id number]                 Emergency Contacts      (Rel.) Home Phone Work Phone Mobile Phone    GENARO BUCHANAN (Brother) [phone] -- --            Vital Signs (last day)     Date/Time Temp Temp src Pulse Resp BP Patient Position SpO2    04/08/23 1235 -- -- 78 -- -- -- 95    04/08/23 1052 -- -- 75 -- -- -- 95    04/08/23 0904 -- -- 73 18 -- -- 99    04/08/23 0722 97.8 (36.6) Oral 73 22 158/58 Lying " 98    04/08/23 0340 97.7 (36.5) Oral 80 24 151/47 Lying 94    04/08/23 0039 98 (36.7) Oral 76 20 152/56 Lying 95    04/08/23 0035 -- -- -- 20 -- -- 95    04/07/23 2300 -- -- -- -- -- -- 95    04/07/23 2111 -- -- -- -- -- -- 96    04/07/23 2008 97.9 (36.6) Oral 79 18 141/56 Lying 94    04/07/23 1500 97.9 (36.6) Oral 77 18 155/61 Lying 95    04/07/23 1259 -- -- -- -- 162/68 -- --    04/07/23 1100 97.9 (36.6) Oral 73 18 170/62 Lying 96    04/07/23 0700 97.9 (36.6) Oral 73 18 151/51 Lying 96    04/07/23 0300 98.3 (36.8) Axillary 77 18 130/62 Lying 96          Current Facility-Administered Medications   Medication Dose Route Frequency Provider Last Rate Last Admin   • acetaminophen (TYLENOL) tablet 650 mg  650 mg Oral Q4H PRN Jens Mandujano MD   650 mg at 04/05/23 1200    Or   • acetaminophen (TYLENOL) 160 MG/5ML solution 650 mg  650 mg Oral Q4H PRN Jens Mandujano MD        Or   • acetaminophen (TYLENOL) suppository 650 mg  650 mg Rectal Q4H PRN Jens Mandujano MD       • albuterol (PROVENTIL) nebulizer solution 0.083% 2.5 mg/3mL  2.5 mg Nebulization Q6H PRN Jens Mandujano MD   2.5 mg at 04/08/23 0035   • atorvastatin (LIPITOR) tablet 40 mg  40 mg Oral Daily Jens Mandujano MD   40 mg at 04/08/23 1032   • buPROPion XL (WELLBUTRIN XL) 24 hr tablet 150 mg  150 mg Oral Daily Jens Mandujano MD   150 mg at 04/08/23 1032   • cefuroxime (CEFTIN) tablet 500 mg  500 mg Oral Q12H Kerley, Brian Joseph, DO   500 mg at 04/08/23 1032   • dextrose (D50W) (25 g/50 mL) IV injection 25 g  25 g Intravenous Q15 Min PRN Kerley, Brian Joseph, DO       • dextrose (GLUTOSE) oral gel 15 g  15 g Oral Q15 Min PRN Kerley, Brian Joseph, DO       • donepezil (ARICEPT) tablet 10 mg  10 mg Oral Nightly Jens Mandujano MD   10 mg at 04/07/23 2110   • furosemide (LASIX) injection 40 mg  40 mg Intravenous Q8H Denis Ellis MD, FASN   40 mg at 04/08/23 1237   • glucagon (human recombinant) (GLUCAGEN  DIAGNOSTIC) injection 1 mg  1 mg Intramuscular Q15 Min PRN Kerley, Brian Joseph, DO       • HYDROcodone-acetaminophen (NORCO) 5-325 MG per tablet 1 tablet  1 tablet Oral Q8H PRN Jens Mandujano MD   1 tablet at 04/06/23 1358   • hydrOXYzine (ATARAX) tablet 50 mg  50 mg Oral Q6H PRN Kerley, Brian Joseph, DO   50 mg at 04/08/23 1319   • Insulin Aspart (novoLOG) injection 0-14 Units  0-14 Units Subcutaneous TID AC Kerley, Brian Joseph, DO   3 Units at 04/08/23 1237   • ipratropium-albuterol (DUO-NEB) nebulizer solution 3 mL  3 mL Nebulization Q6H PRN Jens Mandujano MD   3 mL at 04/08/23 0904   • iron polysaccharides (NIFEREX) capsule 150 mg  150 mg Oral Daily Denis Ellis MD, FASN   150 mg at 04/08/23 1237   • levothyroxine (SYNTHROID, LEVOTHROID) tablet 300 mcg  300 mcg Oral Daily Jens Mandujano MD   300 mcg at 04/08/23 1041   • Morphine sulfate (PF) injection 2 mg  2 mg Intravenous Q4H PRN Jens Mandujano MD       • ondansetron (ZOFRAN) injection 4 mg  4 mg Intravenous Q6H PRN Jens Mandujano MD       • pantoprazole (PROTONIX) EC tablet 40 mg  40 mg Oral Q AM Esperanza Garrison APRN   40 mg at 04/08/23 0634   • Pharmacy to dose ceftriaxone   Does not apply Continuous PRN Jens Mandujano MD       • sodium chloride 0.9 % flush 10 mL  10 mL Intravenous PRN Jens Mandujano MD   10 mL at 04/08/23 1238   • spironolactone (ALDACTONE) tablet 25 mg  25 mg Oral Daily Denis Ellis MD, FASN   25 mg at 04/08/23 1237     Lab Results (last 24 hours)     Procedure Component Value Units Date/Time    Protein / Creatinine Ratio, Urine - Urine, Clean Catch [[id number]]  (Abnormal) Collected: 04/08/23 0305    Specimen: Urine, Clean Catch Updated: 04/08/23 1245     Protein/Creatinine Ratio, Urine 1,308.2 mg/G Crea      Creatinine, Urine 118.1 mg/dL      Total Protein, Urine 154.5 mg/dL     POC Glucose Once [[id number]]  (Abnormal) Collected: 04/08/23 1042    Specimen: Blood Updated:  04/08/23 1048     Glucose 180 mg/dL      Comment: Serial Number: [id number]:  [id number]       Body Fluid Culture - Body Fluid, Peritoneum [[id number]] Collected: 04/05/23 1549    Specimen: Body Fluid from Peritoneum Updated: 04/08/23 0744     Body Fluid Culture No growth at 3 days     Gram Stain Rare (1+) WBCs per low power field      No organisms seen    POC Glucose Once [[id number]]  (Abnormal) Collected: 04/08/23 0624    Specimen: Blood Updated: 04/08/23 0626     Glucose 148 mg/dL      Comment: Serial Number: [id number]:  [id number]       Basic Metabolic Panel [[id number]]  (Abnormal) Collected: 04/08/23 0542    Specimen: Blood Updated: 04/08/23 0613     Glucose 149 mg/dL      BUN 27 mg/dL      Creatinine 1.71 mg/dL      Sodium 143 mmol/L      Potassium 3.7 mmol/L      Chloride 111 mmol/L      CO2 23.1 mmol/L      Calcium 7.7 mg/dL      BUN/Creatinine Ratio 15.8     Anion Gap 8.9 mmol/L      eGFR 33.3 mL/min/1.73     Narrative:      GFR Normal >60  Chronic Kidney Disease <60  Kidney Failure <15      CBC & Differential [[id number]]  (Abnormal) Collected: 04/08/23 0542    Specimen: Blood Updated: 04/08/23 0555    Narrative:      The following orders were created for panel order CBC & Differential.  Procedure                               Abnormality         Status                     ---------                               -----------         ------                     CBC Auto Differential[[id number]]        Abnormal            Final result                 Please view results for these tests on the individual orders.    CBC Auto Differential [[id number]]  (Abnormal) Collected: 04/08/23 0542    Specimen: Blood Updated: 04/08/23 0555     WBC 5.44 10*3/mm3      RBC 2.40 10*6/mm3      Hemoglobin 8.0 g/dL      Hematocrit 22.6 %      MCV 94.2 fL      MCH 33.3 pg      MCHC 35.4 g/dL      RDW 16.8 %      RDW-SD 52.9 fl      MPV 11.2 fL      Platelets 91 10*3/mm3      Neutrophil % 62.8 %      Lymphocyte % 15.4 %       Monocyte % 15.4 %      Eosinophil % 5.1 %      Basophil % 0.9 %      Immature Grans % 0.4 %      Neutrophils, Absolute 3.41 10*3/mm3      Lymphocytes, Absolute 0.84 10*3/mm3      Monocytes, Absolute 0.84 10*3/mm3      Eosinophils, Absolute 0.28 10*3/mm3      Basophils, Absolute 0.05 10*3/mm3      Immature Grans, Absolute 0.02 10*3/mm3      nRBC 0.0 /100 WBC     Blood Culture With WENDY - Blood, Hand, Right [[id number]]  (Normal) Collected: 23 2300    Specimen: Blood from Hand, Right Updated: 23 0000     Blood Culture No growth at 3 days    Blood Culture With WENDY - Blood, Hand, Left [[id number]]  (Normal) Collected: 235    Specimen: Blood from Hand, Left Updated: 23 2345     Blood Culture No growth at 3 days    Iron Profile [[id number]]  (Abnormal) Collected: 23    Specimen: Blood Updated: 239     Iron 22 mcg/dL      Iron Saturation 21 %      Transferrin 72 mg/dL      TIBC 107 mcg/dL     POC Glucose Once [[id number]]  (Abnormal) Collected: 23    Specimen: Blood Updated: 23 2045     Glucose 172 mg/dL      Comment: Serial Number: [id number]:  [id number]       POC Glucose Once [[id number]]  (Abnormal) Collected: 23 1534    Specimen: Blood Updated: 23 1544     Glucose 199 mg/dL      Comment: Serial Number: [id number]:  [id number]       TISSUE EXAM, [facility] (LOU,COR,[facility]) [[id number]] Collected: 23 0745    Specimen: Tissue from Gastric, Antrum Updated: 23 1454     Reference Lab Report --     [facility]  [address] [place] [address]  Phone: [phone] or [phone]  Fax: [phone]  Jak Baldwin M.D., Medical Director    PATIENT NAME                                     LABORATORY NO.  [id number]   BRYANNA KING                            [id number]  [id number]                                 AGE                    SEX   SSN              CLIENT REF #  [facility]             "        62        1960           [id number]      [id number]    [address]                        REQUESTING M.D.           ATTENDING M.D.         COPY TO.  [address]                                [place] [address]                         ANDRA  DATE COLLECTED            DATE RECEIVED          DATE REPORTED  04/06/2023 04/06/2023 04/07/2023    DIAGNOSIS:  ANTRUM AND BODY, BIOPSY:  Antral and oxyntic mucosa with no significant histopathology.  No H. pylori organisms are identified on H&E-stained sections.      CLINICAL HISTORY:  Gastrointestinal hemorrhage, unspecified gastrointestinal hemorrhage type,  cirrhosis of,liver ascites, unspecified hepatic cirrhosis type    SPECIMENS RECEIVED:  ANTRUM AND BODY, BIOPSY    MICROSCOPIC DESCRIPTION:  Tissue blocks are prepared and slides are examined microscopically on all  specimens. See diagnosis for details.    Professional interpretation rendered by Munir Bustamante M.D., F.C.A.P. at [facility] [employer], [address] [place] [address].    GROSS DESCRIPTION:  Labeled \"gastric antrum and body\".  Consists of multiple pieces of tan soft tissue  measuring 0.4 x 0.4 x 0.2 cm in aggregate and is submitted entirely in 1 block.  [facility]    REVIEWED, DIAGNOSED AND ELECTRONICALLY  SIGNED BY:    Munir Bustamante M.D., F.C.A.P.  CPT CODES:  86169          Imaging Results (Last 24 Hours)     ** No results found for the last 24 hours. **        Orders (last 24 hrs)      Start     Ordered    04/09/23 0600  Renal Function Panel  Daily       04/08/23 1221    04/08/23 1315  spironolactone (ALDACTONE) tablet 25 mg  Daily         04/08/23 1219    04/08/23 1315  furosemide (LASIX) injection 40 mg  Every 8 Hours         04/08/23 1220    04/08/23 1315  iron polysaccharides (NIFEREX) capsule 150 mg  Daily         04/08/23 1220    04/08/23 1242  hydrOXYzine (ATARAX) tablet 50 mg  Every 6 Hours PRN         " 04/08/23 1242    04/08/23 1226  Discontinue Cardiac Monitoring  Once         04/08/23 1229    04/08/23 1049  POC Glucose Once  PROCEDURE ONCE         04/08/23 1042    04/08/23 0641  Inpatient Nephrology Consult  Once        Specialty:  Nephrology  Provider:  Denis Ellis MD, FASN    04/08/23 0641    04/08/23 0627  POC Glucose Once  PROCEDURE ONCE         04/08/23 0624    04/08/23 0600  Basic Metabolic Panel  Daily,   Status:  Canceled       04/07/23 1316    04/08/23 0600  CBC & Differential  Daily       04/07/23 1316    04/08/23 0600  CBC Auto Differential  PROCEDURE ONCE         04/07/23 2203    04/07/23 2234  Iron Profile  Once         04/07/23 2224    04/07/23 2225  Protein / Creatinine Ratio, Urine - Urine, Clean Catch  Once         04/07/23 2224    04/07/23 2046  POC Glucose Once  PROCEDURE ONCE         04/07/23 2016    04/07/23 1800  Dietary Nutrition Supplements Boost Glucose Control (Glucerna Shake)  Daily With Dinner       04/07/23 1438    04/07/23 1545  POC Glucose Once  PROCEDURE ONCE         04/07/23 1534    04/07/23 1439  Dietary Nutrition Supplements Arginaid  Daily With Lunch       04/07/23 1438    04/07/23 1130  Insulin Aspart (novoLOG) injection 0-14 Units  3 Times Daily Before Meals         04/07/23 1005    04/07/23 1100  POC Glucose TID AC  3 Times Daily Before Meals       04/07/23 1005    04/07/23 1040  Skin Care  Daily       04/07/23 1039    04/07/23 1036  Apply Moisture Barrier to All Bony Prominences  Every Shift       04/07/23 1036    04/07/23 1005  glucagon (human recombinant) (GLUCAGEN DIAGNOSTIC) injection 1 mg  Every 15 Minutes PRN         04/07/23 1005    04/07/23 1005  dextrose (GLUTOSE) oral gel 15 g  Every 15 Minutes PRN         04/07/23 1005    04/07/23 1005  dextrose (D50W) (25 g/50 mL) IV injection 25 g  Every 15 Minutes PRN         04/07/23 1005    04/06/23 1430  cefuroxime (CEFTIN) tablet 500 mg  Every 12 Hours Scheduled         04/06/23 1342    04/06/23 1345  pantoprazole  (PROTONIX) EC tablet 40 mg  Every Early Morning         04/06/23 1253    04/06/23 0640  sodium chloride 0.9 % infusion  Continuous PRN,   Status:  Discontinued         04/06/23 0640    04/06/23 0305  HYDROcodone-acetaminophen (NORCO) 5-325 MG per tablet 1 tablet  Every 8 Hours PRN         04/06/23 0305    04/05/23 1326  Morphine sulfate (PF) injection 2 mg  Every 4 Hours PRN         04/05/23 1326    04/05/23 0900  atorvastatin (LIPITOR) tablet 40 mg  Daily         04/04/23 2222 04/05/23 0900  buPROPion XL (WELLBUTRIN XL) 24 hr tablet 150 mg  Daily         04/04/23 2222 04/05/23 0900  levothyroxine (SYNTHROID, LEVOTHROID) tablet 300 mcg  Daily         04/04/23 2222 04/05/23 0600  Incentive Spirometry  Every 4 Hours While Awake       04/04/23 2222 04/05/23 0000  Vital Signs  Every 4 Hours       04/04/23 2222 04/05/23 0000  Strict Intake & Output  Every 4 Hours       04/04/23 2222 04/04/23 2315  donepezil (ARICEPT) tablet 10 mg  Nightly         04/04/23 2222 04/04/23 2223  Daily Weights  Daily       04/04/23 2222 04/04/23 2215  Pharmacy to dose ceftriaxone  Continuous PRN         04/04/23 2222 04/04/23 2215  ondansetron (ZOFRAN) injection 4 mg  Every 6 Hours PRN         04/04/23 2222 04/04/23 2215  acetaminophen (TYLENOL) tablet 650 mg  Every 4 Hours PRN        See Hyperspace for full Linked Orders Report.    04/04/23 2222 04/04/23 2215  acetaminophen (TYLENOL) 160 MG/5ML solution 650 mg  Every 4 Hours PRN        See Hyperspace for full Linked Orders Report.    04/04/23 2222 04/04/23 2215  acetaminophen (TYLENOL) suppository 650 mg  Every 4 Hours PRN        See Hyperspace for full Linked Orders Report.    04/04/23 2222 04/04/23 2213  Ambulate Patient  Every Shift       04/04/23 2222 04/04/23 2211  ipratropium-albuterol (DUO-NEB) nebulizer solution 3 mL  Every 6 Hours PRN         04/04/23 2222    04/04/23 2209  albuterol (PROVENTIL) nebulizer solution 0.083% 2.5 mg/3mL  Every 6  Hours PRN         04/04/23 2222    04/04/23 1416  sodium chloride 0.9 % flush 10 mL  As Needed        See Hyperspace for full Linked Orders Report.    04/04/23 1418    Unscheduled  Oxygen Therapy- Nasal Cannula; 2 LPM; Titrate for SPO2: equal to or greater than, per policy, 90%  Continuous PRN       04/06/23 0851    Unscheduled  Pulse Oximetry, Continuous  Continuous PRN       04/06/23 0851    Unscheduled  Follow Hypoglycemia Standing Orders For Blood Glucose <70 & Notify Provider of Treatment  As Needed      Comments: Follow Hypoglycemia Orders As Outlined in Process Instructions (Open Order Report to View Full Instructions)  Notify Provider Any Time Hypoglycemia Treatment is Administered    04/07/23 1005    Unscheduled  Apply Moisture Barrier After Any Incontinence  As Needed       04/07/23 1036    Unscheduled  Perineal Dermatitis Care PRN  As Needed      Comments: - Gently Cleanse Area With Perineal Foam Cleanser or Gentle Perineal Cleanser  - Pat Dry  - Gently Apply Protective Moisture Barrier BID & After Each Incontinence Episode  - Notify Wound Care if No Improvement After 3 Days    04/07/23 1036    Unscheduled  Red Rash Within Skin Fold Care PRN  As Needed      Comments: - Notify Provider of Severe Rashes Within Skin Folds That May Require Antifungal Cream or Powder (Order Required)  - Wash Skin Folds With Soap & Water, Pat Dry  - Apply Light Coating of Appropriate Cream (Z Guard or Miconazole) or Powder (Miconazole) - If Ordered  - Keep Area Dry With Absorbent Material (Pillow Case, Dry Wash Cloth, Gauze), Change Every 12 Hours & As Needed  - Consult Wound Care if Rash Does Not Improve After 3 Days    04/07/23 1036    --  SCANNED - TELEMETRY           04/04/23 0000    --  SCANNED - TELEMETRY           04/04/23 0000    --  ibuprofen (ADVIL,MOTRIN) 200 MG tablet  Every 6 Hours PRN         04/05/23 1340    --  SCANNED - TELEMETRY           04/04/23 0000    --  SCANNED - TELEMETRY           04/04/23 0000    --   SCANNED - TELEMETRY           23 0000    --  UPPER GI ENDOSCOPY         23 0707    --  SCANNED - TELEMETRY           23 0000    --  SCANNED - TELEMETRY           23 0000    --  SCANNED - TELEMETRY           23 0000    --  SCANNED - TELEMETRY           23 0000    --  SCANNED - TELEMETRY           23 0000    --  SCANNED - TELEMETRY           23 0000    --  SCANNED - TELEMETRY           23 0000                   Physician Progress Notes (last 24 hours)      Kerley, Brian Joseph, DO at 23 1222              [facility]    [facility]ESS [facility]    Name:  Ivania Harris   Age:  62 y.o.  Sex:  female  :  1960  MRN:  [id number]   Visit Number:  [id number]  Admission Date:  2023  Date Of Service:  23  Primary Care Physician:  Alessandro Mercer MD     LOS: 4 days :    Chief Complaint:      Follow-up; generalized weakness, diarrhea    Subjective:    Feeling much better today.  Eating okay, slept okay.  Says she got a breathing treatment overnight which helped a lot.  Denied history of smoking or COPD.  Discussed how she will have evaluation by nephrology, possibly establish for CKD.  Awaiting to hear regarding rehab.  Brother at bedside.  All questions answered.    Hospital Course:    The patient is a 62-year-old woman with past medical history of obesity BMI 53, type 2 diabetes, apparent anemia unknown type, hyperlipidemia, hypertension, hypothyroidism, who presented to the emergency room with concern for generalized weakness and diarrhea for 3 days with no vomiting.     Upon ER work-up, she had a creatinine of 1.46 with a hemoglobin of 8.5 and platelets of 120.  She had positive FOBT testing.  A chest x-ray was possibly showing a left basilar pneumonia.  There was evidence of cirrhosis and portal hypertension with ascites with a nonobstructing right renal calculi.  There was bilateral effusions.  GI was consulted from the  emergency room and she was started on octreotide and Protonix.  She received Rocephin and a normal saline bolus in addition.  She was admitted to the hospital service.     Patient had evidence of worsening anemia, was ordered 1 unit PRBCs on morning of 4/5/2023.      EGD 4/6 by gastroenterology-Dr. Mandujano; no gross lesions entire esophagus, no varices, small amount of food residue in stomach suggesting gastroparesis, nonbleeding gastric ulcer with a flat pigmented spot, nonbleeding gastric ulcer with no stigmata of bleeding, ulcer scarring gastric antrum, erythematous mucosa gastric body, antrum, prepyloric region of stomach biopsied, mild portal hypertensive gastropathy, normal duodenal portions, no current bleeding, likely NSAID/ASA induced ulcer.    Gastroenterology recommends low fiber diet, low-salt small meals, PPI daily, avoid NSAIDs, hold ASA 2 weeks, iron pills daily, await pathology results, repeat upper endoscopy in 6 months for surveillance, return to GI office in 8 weeks.    Completed 5-day treatment for left lower lobe pneumonia, low suspicion for true active infection, may have been chronic CXR changes.    Nephrology consulted for SILVINA on suspected CKD.    STR pending.    Wound care  Per wound care consult; dry flaking skin on back, hyperkeratotic skin to bilateral lower extremities and lower panus. Skin folds moist red. Left lower skin fold with redness, blisters and open blister. Was not able to lay patient flat to assess all skin folds due to patient complaints of difficulty breathing.  Orders written per standing order sets for dry skin and red skin folds.   Recommendations for care include a turning schedule, barrier cream twice daily and prn after cleansing, using dry sheets in folds, float heels off bed with pillows, encourage increase mobility as appropriate and tolerated to reduce pressure, for dry skin apply lotion/cream and a nutrition consult for dietary needs.     Review of Systems:      All systems were reviewed and negative except as mentioned in subjective, assessment and plan.    Vital Signs:    Temp:  [97.7 °F (36.5 °C)-98 °F (36.7 °C)] 97.8 °F (36.6 °C)  Heart Rate:  [73-80] 75  Resp:  [18-24] 18  BP: (141-162)/(47-68) 158/58    Intake and output:    I/O last 3 completed shifts:  In: 1320 [P.O.:1320]  Out: 800 [Urine:800]  I/O this shift:  In: 240 [P.O.:240]  Out: 200 [Urine:200]    Physical Examination:    General Appearance:  Alert and cooperative.  Obese   Head:  Atraumatic and normocephalic.   Eyes: Conjunctivae and sclerae normal, no icterus. No pallor.   Throat: No oral lesions, no thrush, oral mucosa moist.   Neck: Supple, trachea midline, no thyromegaly.   Lungs:   Breath sounds heard bilaterally equally.  No wheezing or crackles. No Pleural rub or bronchial breathing.   Heart:  Normal S1 and S2, no murmur, no gallop, no rub. No JVD.   Abdomen:    Obese.  Normal bowel sounds, no masses, no organomegaly. Soft, nontender, nondistended, no rebound tenderness.   Extremities:  Dark chronic venous insufficiency and scaled changes bilateral lower extremities with trace edema   Skin:  Jaundice.   Neurologic: Alert and oriented x 3. No facial asymmetry. Moves all four limbs. No tremors.      Laboratory results:    Results from last 7 days   Lab Units 04/08/23  0542 04/07/23  0520 04/05/23  0529 04/04/23  1431   SODIUM mmol/L 143 141 146* 142   POTASSIUM mmol/L 3.7 3.7 3.9 3.9   CHLORIDE mmol/L 111* 109* 112* 109*   CO2 mmol/L 23.1 23.6 24.2 24.5   BUN mg/dL 27* 29* 26* 23   CREATININE mg/dL 1.71* 1.72* 1.53* 1.46*   CALCIUM mg/dL 7.7* 8.1* 8.1* 8.2*   BILIRUBIN mg/dL  --  1.0  --  1.0   ALK PHOS U/L  --  77  --  100   ALT (SGPT) U/L  --  19  --  19   AST (SGOT) U/L  --  55*  --  42*   GLUCOSE mg/dL 149* 160* 95 105*     Results from last 7 days   Lab Units 04/08/23  0542 04/07/23  0520 04/05/23  2202 04/05/23  1331 04/05/23  0529   WBC 10*3/mm3 5.44 5.60  --   --  2.86*   HEMOGLOBIN g/dL  8.0* 8.5* 7.6*   < > 6.7*   HEMATOCRIT % 22.6* 25.2*  --   --  18.7*   PLATELETS 10*3/mm3 91* 81*  --   --  87*    < > = values in this interval not displayed.     Results from last 7 days   Lab Units 04/05/23  0529   INR  1.48*         Results from last 7 days   Lab Units 04/04/23  2315 04/04/23  2300   BLOODCX  No growth at 3 days No growth at 3 days     No results for input(s): PHART, PJJ7AVQ, PO2ART, WYG7STK, BASEEXCESS in the last 8760 hours.   I have reviewed the patient's laboratory results.    Radiology results:    No radiology results from the last 24 hrs  I have reviewed the patient's radiology reports.    Medication Review:     I have reviewed the patient's active and prn medications.     Problem List:      Pneumonia of left lung due to infectious organism, unspecified part of lung    Gastrointestinal hemorrhage    Cirrhosis of liver with ascites      Assessment/Plan:    Afebrile, vital signs stable on room air.  Needs low level oxygen while sleeping.  Creatinine 1.71, may have plateaued.  Hemoglobin stable at 8.0.  Appeared well at bedside, pleasantly conversational.    Sleep apnea, suspected  Oxygen while sleeping.  Recommend outpatient sleep study.    Iron deficiency  Continue iron pills daily.  Iron 22, iron saturation 21, transferrin 72, TIBC 107.    Suspected upper GI bleed, resolved  Acute on chronic anemia, resolved  Suspected decompensated cirrhosis with ascites, likely nonalcoholic fatty liver disease  -- GI following  -- EGD 4/6 with Dr. Mandujano.   -- PPI daily  -- Avoid NSAIDS  -- Hold ASA for 2 weeks  -- Iron Pills daily  -- Repeat upper endoscopy in 6 months for surveillance. Outpatient GI follow up 8-week  -- ok to stop IV ocetrotide and IV PPI  --Hepatic testing for causes of cirrhosis of already been ordered by GI.  -- peritoneum fluid with no growth  -Suspected gastroparesis     Left lower lobe pneumonia, resolved  -On Rocephin, transition to oral ceftin, completed 5-day  treatment.  -Blood cultures obtained after patient received first dose of Rocephin in the ER.  Will follow.      SILVINA  -Could be hepatorenal  -Avoid nephrotoxic drugs, holding lisinopril  -Continue to monitor renal function  -UA with pyria, no culture, already on cephalosporin  -Nephrology consultation, recommendations appreciated.     Debility  -- lives alone, PT/OT recommends rehab, patient agreeable     Wound care  Per wound care consult; dry flaking skin on back, hyperkeratotic skin to bilateral lower extremities and lower panus. Skin folds moist red. Left lower skin fold with redness, blisters and open blister. Was not able to lay patient flat to assess all skin folds due to patient complaints of difficulty breathing.  Orders written per standing order sets for dry skin and red skin folds.   Recommendations for care include a turning schedule, barrier cream twice daily and prn after cleansing, using dry sheets in folds, float heels off bed with pillows, encourage increase mobility as appropriate and tolerated to reduce pressure, for dry skin apply lotion/cream and a nutrition consult for dietary needs.      DVT Prophylaxis: SCDs  Code Status: Full  Diet:  Carbohydrate controlled diet, low-sodium, fluid restriction  Discharge Plan:  Ongoing renal evaluation and treatment, SNF when stable and available    Brian Joseph Kerley, DO  04/08/23  12:22 EDT    Dictated utilizing Dragon dictation.      Electronically signed by Kerley, Brian Joseph, DO at 04/08/23 1231          Consult Notes (last 24 hours)      Denis Ellis MD, FASN at 04/08/23 0953      Consult Orders    1. Inpatient Nephrology Consult [[id number]] ordered by Kerley, Brian Joseph, DO at 04/08/23 0641    2. Inpatient Nephrology Consult [[id number]] ordered by Kerley, Brian Joseph, DO at 04/07/23 1045                       [facility]      Nephrology Consultation      Referring Provider:   Abhilash Valdivia, *    Reason for  Consultation:  Acute Kidney Injury and associated problems.    Subjective:  Chief complaint   Chief Complaint   Patient presents with   • Weakness - Generalized     History of present illness:    Patient is 62-year-old morbidly obese female with history of diabetes and hypertension, she also has hypothyroidism, hyperlipidemia was recently admitted to the hospital secondary to persistent diarrhea.  In the ER evaluation shows bilateral pleural effusions and ascites, she was also noted to have signs of cirrhosis with portal hypertension.  She was also noted to have pneumonia which is being treated with IV antibiotics.  She has recently moved to [place] and has only been here for about a year and a half.  She used to live in [facility], she was found on the floor after 4 days by a neighbor and then her brother brought her to [place].  She was also noted to have low protein, initially it was 1.6 and recently has gone up to 2.8.  She was seen and evaluated by by GI and had EGD done which was negative.  She has gradual worsening of renal function and I was consulted for further evaluation and treatment.  Currently patient is lying in the bed awake alert and interactive, she denies having any chest pain or shortness of breath no nausea vomiting she is morbidly obese, she is aware that there is some renal issues but does not know anything more about it.  She did mention that she had been in worse shape with increased lower extremity edema.  He has fairly poor hygiene.  I have reviewed labs/imaging/records from this hospitalization, including ER staff and admitting/attending physicians H/P's and progress notes to establish a comprehensive understanding of this patient's clinical hospital course, as well as to establish plan of care appropriately.   Past Medical History:   Diagnosis Date   • Anemia    • Diabetes mellitus    • Hyperlipidemia    • Hypertension    • Hypothyroidism    • Impaired mobility    • Short-term  memory loss        Past Surgical History:   Procedure Laterality Date   • CHOLECYSTECTOMY     • ENDOSCOPY W/ BANDING N/A 4/6/2023    Procedure: ESOPHAGOGASTRODUODENOSCOPY WITH BIOPSY;  Surgeon: Jens Mandujano MD;  Location: [facility] ENDOSCOPY;  Service: Gastroenterology;  Laterality: N/A;     History reviewed. No pertinent family history.  negative h/o ESRD     Social History     Tobacco Use   • Smoking status: Never   • Smokeless tobacco: Never   Vaping Use   • Vaping Use: Never used   Substance Use Topics   • Alcohol use: Yes     Comment: a couple margaritas a year   • Drug use: Never     Home medications:   Prior to Admission Medications     Prescriptions Last Dose Informant Patient Reported? Taking?    albuterol sulfate  (90 Base) MCG/ACT inhaler Not Taking  No No    Inhale 2 puffs Every 4 (Four) Hours As Needed for Wheezing.    Patient not taking:  Reported on 4/5/2023    aspirin 81 MG EC tablet 4/3/2023  Yes Yes    Take 1 tablet by mouth Daily.    atorvastatin (LIPITOR) 20 MG tablet 4/3/2023  Yes Yes    Take 1 tablet by mouth Daily.    buPROPion XL (WELLBUTRIN XL) 150 MG 24 hr tablet 4/3/2023  Yes Yes    Take 1 tablet by mouth Daily.    donepezil (ARICEPT) 10 MG tablet 4/3/2023  Yes Yes    Take 1 tablet by mouth Every Night.    ibuprofen (ADVIL,MOTRIN) 200 MG tablet Past Month  Yes Yes    Take 1 tablet by mouth Every 6 (Six) Hours As Needed for Mild Pain.    insulin aspart (novoLOG) 100 UNIT/ML injection   Yes Yes    Inject  under the skin into the appropriate area as directed 3 (Three) Times a Day Before Meals. Sliding Scale as needed    insulin glargine (Lantus) 100 UNIT/ML injection   Yes Yes    Inject 60 Units under the skin into the appropriate area as directed 2 (Two) Times a Day.    ipratropium-albuterol (DUO-NEB) 0.5-2.5 mg/3 ml nebulizer Not Taking  No No    Nebulize q 4 h prn wheezing / shortness of breath    Patient not taking:  Reported on 4/5/2023    levothyroxine (SYNTHROID,  LEVOTHROID) 100 MCG tablet 4/3/2023  Yes Yes    Take 3 tablets by mouth Daily.    lisinopril (PRINIVIL,ZESTRIL) 10 MG tablet 4/3/2023  Yes Yes    Take 1 tablet by mouth Daily.    minocycline (MINOCIN,DYNACIN) 100 MG capsule Unknown  Yes No    Take 1 capsule by mouth Daily.    multivitamin with minerals tablet tablet   Yes Yes    Take 1 tablet by mouth Daily.    SITagliptin (JANUVIA) 50 MG tablet 4/3/2023  Yes Yes    Take 1 tablet by mouth Daily.        Emergency department medications:   Medications   sodium chloride 0.9 % flush 10 mL (10 mL Intravenous Given 4/7/23 2110)   albuterol (PROVENTIL) nebulizer solution 0.083% 2.5 mg/3mL (2.5 mg Nebulization Given 4/8/23 0035)   atorvastatin (LIPITOR) tablet 40 mg (40 mg Oral Given 4/7/23 1018)   buPROPion XL (WELLBUTRIN XL) 24 hr tablet 150 mg (150 mg Oral Given 4/7/23 1018)   donepezil (ARICEPT) tablet 10 mg (10 mg Oral Given 4/7/23 2110)   levothyroxine (SYNTHROID, LEVOTHROID) tablet 300 mcg (300 mcg Oral Given 4/7/23 1017)   ipratropium-albuterol (DUO-NEB) nebulizer solution 3 mL (3 mL Nebulization Given 4/8/23 0904)   acetaminophen (TYLENOL) tablet 650 mg ( Oral MAR Unhold 4/6/23 0852)     Or   acetaminophen (TYLENOL) 160 MG/5ML solution 650 mg ( Oral MAR Unhold 4/6/23 0852)     Or   acetaminophen (TYLENOL) suppository 650 mg ( Rectal MAR Unhold 4/6/23 0852)   ondansetron (ZOFRAN) injection 4 mg ( Intravenous MAR Unhold 4/6/23 0852)   Pharmacy to dose ceftriaxone (has no administration in time range)   sodium chloride 0.9 % infusion (0 mL/hr Intravenous Stopped 4/6/23 1100)   Morphine sulfate (PF) injection 2 mg ( Intravenous MAR Unhold 4/6/23 0852)   sodium chloride 0.9 % infusion (50 mL/hr Intravenous New Bag 4/5/23 2054)   HYDROcodone-acetaminophen (NORCO) 5-325 MG per tablet 1 tablet (1 tablet Oral Given 4/6/23 5934)   pantoprazole (PROTONIX) EC tablet 40 mg (40 mg Oral Given 4/8/23 3832)   cefuroxime (CEFTIN) tablet 500 mg (500 mg Oral Given 4/7/23 2012)  "  dextrose (GLUTOSE) oral gel 15 g (has no administration in time range)   dextrose (D50W) (25 g/50 mL) IV injection 25 g (has no administration in time range)   glucagon (human recombinant) (GLUCAGEN DIAGNOSTIC) injection 1 mg (has no administration in time range)   Insulin Aspart (novoLOG) injection 0-14 Units (0 Units Subcutaneous Not Given 4/8/23 0632)   sodium chloride 0.9 % bolus 1,000 mL (0 mL Intravenous Stopped 4/4/23 1736)   cefTRIAXone (ROCEPHIN) IVPB 1 g/50ml dextrose (premix) (0 g Intravenous Stopped 4/4/23 1845)   pantoprazole (PROTONIX) injection 80 mg (80 mg Intravenous Given 4/4/23 1858)   octreotide (sandoSTATIN) injection 50 mcg (50 mcg Intravenous Given 4/4/23 1859)   albumin human 25 % IV SOLN 25 g (25 g Intravenous New Bag 4/6/23 1526)       Allergies:  Patient has no known allergies.    Review of Systems  All review of system were reviewed and are negative except as mentioned in the history of present illness and assessment and plan.    Objective:  Vital Signs  /58 (BP Location: Left arm, Patient Position: Lying)   Pulse 73   Temp 97.8 °F (36.6 °C) (Oral)   Resp 18   Ht 165.1 cm (65\")   Wt (!) 147 kg (324 lb 8.3 oz)   SpO2 99%   BMI 54.00 kg/m²          I/O this shift:  In: 240 [P.O.:240]  Out: -     Intake/Output Summary (Last 24 hours) at 4/8/2023 0953  Last data filed at 4/8/2023 0909  Gross per 24 hour   Intake 1320 ml   Output 500 ml   Net 820 ml       Physical Exam:  General Appearance:   Alert, cooperative, in no acute distress.     Head:   Normocephalic, without obvious abnormality, atraumatic.     Eyes:      Normal, conjunctivae and sclerae, no icterus, no pallor, corneas clear, PERRLA        Throat:   Oral mucosa dry      Neck:  No adenopathy, supple, trachea midline, no thyromegaly, no carotid bruit, no JVD        Lungs:    Clear to auscultation and fair air movement noted, with occasional basal crackles.      Heart::   Regular rhythm and normal rate, normal S1 and S2. "       Abdomen:   Obese. Normal bowel sounds, no masses, no organomegaly, soft non-tender, non-distended, no guarding, no rebound tenderness      Genital urinary:   No urinary bladder palpable      Extremities:  Moves all extremities, trace to 1+ edema, no cyanosis, no redness.     Pulses:  Pulses palpable and equal bilaterally but weak.     Skin:  No bleeding, bruising or rash        Neurologic:  Cranial nerves grossly intact, move all extremities         Results Review:   Results from last 7 days   Lab Units 04/08/23  0542 04/07/23  0520 04/05/23  0529 04/04/23  1431   SODIUM mmol/L 143 141 146* 142   POTASSIUM mmol/L 3.7 3.7 3.9 3.9   CHLORIDE mmol/L 111* 109* 112* 109*   CO2 mmol/L 23.1 23.6 24.2 24.5   BUN mg/dL 27* 29* 26* 23   CREATININE mg/dL 1.71* 1.72* 1.53* 1.46*   CALCIUM mg/dL 7.7* 8.1* 8.1* 8.2*   ALBUMIN g/dL  --  2.8*  --  1.6*   BILIRUBIN mg/dL  --  1.0  --  1.0   ALK PHOS U/L  --  77  --  100   ALT (SGPT) U/L  --  19  --  19   AST (SGOT) U/L  --  55*  --  42*   GLUCOSE mg/dL 149* 160* 95 105*     Estimated Creatinine Clearance: 49.4 mL/min (A) (by C-G formula based on SCr of 1.71 mg/dL (H)).          Results from last 7 days   Lab Units 04/08/23  0542 04/07/23  0520 04/05/23  2202 04/05/23  1331 04/05/23  0529 04/04/23  2315 04/04/23  1431   WBC 10*3/mm3 5.44 5.60  --   --  2.86*  --  6.32   HEMOGLOBIN g/dL 8.0* 8.5* 7.6* 7.8* 6.7*   < > 8.5*   PLATELETS 10*3/mm3 91* 81*  --   --  87*  --  120*    < > = values in this interval not displayed.     Results from last 7 days   Lab Units 04/05/23  0529   INR  1.48*     Brief Urine Lab Results  (Last result in the past 365 days)      Color   Clarity   Blood   Leuk Est   Nitrite   Protein   CREAT   Urine HCG        04/06/23 0043 Dark Yellow   Cloudy   Large (3+)   Small (1+)   Negative   100 mg/dL (2+)               No results found for: UTPCR  Imaging Results (Last 24 Hours)     ** No results found for the last 24 hours. **        atorvastatin, 40 mg,  Oral, Daily  buPROPion XL, 150 mg, Oral, Daily  cefuroxime, 500 mg, Oral, Q12H  donepezil, 10 mg, Oral, Nightly  Insulin Aspart, 0-14 Units, Subcutaneous, TID AC  levothyroxine, 300 mcg, Oral, Daily  pantoprazole, 40 mg, Oral, Q AM      Pharmacy Consult - Pharmacy to dose,   sodium chloride, 70 mL/hr, Last Rate: Stopped (04/06/23 1100)        Assessment/Plan:    1. Acute kidney injury: It is not clear if this is all acute or likely has chronic kidney disease that patient is not aware of, she said she has been a diabetic for greater than 30 years.  She is also morbidly obese may also have some component of chronic kidney disease associated with obesity.  2. Type 2 diabetes: Treated as per hospitalist service.  3. Hypertension with chronic kidney disease: Continue to optimize volume status before increasing some of the blood pressure medications.  4. Anemia: Check iron stores and treat as needed.  5. Morbid obesity with a BMI of 53: Complicates all forms of care  6. Pneumonia of left lung due to infectious organism, unspecified part of lung: Treated as per hospitalist service  7. Cirrhosis of liver with ascites: GI following the patient.  8. Hypothyroidism: Continue home dose.  9. Dyslipidemia: Treated      Risk and complexity: High      Plan:  · No renal ultrasound but the CT of the abdomen does not show any signs of obstruction except a small stone which is nonobstructing.  We will check UA in few days, I have added a spot protein creatinine ratio to the urine that she had initially done in the ER.  · Blood pressure is stable enough we will go ahead and start loop diuretic as well as spironolactone for now.  We will need to reassess on daily basis.  · Continue with rest of the current treatment plan and surveillance labs.  · Details were discussed with the patient as well as family in the room.  Patient's brother in the room.  · Details were also discussed with the hospitalist service.   · Further recommendations  will depend on clinical course of the patient during the current hospitalization.    · I also discussed the details with the nursing staff.  · Rest as ordered.    In closing, I sincerely appreciate opportunity to participate in care of this patient. If I can be of any further assistance with the management of this patient, please don’t hesitate to contact me.    Denis Ellis MD, KIMBERLY    04/08/23  09:53 EDT    Dictated using Dragon.            Electronically signed by Denis Ellis MD, JANNN at 04/08/23 6582

## 2023-04-08 NOTE — CONSULTS
[facility]      Nephrology Consultation      Referring Provider:   Abhilash Valdivia, *    Reason for Consultation:  Acute Kidney Injury and associated problems.    Subjective:  Chief complaint   Chief Complaint   Patient presents with   • Weakness - Generalized     History of present illness:    Patient is 62-year-old morbidly obese female with history of diabetes and hypertension, she also has hypothyroidism, hyperlipidemia was recently admitted to the hospital secondary to persistent diarrhea.  In the ER evaluation shows bilateral pleural effusions and ascites, she was also noted to have signs of cirrhosis with portal hypertension.  She was also noted to have pneumonia which is being treated with IV antibiotics.  She has recently moved to [place] and has only been here for about a year and a half.  She used to live in [facility], she was found on the floor after 4 days by a neighbor and then her brother brought her to [place].  She was also noted to have low protein, initially it was 1.6 and recently has gone up to 2.8.  She was seen and evaluated by by GI and had EGD done which was negative.  She has gradual worsening of renal function and I was consulted for further evaluation and treatment.  Currently patient is lying in the bed awake alert and interactive, she denies having any chest pain or shortness of breath no nausea vomiting she is morbidly obese, she is aware that there is some renal issues but does not know anything more about it.  She did mention that she had been in worse shape with increased lower extremity edema.  He has fairly poor hygiene.  I have reviewed labs/imaging/records from this hospitalization, including ER staff and admitting/attending physicians H/P's and progress notes to establish a comprehensive understanding of this patient's clinical hospital course, as well as to establish plan of care appropriately.   Past Medical History:   Diagnosis Date   •  Anemia    • Diabetes mellitus    • Hyperlipidemia    • Hypertension    • Hypothyroidism    • Impaired mobility    • Short-term memory loss        Past Surgical History:   Procedure Laterality Date   • CHOLECYSTECTOMY     • ENDOSCOPY W/ BANDING N/A 4/6/2023    Procedure: ESOPHAGOGASTRODUODENOSCOPY WITH BIOPSY;  Surgeon: Jens Mandujano MD;  Location: [facility] ENDOSCOPY;  Service: Gastroenterology;  Laterality: N/A;     History reviewed. No pertinent family history.  negative h/o ESRD     Social History     Tobacco Use   • Smoking status: Never   • Smokeless tobacco: Never   Vaping Use   • Vaping Use: Never used   Substance Use Topics   • Alcohol use: Yes     Comment: a couple margaritas a year   • Drug use: Never     Home medications:   Prior to Admission Medications     Prescriptions Last Dose Informant Patient Reported? Taking?    albuterol sulfate  (90 Base) MCG/ACT inhaler Not Taking  No No    Inhale 2 puffs Every 4 (Four) Hours As Needed for Wheezing.    Patient not taking:  Reported on 4/5/2023    aspirin 81 MG EC tablet 4/3/2023  Yes Yes    Take 1 tablet by mouth Daily.    atorvastatin (LIPITOR) 20 MG tablet 4/3/2023  Yes Yes    Take 1 tablet by mouth Daily.    buPROPion XL (WELLBUTRIN XL) 150 MG 24 hr tablet 4/3/2023  Yes Yes    Take 1 tablet by mouth Daily.    donepezil (ARICEPT) 10 MG tablet 4/3/2023  Yes Yes    Take 1 tablet by mouth Every Night.    ibuprofen (ADVIL,MOTRIN) 200 MG tablet Past Month  Yes Yes    Take 1 tablet by mouth Every 6 (Six) Hours As Needed for Mild Pain.    insulin aspart (novoLOG) 100 UNIT/ML injection   Yes Yes    Inject  under the skin into the appropriate area as directed 3 (Three) Times a Day Before Meals. Sliding Scale as needed    insulin glargine (Lantus) 100 UNIT/ML injection   Yes Yes    Inject 60 Units under the skin into the appropriate area as directed 2 (Two) Times a Day.    ipratropium-albuterol (DUO-NEB) 0.5-2.5 mg/3 ml nebulizer Not Taking  No No     Nebulize q 4 h prn wheezing / shortness of breath    Patient not taking:  Reported on 4/5/2023    levothyroxine (SYNTHROID, LEVOTHROID) 100 MCG tablet 4/3/2023  Yes Yes    Take 3 tablets by mouth Daily.    lisinopril (PRINIVIL,ZESTRIL) 10 MG tablet 4/3/2023  Yes Yes    Take 1 tablet by mouth Daily.    minocycline (MINOCIN,DYNACIN) 100 MG capsule Unknown  Yes No    Take 1 capsule by mouth Daily.    multivitamin with minerals tablet tablet   Yes Yes    Take 1 tablet by mouth Daily.    SITagliptin (JANUVIA) 50 MG tablet 4/3/2023  Yes Yes    Take 1 tablet by mouth Daily.        Emergency department medications:   Medications   sodium chloride 0.9 % flush 10 mL (10 mL Intravenous Given 4/7/23 2110)   albuterol (PROVENTIL) nebulizer solution 0.083% 2.5 mg/3mL (2.5 mg Nebulization Given 4/8/23 0035)   atorvastatin (LIPITOR) tablet 40 mg (40 mg Oral Given 4/7/23 1018)   buPROPion XL (WELLBUTRIN XL) 24 hr tablet 150 mg (150 mg Oral Given 4/7/23 1018)   donepezil (ARICEPT) tablet 10 mg (10 mg Oral Given 4/7/23 2110)   levothyroxine (SYNTHROID, LEVOTHROID) tablet 300 mcg (300 mcg Oral Given 4/7/23 1017)   ipratropium-albuterol (DUO-NEB) nebulizer solution 3 mL (3 mL Nebulization Given 4/8/23 0904)   acetaminophen (TYLENOL) tablet 650 mg ( Oral MAR Unhold 4/6/23 0852)     Or   acetaminophen (TYLENOL) 160 MG/5ML solution 650 mg ( Oral MAR Unhold 4/6/23 0852)     Or   acetaminophen (TYLENOL) suppository 650 mg ( Rectal MAR Unhold 4/6/23 0852)   ondansetron (ZOFRAN) injection 4 mg ( Intravenous MAR Unhold 4/6/23 0852)   Pharmacy to dose ceftriaxone (has no administration in time range)   sodium chloride 0.9 % infusion (0 mL/hr Intravenous Stopped 4/6/23 1100)   Morphine sulfate (PF) injection 2 mg ( Intravenous MAR Unhold 4/6/23 0852)   sodium chloride 0.9 % infusion (50 mL/hr Intravenous New Bag 4/5/23 8291)   HYDROcodone-acetaminophen (NORCO) 5-325 MG per tablet 1 tablet (1 tablet Oral Given 4/6/23 6460)   pantoprazole  "(PROTONIX) EC tablet 40 mg (40 mg Oral Given 4/8/23 0634)   cefuroxime (CEFTIN) tablet 500 mg (500 mg Oral Given 4/7/23 2109)   dextrose (GLUTOSE) oral gel 15 g (has no administration in time range)   dextrose (D50W) (25 g/50 mL) IV injection 25 g (has no administration in time range)   glucagon (human recombinant) (GLUCAGEN DIAGNOSTIC) injection 1 mg (has no administration in time range)   Insulin Aspart (novoLOG) injection 0-14 Units (0 Units Subcutaneous Not Given 4/8/23 0632)   sodium chloride 0.9 % bolus 1,000 mL (0 mL Intravenous Stopped 4/4/23 1736)   cefTRIAXone (ROCEPHIN) IVPB 1 g/50ml dextrose (premix) (0 g Intravenous Stopped 4/4/23 1845)   pantoprazole (PROTONIX) injection 80 mg (80 mg Intravenous Given 4/4/23 1858)   octreotide (sandoSTATIN) injection 50 mcg (50 mcg Intravenous Given 4/4/23 1859)   albumin human 25 % IV SOLN 25 g (25 g Intravenous New Bag 4/6/23 1526)       Allergies:  Patient has no known allergies.    Review of Systems  All review of system were reviewed and are negative except as mentioned in the history of present illness and assessment and plan.    Objective:  Vital Signs  /58 (BP Location: Left arm, Patient Position: Lying)   Pulse 73   Temp 97.8 °F (36.6 °C) (Oral)   Resp 18   Ht 165.1 cm (65\")   Wt (!) 147 kg (324 lb 8.3 oz)   SpO2 99%   BMI 54.00 kg/m²          I/O this shift:  In: 240 [P.O.:240]  Out: -     Intake/Output Summary (Last 24 hours) at 4/8/2023 0953  Last data filed at 4/8/2023 0909  Gross per 24 hour   Intake 1320 ml   Output 500 ml   Net 820 ml       Physical Exam:  General Appearance:   Alert, cooperative, in no acute distress.     Head:   Normocephalic, without obvious abnormality, atraumatic.     Eyes:      Normal, conjunctivae and sclerae, no icterus, no pallor, corneas clear, PERRLA        Throat:   Oral mucosa dry      Neck:  No adenopathy, supple, trachea midline, no thyromegaly, no carotid bruit, no JVD        Lungs:    Clear to auscultation " and fair air movement noted, with occasional basal crackles.      Heart::   Regular rhythm and normal rate, normal S1 and S2.       Abdomen:   Obese. Normal bowel sounds, no masses, no organomegaly, soft non-tender, non-distended, no guarding, no rebound tenderness      Genital urinary:   No urinary bladder palpable      Extremities:  Moves all extremities, trace to 1+ edema, no cyanosis, no redness.     Pulses:  Pulses palpable and equal bilaterally but weak.     Skin:  No bleeding, bruising or rash        Neurologic:  Cranial nerves grossly intact, move all extremities         Results Review:   Results from last 7 days   Lab Units 04/08/23  0542 04/07/23  0520 04/05/23  0529 04/04/23  1431   SODIUM mmol/L 143 141 146* 142   POTASSIUM mmol/L 3.7 3.7 3.9 3.9   CHLORIDE mmol/L 111* 109* 112* 109*   CO2 mmol/L 23.1 23.6 24.2 24.5   BUN mg/dL 27* 29* 26* 23   CREATININE mg/dL 1.71* 1.72* 1.53* 1.46*   CALCIUM mg/dL 7.7* 8.1* 8.1* 8.2*   ALBUMIN g/dL  --  2.8*  --  1.6*   BILIRUBIN mg/dL  --  1.0  --  1.0   ALK PHOS U/L  --  77  --  100   ALT (SGPT) U/L  --  19  --  19   AST (SGOT) U/L  --  55*  --  42*   GLUCOSE mg/dL 149* 160* 95 105*     Estimated Creatinine Clearance: 49.4 mL/min (A) (by C-G formula based on SCr of 1.71 mg/dL (H)).          Results from last 7 days   Lab Units 04/08/23  0542 04/07/23  0520 04/05/23  2202 04/05/23  1331 04/05/23  0529 04/04/23  2315 04/04/23  1431   WBC 10*3/mm3 5.44 5.60  --   --  2.86*  --  6.32   HEMOGLOBIN g/dL 8.0* 8.5* 7.6* 7.8* 6.7*   < > 8.5*   PLATELETS 10*3/mm3 91* 81*  --   --  87*  --  120*    < > = values in this interval not displayed.     Results from last 7 days   Lab Units 04/05/23  0529   INR  1.48*     Brief Urine Lab Results  (Last result in the past 365 days)      Color   Clarity   Blood   Leuk Est   Nitrite   Protein   CREAT   Urine HCG        04/06/23 0043 Dark Yellow   Cloudy   Large (3+)   Small (1+)   Negative   100 mg/dL (2+)               No results found  for: UTPCR  Imaging Results (Last 24 Hours)     ** No results found for the last 24 hours. **        atorvastatin, 40 mg, Oral, Daily  buPROPion XL, 150 mg, Oral, Daily  cefuroxime, 500 mg, Oral, Q12H  donepezil, 10 mg, Oral, Nightly  Insulin Aspart, 0-14 Units, Subcutaneous, TID AC  levothyroxine, 300 mcg, Oral, Daily  pantoprazole, 40 mg, Oral, Q AM      Pharmacy Consult - Pharmacy to dose,   sodium chloride, 70 mL/hr, Last Rate: Stopped (04/06/23 1100)        Assessment/Plan:    1. Acute kidney injury: It is not clear if this is all acute or likely has chronic kidney disease that patient is not aware of, she said she has been a diabetic for greater than 30 years.  She is also morbidly obese may also have some component of chronic kidney disease associated with obesity.  2. Type 2 diabetes: Treated as per hospitalist service.  3. Hypertension with chronic kidney disease: Continue to optimize volume status before increasing some of the blood pressure medications.  4. Anemia: Check iron stores and treat as needed.  5. Morbid obesity with a BMI of 53: Complicates all forms of care  6. Pneumonia of left lung due to infectious organism, unspecified part of lung: Treated as per hospitalist service  7. Cirrhosis of liver with ascites: GI following the patient.  8. Hypothyroidism: Continue home dose.  9. Dyslipidemia: Treated      Risk and complexity: High      Plan:  · No renal ultrasound but the CT of the abdomen does not show any signs of obstruction except a small stone which is nonobstructing.  We will check UA in few days, I have added a spot protein creatinine ratio to the urine that she had initially done in the ER.  · Blood pressure is stable enough we will go ahead and start loop diuretic as well as spironolactone for now.  We will need to reassess on daily basis.  · Continue with rest of the current treatment plan and surveillance labs.  · Details were discussed with the patient as well as family in the room.   Patient's brother in the room.  · Details were also discussed with the hospitalist service.   · Further recommendations will depend on clinical course of the patient during the current hospitalization.    · I also discussed the details with the nursing staff.  · Rest as ordered.    In closing, I sincerely appreciate opportunity to participate in care of this patient. If I can be of any further assistance with the management of this patient, please don’t hesitate to contact me.    Denis Ellis MD, FASN    04/08/23  09:53 EDT    Dictated using Dragon.

## 2023-04-09 LAB
ALBUMIN SERPL-MCNC: 2.1 G/DL (ref 3.5–5.2)
ANION GAP SERPL CALCULATED.3IONS-SCNC: 8.7 MMOL/L (ref 5–15)
BACTERIA SPEC AEROBE CULT: NORMAL
BASOPHILS # BLD AUTO: 0.03 10*3/MM3 (ref 0–0.2)
BASOPHILS NFR BLD AUTO: 0.6 % (ref 0–1.5)
BUN SERPL-MCNC: 28 MG/DL (ref 8–23)
BUN/CREAT SERPL: 17.6 (ref 7–25)
CALCIUM SPEC-SCNC: 7.6 MG/DL (ref 8.6–10.5)
CHLORIDE SERPL-SCNC: 111 MMOL/L (ref 98–107)
CO2 SERPL-SCNC: 24.3 MMOL/L (ref 22–29)
CREAT SERPL-MCNC: 1.59 MG/DL (ref 0.57–1)
DEPRECATED RDW RBC AUTO: 56.3 FL (ref 37–54)
EGFRCR SERPLBLD CKD-EPI 2021: 36.4 ML/MIN/1.73
EOSINOPHIL # BLD AUTO: 0.29 10*3/MM3 (ref 0–0.4)
EOSINOPHIL NFR BLD AUTO: 5.8 % (ref 0.3–6.2)
ERYTHROCYTE [DISTWIDTH] IN BLOOD BY AUTOMATED COUNT: 17 % (ref 12.3–15.4)
GLUCOSE BLDC GLUCOMTR-MCNC: 147 MG/DL (ref 70–130)
GLUCOSE BLDC GLUCOMTR-MCNC: 172 MG/DL (ref 70–130)
GLUCOSE BLDC GLUCOMTR-MCNC: 180 MG/DL (ref 70–130)
GLUCOSE SERPL-MCNC: 148 MG/DL (ref 65–99)
HCT VFR BLD AUTO: 22.7 % (ref 34–46.6)
HGB BLD-MCNC: 7.8 G/DL (ref 12–15.9)
IMM GRANULOCYTES # BLD AUTO: 0.01 10*3/MM3 (ref 0–0.05)
IMM GRANULOCYTES NFR BLD AUTO: 0.2 % (ref 0–0.5)
LYMPHOCYTES # BLD AUTO: 0.8 10*3/MM3 (ref 0.7–3.1)
LYMPHOCYTES NFR BLD AUTO: 16.1 % (ref 19.6–45.3)
MCH RBC QN AUTO: 33.5 PG (ref 26.6–33)
MCHC RBC AUTO-ENTMCNC: 34.4 G/DL (ref 31.5–35.7)
MCV RBC AUTO: 97.4 FL (ref 79–97)
MONOCYTES # BLD AUTO: 0.8 10*3/MM3 (ref 0.1–0.9)
MONOCYTES NFR BLD AUTO: 16.1 % (ref 5–12)
NEUTROPHILS NFR BLD AUTO: 3.05 10*3/MM3 (ref 1.7–7)
NEUTROPHILS NFR BLD AUTO: 61.2 % (ref 42.7–76)
NRBC BLD AUTO-RTO: 0 /100 WBC (ref 0–0.2)
PHOSPHATE SERPL-MCNC: 3.6 MG/DL (ref 2.5–4.5)
PLATELET # BLD AUTO: 85 10*3/MM3 (ref 140–450)
PMV BLD AUTO: 11.3 FL (ref 6–12)
POTASSIUM SERPL-SCNC: 3.7 MMOL/L (ref 3.5–5.2)
RBC # BLD AUTO: 2.33 10*6/MM3 (ref 3.77–5.28)
SODIUM SERPL-SCNC: 144 MMOL/L (ref 136–145)
WBC NRBC COR # BLD: 4.98 10*3/MM3 (ref 3.4–10.8)

## 2023-04-09 PROCEDURE — 99232 SBSQ HOSP IP/OBS MODERATE 35: CPT | Performed by: STUDENT IN AN ORGANIZED HEALTH CARE EDUCATION/TRAINING PROGRAM

## 2023-04-09 PROCEDURE — 63710000001 INSULIN ASPART PER 5 UNITS: Performed by: STUDENT IN AN ORGANIZED HEALTH CARE EDUCATION/TRAINING PROGRAM

## 2023-04-09 PROCEDURE — 82962 GLUCOSE BLOOD TEST: CPT

## 2023-04-09 PROCEDURE — 25010000002 FUROSEMIDE PER 20 MG: Performed by: INTERNAL MEDICINE

## 2023-04-09 PROCEDURE — 97530 THERAPEUTIC ACTIVITIES: CPT

## 2023-04-09 PROCEDURE — 85025 COMPLETE CBC W/AUTO DIFF WBC: CPT | Performed by: STUDENT IN AN ORGANIZED HEALTH CARE EDUCATION/TRAINING PROGRAM

## 2023-04-09 PROCEDURE — 97110 THERAPEUTIC EXERCISES: CPT

## 2023-04-09 PROCEDURE — 80069 RENAL FUNCTION PANEL: CPT | Performed by: INTERNAL MEDICINE

## 2023-04-09 RX ORDER — FUROSEMIDE 10 MG/ML
80 INJECTION INTRAMUSCULAR; INTRAVENOUS EVERY 8 HOURS
Status: COMPLETED | OUTPATIENT
Start: 2023-04-09 | End: 2023-04-09

## 2023-04-09 RX ADMIN — Medication 10 ML: at 10:51

## 2023-04-09 RX ADMIN — Medication 10 ML: at 18:41

## 2023-04-09 RX ADMIN — HYDROXYZINE HYDROCHLORIDE 50 MG: 25 TABLET ORAL at 20:26

## 2023-04-09 RX ADMIN — FUROSEMIDE 80 MG: 10 INJECTION, SOLUTION INTRAMUSCULAR; INTRAVENOUS at 10:50

## 2023-04-09 RX ADMIN — Medication 10 ML: at 20:27

## 2023-04-09 RX ADMIN — PANTOPRAZOLE SODIUM 40 MG: 40 TABLET, DELAYED RELEASE ORAL at 06:43

## 2023-04-09 RX ADMIN — BUPROPION HYDROCHLORIDE 150 MG: 150 TABLET, FILM COATED, EXTENDED RELEASE ORAL at 08:46

## 2023-04-09 RX ADMIN — DONEPEZIL HYDROCHLORIDE 10 MG: 5 TABLET ORAL at 20:27

## 2023-04-09 RX ADMIN — INSULIN ASPART 3 UNITS: 100 INJECTION, SOLUTION INTRAVENOUS; SUBCUTANEOUS at 12:44

## 2023-04-09 RX ADMIN — SPIRONOLACTONE 25 MG: 25 TABLET, FILM COATED ORAL at 08:45

## 2023-04-09 RX ADMIN — LEVOTHYROXINE SODIUM 300 MCG: 0.05 TABLET ORAL at 08:45

## 2023-04-09 RX ADMIN — Medication 150 MG: at 08:46

## 2023-04-09 RX ADMIN — FUROSEMIDE 80 MG: 10 INJECTION, SOLUTION INTRAMUSCULAR; INTRAVENOUS at 18:41

## 2023-04-09 RX ADMIN — ATORVASTATIN CALCIUM 40 MG: 40 TABLET, FILM COATED ORAL at 08:46

## 2023-04-09 RX ADMIN — INSULIN ASPART 3 UNITS: 100 INJECTION, SOLUTION INTRAVENOUS; SUBCUTANEOUS at 18:41

## 2023-04-09 NOTE — THERAPY TREATMENT NOTE
Patient Name: Ivania Harris  : 1960    MRN: [id number]                              Today's Date: 2023       Admit Date: 2023    Visit Dx:     ICD-10-CM ICD-9-CM   1. Pneumonia of left lung due to infectious organism, unspecified part of lung  J18.9 486   2. Gastrointestinal hemorrhage, unspecified gastrointestinal hemorrhage type  K92.2 578.9   3. Other cirrhosis of liver  K74.69 571.5   4. Cirrhosis of liver with ascites, unspecified hepatic cirrhosis type  K74.60 571.5    R18.8      Patient Active Problem List   Diagnosis   • Pneumonia of left lung due to infectious organism, unspecified part of lung   • Gastrointestinal hemorrhage   • Cirrhosis of liver with ascites   • Sleep apnea   • Iron deficiency anemia   • SILVINA (acute kidney injury)   • CKD (chronic kidney disease)   • Stasis dermatitis of both legs   • Debility   • Type 2 diabetes mellitus   • Essential hypertension   • Dyslipidemia   • Hypothyroidism (acquired)     Past Medical History:   Diagnosis Date   • Anemia    • Diabetes mellitus    • Hyperlipidemia    • Hypertension    • Hypothyroidism    • Impaired mobility    • Short-term memory loss      Past Surgical History:   Procedure Laterality Date   • CHOLECYSTECTOMY     • ENDOSCOPY W/ BANDING N/A 2023    Procedure: ESOPHAGOGASTRODUODENOSCOPY WITH BIOPSY;  Surgeon: Jens Mandujano MD;  [facility] ENDOSCOPY;  Service: Gastroenterology;  Laterality: N/A;      General Information     Row Name 23          Physical Therapy Time and Intention    Document Type therapy note (daily note)  -CC     Mode of Treatment physical therapy  -CC     Row Name 23          General Information    Patient Profile Reviewed yes  -CC     Existing Precautions/Restrictions fall  -CC     Row Name 23          Safety Issues, Functional Mobility    Safety Issues Affecting Function (Mobility) awareness of need for assistance;insight into  deficits/self-awareness;safety precautions follow-through/compliance;sequencing abilities  -CC     Impairments Affecting Function (Mobility) balance;endurance/activity tolerance;shortness of breath;strength  -CC           User Key  (r) = Recorded By, (t) = Taken By, (c) = Cosigned By    Initials Name Provider Type    CC Taisha La PTA Physical Therapist Assistant               Mobility    No documentation.                Obj/Interventions    No documentation.                Goals/Plan    No documentation.                Clinical Impression     Row Name 04/09/23 1728          Pain    Pretreatment Pain Rating 0/10 - no pain  -CC     Posttreatment Pain Rating 0/10 - no pain  -CC     Row Name 04/09/23 1728          Plan of Care Review    Plan of Care Reviewed With patient  -CC     Outcome Evaluation Pt agreeable to therapy this AM. Pt performed B LE ex in supine AP, heelslides, hip abd, SAQ, QS, glut sets 1x10 reps. Rolling L side for bed pan placement with min/CGA and VC. Pt declined sitting EOB and standing d/t given lasix and with any streneous activty causes incontence. Con't with PT POC and progress as tolerated  -CC     Row Name 04/09/23 1728          Positioning and Restraints    Pre-Treatment Position in bed  -CC     Post Treatment Position bed  -CC     In Bed supine;call light within reach;encouraged to call for assist  -CC           User Key  (r) = Recorded By, (t) = Taken By, (c) = Cosigned By    Initials Name Provider Type    CC Taisha La PTA Physical Therapist Assistant               Outcome Measures     Row Name 04/09/23 1731          How much help from another person do you currently need...    Turning from your back to your side while in flat bed without using bedrails? 3  -CC     Moving from lying on back to sitting on the side of a flat bed without bedrails? 2  -CC     Moving to and from a bed to a chair (including a wheelchair)? 1  -CC     Standing up from a chair using your arms  (e.g., wheelchair, bedside chair)? 2  -CC     Climbing 3-5 steps with a railing? 1  -CC     To walk in hospital room? 1  -CC     AM-PAC 6 Clicks Score (PT) 10  -CC     Highest level of mobility 4 --> Transferred to chair/commode  -     Row Name 04/09/23 1731          Functional Assessment    Outcome Measure Options AM-PAC 6 Clicks Basic Mobility (PT)  -           User Key  (r) = Recorded By, (t) = Taken By, (c) = Cosigned By    Initials Name Provider Type    CC Taisha La, SHARMILA Physical Therapist Assistant                             Physical Therapy Education     Title: PT OT SLP Therapies (In Progress)     Topic: Physical Therapy (Done)     Point: Mobility training (Done)     Learning Progress Summary           Patient Acceptance, E, VU by  at 4/6/2023 1656    Comment: Importance of mobility                   Point: Home exercise program (Done)     Learning Progress Summary           Patient Acceptance, E,TB, VU by  at 4/8/2023 1731    Comment: Perform B LE ex btw therapy sessions                   Point: Body mechanics (Done)     Learning Progress Summary           Patient Acceptance, E,TB, VU,NR by  at 4/7/2023 1526    Comment: Upright posture in standing                   Point: Precautions (Done)     Learning Progress Summary           Patient Acceptance, E,TB, VU,NR by  at 4/9/2023 1732    Comment: Importance of frequent position changes to decrease risk of skin breakdown.                               User Key     Initials Effective Dates Name Provider Type Discipline     06/16/21 -  Taisha La PTA Physical Therapist Assistant PT     12/29/22 -  Haydee Red, PT Student PT Student PT              PT Recommendation and Plan     Plan of Care Reviewed With: patient  Progress: no change  Outcome Evaluation: Pt agreeable to therapy this AM. Pt performed B LE ex in supine AP, heelslides, hip abd, SAQ, QS, glut sets 1x10 reps. Rolling L side for bed pan placement with min/CGA and  VC. Pt declined sitting EOB and standing d/t given lasix and with any streneous activty causes incontence. Con't with PT POC and progress as tolerated     Time Calculation:    PT Charges     Row Name 04/09/23 1733             Time Calculation    PT Received On 04/09/23  -CC      PT Goal Re-Cert Due Date 04/16/23  -CC         Time Calculation- PT    Total Timed Code Minutes- PT 25 minute(s)  -CC         Timed Charges    35758 - PT Therapeutic Exercise Minutes 17  -CC      32935 - PT Therapeutic Activity Minutes 8  -CC         Total Minutes    Timed Charges Total Minutes 25  -CC       Total Minutes 25  -CC            User Key  (r) = Recorded By, (t) = Taken By, (c) = Cosigned By    Initials Name Provider Type    CC Taisha La PTA Physical Therapist Assistant              Therapy Charges for Today     Code Description Service Date Service Provider Modifiers Qty    14511144940 HC PT THER PROC EA 15 MIN 4/8/2023 Taisha La, SHARMILA GP 1    91903977463 HC PT THER PROC EA 15 MIN 4/9/2023 Taisha La, SHARMILA GP 1    86342018027 HC PT THERAPEUTIC ACT EA 15 MIN 4/9/2023 Taisha La, SHARMILA GP 1          PT G-Codes  Outcome Measure Options: AM-PAC 6 Clicks Basic Mobility (PT)  AM-PAC 6 Clicks Score (PT): 10  AM-PAC 6 Clicks Score (OT): 16       Taisha La PTA  4/9/2023

## 2023-04-09 NOTE — PLAN OF CARE
Goal Outcome Evaluation:  Plan of Care Reviewed With: patient        Progress: no change  Outcome Evaluation: Pt agreeable to therapy this AM. Pt performed B LE ex in supine AP, heelslides, hip abd, SAQ, QS, glut sets 1x10 reps. Rolling L side for bed pan placement with min/CGA and VC. Pt declined sitting EOB and standing d/t given lasix and with any streneous activty causes incontence. Con't with PT POC and progress as tolerated

## 2023-04-09 NOTE — PLAN OF CARE
Goal Outcome Evaluation:  Plan of Care Reviewed With: patient        Progress: no change  Outcome Evaluation: no acute events during shift. VSS, non-tele. 1L nc. purewick placed (lasix). no other changes in pt condition. will continue to monitor.

## 2023-04-09 NOTE — PROGRESS NOTES
[facility]    Name:  Ivania Harris   Age:  62 y.o.  Sex:  female  :  1960  MRN:  [id number]   Visit Number:  [id number]  Admission Date:  2023  Date Of Service:  23  Primary Care Physician:  Alessandro Mercer MD     LOS: 5 days :    Chief Complaint:      Follow-up; generalized weakness, diarrhea    Subjective:    Feeling good today.  Discussed how she had a panic attack, recovered well.  Denied history of panic attacks.  Discussed the severity of her chronic cirrhosis, how it can be fatal.  She voiced understanding.  She said that prior to this hospitalization she was mobile with some limitations, does plan to improve her mobility back to that.  She is looking forward to rehab.  Discussed continuing to improve her kidneys before going to rehab.    Hospital Course:    The patient is a 62-year-old woman with past medical history of obesity BMI 53, type 2 diabetes, apparent anemia unknown type, hyperlipidemia, hypertension, hypothyroidism, who presented to the emergency room with concern for generalized weakness and diarrhea for 3 days with no vomiting.     Upon ER work-up, she had a creatinine of 1.46 with a hemoglobin of 8.5 and platelets of 120.  She had positive FOBT testing.  A chest x-ray was possibly showing a left basilar pneumonia.  There was evidence of cirrhosis and portal hypertension with ascites with a nonobstructing right renal calculi.  There was bilateral effusions.  GI was consulted from the emergency room and she was started on octreotide and Protonix.  She received Rocephin and a normal saline bolus in addition.  She was admitted to the hospital service.     Patient had evidence of worsening anemia, was ordered 1 unit PRBCs on morning of 2023.      EGD  by gastroenterology-Dr. Mandujano; no gross lesions entire esophagus, no varices, small amount of food residue in stomach suggesting gastroparesis, nonbleeding gastric ulcer  with a flat pigmented spot, nonbleeding gastric ulcer with no stigmata of bleeding, ulcer scarring gastric antrum, erythematous mucosa gastric body, antrum, prepyloric region of stomach biopsied, mild portal hypertensive gastropathy, normal duodenal portions, no current bleeding, likely NSAID/ASA induced ulcer.    Gastroenterology recommends low fiber diet, low-salt small meals, PPI daily, avoid NSAIDs, hold ASA 2 weeks, iron pills daily, await pathology results, repeat upper endoscopy in 6 months for surveillance, return to GI office in 8 weeks.    Completed 5-day treatment for left lower lobe pneumonia, low suspicion for true active infection, may have been chronic CXR changes.    Nephrology consulted for SILVINA on suspected CKD.  Will optimize kidney function prior to discharge to STR.    STR pending.    Wound care  Per wound care consult; dry flaking skin on back, hyperkeratotic skin to bilateral lower extremities and lower panus. Skin folds moist red. Left lower skin fold with redness, blisters and open blister. Was not able to lay patient flat to assess all skin folds due to patient complaints of difficulty breathing.  Orders written per standing order sets for dry skin and red skin folds.   Recommendations for care include a turning schedule, barrier cream twice daily and prn after cleansing, using dry sheets in folds, float heels off bed with pillows, encourage increase mobility as appropriate and tolerated to reduce pressure, for dry skin apply lotion/cream and a nutrition consult for dietary needs.     Review of Systems:     All systems were reviewed and negative except as mentioned in subjective, assessment and plan.    Vital Signs:    Temp:  [97.5 °F (36.4 °C)-98 °F (36.7 °C)] 97.8 °F (36.6 °C)  Heart Rate:  [60-81] 73  Resp:  [18-20] 18  BP: (148-173)/(60-83) 168/68    Intake and output:    I/O last 3 completed shifts:  In: 960 [P.O.:960]  Out: 2000 [Urine:2000]  I/O this shift:  In: 480 [P.O.:480]  Out:  2625 [Urine:2625]    Physical Examination:    General Appearance:  Alert and cooperative.  Obese   Head:  Atraumatic and normocephalic.   Eyes: Conjunctivae and sclerae normal, no icterus. No pallor.   Throat: No oral lesions, no thrush, oral mucosa moist.   Neck: Supple, trachea midline, no thyromegaly.   Lungs:   Breath sounds heard bilaterally equally.  No wheezing or crackles. No Pleural rub or bronchial breathing.   Heart:  Normal S1 and S2, no murmur, no gallop, no rub. No JVD.   Abdomen:    Obese.  Normal bowel sounds, no masses, no organomegaly. Soft, nontender, nondistended, no rebound tenderness.   Extremities:  Dark chronic venous insufficiency and scaled changes bilateral lower extremities with trace edema   Skin:  Jaundice.   Neurologic: Alert and oriented x 3. No facial asymmetry. Moves all four limbs. No tremors.      Laboratory results:    Results from last 7 days   Lab Units 04/09/23 0454 04/08/23  0542 04/07/23  0520 04/05/23  0529 04/04/23  1431   SODIUM mmol/L 144 143 141   < > 142   POTASSIUM mmol/L 3.7 3.7 3.7   < > 3.9   CHLORIDE mmol/L 111* 111* 109*   < > 109*   CO2 mmol/L 24.3 23.1 23.6   < > 24.5   BUN mg/dL 28* 27* 29*   < > 23   CREATININE mg/dL 1.59* 1.71* 1.72*   < > 1.46*   CALCIUM mg/dL 7.6* 7.7* 8.1*   < > 8.2*   BILIRUBIN mg/dL  --   --  1.0  --  1.0   ALK PHOS U/L  --   --  77  --  100   ALT (SGPT) U/L  --   --  19  --  19   AST (SGOT) U/L  --   --  55*  --  42*   GLUCOSE mg/dL 148* 149* 160*   < > 105*    < > = values in this interval not displayed.     Results from last 7 days   Lab Units 04/09/23 0454 04/08/23  0542 04/07/23  0520   WBC 10*3/mm3 4.98 5.44 5.60   HEMOGLOBIN g/dL 7.8* 8.0* 8.5*   HEMATOCRIT % 22.7* 22.6* 25.2*   PLATELETS 10*3/mm3 85* 91* 81*     Results from last 7 days   Lab Units 04/05/23  0529   INR  1.48*         Results from last 7 days   Lab Units 04/04/23  2315 04/04/23  2300   BLOODCX  No growth at 4 days No growth at 4 days     No results for  input(s): PHART, SQC0CJM, PO2ART, QKG5YCP, BASEEXCESS in the last 8760 hours.   I have reviewed the patient's laboratory results.    Radiology results:    No radiology results from the last 24 hrs  I have reviewed the patient's radiology reports.    Medication Review:     I have reviewed the patient's active and prn medications.     Problem List:      Pneumonia of left lung due to infectious organism, unspecified part of lung    Gastrointestinal hemorrhage    Cirrhosis of liver with ascites    Sleep apnea    Iron deficiency anemia    SILVINA (acute kidney injury)    CKD (chronic kidney disease)    Stasis dermatitis of both legs    Debility    Type 2 diabetes mellitus    Essential hypertension    Dyslipidemia    Hypothyroidism (acquired)      Assessment/Plan:    Afebrile, vital signs stable on room air.  Needs low level oxygen while sleeping.  Creatinine improved to 1.59.  Hemoglobin stable at 7.8.  Appeared well at bedside, pleasantly conversational.    Sleep apnea, suspected  Oxygen while sleeping.  Recommend outpatient sleep study.    Iron deficiency  Continue iron pills daily.  Iron 22, iron saturation 21, transferrin 72, TIBC 107.    Suspected upper GI bleed, resolved  Acute on chronic anemia, resolved  Suspected decompensated cirrhosis with ascites, likely nonalcoholic fatty liver disease  -- GI following  -- EGD 4/6 with Dr. Mandujano.   -- PPI daily  -- Avoid NSAIDS  -- Hold ASA for 2 weeks  -- Iron Pills daily  -- Repeat upper endoscopy in 6 months for surveillance. Outpatient GI follow up 8-week  -- ok to stop IV ocetrotide and IV PPI  --Hepatic testing for causes of cirrhosis of already been ordered by GI.  -- peritoneum fluid with no growth  -Suspected gastroparesis     Left lower lobe pneumonia, resolved  -Was started on Rocephin, transitioned to oral ceftin, completed 5-day treatment.  Blood cultures and peritoneal fluid negative.     SILVINA  -Could be hepatorenal  -Avoid nephrotoxic drugs, holding  lisinopril  -Continue to monitor renal function  -UA with pyria, no culture, already on cephalosporin  -Nephrology consultation, recommendations appreciated.     Debility  -- lives alone, PT/OT recommends rehab, patient agreeable     Wound care  Per wound care consult; dry flaking skin on back, hyperkeratotic skin to bilateral lower extremities and lower panus. Skin folds moist red. Left lower skin fold with redness, blisters and open blister. Was not able to lay patient flat to assess all skin folds due to patient complaints of difficulty breathing.  Orders written per standing order sets for dry skin and red skin folds.   Recommendations for care include a turning schedule, barrier cream twice daily and prn after cleansing, using dry sheets in folds, float heels off bed with pillows, encourage increase mobility as appropriate and tolerated to reduce pressure, for dry skin apply lotion/cream and a nutrition consult for dietary needs.      DVT Prophylaxis: SCDs  Code Status: Full  Diet:  Carbohydrate controlled diet, low-sodium, fluid restriction  Discharge Plan:  Ongoing renal evaluation and treatment, SNF when stable and available    Brian Joseph Kerley, DO  04/09/23  16:40 EDT    Dictated utilizing Dragon dictation.

## 2023-04-09 NOTE — PROGRESS NOTES
[facility] Progress Note  [facility]. [place]        Patient Name: Ivania Harris  : 1960  MRN: [id number]   LOS: 5 days    Patient Care Team:  Alessandro Mercer MD as PCP - General (Internal Medicine)    Chief Complaint:    Chief Complaint   Patient presents with   • Weakness - Generalized     Primary Care Physician:  Alessandro Mercer MD  Date of admission: 2023    Subjective     Interval History:   Follow-up chronic kidney disease stage IIIb.  Events noted from last 24 hours.  Patient is awake alert and interactive denies having any chest pain or shortness of breath.    Review of Systems:   As noted above.    Objective     Vitals:   Temp:  [97.5 °F (36.4 °C)-98 °F (36.7 °C)] 97.5 °F (36.4 °C)  Heart Rate:  [60-83] 66  Resp:  [18-20] 20  BP: (148-179)/(60-83) 154/66  Flow (L/min):  [1] 1    Intake/Output Summary (Last 24 hours) at 2023 0725  Last data filed at 2023 1946  Gross per 24 hour   Intake 480 ml   Output 1700 ml   Net -1220 ml       Physical Exam:    General Appearance: alert, oriented x 3, no acute distress   Skin: warm and dry  HEENT: oral mucosa normal, nonicteric sclera  Neck: supple, no JVD  Lungs: Occasional crackles with fair air movement.  Heart: RRR, normal S1 and S2  Abdomen: Obese, soft, nontender, nondistended positive bowel sounds  : no palpable bladder  Extremities: Trace edema, no cyanosis or clubbing  Neuro: normal speech and mental status     Scheduled Meds:     Current Facility-Administered Medications   Medication Dose Route Frequency Provider Last Rate Last Admin   • acetaminophen (TYLENOL) tablet 650 mg  650 mg Oral Q4H PRN Jens Mandujano MD   650 mg at 23 1200    Or   • acetaminophen (TYLENOL) 160 MG/5ML solution 650 mg  650 mg Oral Q4H PRN Jens Mandujano MD        Or   • acetaminophen (TYLENOL) suppository 650 mg  650 mg Rectal Q4H PRN Jens Mandujano MD       • albuterol (PROVENTIL)  nebulizer solution 0.083% 2.5 mg/3mL  2.5 mg Nebulization Q6H PRN Jens Mandujano MD   2.5 mg at 04/08/23 0035   • atorvastatin (LIPITOR) tablet 40 mg  40 mg Oral Daily Jens Mandujano MD   40 mg at 04/08/23 1032   • buPROPion XL (WELLBUTRIN XL) 24 hr tablet 150 mg  150 mg Oral Daily Jens Mandujano MD   150 mg at 04/08/23 1032   • dextrose (D50W) (25 g/50 mL) IV injection 25 g  25 g Intravenous Q15 Min PRN Kerley, Brian Joseph, DO       • dextrose (GLUTOSE) oral gel 15 g  15 g Oral Q15 Min PRN Kerley, Brian Joseph, DO       • donepezil (ARICEPT) tablet 10 mg  10 mg Oral Nightly Jens Mandujano MD   10 mg at 04/08/23 2127   • glucagon (human recombinant) (GLUCAGEN DIAGNOSTIC) injection 1 mg  1 mg Intramuscular Q15 Min PRN Kerley, Brian Joseph, DO       • HYDROcodone-acetaminophen (NORCO) 5-325 MG per tablet 1 tablet  1 tablet Oral Q8H PRN Jens Mandujano MD   1 tablet at 04/06/23 1358   • hydrOXYzine (ATARAX) tablet 50 mg  50 mg Oral Q6H PRN Kerley, Brian Joseph, DO   50 mg at 04/08/23 2126   • Insulin Aspart (novoLOG) injection 0-14 Units  0-14 Units Subcutaneous TID AC Kerley, Brian Joseph, DO   3 Units at 04/08/23 1823   • ipratropium-albuterol (DUO-NEB) nebulizer solution 3 mL  3 mL Nebulization Q6H PRN Jens Mandujano MD   3 mL at 04/08/23 0904   • iron polysaccharides (NIFEREX) capsule 150 mg  150 mg Oral Daily Denis Ellis MD, FASN   150 mg at 04/08/23 1237   • levothyroxine (SYNTHROID, LEVOTHROID) tablet 300 mcg  300 mcg Oral Daily Jens Mandujano MD   300 mcg at 04/08/23 1041   • Morphine sulfate (PF) injection 2 mg  2 mg Intravenous Q4H PRN Jens Mandujano MD       • ondansetron (ZOFRAN) injection 4 mg  4 mg Intravenous Q6H PRN Jens Mandujano MD       • pantoprazole (PROTONIX) EC tablet 40 mg  40 mg Oral Q AM Esperanza Garrison APRN   40 mg at 04/09/23 0643   • Pharmacy to dose ceftriaxone   Does not apply Continuous PRN Jens Mandujano  MD GIOVANNA       • sodium chloride 0.9 % flush 10 mL  10 mL Intravenous PRN Jens Mandujano MD   10 mL at 04/08/23 2127   • spironolactone (ALDACTONE) tablet 25 mg  25 mg Oral Daily Denis Ellis MD, FASN   25 mg at 04/08/23 1237       atorvastatin, 40 mg, Oral, Daily  buPROPion XL, 150 mg, Oral, Daily  donepezil, 10 mg, Oral, Nightly  Insulin Aspart, 0-14 Units, Subcutaneous, TID AC  iron polysaccharides, 150 mg, Oral, Daily  levothyroxine, 300 mcg, Oral, Daily  pantoprazole, 40 mg, Oral, Q AM  spironolactone, 25 mg, Oral, Daily        IV Meds:   Pharmacy Consult - Pharmacy to dose,         Results Reviewed:   I have personally reviewed the results from the time of this admission to 4/9/2023 07:25 EDT     Results from last 7 days   Lab Units 04/09/23  0454 04/08/23  0542 04/07/23  0520 04/05/23  0529 04/04/23  1431   SODIUM mmol/L 144 143 141   < > 142   POTASSIUM mmol/L 3.7 3.7 3.7   < > 3.9   CHLORIDE mmol/L 111* 111* 109*   < > 109*   CO2 mmol/L 24.3 23.1 23.6   < > 24.5   BUN mg/dL 28* 27* 29*   < > 23   CREATININE mg/dL 1.59* 1.71* 1.72*   < > 1.46*   CALCIUM mg/dL 7.6* 7.7* 8.1*   < > 8.2*   BILIRUBIN mg/dL  --   --  1.0  --  1.0   ALK PHOS U/L  --   --  77  --  100   ALT (SGPT) U/L  --   --  19  --  19   AST (SGOT) U/L  --   --  55*  --  42*   GLUCOSE mg/dL 148* 149* 160*   < > 105*    < > = values in this interval not displayed.       Estimated Creatinine Clearance: 53.2 mL/min (A) (by C-G formula based on SCr of 1.59 mg/dL (H)).    Results from last 7 days   Lab Units 04/09/23  0454   PHOSPHORUS mg/dL 3.6             Results from last 7 days   Lab Units 04/09/23  0454 04/08/23  0542 04/07/23  0520 04/05/23  2202 04/05/23  1331 04/05/23  0529 04/04/23  2315 04/04/23  1431   WBC 10*3/mm3 4.98 5.44 5.60  --   --  2.86*  --  6.32   HEMOGLOBIN g/dL 7.8* 8.0* 8.5* 7.6* 7.8* 6.7*   < > 8.5*   PLATELETS 10*3/mm3 85* 91* 81*  --   --  87*  --  120*    < > = values in this interval not displayed.       Results  from last 7 days   Lab Units 04/05/23  0529   INR  1.48*       Brief Urine Lab Results  (Last result in the past 365 days)      Color   Clarity   Blood   Leuk Est   Nitrite   Protein   CREAT   Urine HCG        04/08/23 0305             118.1               Protein/Creatinine Ratio, Urine   Date Value Ref Range Status   04/08/2023 1,308.2 (H) 0.0 - 200.0 mg/G Crea Final       Imaging Results (Last 24 Hours)     ** No results found for the last 24 hours. **              Assessment / Plan     ASSESSMENT:  1. Acute kidney injury: It is not clear if this is all acute or likely has chronic kidney disease that patient is not aware of, she said she has been a diabetic for greater than 30 years.  She is also morbidly obese may also have some component of chronic kidney disease associated with obesity.  2. Type 2 diabetes: Treated as per hospitalist service.  3. Hypertension with chronic kidney disease: Continue to optimize volume status before increasing some of the blood pressure medications.  4. Anemia: Check iron stores and treat as needed.  5. Morbid obesity with a BMI of 53: Complicates all forms of care  6. Pneumonia of left lung due to infectious organism, unspecified part of lung: Treated as per hospitalist service  7. Cirrhosis of liver with ascites: GI following the patient.  8. Hypothyroidism: Continue home dose.  9. Dyslipidemia: Treated       PLAN:  · Renal function slightly better, will give another dose of diuretics today, I will go ahead and give 80 mg IV every 8 hours x2 doses.  She will continue with spironolactone 25 mg daily, consider increasing it to twice a day starting tomorrow.  · Low iron stores noted, will start her on oral iron supplements.  · Details were discussed with the patient no family in the room.    · Details were also discussed with the hospitalist service.   · Continue with rest of the current treatment plan, and monitor with surveillance labs.  · Further recommendations will depend on  clinical course of the patient during the current hospitalization.     Thank you for involving us in the care of Ivania Steven.  Please feel free to call with any questions.    Denis Ellis MD, KIMBERLY  04/09/23  07:25 EDT    [facility]  [phone]      Part of this note may be an electronic transcription/translation of spoken language to printed text using the Dragon Dictation System.

## 2023-04-10 LAB
ALBUMIN SERPL-MCNC: 2.1 G/DL (ref 3.5–5.2)
ANION GAP SERPL CALCULATED.3IONS-SCNC: 9.6 MMOL/L (ref 5–15)
BACTERIA SPEC AEROBE CULT: NORMAL
BASOPHILS # BLD AUTO: 0.04 10*3/MM3 (ref 0–0.2)
BASOPHILS NFR BLD AUTO: 0.8 % (ref 0–1.5)
BUN SERPL-MCNC: 25 MG/DL (ref 8–23)
BUN/CREAT SERPL: 15.2 (ref 7–25)
CALCIUM SPEC-SCNC: 7.5 MG/DL (ref 8.6–10.5)
CHLORIDE SERPL-SCNC: 109 MMOL/L (ref 98–107)
CO2 SERPL-SCNC: 26.4 MMOL/L (ref 22–29)
CREAT SERPL-MCNC: 1.64 MG/DL (ref 0.57–1)
DEPRECATED RDW RBC AUTO: 53.7 FL (ref 37–54)
EGFRCR SERPLBLD CKD-EPI 2021: 35 ML/MIN/1.73
EOSINOPHIL # BLD AUTO: 0.37 10*3/MM3 (ref 0–0.4)
EOSINOPHIL NFR BLD AUTO: 7.4 % (ref 0.3–6.2)
ERYTHROCYTE [DISTWIDTH] IN BLOOD BY AUTOMATED COUNT: 16.8 % (ref 12.3–15.4)
GLUCOSE BLDC GLUCOMTR-MCNC: 154 MG/DL (ref 70–130)
GLUCOSE BLDC GLUCOMTR-MCNC: 163 MG/DL (ref 70–130)
GLUCOSE BLDC GLUCOMTR-MCNC: 170 MG/DL (ref 70–130)
GLUCOSE BLDC GLUCOMTR-MCNC: 193 MG/DL (ref 70–130)
GLUCOSE BLDC GLUCOMTR-MCNC: 220 MG/DL (ref 70–130)
GLUCOSE SERPL-MCNC: 165 MG/DL (ref 65–99)
HCT VFR BLD AUTO: 23.5 % (ref 34–46.6)
HGB BLD-MCNC: 8.1 G/DL (ref 12–15.9)
IMM GRANULOCYTES # BLD AUTO: 0.02 10*3/MM3 (ref 0–0.05)
IMM GRANULOCYTES NFR BLD AUTO: 0.4 % (ref 0–0.5)
LYMPHOCYTES # BLD AUTO: 0.71 10*3/MM3 (ref 0.7–3.1)
LYMPHOCYTES NFR BLD AUTO: 14.1 % (ref 19.6–45.3)
MCH RBC QN AUTO: 32.5 PG (ref 26.6–33)
MCHC RBC AUTO-ENTMCNC: 34.5 G/DL (ref 31.5–35.7)
MCV RBC AUTO: 94.4 FL (ref 79–97)
MONOCYTES # BLD AUTO: 0.84 10*3/MM3 (ref 0.1–0.9)
MONOCYTES NFR BLD AUTO: 16.7 % (ref 5–12)
NEUTROPHILS NFR BLD AUTO: 3.04 10*3/MM3 (ref 1.7–7)
NEUTROPHILS NFR BLD AUTO: 60.6 % (ref 42.7–76)
NRBC BLD AUTO-RTO: 0 /100 WBC (ref 0–0.2)
PHOSPHATE SERPL-MCNC: 3.3 MG/DL (ref 2.5–4.5)
PLATELET # BLD AUTO: 107 10*3/MM3 (ref 140–450)
PMV BLD AUTO: 11 FL (ref 6–12)
POTASSIUM SERPL-SCNC: 3.3 MMOL/L (ref 3.5–5.2)
RBC # BLD AUTO: 2.49 10*6/MM3 (ref 3.77–5.28)
SODIUM SERPL-SCNC: 145 MMOL/L (ref 136–145)
WBC NRBC COR # BLD: 5.02 10*3/MM3 (ref 3.4–10.8)

## 2023-04-10 PROCEDURE — 97530 THERAPEUTIC ACTIVITIES: CPT

## 2023-04-10 PROCEDURE — 99232 SBSQ HOSP IP/OBS MODERATE 35: CPT | Performed by: STUDENT IN AN ORGANIZED HEALTH CARE EDUCATION/TRAINING PROGRAM

## 2023-04-10 PROCEDURE — 63710000001 INSULIN ASPART PER 5 UNITS: Performed by: STUDENT IN AN ORGANIZED HEALTH CARE EDUCATION/TRAINING PROGRAM

## 2023-04-10 PROCEDURE — 80069 RENAL FUNCTION PANEL: CPT | Performed by: INTERNAL MEDICINE

## 2023-04-10 PROCEDURE — 82962 GLUCOSE BLOOD TEST: CPT

## 2023-04-10 PROCEDURE — 85025 COMPLETE CBC W/AUTO DIFF WBC: CPT | Performed by: STUDENT IN AN ORGANIZED HEALTH CARE EDUCATION/TRAINING PROGRAM

## 2023-04-10 RX ORDER — SPIRONOLACTONE 25 MG/1
50 TABLET ORAL DAILY
Status: DISCONTINUED | OUTPATIENT
Start: 2023-04-10 | End: 2023-04-12

## 2023-04-10 RX ORDER — FUROSEMIDE 40 MG/1
40 TABLET ORAL
Status: DISCONTINUED | OUTPATIENT
Start: 2023-04-11 | End: 2023-04-11

## 2023-04-10 RX ORDER — POTASSIUM CHLORIDE 750 MG/1
40 CAPSULE, EXTENDED RELEASE ORAL ONCE
Status: COMPLETED | OUTPATIENT
Start: 2023-04-10 | End: 2023-04-10

## 2023-04-10 RX ADMIN — BUPROPION HYDROCHLORIDE 150 MG: 150 TABLET, FILM COATED, EXTENDED RELEASE ORAL at 10:24

## 2023-04-10 RX ADMIN — HYDROCODONE BITARTRATE AND ACETAMINOPHEN 1 TABLET: 5; 325 TABLET ORAL at 23:58

## 2023-04-10 RX ADMIN — PANTOPRAZOLE SODIUM 40 MG: 40 TABLET, DELAYED RELEASE ORAL at 06:43

## 2023-04-10 RX ADMIN — INSULIN ASPART 3 UNITS: 100 INJECTION, SOLUTION INTRAVENOUS; SUBCUTANEOUS at 06:43

## 2023-04-10 RX ADMIN — LEVOTHYROXINE SODIUM 300 MCG: 0.05 TABLET ORAL at 10:24

## 2023-04-10 RX ADMIN — HYDROXYZINE HYDROCHLORIDE 50 MG: 25 TABLET ORAL at 20:47

## 2023-04-10 RX ADMIN — POTASSIUM CHLORIDE 40 MEQ: 10 CAPSULE, COATED, EXTENDED RELEASE ORAL at 10:24

## 2023-04-10 RX ADMIN — SPIRONOLACTONE 50 MG: 25 TABLET, FILM COATED ORAL at 10:25

## 2023-04-10 RX ADMIN — Medication 150 MG: at 10:25

## 2023-04-10 RX ADMIN — INSULIN ASPART 3 UNITS: 100 INJECTION, SOLUTION INTRAVENOUS; SUBCUTANEOUS at 18:15

## 2023-04-10 RX ADMIN — INSULIN ASPART 3 UNITS: 100 INJECTION, SOLUTION INTRAVENOUS; SUBCUTANEOUS at 12:22

## 2023-04-10 RX ADMIN — Medication 10 ML: at 10:25

## 2023-04-10 RX ADMIN — DONEPEZIL HYDROCHLORIDE 10 MG: 5 TABLET ORAL at 20:47

## 2023-04-10 RX ADMIN — ATORVASTATIN CALCIUM 40 MG: 40 TABLET, FILM COATED ORAL at 10:25

## 2023-04-10 NOTE — PLAN OF CARE
Goal Outcome Evaluation:  Plan of Care Reviewed With: patient        Progress: improving  Outcome Evaluation: Pt supine in bed and willing to work with therapy.  Pt performed transfer to EOB with min a x 2 . Pt perfomed sit to stand x 2 reps with rw min a.  Pt performed sit step from bed to chair min a rw x 2 assist .  See flowsheet for details.

## 2023-04-10 NOTE — PLAN OF CARE
Goal Outcome Evaluation:  Plan of Care Reviewed With: patient        Progress: no change  Outcome Evaluation: no acute events during shift. VSS, non-tele. 1L nc. purewick placed. no other changes in pt condition. will continue to monitor.

## 2023-04-10 NOTE — THERAPY TREATMENT NOTE
Patient Name: Ivania Harris  : 1960    MRN: [id number]                              Today's Date: 4/10/2023       Admit Date: 2023    Visit Dx:     ICD-10-CM ICD-9-CM   1. Pneumonia of left lung due to infectious organism, unspecified part of lung  J18.9 486   2. Gastrointestinal hemorrhage, unspecified gastrointestinal hemorrhage type  K92.2 578.9   3. Other cirrhosis of liver  K74.69 571.5   4. Cirrhosis of liver with ascites, unspecified hepatic cirrhosis type  K74.60 571.5    R18.8      Patient Active Problem List   Diagnosis   • Pneumonia of left lung due to infectious organism, unspecified part of lung   • Gastrointestinal hemorrhage   • Cirrhosis of liver with ascites   • Sleep apnea   • Iron deficiency anemia   • SILVINA (acute kidney injury)   • CKD (chronic kidney disease)   • Stasis dermatitis of both legs   • Debility   • Type 2 diabetes mellitus   • Essential hypertension   • Dyslipidemia   • Hypothyroidism (acquired)     Past Medical History:   Diagnosis Date   • Anemia    • Diabetes mellitus    • Hyperlipidemia    • Hypertension    • Hypothyroidism    • Impaired mobility    • Short-term memory loss      Past Surgical History:   Procedure Laterality Date   • CHOLECYSTECTOMY     • ENDOSCOPY W/ BANDING N/A 2023    Procedure: ESOPHAGOGASTRODUODENOSCOPY WITH BIOPSY;  Surgeon: Jens Mandujano MD;  Location: [facility] ENDOSCOPY;  Service: Gastroenterology;  Laterality: N/A;      General Information     Row Name 04/10/23 1145          OT Time and Intention    Document Type therapy note (daily note)  -     Mode of Treatment co-treatment;physical therapy;occupational therapy  -     Row Name 04/10/23 1145          General Information    Patient Profile Reviewed yes  -     Existing Precautions/Restrictions fall  -           User Key  (r) = Recorded By, (t) = Taken By, (c) = Cosigned By    Initials Name Provider Type     Kala Landa Occupational Therapist                  Mobility/ADL's     [facility] Name 04/10/23 1145          Bed Mobility    Bed Mobility supine-sit  -     Scooting/Bridging [place] (Bed Mobility) minimum assist (75% patient effort);2 person assist  -     Supine-Sit [place] (Bed Mobility) minimum assist (75% patient effort);2 person assist  -     Assistive Device (Bed Mobility) head of bed elevated;draw sheet  -     Row Name 04/10/23 1145          Sit-Stand Transfer    Sit-Stand [place] (Transfers) contact guard;minimum assist (75% patient effort)  -     Assistive Device (Sit-Stand Transfers) walker, front-wheeled  -     Comment, (Sit-Stand Transfer) performed 2 sit to stand transfers  -[facility] Name 04/10/23 1145          Functional Mobility    Functional Mobility- Ind. Level contact guard assist;2 person assist required  -     Functional Mobility- Device walker, front-wheeled  -     Functional Mobility-Distance (Feet) 3  -           User Key  (r) = Recorded By, (t) = Taken By, (c) = Cosigned By    Initials Name Provider Type     Kala Landa Occupational Therapist               Obj/Interventions    No documentation.                Goals/Plan    No documentation.                Clinical Impression     [facility] Name 04/10/23 1147          Pain Assessment    Pretreatment Pain Rating 0/10 - no pain  -     Posttreatment Pain Rating 0/10 - no pain  -[facility] Name 04/10/23 1147          Plan of Care Review    Plan of Care Reviewed With patient  -     Progress improving  -     Outcome Evaluation Pt received supine in bed, min assist of 2 to perform supine to sit, min assist to scoot to edge of bed.  Pt cga/min assist to stand with 2 people present for safety, but only one assist.  Pt able to march in place and perform weight shifting eob with RW for support.  Pt then able to side step~3' with cga of 2 and RW.  Pt reports feeling much better sitting up in the chair.  Pt was left with RN at bedside giving her medication.  cont OT per POC.   -     Row Name 04/10/23 1147          Vital Signs    Pre SpO2 (%) 99  -AH     O2 Delivery Pre Treatment supplemental O2  2L  -AH     Intra SpO2 (%) 97  -AH     O2 Delivery Intra Treatment room air  -     Post SpO2 (%) 99  -AH     O2 Delivery Post Treatment room air  -AH     Row Name 04/10/23 1147          Positioning and Restraints    Pre-Treatment Position in bed  -     Post Treatment Position chair  -     In Chair reclined;call light within reach;encouraged to call for assist  -           User Key  (r) = Recorded By, (t) = Taken By, (c) = Cosigned By    Initials Name Provider Type    Kala Carr Occupational Therapist               Outcome Measures     Row Name 04/10/23 1159          How much help from another is currently needed...    Putting on and taking off regular lower body clothing? 2  -AH     Bathing (including washing, rinsing, and drying) 2  -AH     Toileting (which includes using toilet bed pan or urinal) 2  -AH     Putting on and taking off regular upper body clothing 3  -AH     Taking care of personal grooming (such as brushing teeth) 3  -AH     Eating meals 4  -AH     AM-PAC 6 Clicks Score (OT) 16  -     Row Name 04/10/23 1159          Functional Assessment    Outcome Measure Options AM-PAC 6 Clicks Daily Activity (OT)  -           User Key  (r) = Recorded By, (t) = Taken By, (c) = Cosigned By    Initials Name Provider Type    Kala Carr Occupational Therapist                Occupational Therapy Education     Title: PT OT SLP Therapies (In Progress)     Topic: Occupational Therapy (In Progress)     Point: ADL training (Done)     Description:   Instruct learner(s) on proper safety adaptation and remediation techniques during self care or transfers.   Instruct in proper use of assistive devices.              Learning Progress Summary           Patient Acceptance, E,TB, VU by  at 4/10/2023 1159    Comment: benefit of working with therapy    Acceptance, E,TB, VU by  at  4/6/2023 1819    Comment: Role of OT/POC                   Point: Home exercise program (Not Started)     Description:   Instruct learner(s) on appropriate technique for monitoring, assisting and/or progressing therapeutic exercises/activities.              Learner Progress:  Not documented in this visit.          Point: Precautions (Done)     Description:   Instruct learner(s) on prescribed precautions during self-care and functional transfers.              Learning Progress Summary           Patient Acceptance, E,D, DU,NR by TL at 4/7/2023 1244    Comment: Patient educated on deep breathing with movement to decrease anxiety but required moderate cuing.   Family Acceptance, E,D, DU,NR by TL at 4/7/2023 1244    Comment: Patient educated on deep breathing with movement to decrease anxiety but required moderate cuing.                   Point: Body mechanics (Done)     Description:   Instruct learner(s) on proper positioning and spine alignment during self-care, functional mobility activities and/or exercises.              Learning Progress Summary           Patient Acceptance, E,D, DU,NR by TL at 4/7/2023 1244    Comment: Patient educated on deep breathing with movement to decrease anxiety but required moderate cuing.   Family Acceptance, E,D, DU,NR by TL at 4/7/2023 1244    Comment: Patient educated on deep breathing with movement to decrease anxiety but required moderate cuing.                               User Key     Initials Effective Dates Name Provider Type Discipline     06/16/21 -  Kala Landa Occupational Therapist OT     06/16/21 -  Toshia Wan OTR Occupational Therapist OT              OT Recommendation and Plan  Planned Therapy Interventions (OT): activity tolerance training, BADL retraining, patient/caregiver education/training, transfer/mobility retraining, strengthening exercise  Therapy Frequency (OT): 3 times/wk  Plan of Care Review  Plan of Care Reviewed With: patient  Progress:  improving  Outcome Evaluation: Pt received supine in bed, min assist of 2 to perform supine to sit, min assist to scoot to edge of bed.  Pt cga/min assist to stand with 2 people present for safety, but only one assist.  Pt able to march in place and perform weight shifting eob with RW for support.  Pt then able to side step~3' with cga of 2 and RW.  Pt reports feeling much better sitting up in the chair.  Pt was left with RN at bedside giving her medication.  cont OT per POC.     Time Calculation:    Time Calculation- OT     Row Name 04/10/23 1200             Time Calculation- OT    OT Start Time 0959  -      OT Stop Time 1024  -      OT Time Calculation (min) 25 min  -AH      OT Received On 04/10/23  -      OT Goal Re-Cert Due Date 04/16/23  -         Timed Charges    15186 - OT Therapeutic Activity Minutes 25  -AH         Total Minutes    Timed Charges Total Minutes 25  -AH       Total Minutes 25  -AH            User Key  (r) = Recorded By, (t) = Taken By, (c) = Cosigned By    Initials Name Provider Type    Kala Carr Occupational Therapist              Therapy Charges for Today     Code Description Service Date Service Provider Modifiers Qty    [id number]  OT THERAPEUTIC ACT EA 15 MIN 4/10/2023 Kala Landa GO 2               Kala Landa  4/10/2023

## 2023-04-10 NOTE — PROGRESS NOTES
[facility] Progress Note  [facility]. [place]        Patient Name: Ivania Harris  : 1960  MRN: [id number]   LOS: 6 days    Patient Care Team:  Alessandro Mercer MD as PCP - General (Internal Medicine)    Chief Complaint:    Chief Complaint   Patient presents with   • Weakness - Generalized     Primary Care Physician:  Alessandro Mercer MD  Date of admission: 2023    Subjective     Interval History:   Follow-up chronic kidney disease stage IIIb.  Events noted from last 24 hours.  Patient is awake alert and interactive denies having any chest pain or shortness of breath.    Review of Systems:   As noted above.    Objective     Vitals:   Temp:  [97.6 °F (36.4 °C)-98.4 °F (36.9 °C)] 98.4 °F (36.9 °C)  Heart Rate:  [68-77] 76  Resp:  [16-18] 18  BP: (137-173)/(51-68) 137/54  Flow (L/min):  [1] 1    Intake/Output Summary (Last 24 hours) at 4/10/2023 0751  Last data filed at 4/10/2023 0600  Gross per 24 hour   Intake 1320 ml   Output 5025 ml   Net -3705 ml       Physical Exam:    General Appearance: alert, oriented x 3, no acute distress   Skin: warm and dry  HEENT: oral mucosa normal, nonicteric sclera  Neck: supple, no JVD  Lungs: Occasional crackles with fair air movement.  Heart: RRR, normal S1 and S2  Abdomen: Obese, soft, nontender, nondistended positive bowel sounds  : no palpable bladder  Extremities: Trace edema, no cyanosis or clubbing  Neuro: normal speech and mental status     Scheduled Meds:     Current Facility-Administered Medications   Medication Dose Route Frequency Provider Last Rate Last Admin   • acetaminophen (TYLENOL) tablet 650 mg  650 mg Oral Q4H PRN Jens Mandujano MD   650 mg at 23 1200    Or   • acetaminophen (TYLENOL) 160 MG/5ML solution 650 mg  650 mg Oral Q4H PRN Jens Mandujano MD        Or   • acetaminophen (TYLENOL) suppository 650 mg  650 mg Rectal Q4H PRN Jens Mandujano MD       • albuterol (PROVENTIL)  nebulizer solution 0.083% 2.5 mg/3mL  2.5 mg Nebulization Q6H PRN Jens Mandujano MD   2.5 mg at 04/08/23 0035   • atorvastatin (LIPITOR) tablet 40 mg  40 mg Oral Daily Jens Mandujano MD   40 mg at 04/09/23 0846   • buPROPion XL (WELLBUTRIN XL) 24 hr tablet 150 mg  150 mg Oral Daily Jens Mandujano MD   150 mg at 04/09/23 0846   • dextrose (D50W) (25 g/50 mL) IV injection 25 g  25 g Intravenous Q15 Min PRN Kerley, Brian Joseph, DO       • dextrose (GLUTOSE) oral gel 15 g  15 g Oral Q15 Min PRN Kerley, Brian Joseph, DO       • donepezil (ARICEPT) tablet 10 mg  10 mg Oral Nightly Jens Mandujano MD   10 mg at 04/09/23 2027   • glucagon (human recombinant) (GLUCAGEN DIAGNOSTIC) injection 1 mg  1 mg Intramuscular Q15 Min PRN Kerley, Brian Joseph, DO       • HYDROcodone-acetaminophen (NORCO) 5-325 MG per tablet 1 tablet  1 tablet Oral Q8H PRN Jens Mandujano MD   1 tablet at 04/06/23 1358   • hydrOXYzine (ATARAX) tablet 50 mg  50 mg Oral Q6H PRN Kerley, Brian Joseph, DO   50 mg at 04/09/23 2026   • Insulin Aspart (novoLOG) injection 0-14 Units  0-14 Units Subcutaneous TID AC Kerley, Brian Joseph, DO   3 Units at 04/10/23 0643   • ipratropium-albuterol (DUO-NEB) nebulizer solution 3 mL  3 mL Nebulization Q6H PRN Jens Mandujano MD   3 mL at 04/08/23 0904   • iron polysaccharides (NIFEREX) capsule 150 mg  150 mg Oral Daily Denis Ellis MD, FASN   150 mg at 04/09/23 0846   • levothyroxine (SYNTHROID, LEVOTHROID) tablet 300 mcg  300 mcg Oral Daily Jens Mandujano MD   300 mcg at 04/09/23 0845   • Morphine sulfate (PF) injection 2 mg  2 mg Intravenous Q4H PRN Jens Mandujano MD       • ondansetron (ZOFRAN) injection 4 mg  4 mg Intravenous Q6H PRN Jens Mnadujano MD       • pantoprazole (PROTONIX) EC tablet 40 mg  40 mg Oral Q AM Esperanza Garrison APRN   40 mg at 04/10/23 0643   • Pharmacy to dose ceftriaxone   Does not apply Continuous PRN Jens Mandujano  MD GIOVANNA       • sodium chloride 0.9 % flush 10 mL  10 mL Intravenous PRN Jens Mandujano MD   10 mL at 04/09/23 2027   • spironolactone (ALDACTONE) tablet 25 mg  25 mg Oral Daily Denis Ellis MD, FASN   25 mg at 04/09/23 0845       atorvastatin, 40 mg, Oral, Daily  buPROPion XL, 150 mg, Oral, Daily  donepezil, 10 mg, Oral, Nightly  Insulin Aspart, 0-14 Units, Subcutaneous, TID AC  iron polysaccharides, 150 mg, Oral, Daily  levothyroxine, 300 mcg, Oral, Daily  pantoprazole, 40 mg, Oral, Q AM  spironolactone, 25 mg, Oral, Daily        IV Meds:   Pharmacy Consult - Pharmacy to dose,         Results Reviewed:   I have personally reviewed the results from the time of this admission to 4/10/2023 07:51 EDT     Results from last 7 days   Lab Units 04/10/23  0620 04/09/23  0454 04/08/23  0542 04/07/23  0520 04/05/23  0529 04/04/23  1431   SODIUM mmol/L 145 144 143 141   < > 142   POTASSIUM mmol/L 3.3* 3.7 3.7 3.7   < > 3.9   CHLORIDE mmol/L 109* 111* 111* 109*   < > 109*   CO2 mmol/L 26.4 24.3 23.1 23.6   < > 24.5   BUN mg/dL 25* 28* 27* 29*   < > 23   CREATININE mg/dL 1.64* 1.59* 1.71* 1.72*   < > 1.46*   CALCIUM mg/dL 7.5* 7.6* 7.7* 8.1*   < > 8.2*   BILIRUBIN mg/dL  --   --   --  1.0  --  1.0   ALK PHOS U/L  --   --   --  77  --  100   ALT (SGPT) U/L  --   --   --  19  --  19   AST (SGOT) U/L  --   --   --  55*  --  42*   GLUCOSE mg/dL 165* 148* 149* 160*   < > 105*    < > = values in this interval not displayed.       Estimated Creatinine Clearance: 51.5 mL/min (A) (by C-G formula based on SCr of 1.64 mg/dL (H)).    Results from last 7 days   Lab Units 04/10/23  0620 04/09/23  0454   PHOSPHORUS mg/dL 3.3 3.6             Results from last 7 days   Lab Units 04/10/23  0620 04/09/23  0454 04/08/23  0542 04/07/23  0520 04/05/23  2202 04/05/23  1331 04/05/23  0529   WBC 10*3/mm3 5.02 4.98 5.44 5.60  --   --  2.86*   HEMOGLOBIN g/dL 8.1* 7.8* 8.0* 8.5* 7.6*   < > 6.7*   PLATELETS 10*3/mm3 107* 85* 91* 81*  --   --  87*     < > = values in this interval not displayed.       Results from last 7 days   Lab Units 04/05/23  0529   INR  1.48*       Brief Urine Lab Results  (Last result in the past 365 days)      Color   Clarity   Blood   Leuk Est   Nitrite   Protein   CREAT   Urine HCG        04/08/23 0305             118.1               Protein/Creatinine Ratio, Urine   Date Value Ref Range Status   04/08/2023 1,308.2 (H) 0.0 - 200.0 mg/G Crea Final       Imaging Results (Last 24 Hours)     ** No results found for the last 24 hours. **              Assessment / Plan     ASSESSMENT:  1. Acute kidney injury: It is not clear if this is all acute or likely has chronic kidney disease that patient is not aware of, she said she has been a diabetic for greater than 30 years.  She is also morbidly obese may also have some component of chronic kidney disease associated with obesity.  2. Type 2 diabetes: Treated as per hospitalist service.  3. Hypertension with chronic kidney disease: Continue to optimize volume status before increasing some of the blood pressure medications.  4. Anemia: Check iron stores and treat as needed.  5. Morbid obesity with a BMI of 53: Complicates all forms of care  6. Pneumonia of left lung due to infectious organism, unspecified part of lung: Treated as per hospitalist service  7. Cirrhosis of liver with ascites: GI following the patient.  8. Hypothyroidism: Continue home dose.  9. Dyslipidemia: Treated       PLAN:  · Renal function slightly better, will not give another dose of diuretics today, there is some worsening of renal function but I think this is about her baseline.  Hold off giving IV diuretics today we will start on oral Lasix starting tomorrow.  Low potassium noted we will go ahead and give her 20 mg of p.o. potassium and increase her spironolactone to 50 mg a day.  · Low iron stores noted, will start her on oral iron supplements.  · Details were discussed with the patient no family in the room.     · Details were also discussed with the hospitalist service.  Patient will need likely long-term rehab/nursing home placement.  · Continue with rest of the current treatment plan, and monitor with surveillance labs.  · Further recommendations will depend on clinical course of the patient during the current hospitalization.     Thank you for involving us in the care of Ivania MedinaCarlosBladimir.  Please feel free to call with any questions.    Denis Ellis MD, FASN  04/10/23  07:51 EDT    [facility]  [phone]      Part of this note may be an electronic transcription/translation of spoken language to printed text using the Dragon Dictation System.

## 2023-04-10 NOTE — CASE MANAGEMENT/SOCIAL WORK
MANNIE left for Mary/[facility] for status of referral. She returned call-reviewing referral.   14:00 Spoke with Yoon regarding referral,she will call back.  14:15 Roslyn phoned, referral will be checked.

## 2023-04-10 NOTE — PLAN OF CARE
Goal Outcome Evaluation:  Plan of Care Reviewed With: patient        Progress: improving  Outcome Evaluation: Pt received supine in bed, min assist of 2 to perform supine to sit, min assist to scoot to edge of bed.  Pt cga/min assist to stand with 2 people present for safety, but only one assist.  Pt able to march in place and perform weight shifting eob with RW for support.  Pt then able to side step~3' with cga of 2 and RW.  Pt reports feeling much better sitting up in the chair.  Pt was left with RN at bedside giving her medication.  cont OT per POC.

## 2023-04-10 NOTE — PLAN OF CARE
Goal Outcome Evaluation:  Plan of Care Reviewed With: patient        Progress: improving  Outcome Evaluation: PT up to chair and bedside commode this shift. Awaiting acceptance for DC transfer to [facility]

## 2023-04-10 NOTE — CASE MANAGEMENT/SOCIAL WORK
Spoke with pt to let her know [facility] had no beds available.Teo is now reviewing pt. Pt agreeable. 2nd IMM signed.

## 2023-04-10 NOTE — PROGRESS NOTES
[facility]    Name:  Ivania Harris   Age:  62 y.o.  Sex:  female  :  1960  MRN:  [id number]   Visit Number:  [id number]  Admission Date:  2023  Date Of Service:  04/10/23  Primary Care Physician:  Alessandro Mercer MD     LOS: 6 days :    Chief Complaint:      Follow-up; generalized weakness, diarrhea    Subjective:    Feeling good today.  Reviewed again her conditions and plans for follow-ups.  Thorough discussion of severity of cirrhosis, CKD.  No immediate concerns.    Hospital Course:    The patient is a 62-year-old woman with past medical history of obesity BMI 53, type 2 diabetes, apparent anemia unknown type, hyperlipidemia, hypertension, hypothyroidism, who presented to the emergency room with concern for generalized weakness and diarrhea for 3 days with no vomiting.     Upon ER work-up, she had a creatinine of 1.46 with a hemoglobin of 8.5 and platelets of 120.  She had positive FOBT testing.  A chest x-ray was possibly showing a left basilar pneumonia.  There was evidence of cirrhosis and portal hypertension with ascites with a nonobstructing right renal calculi.  There was bilateral effusions.  GI was consulted from the emergency room and she was started on octreotide and Protonix.  She received Rocephin and a normal saline bolus in addition.  She was admitted to the hospital service.     Patient had evidence of worsening anemia, was ordered 1 unit PRBCs on morning of 2023, acute on chronic.      EGD  by gastroenterology-Dr. Mandujano; no gross lesions entire esophagus, no varices, small amount of food residue in stomach suggesting gastroparesis, nonbleeding gastric ulcer with a flat pigmented spot, nonbleeding gastric ulcer with no stigmata of bleeding, ulcer scarring gastric antrum, erythematous mucosa gastric body, antrum, prepyloric region of stomach biopsied, mild portal hypertensive gastropathy, normal duodenal portions, no  current bleeding, likely NSAID/ASA induced ulcer.    Gastroenterology recommends low fiber diet, low-salt small meals, PPI daily, avoid NSAIDs, hold ASA 2 weeks, iron pills daily, await pathology results, repeat upper endoscopy in 6 months for surveillance, return to GI office in 8 weeks.  Hepatic testing for cirrhosis ordered by GI.  Peritoneal fluid and blood cultures no growth.    Completed 5-day treatment for left lower lobe pneumonia, low suspicion for true active infection, may have been chronic CXR changes.    Required oxygen while sleeping, recommend outpatient sleep study.    Nephrology consulted for SILVINA on CKD.  Stable for DC per nephrology perspective as of 4/10, awaiting STR placement .  Nephrology to provide medication recommendations for discharge.    Wound care  Per wound care consult; dry flaking skin on back, hyperkeratotic skin to bilateral lower extremities and lower panus. Skin folds moist red. Left lower skin fold with redness, blisters and open blister. Was not able to lay patient flat to assess all skin folds due to patient complaints of difficulty breathing.  Orders written per standing order sets for dry skin and red skin folds.   Recommendations for care include a turning schedule, barrier cream twice daily and prn after cleansing, using dry sheets in folds, float heels off bed with pillows, encourage increase mobility as appropriate and tolerated to reduce pressure, for dry skin apply lotion/cream and a nutrition consult for dietary needs.     Medically stable.  STR pending.    Review of Systems:     All systems were reviewed and negative except as mentioned in subjective, assessment and plan.    Vital Signs:    Temp:  [97.6 °F (36.4 °C)-98.4 °F (36.9 °C)] 98.2 °F (36.8 °C)  Heart Rate:  [58-77] 69  Resp:  [16-18] 16  BP: (112-167)/(39-64) 141/40    Intake and output:    I/O last 3 completed shifts:  In: 1620 [P.O.:1620]  Out: 5175 [Urine:5175]  I/O this shift:  In: 240 [P.O.:240]  Out:  350 [Urine:350]    Physical Examination:    General Appearance:  Alert and cooperative.  Obese   Head:  Atraumatic and normocephalic.   Eyes: Conjunctivae and sclerae normal, no icterus. No pallor.   Throat: No oral lesions, no thrush, oral mucosa moist.   Neck: Supple, trachea midline, no thyromegaly.   Lungs:   Breath sounds heard bilaterally equally.  No wheezing or crackles. No Pleural rub or bronchial breathing.   Heart:  Normal S1 and S2, no murmur, no gallop, no rub. No JVD.   Abdomen:    Obese.  Normal bowel sounds, no masses, no organomegaly. Soft, nontender, nondistended, no rebound tenderness.   Extremities:  Dark chronic venous insufficiency and scaled changes bilateral lower extremities with trace edema   Skin:  Jaundice.   Neurologic: Alert and oriented x 3. No facial asymmetry. Moves all four limbs. No tremors.      Laboratory results:    Results from last 7 days   Lab Units 04/10/23  0620 04/09/23  0454 04/08/23  0542 04/07/23  0520 04/05/23  0529 04/04/23  1431   SODIUM mmol/L 145 144 143 141   < > 142   POTASSIUM mmol/L 3.3* 3.7 3.7 3.7   < > 3.9   CHLORIDE mmol/L 109* 111* 111* 109*   < > 109*   CO2 mmol/L 26.4 24.3 23.1 23.6   < > 24.5   BUN mg/dL 25* 28* 27* 29*   < > 23   CREATININE mg/dL 1.64* 1.59* 1.71* 1.72*   < > 1.46*   CALCIUM mg/dL 7.5* 7.6* 7.7* 8.1*   < > 8.2*   BILIRUBIN mg/dL  --   --   --  1.0  --  1.0   ALK PHOS U/L  --   --   --  77  --  100   ALT (SGPT) U/L  --   --   --  19  --  19   AST (SGOT) U/L  --   --   --  55*  --  42*   GLUCOSE mg/dL 165* 148* 149* 160*   < > 105*    < > = values in this interval not displayed.     Results from last 7 days   Lab Units 04/10/23  0620 04/09/23  0454 04/08/23  0542   WBC 10*3/mm3 5.02 4.98 5.44   HEMOGLOBIN g/dL 8.1* 7.8* 8.0*   HEMATOCRIT % 23.5* 22.7* 22.6*   PLATELETS 10*3/mm3 107* 85* 91*     Results from last 7 days   Lab Units 04/05/23  0529   INR  1.48*         Results from last 7 days   Lab Units 04/04/23  2316 04/04/23  2308    BLOODCX  No growth at 5 days No growth at 5 days     No results for input(s): PHART, AVC1ZFD, PO2ART, YGR1RYC, BASEEXCESS in the last 8760 hours.   I have reviewed the patient's laboratory results.    Radiology results:    No radiology results from the last 24 hrs  I have reviewed the patient's radiology reports.    Medication Review:     I have reviewed the patient's active and prn medications.     Problem List:      Pneumonia of left lung due to infectious organism, unspecified part of lung    Gastrointestinal hemorrhage    Cirrhosis of liver with ascites    Sleep apnea    Iron deficiency anemia    SILVINA (acute kidney injury)    CKD (chronic kidney disease)    Stasis dermatitis of both legs    Debility    Type 2 diabetes mellitus    Essential hypertension    Dyslipidemia    Hypothyroidism (acquired)      Assessment/Plan:    Afebrile, vital signs stable on room air.  Comfortable in chair.  All questions answered.  Creatinine waxing and waning, currently 1.64.  Per nephrology she is stable for DC from renal point of view.  Dr. Ellis will have medication recommendations.  She will follow-up with nephrology as well as gastroenterology.  Had thorough discussion with patient regarding severity of diagnoses like cirrhosis and CKD.    Sleep apnea, suspected  Oxygen while sleeping.  Recommend outpatient sleep study.    Iron deficiency  Continue iron pills daily.  Iron 22, iron saturation 21, transferrin 72, TIBC 107.    Suspected upper GI bleed, resolved  Acute on chronic anemia, resolved  Suspected decompensated cirrhosis with ascites, likely nonalcoholic fatty liver disease  -- GI followed  -- EGD 4/6 with Dr. Mandujano.   -- PPI daily  -- Avoid NSAIDS  -- Hold ASA for 2 weeks  -- Iron Pills daily  -- Repeat upper endoscopy in 6 months for surveillance. Outpatient GI follow up 8-week  --Hepatic testing for causes of cirrhosis of already been ordered by GI.  -- peritoneum fluid with no growth  -Suspected  gastroparesis     Left lower lobe pneumonia, resolved  -Was started on Rocephin, transitioned to oral ceftin, completed 5-day treatment.  Blood cultures and peritoneal fluid negative.     SILVINA  -Could be hepatorenal  -Avoid nephrotoxic drugs, holding lisinopril  -Continue to monitor renal function  -UA with pyria, no culture, already on cephalosporin  -Nephrology consultation, recommendations appreciated.     Debility  -- lives alone, PT/OT recommends rehab, patient agreeable     Wound care  Per wound care consult; dry flaking skin on back, hyperkeratotic skin to bilateral lower extremities and lower panus. Skin folds moist red. Left lower skin fold with redness, blisters and open blister. Was not able to lay patient flat to assess all skin folds due to patient complaints of difficulty breathing.  Orders written per standing order sets for dry skin and red skin folds.   Recommendations for care include a turning schedule, barrier cream twice daily and prn after cleansing, using dry sheets in folds, float heels off bed with pillows, encourage increase mobility as appropriate and tolerated to reduce pressure, for dry skin apply lotion/cream and a nutrition consult for dietary needs.      DVT Prophylaxis: SCDs  Code Status: Full  Diet:  Carbohydrate controlled diet, low-sodium, fluid restriction  Discharge Plan:  Rehab placement pending    Brian Joseph Kerley, DO  04/10/23  16:46 EDT    Dictated utilizing Dragon dictation.

## 2023-04-10 NOTE — THERAPY TREATMENT NOTE
Patient Name: Ivania Harris  : 1960    MRN: [id number]                              Today's Date: 4/10/2023       Admit Date: 2023    Visit Dx:     ICD-10-CM ICD-9-CM   1. Pneumonia of left lung due to infectious organism, unspecified part of lung  J18.9 486   2. Gastrointestinal hemorrhage, unspecified gastrointestinal hemorrhage type  K92.2 578.9   3. Other cirrhosis of liver  K74.69 571.5   4. Cirrhosis of liver with ascites, unspecified hepatic cirrhosis type  K74.60 571.5    R18.8      Patient Active Problem List   Diagnosis   • Pneumonia of left lung due to infectious organism, unspecified part of lung   • Gastrointestinal hemorrhage   • Cirrhosis of liver with ascites   • Sleep apnea   • Iron deficiency anemia   • SILVINA (acute kidney injury)   • CKD (chronic kidney disease)   • Stasis dermatitis of both legs   • Debility   • Type 2 diabetes mellitus   • Essential hypertension   • Dyslipidemia   • Hypothyroidism (acquired)     Past Medical History:   Diagnosis Date   • Anemia    • Diabetes mellitus    • Hyperlipidemia    • Hypertension    • Hypothyroidism    • Impaired mobility    • Short-term memory loss      Past Surgical History:   Procedure Laterality Date   • CHOLECYSTECTOMY     • ENDOSCOPY W/ BANDING N/A 2023    Procedure: ESOPHAGOGASTRODUODENOSCOPY WITH BIOPSY;  Surgeon: Jens Mandujano MD;  Location: [facility] ENDOSCOPY;  Service: Gastroenterology;  Laterality: N/A;      General Information     Row Name 04/10/23 1406          Physical Therapy Time and Intention    Document Type therapy note (daily note)  -RM     Mode of Treatment co-treatment;physical therapy;occupational therapy  -RM     Row Name 04/10/23 140          General Information    Patient Profile Reviewed yes  -RM     Existing Precautions/Restrictions fall  -RM     Row Name 04/10/23 140          Cognition    Orientation Status (Cognition) oriented x 4  -RM     Row Name 04/10/23 1406          Safety Issues,  Functional Mobility    Safety Issues Affecting Function (Mobility) sequencing abilities;positioning of assistive device;safety precautions follow-through/compliance  -RM     Impairments Affecting Function (Mobility) balance;endurance/activity tolerance;shortness of breath;strength  -RM           User Key  (r) = Recorded By, (t) = Taken By, (c) = Cosigned By    Initials Name Provider Type    Sean Flores, SHARMILA Physical Therapist Assistant               Mobility     Row Name 04/10/23 1407          Bed Mobility    Scooting/Bridging [place] (Bed Mobility) minimum assist (75% patient effort);2 person assist  -RM     Supine-Sit [place] (Bed Mobility) minimum assist (75% patient effort);2 person assist  -RM     Assistive Device (Bed Mobility) head of bed elevated;draw sheet  -RM     Row Name 04/10/23 1407          Bed-Chair Transfer    Bed-Chair [place] (Transfers) minimum assist (75% patient effort);2 person assist;verbal cues;nonverbal cues (demo/gesture)  -RM     Assistive Device (Bed-Chair Transfers) walker, front-wheeled  -RM     Row Name 04/10/23 1407          Sit-Stand Transfer    Sit-Stand [place] (Transfers) contact guard;minimum assist (75% patient effort);nonverbal cues (demo/gesture)  -RM     Assistive Device (Sit-Stand Transfers) walker, front-wheeled  -RM     Comment, (Sit-Stand Transfer) performed x 2  -RM     Row Name 04/10/23 1407          Gait/Stairs (Locomotion)    [place] Level (Gait) unable to assess  -RM           User Key  (r) = Recorded By, (t) = Taken By, (c) = Cosigned By    Initials Name Provider Type    Sean Flores, SHARMILA Physical Therapist Assistant               Obj/Interventions    No documentation.                Goals/Plan    No documentation.                Clinical Impression     Row Name 04/10/23 1408          Pain    Pretreatment Pain Rating 0/10 - no pain  -RM     Posttreatment Pain Rating 0/10 - no pain  -RM     Row Name 04/10/23 1406           Plan of Care Review    Plan of Care Reviewed With patient  -RM     Progress improving  -RM     Outcome Evaluation Pt supine in bed and willing to work with therapy.  Pt performed transfer to EOB with min a x 2 . Pt perfomed sit to stand x 2 reps with rw min a.  Pt performed sit step from bed to chair min a rw x 2 assist .  See flowsheet for details.  -     Row Name 04/10/23 1408          Positioning and Restraints    Pre-Treatment Position in bed  -RM     Post Treatment Position chair  -RM     In Chair reclined;call light within reach;encouraged to call for assist;exit alarm on;notified nsg  -RM           User Key  (r) = Recorded By, (t) = Taken By, (c) = Cosigned By    Initials Name Provider Type    Sean Flores, SHARMILA Physical Therapist Assistant               Outcome Measures     Row Name 04/10/23 1410          How much help from another person do you currently need...    Turning from your back to your side while in flat bed without using bedrails? 3  -RM     Moving from lying on back to sitting on the side of a flat bed without bedrails? 2  -RM     Moving to and from a bed to a chair (including a wheelchair)? 2  -RM     Standing up from a chair using your arms (e.g., wheelchair, bedside chair)? 2  -RM     Climbing 3-5 steps with a railing? 1  -RM     To walk in hospital room? 1  -RM     AM-PAC 6 Clicks Score (PT) 11  -RM     Highest level of mobility 4 --> Transferred to chair/commode  -     Row Name 04/10/23 1410 04/10/23 1159       Functional Assessment    Outcome Measure Options AM-PAC 6 Clicks Basic Mobility (PT)  - AM-PAC 6 Clicks Daily Activity (OT)  -          User Key  (r) = Recorded By, (t) = Taken By, (c) = Cosigned By    Initials Name Provider Type    Kala Carr Occupational Therapist    Sean Flores, PTA Physical Therapist Assistant                             Physical Therapy Education     Title: PT OT SLP Therapies (In Progress)     Topic: Physical Therapy (Done)      Point: Mobility training (Done)     Learning Progress Summary           Patient Acceptance, E,TB,D, VU,NR by RM at 4/10/2023 1411    Comment: sequencing with mobility    Acceptance, E, VU by LG at 4/6/2023 1656    Comment: Importance of mobility                   Point: Home exercise program (Done)     Learning Progress Summary           Patient Acceptance, E,TB, VU by CC at 4/8/2023 1731    Comment: Perform B LE ex btw therapy sessions                   Point: Body mechanics (Done)     Learning Progress Summary           Patient Acceptance, E,TB, VU,NR by CC at 4/7/2023 1526    Comment: Upright posture in standing                   Point: Precautions (Done)     Learning Progress Summary           Patient Acceptance, E,TB, VU,NR by CC at 4/9/2023 1732    Comment: Importance of frequent position changes to decrease risk of skin breakdown.                               User Key     Initials Effective Dates Name Provider Type Discipline     06/16/21 -  Taisha La, PTA Physical Therapist Assistant PT     06/16/21 -  Sean Mercado, PTA Physical Therapist Assistant PT     12/29/22 -  Haydee Red PT Student PT Student PT              PT Recommendation and Plan     Plan of Care Reviewed With: patient  Progress: improving  Outcome Evaluation: Pt supine in bed and willing to work with therapy.  Pt performed transfer to EOB with min a x 2 . Pt perfomed sit to stand x 2 reps with rw min a.  Pt performed sit step from bed to chair min a rw x 2 assist .  See flowsheet for details.     Time Calculation:    PT Charges     Row Name 04/10/23 1411             Time Calculation    Start Time 0959  -RM      Stop Time 1023  -RM      Time Calculation (min) 24 min  -RM      PT Received On 04/10/23  -RM      PT Goal Re-Cert Due Date 04/16/23  -RM         Time Calculation- PT    Total Timed Code Minutes- PT 24 minute(s)  -RM         Timed Charges    05626 - PT Therapeutic Activity Minutes 24  -RM         Total Minutes     Timed Charges Total Minutes 24  -RM       Total Minutes 24  -RM            User Key  (r) = Recorded By, (t) = Taken By, (c) = Cosigned By    Initials Name Provider Type    Sean Flores PTA Physical Therapist Assistant              Therapy Charges for Today     Code Description Service Date Service Provider Modifiers Qty    [id number] HC PT THERAPEUTIC ACT EA 15 MIN 4/10/2023 Sean Mercado PTA GP 2          PT G-Codes  Outcome Measure Options: AM-PAC 6 Clicks Basic Mobility (PT)  AM-PAC 6 Clicks Score (PT): 11  AM-PAC 6 Clicks Score (OT): 16       Sean Mercado PTA  4/10/2023

## 2023-04-11 LAB
ALBUMIN SERPL-MCNC: 1.8 G/DL (ref 3.5–5.2)
ANION GAP SERPL CALCULATED.3IONS-SCNC: 8.1 MMOL/L (ref 5–15)
BASOPHILS # BLD AUTO: 0.05 10*3/MM3 (ref 0–0.2)
BASOPHILS NFR BLD AUTO: 1 % (ref 0–1.5)
BUN SERPL-MCNC: 26 MG/DL (ref 8–23)
BUN/CREAT SERPL: 16.4 (ref 7–25)
CALCIUM SPEC-SCNC: 7.2 MG/DL (ref 8.6–10.5)
CHLORIDE SERPL-SCNC: 111 MMOL/L (ref 98–107)
CO2 SERPL-SCNC: 26.9 MMOL/L (ref 22–29)
CREAT SERPL-MCNC: 1.59 MG/DL (ref 0.57–1)
DEPRECATED RDW RBC AUTO: 52.2 FL (ref 37–54)
EGFRCR SERPLBLD CKD-EPI 2021: 36.4 ML/MIN/1.73
EOSINOPHIL # BLD AUTO: 0.31 10*3/MM3 (ref 0–0.4)
EOSINOPHIL NFR BLD AUTO: 6.3 % (ref 0.3–6.2)
ERYTHROCYTE [DISTWIDTH] IN BLOOD BY AUTOMATED COUNT: 16.5 % (ref 12.3–15.4)
GLUCOSE BLDC GLUCOMTR-MCNC: 154 MG/DL (ref 70–130)
GLUCOSE BLDC GLUCOMTR-MCNC: 192 MG/DL (ref 70–130)
GLUCOSE BLDC GLUCOMTR-MCNC: 202 MG/DL (ref 70–130)
GLUCOSE SERPL-MCNC: 160 MG/DL (ref 65–99)
HCT VFR BLD AUTO: 20.9 % (ref 34–46.6)
HGB BLD-MCNC: 7.8 G/DL (ref 12–15.9)
IMM GRANULOCYTES # BLD AUTO: 0.03 10*3/MM3 (ref 0–0.05)
IMM GRANULOCYTES NFR BLD AUTO: 0.6 % (ref 0–0.5)
LYMPHOCYTES # BLD AUTO: 0.95 10*3/MM3 (ref 0.7–3.1)
LYMPHOCYTES NFR BLD AUTO: 19.2 % (ref 19.6–45.3)
MCH RBC QN AUTO: 35.8 PG (ref 26.6–33)
MCHC RBC AUTO-ENTMCNC: 37.3 G/DL (ref 31.5–35.7)
MCV RBC AUTO: 95.9 FL (ref 79–97)
MONOCYTES # BLD AUTO: 0.8 10*3/MM3 (ref 0.1–0.9)
MONOCYTES NFR BLD AUTO: 16.2 % (ref 5–12)
NEUTROPHILS NFR BLD AUTO: 2.81 10*3/MM3 (ref 1.7–7)
NEUTROPHILS NFR BLD AUTO: 56.7 % (ref 42.7–76)
NRBC BLD AUTO-RTO: 0 /100 WBC (ref 0–0.2)
PHOSPHATE SERPL-MCNC: 2.8 MG/DL (ref 2.5–4.5)
PLATELET # BLD AUTO: 113 10*3/MM3 (ref 140–450)
PMV BLD AUTO: 11 FL (ref 6–12)
POTASSIUM SERPL-SCNC: 3.3 MMOL/L (ref 3.5–5.2)
RBC # BLD AUTO: 2.18 10*6/MM3 (ref 3.77–5.28)
SODIUM SERPL-SCNC: 146 MMOL/L (ref 136–145)
WBC NRBC COR # BLD: 4.95 10*3/MM3 (ref 3.4–10.8)

## 2023-04-11 PROCEDURE — 80069 RENAL FUNCTION PANEL: CPT | Performed by: INTERNAL MEDICINE

## 2023-04-11 PROCEDURE — 97530 THERAPEUTIC ACTIVITIES: CPT

## 2023-04-11 PROCEDURE — 97110 THERAPEUTIC EXERCISES: CPT

## 2023-04-11 PROCEDURE — 82962 GLUCOSE BLOOD TEST: CPT

## 2023-04-11 PROCEDURE — 85025 COMPLETE CBC W/AUTO DIFF WBC: CPT | Performed by: STUDENT IN AN ORGANIZED HEALTH CARE EDUCATION/TRAINING PROGRAM

## 2023-04-11 PROCEDURE — 99232 SBSQ HOSP IP/OBS MODERATE 35: CPT | Performed by: NURSE PRACTITIONER

## 2023-04-11 PROCEDURE — 63710000001 INSULIN ASPART PER 5 UNITS: Performed by: STUDENT IN AN ORGANIZED HEALTH CARE EDUCATION/TRAINING PROGRAM

## 2023-04-11 RX ORDER — FUROSEMIDE 40 MG/1
40 TABLET ORAL DAILY
Status: DISCONTINUED | OUTPATIENT
Start: 2023-04-11 | End: 2023-04-12 | Stop reason: HOSPADM

## 2023-04-11 RX ORDER — POTASSIUM CHLORIDE 750 MG/1
40 CAPSULE, EXTENDED RELEASE ORAL ONCE
Status: COMPLETED | OUTPATIENT
Start: 2023-04-11 | End: 2023-04-11

## 2023-04-11 RX ADMIN — INSULIN ASPART 5 UNITS: 100 INJECTION, SOLUTION INTRAVENOUS; SUBCUTANEOUS at 17:15

## 2023-04-11 RX ADMIN — INSULIN ASPART 3 UNITS: 100 INJECTION, SOLUTION INTRAVENOUS; SUBCUTANEOUS at 07:22

## 2023-04-11 RX ADMIN — SPIRONOLACTONE 50 MG: 25 TABLET, FILM COATED ORAL at 09:54

## 2023-04-11 RX ADMIN — ATORVASTATIN CALCIUM 40 MG: 40 TABLET, FILM COATED ORAL at 09:54

## 2023-04-11 RX ADMIN — BUPROPION HYDROCHLORIDE 150 MG: 150 TABLET, FILM COATED, EXTENDED RELEASE ORAL at 10:48

## 2023-04-11 RX ADMIN — FUROSEMIDE 40 MG: 40 TABLET ORAL at 09:54

## 2023-04-11 RX ADMIN — INSULIN ASPART 3 UNITS: 100 INJECTION, SOLUTION INTRAVENOUS; SUBCUTANEOUS at 11:27

## 2023-04-11 RX ADMIN — PANTOPRAZOLE SODIUM 40 MG: 40 TABLET, DELAYED RELEASE ORAL at 06:00

## 2023-04-11 RX ADMIN — POTASSIUM CHLORIDE 40 MEQ: 10 CAPSULE, COATED, EXTENDED RELEASE ORAL at 09:54

## 2023-04-11 RX ADMIN — Medication 10 ML: at 09:55

## 2023-04-11 RX ADMIN — Medication 150 MG: at 10:47

## 2023-04-11 RX ADMIN — DONEPEZIL HYDROCHLORIDE 10 MG: 5 TABLET ORAL at 21:31

## 2023-04-11 RX ADMIN — LEVOTHYROXINE SODIUM 300 MCG: 0.05 TABLET ORAL at 09:54

## 2023-04-11 RX ADMIN — HYDROXYZINE HYDROCHLORIDE 50 MG: 25 TABLET ORAL at 05:59

## 2023-04-11 NOTE — CASE MANAGEMENT/SOCIAL WORK
Case Management/Social Work    Patient Name:  Ivania Harris  YOB: 1960  MRN: [id number]  Admit Date:  4/4/2023        ORI spoke with Y[facility] at [facility] and has offered Pt. a bed. ORI spoke with Pt. brother who is her POA and he has accepted the bed. Yakov stated that the precert will be started today. ORI/DIEUDONNE will continue to follow.       Electronically signed by:  Aman Frost  04/11/23 15:24 EDT

## 2023-04-11 NOTE — PLAN OF CARE
Goal Outcome Evaluation:  Plan of Care Reviewed With: patient        Progress: improving  Outcome Evaluation: Pt reports feeling stronger today, sat eob min assist of 2, stood min assist of 2 and walked 3' cga/min assist of 2 with RW.  Pt sat eob ~8 min and performed UB ex using theraband for resistance.  Pt completed grooming tasks sitting eob.  Pt progressing with therapy.  Cont OT per POC.

## 2023-04-11 NOTE — PAYOR COMM NOTE
"  UPDATED CLINICALS  UR MANAGER; KIMBERLY [phone] AND [phone]      Ivania King (62 y.o. Female)     Date of Birth   1960    Social Security Number       Address   [address] [place] [address] [place] [id number]    Home Phone   [phone]    MRN   [id number]       Zoroastrian   Nazarene    Marital Status                               Admission Date   23    Admission Type   Emergency    Admitting Provider       Attending Provider   Kerley, Brian Joseph, DO    Department, Room/Bed   [facility] OB GYN, W210       Discharge Date       Discharge Disposition       Discharge Destination                               Attending Provider: Kerley, Brian Joseph, DO    Allergies: No Known Allergies    Isolation: None   Infection: None   Code Status: CPR    Ht: 165.1 cm (65\")   Wt: 62.6 kg (138 lb 1 oz)    Admission Cmt: None   Principal Problem: Pneumonia of left lung due to infectious organism, unspecified part of lung [J18.9]                 Active Insurance as of 2023     Primary Coverage     Payor Plan Insurance Group Employer/Plan Group    HUMANA MEDICARE REPLACEMENT HUMANA MEDICARE REPLACEMENT [id number]     Payor Plan Address Payor Plan Phone Number Payor Plan Fax Number Effective Dates    [address] [address] [phone]  2021 - None Entered    [facility] [address]       Subscriber Name Subscriber Birth Date Member ID       IVANIA KING 1960 [id number]                 Emergency Contacts      (Rel.) Home Phone Work Phone Mobile Phone    GENARO BUCHANAN (Brother) [phone] -- --               Physician Progress Notes (last 24 hours)      Kerley, Brian Joseph, DO at 04/10/23 1646              [facility] HOSPITALIST    PROGRESS NOTE    Name:  Ivania King   Age:  62 y.o.  Sex:  female  :  1960  MRN:  [id number]   Visit Number:  [id number]  Admission Date:  2023  Date Of Service:  " 04/10/23  Primary Care Physician:  Alessandro Mercer MD     LOS: 6 days :    Chief Complaint:      Follow-up; generalized weakness, diarrhea    Subjective:    Feeling good today.  Reviewed again her conditions and plans for follow-ups.  Thorough discussion of severity of cirrhosis, CKD.  No immediate concerns.    Hospital Course:    The patient is a 62-year-old woman with past medical history of obesity BMI 53, type 2 diabetes, apparent anemia unknown type, hyperlipidemia, hypertension, hypothyroidism, who presented to the emergency room with concern for generalized weakness and diarrhea for 3 days with no vomiting.     Upon ER work-up, she had a creatinine of 1.46 with a hemoglobin of 8.5 and platelets of 120.  She had positive FOBT testing.  A chest x-ray was possibly showing a left basilar pneumonia.  There was evidence of cirrhosis and portal hypertension with ascites with a nonobstructing right renal calculi.  There was bilateral effusions.  GI was consulted from the emergency room and she was started on octreotide and Protonix.  She received Rocephin and a normal saline bolus in addition.  She was admitted to the hospital service.     Patient had evidence of worsening anemia, was ordered 1 unit PRBCs on morning of 4/5/2023, acute on chronic.      EGD 4/6 by gastroenterology-Dr. Mandujano; no gross lesions entire esophagus, no varices, small amount of food residue in stomach suggesting gastroparesis, nonbleeding gastric ulcer with a flat pigmented spot, nonbleeding gastric ulcer with no stigmata of bleeding, ulcer scarring gastric antrum, erythematous mucosa gastric body, antrum, prepyloric region of stomach biopsied, mild portal hypertensive gastropathy, normal duodenal portions, no current bleeding, likely NSAID/ASA induced ulcer.    Gastroenterology recommends low fiber diet, low-salt small meals, PPI daily, avoid NSAIDs, hold ASA 2 weeks, iron pills daily, await pathology results, repeat upper endoscopy in 6  months for surveillance, return to GI office in 8 weeks.  Hepatic testing for cirrhosis ordered by GI.  Peritoneal fluid and blood cultures no growth.    Completed 5-day treatment for left lower lobe pneumonia, low suspicion for true active infection, may have been chronic CXR changes.    Required oxygen while sleeping, recommend outpatient sleep study.    Nephrology consulted for SILVINA on CKD.  Stable for DC per nephrology perspective as of 4/10, awaiting STR placement .  Nephrology to provide medication recommendations for discharge.    Wound care  Per wound care consult; dry flaking skin on back, hyperkeratotic skin to bilateral lower extremities and lower panus. Skin folds moist red. Left lower skin fold with redness, blisters and open blister. Was not able to lay patient flat to assess all skin folds due to patient complaints of difficulty breathing.  Orders written per standing order sets for dry skin and red skin folds.   Recommendations for care include a turning schedule, barrier cream twice daily and prn after cleansing, using dry sheets in folds, float heels off bed with pillows, encourage increase mobility as appropriate and tolerated to reduce pressure, for dry skin apply lotion/cream and a nutrition consult for dietary needs.     Medically stable.  STR pending.    Review of Systems:     All systems were reviewed and negative except as mentioned in subjective, assessment and plan.    Vital Signs:    Temp:  [97.6 °F (36.4 °C)-98.4 °F (36.9 °C)] 98.2 °F (36.8 °C)  Heart Rate:  [58-77] 69  Resp:  [16-18] 16  BP: (112-167)/(39-64) 141/40    Intake and output:    I/O last 3 completed shifts:  In: 1620 [P.O.:1620]  Out: 5175 [Urine:5175]  I/O this shift:  In: 240 [P.O.:240]  Out: 350 [Urine:350]    Physical Examination:    General Appearance:  Alert and cooperative.  Obese   Head:  Atraumatic and normocephalic.   Eyes: Conjunctivae and sclerae normal, no icterus. No pallor.   Throat: No oral lesions, no thrush,  oral mucosa moist.   Neck: Supple, trachea midline, no thyromegaly.   Lungs:   Breath sounds heard bilaterally equally.  No wheezing or crackles. No Pleural rub or bronchial breathing.   Heart:  Normal S1 and S2, no murmur, no gallop, no rub. No JVD.   Abdomen:    Obese.  Normal bowel sounds, no masses, no organomegaly. Soft, nontender, nondistended, no rebound tenderness.   Extremities:  Dark chronic venous insufficiency and scaled changes bilateral lower extremities with trace edema   Skin:  Jaundice.   Neurologic: Alert and oriented x 3. No facial asymmetry. Moves all four limbs. No tremors.      Laboratory results:    Results from last 7 days   Lab Units 04/10/23  0620 04/09/23  0454 04/08/23  0542 04/07/23  0520 04/05/23  0529 04/04/23  1431   SODIUM mmol/L 145 144 143 141   < > 142   POTASSIUM mmol/L 3.3* 3.7 3.7 3.7   < > 3.9   CHLORIDE mmol/L 109* 111* 111* 109*   < > 109*   CO2 mmol/L 26.4 24.3 23.1 23.6   < > 24.5   BUN mg/dL 25* 28* 27* 29*   < > 23   CREATININE mg/dL 1.64* 1.59* 1.71* 1.72*   < > 1.46*   CALCIUM mg/dL 7.5* 7.6* 7.7* 8.1*   < > 8.2*   BILIRUBIN mg/dL  --   --   --  1.0  --  1.0   ALK PHOS U/L  --   --   --  77  --  100   ALT (SGPT) U/L  --   --   --  19  --  19   AST (SGOT) U/L  --   --   --  55*  --  42*   GLUCOSE mg/dL 165* 148* 149* 160*   < > 105*    < > = values in this interval not displayed.     Results from last 7 days   Lab Units 04/10/23  0620 04/09/23  0454 04/08/23  0542   WBC 10*3/mm3 5.02 4.98 5.44   HEMOGLOBIN g/dL 8.1* 7.8* 8.0*   HEMATOCRIT % 23.5* 22.7* 22.6*   PLATELETS 10*3/mm3 107* 85* 91*     Results from last 7 days   Lab Units 04/05/23  0529   INR  1.48*         Results from last 7 days   Lab Units 04/04/23  2315 04/04/23  2300   BLOODCX  No growth at 5 days No growth at 5 days     No results for input(s): PHART, CEE1BQN, PO2ART, ZJU6AMZ, BASEEXCESS in the last 8760 hours.   I have reviewed the patient's laboratory results.    Radiology results:    No radiology  results from the last 24 hrs  I have reviewed the patient's radiology reports.    Medication Review:     I have reviewed the patient's active and prn medications.     Problem List:      Pneumonia of left lung due to infectious organism, unspecified part of lung    Gastrointestinal hemorrhage    Cirrhosis of liver with ascites    Sleep apnea    Iron deficiency anemia    SILVINA (acute kidney injury)    CKD (chronic kidney disease)    Stasis dermatitis of both legs    Debility    Type 2 diabetes mellitus    Essential hypertension    Dyslipidemia    Hypothyroidism (acquired)      Assessment/Plan:    Afebrile, vital signs stable on room air.  Comfortable in chair.  All questions answered.  Creatinine waxing and waning, currently 1.64.  Per nephrology she is stable for DC from renal point of view.  Dr. Ellis will have medication recommendations.  She will follow-up with nephrology as well as gastroenterology.  Had thorough discussion with patient regarding severity of diagnoses like cirrhosis and CKD.    Sleep apnea, suspected  Oxygen while sleeping.  Recommend outpatient sleep study.    Iron deficiency  Continue iron pills daily.  Iron 22, iron saturation 21, transferrin 72, TIBC 107.    Suspected upper GI bleed, resolved  Acute on chronic anemia, resolved  Suspected decompensated cirrhosis with ascites, likely nonalcoholic fatty liver disease  -- GI followed  -- EGD 4/6 with Dr. Mandujano.   -- PPI daily  -- Avoid NSAIDS  -- Hold ASA for 2 weeks  -- Iron Pills daily  -- Repeat upper endoscopy in 6 months for surveillance. Outpatient GI follow up 8-week  --Hepatic testing for causes of cirrhosis of already been ordered by GI.  -- peritoneum fluid with no growth  -Suspected gastroparesis     Left lower lobe pneumonia, resolved  -Was started on Rocephin, transitioned to oral ceftin, completed 5-day treatment.  Blood cultures and peritoneal fluid negative.     SILVINA  -Could be hepatorenal  -Avoid nephrotoxic drugs, holding  lisinopril  -Continue to monitor renal function  -UA with pyria, no culture, already on cephalosporin  -Nephrology consultation, recommendations appreciated.     Debility  -- lives alone, PT/OT recommends rehab, patient agreeable     Wound care  Per wound care consult; dry flaking skin on back, hyperkeratotic skin to bilateral lower extremities and lower panus. Skin folds moist red. Left lower skin fold with redness, blisters and open blister. Was not able to lay patient flat to assess all skin folds due to patient complaints of difficulty breathing.  Orders written per standing order sets for dry skin and red skin folds.   Recommendations for care include a turning schedule, barrier cream twice daily and prn after cleansing, using dry sheets in folds, float heels off bed with pillows, encourage increase mobility as appropriate and tolerated to reduce pressure, for dry skin apply lotion/cream and a nutrition consult for dietary needs.      DVT Prophylaxis: SCDs  Code Status: Full  Diet:  Carbohydrate controlled diet, low-sodium, fluid restriction  Discharge Plan:  Rehab placement pending    Brian Joseph Kerley, DO  04/10/23  16:46 EDT    Dictated utilizing Dragon dictation.      Electronically signed by Kerley, Brian Joseph, DO at 04/10/23 1657       Consult Notes (last 24 hours)  Notes from 04/10/23 1123 through 04/11/23 1123   No notes of this type exist for this encounter.

## 2023-04-11 NOTE — PLAN OF CARE
Goal Outcome Evaluation:                 Problem: Adult Inpatient Plan of Care  Goal: Plan of Care Review  Outcome: Ongoing, Progressing  Flowsheets (Taken 4/11/2023 0619)  Progress: no change  Outcome Evaluation: VSS, does not appear or complain of being in pain, alert and oriented, passing flatus but no bowel movement this shift, fluid restrictions observed.

## 2023-04-11 NOTE — THERAPY TREATMENT NOTE
Patient Name: Ivania Harris  : 1960    MRN: [id number]                              Today's Date: 2023       Admit Date: 2023    Visit Dx:     ICD-10-CM ICD-9-CM   1. Pneumonia of left lung due to infectious organism, unspecified part of lung  J18.9 486   2. Gastrointestinal hemorrhage, unspecified gastrointestinal hemorrhage type  K92.2 578.9   3. Other cirrhosis of liver  K74.69 571.5   4. Cirrhosis of liver with ascites, unspecified hepatic cirrhosis type  K74.60 571.5    R18.8    5. Chronic kidney disease, unspecified CKD stage  N18.9 585.9     Patient Active Problem List   Diagnosis   • Pneumonia of left lung due to infectious organism, unspecified part of lung   • Gastrointestinal hemorrhage   • Cirrhosis of liver with ascites   • Sleep apnea   • Iron deficiency anemia   • SILVINA (acute kidney injury)   • CKD (chronic kidney disease)   • Stasis dermatitis of both legs   • Debility   • Type 2 diabetes mellitus   • Essential hypertension   • Dyslipidemia   • Hypothyroidism (acquired)     Past Medical History:   Diagnosis Date   • Anemia    • Diabetes mellitus    • Hyperlipidemia    • Hypertension    • Hypothyroidism    • Impaired mobility    • Short-term memory loss      Past Surgical History:   Procedure Laterality Date   • CHOLECYSTECTOMY     • ENDOSCOPY W/ BANDING N/A 2023    Procedure: ESOPHAGOGASTRODUODENOSCOPY WITH BIOPSY;  Surgeon: Jens Mandujano MD;  Location: [facility] ENDOSCOPY;  Service: Gastroenterology;  Laterality: N/A;      General Information     Row Name 23 1633          Physical Therapy Time and Intention    Document Type therapy note (daily note)  -MS     Mode of Treatment co-treatment;physical therapy;occupational therapy  -MS     Row Name 23 1633          General Information    Patient Profile Reviewed yes  -MS           User Key  (r) = Recorded By, (t) = Taken By, (c) = Cosigned By    Initials Name Provider Type    Tae Sands PT Physical  Therapist               Mobility     Row Name 04/11/23 1633          Bed Mobility    Bed Mobility supine-sit;sit-supine  -MS     All Activities, [place] (Bed Mobility) minimum assist (75% patient effort)  -MS     Scooting/Bridging [place] (Bed Mobility) minimum assist (75% patient effort);2 person assist  -MS     Supine-Sit [place] (Bed Mobility) minimum assist (75% patient effort);2 person assist  -MS     Sit-Supine [place] (Bed Mobility) minimum assist (75% patient effort);2 person assist  -MS     Assistive Device (Bed Mobility) head of bed elevated;draw sheet  -MS     Comment, (Bed Mobility) Pt sat EOB for 8 mins  -MS     Row Name 04/11/23 1633          Sit-Stand Transfer    Sit-Stand [place] (Transfers) minimum assist (75% patient effort);nonverbal cues (demo/gesture)  -MS     Assistive Device (Sit-Stand Transfers) walker, front-wheeled  -MS     Row Name 04/11/23 1633          Gait/Stairs (Locomotion)    [place] Level (Gait) minimum assist (75% patient effort);2 person assist  -MS     Assistive Device (Gait) walker, front-wheeled  -MS     Distance in Feet (Gait) 2  -MS     Deviations/Abnormal Patterns (Gait) nomi decreased;festinating/shuffling  -MS           User Key  (r) = Recorded By, (t) = Taken By, (c) = Cosigned By    Initials Name Provider Type    Tae Sands PT Physical Therapist               Obj/Interventions     Row Name 04/11/23 1635          Balance    Balance Assessment standing dynamic balance  -MS     Dynamic Standing Balance minimal assist;contact guard  -MS           User Key  (r) = Recorded By, (t) = Taken By, (c) = Cosigned By    Initials Name Provider Type    Tae Sands PT Physical Therapist               Goals/Plan    No documentation.                Clinical Impression     Row Name 04/11/23 1635          Pain    Pretreatment Pain Rating 0/10 - no pain  -MS     Posttreatment Pain Rating 0/10 - no pain  -MS     Pain Intervention(s)  Repositioned;Ambulation/increased activity  -MS     Row Name 04/11/23 1637          Plan of Care Review    Plan of Care Reviewed With patient  -MS     Progress no change  -MS     Outcome Evaluation Pt participated in PTx this date. Pt transferred to EOB with Geoff x2. Pt sat EOB for 8 mins with CGA. Pt stood with Geoff x2 and ambulated 3' with Geoff x2 to the HOB with Rwx. Pt progressing, cont per POC.  -MS     Row Name 04/11/23 1635          Therapy Assessment/Plan (PT)    Rehab Potential (PT) fair, will monitor progress closely  -MS     Criteria for Skilled Interventions Met (PT) yes;meets criteria  -MS     Therapy Frequency (PT) 5 times/wk  -MS     Row Name 04/11/23 1635          Vital Signs    O2 Delivery Pre Treatment room air  -MS     O2 Delivery Intra Treatment room air  -MS     O2 Delivery Post Treatment room air  -MS     Pre Patient Position Supine  -MS     Intra Patient Position Standing  -MS     Post Patient Position Supine  -MS     Row Name 04/11/23 1634          Positioning and Restraints    Pre-Treatment Position in bed  -MS     Post Treatment Position bed  -MS     In Bed fowlers;call light within reach;encouraged to call for assist  -MS           User Key  (r) = Recorded By, (t) = Taken By, (c) = Cosigned By    Initials Name Provider Type    MS Tae Ponce, PT Physical Therapist               Outcome Measures     Row Name 04/11/23 1639          How much help from another person do you currently need...    Turning from your back to your side while in flat bed without using bedrails? 3  -MS     Moving from lying on back to sitting on the side of a flat bed without bedrails? 3  -MS     Moving to and from a bed to a chair (including a wheelchair)? 2  -MS     Standing up from a chair using your arms (e.g., wheelchair, bedside chair)? 2  -MS     Climbing 3-5 steps with a railing? 1  -MS     To walk in hospital room? 1  -MS     AM-PAC 6 Clicks Score (PT) 12  -MS     Highest level of mobility 4 -->  Transferred to chair/commode  -MS     Row Name 04/11/23 1631          Functional Assessment    Outcome Measure Options AM-PAC 6 Clicks Daily Activity (OT)  -           User Key  (r) = Recorded By, (t) = Taken By, (c) = Cosigned By    Initials Name Provider Type     aKla Landa Occupational Therapist    MS Tae Ponce, PT Physical Therapist                             Physical Therapy Education     Title: PT OT SLP Therapies (Done)     Topic: Physical Therapy (Done)     Point: Mobility training (Done)     Learning Progress Summary           Patient Acceptance, E, VU by MS at 4/11/2023 1638    Comment: safe transfers, use of [place].    Acceptance, E,TB,D, VU,NR by  at 4/10/2023 1411    Comment: sequencing with mobility    Acceptance, E, VU by  at 4/6/2023 1656    Comment: Importance of mobility                   Point: Home exercise program (Done)     Learning Progress Summary           Patient Acceptance, E,TB, VU by  at 4/8/2023 1731    Comment: Perform B LE ex btw therapy sessions                   Point: Body mechanics (Done)     Learning Progress Summary           Patient Acceptance, E,TB, VU,NR by  at 4/7/2023 1526    Comment: Upright posture in standing                   Point: Precautions (Done)     Learning Progress Summary           Patient Acceptance, E,TB, VU,NR by  at 4/9/2023 1732    Comment: Importance of frequent position changes to decrease risk of skin breakdown.                               User Key     Initials Effective Dates Name Provider Type Discipline     06/16/21 -  Taisha La, PTA Physical Therapist Assistant PT     06/16/21 -  Sean Mercado, PTA Physical Therapist Assistant PT    MS 07/01/22 -  Tae Ponce, BALJEET Physical Therapist PT     12/29/22 -  Haydee Red PT Student PT Student PT              PT Recommendation and Plan     Plan of Care Reviewed With: patient  Progress: no change  Outcome Evaluation: Pt participated in PTx this date. Pt  transferred to EOB with Geoff x2. Pt sat EOB for 8 mins with CGA. Pt stood with Geoff x2 and ambulated 3' with Geoff x2 to the HOB with Rwx. Pt progressing, cont per POC.     Time Calculation:    PT Charges     Row Name 04/11/23 1639             Time Calculation    Start Time 1539  -MS      Stop Time 1604  -MS      Time Calculation (min) 25 min  -MS      PT Received On 04/11/23  -MS         Timed Charges    02209 - PT Therapeutic Activity Minutes 25  -MS         Total Minutes    Timed Charges Total Minutes 25  -MS       Total Minutes 25  -MS            User Key  (r) = Recorded By, (t) = Taken By, (c) = Cosigned By    Initials Name Provider Type    MS Tae Ponce, BALJEET Physical Therapist              Therapy Charges for Today     Code Description Service Date Service Provider Modifiers Qty    42690406926 HC PT THERAPEUTIC ACT EA 15 MIN 4/11/2023 Tae Ponce, PT GP 2          PT G-Codes  Outcome Measure Options: AM-PAC 6 Clicks Daily Activity (OT)  AM-PAC 6 Clicks Score (PT): 12  AM-PAC 6 Clicks Score (OT): 16  PT Discharge Summary  Anticipated Discharge Disposition (PT): skilled nursing facility, inpatient rehabilitation facility    Tae Ponce PT  4/11/2023

## 2023-04-11 NOTE — THERAPY TREATMENT NOTE
Patient Name: Ivania Harris  : 1960    MRN: [id number]                              Today's Date: 2023       Admit Date: 2023    Visit Dx:     ICD-10-CM ICD-9-CM   1. Pneumonia of left lung due to infectious organism, unspecified part of lung  J18.9 486   2. Gastrointestinal hemorrhage, unspecified gastrointestinal hemorrhage type  K92.2 578.9   3. Other cirrhosis of liver  K74.69 571.5   4. Cirrhosis of liver with ascites, unspecified hepatic cirrhosis type  K74.60 571.5    R18.8    5. Chronic kidney disease, unspecified CKD stage  N18.9 585.9     Patient Active Problem List   Diagnosis   • Pneumonia of left lung due to infectious organism, unspecified part of lung   • Gastrointestinal hemorrhage   • Cirrhosis of liver with ascites   • Sleep apnea   • Iron deficiency anemia   • SILVINA (acute kidney injury)   • CKD (chronic kidney disease)   • Stasis dermatitis of both legs   • Debility   • Type 2 diabetes mellitus   • Essential hypertension   • Dyslipidemia   • Hypothyroidism (acquired)     Past Medical History:   Diagnosis Date   • Anemia    • Diabetes mellitus    • Hyperlipidemia    • Hypertension    • Hypothyroidism    • Impaired mobility    • Short-term memory loss      Past Surgical History:   Procedure Laterality Date   • CHOLECYSTECTOMY     • ENDOSCOPY W/ BANDING N/A 2023    Procedure: ESOPHAGOGASTRODUODENOSCOPY WITH BIOPSY;  Surgeon: Jens Mandujano MD;  Location: [facility] ENDOSCOPY;  Service: Gastroenterology;  Laterality: N/A;      General Information     Row Name 23 1621          OT Time and Intention    Mode of Treatment co-treatment;physical therapy;occupational therapy  -     Row Name 23 1621          General Information    Patient Profile Reviewed yes  -     Existing Precautions/Restrictions fall  -           User Key  (r) = Recorded By, (t) = Taken By, (c) = Cosigned By    Initials Name Provider Type    Kala Carr Occupational Therapist                  Mobility/ADL's     [facility] Name 04/11/23 1621          Bed Mobility    Bed Mobility supine-sit;sit-supine  -     Supine-Sit [place] (Bed Mobility) minimum assist (75% patient effort);2 person assist  -     Sit-Supine [place] (Bed Mobility) minimum assist (75% patient effort);2 person assist  -     Comment, (Bed Mobility) pt sat eob ~ 8 min doing UB ex  -[facility] Name 04/11/23 1621          Sit-Stand Transfer    Sit-Stand [place] (Transfers) contact guard;minimum assist (75% patient effort);nonverbal cues (demo/gesture)  -     Assistive Device (Sit-Stand Transfers) walker, front-wheeled  -[facility] Name 04/11/23 1621          Functional Mobility    Functional Mobility- Ind. Level contact guard assist;2 person assist required  -     Functional Mobility- Device walker, front-wheeled  -     Functional Mobility-Distance (Feet) 3  -           User Key  (r) = Recorded By, (t) = Taken By, (c) = Cosigned By    Initials Name Provider Type    Kala Carr Occupational Therapist               Obj/Interventions     [facility] Name 04/11/23 1627          Shoulder (Therapeutic Exercise)    Shoulder (Therapeutic Exercise) strengthening exercise  -     Shoulder Strengthening (Therapeutic Exercise) bilateral;horizontal aBduction/aDduction;resistance band;yellow;10 repetitions  -AH     Row Name 04/11/23 1627          Elbow/Forearm (Therapeutic Exercise)    Elbow/Forearm (Therapeutic Exercise) strengthening exercise  -     Elbow/Forearm Strengthening (Therapeutic Exercise) bilateral;flexion;extension;resistance band;yellow;10 repetitions  -AH     Row Name 04/11/23 1627          Motor Skills    Therapeutic Exercise shoulder;elbow/forearm  -           User Key  (r) = Recorded By, (t) = Taken By, (c) = Cosigned By    Initials Name Provider Type    Kala Carr Occupational Therapist               Goals/Plan    No documentation.                Clinical Impression     [facility] Name 04/11/23 1628           Pain Assessment    Pretreatment Pain Rating 0/10 - no pain  -     Posttreatment Pain Rating 0/10 - no pain  -     Pain Intervention(s) Repositioned;Ambulation/increased activity  -     Row Name 04/11/23 1629          Plan of Care Review    Plan of Care Reviewed With patient  -     Progress improving  -     Outcome Evaluation Pt reports feeling stronger today, sat eob min assist of 2, stood min assist of 2 and walked 3' cga/min assist of 2 with RW.  Pt sat eob ~8 min and performed UB ex using theraband for resistance.  Pt completed grooming tasks sitting eob.  Pt progressing with therapy.  Cont OT per POC.  -     Row Name 04/11/23 1629          Positioning and Restraints    Pre-Treatment Position in bed  -     Post Treatment Position bed  -     In Bed supine;call light within reach;encouraged to call for assist  -           User Key  (r) = Recorded By, (t) = Taken By, (c) = Cosigned By    Initials Name Provider Type    Kala Carr Occupational Therapist               Outcome Measures     Row Name 04/11/23 1631          How much help from another is currently needed...    Putting on and taking off regular lower body clothing? 2  -AH     Bathing (including washing, rinsing, and drying) 2  -AH     Toileting (which includes using toilet bed pan or urinal) 2  -AH     Putting on and taking off regular upper body clothing 3  -AH     Taking care of personal grooming (such as brushing teeth) 3  -AH     Eating meals 4  -AH     AM-PAC 6 Clicks Score (OT) 16  -     Row Name 04/11/23 1631          Functional Assessment    Outcome Measure Options AM-PAC 6 Clicks Daily Activity (OT)  [facility]           User Key  (r) = Recorded By, (t) = Taken By, (c) = Cosigned By    Initials Name Provider Type    Kala Carr Occupational Therapist                Occupational Therapy Education     Title: PT OT SLP Therapies (Done)     Topic: Occupational Therapy (Done)     Point: ADL training (Done)     Description:    Instruct learner(s) on proper safety adaptation and remediation techniques during self care or transfers.   Instruct in proper use of assistive devices.              Learning Progress Summary           Patient Acceptance, E,TB, VU by  at 4/11/2023 1631    Comment: UB ex and benefit of activity to improve functional strength    Acceptance, E,TB, VU by  at 4/10/2023 1159    Comment: benefit of working with therapy    Acceptance, E,TB, VU by  at 4/6/2023 1819    Comment: Role of OT/POC                   Point: Home exercise program (Done)     Description:   Instruct learner(s) on appropriate technique for monitoring, assisting and/or progressing therapeutic exercises/activities.              Learning Progress Summary           Patient Acceptance, E,TB, VU by  at 4/11/2023 1631    Comment: UB ex and benefit of activity to improve functional strength                   Point: Precautions (Done)     Description:   Instruct learner(s) on prescribed precautions during self-care and functional transfers.              Learning Progress Summary           Patient Acceptance, E,D, DU,NR by TL at 4/7/2023 1244    Comment: Patient educated on deep breathing with movement to decrease anxiety but required moderate cuing.   Family Acceptance, E,D, DU,NR by TL at 4/7/2023 1244    Comment: Patient educated on deep breathing with movement to decrease anxiety but required moderate cuing.                   Point: Body mechanics (Done)     Description:   Instruct learner(s) on proper positioning and spine alignment during self-care, functional mobility activities and/or exercises.              Learning Progress Summary           Patient Acceptance, E, VU by  at 4/11/2023 0303    Comment: oriented to new room and plan of care    Acceptance, E,D, DU,NR by TL at 4/7/2023 1244    Comment: Patient educated on deep breathing with movement to decrease anxiety but required moderate cuing.   Family Acceptance, E,D, DU,NR by TL at  4/7/2023 1244    Comment: Patient educated on deep breathing with movement to decrease anxiety but required moderate cuing.                               User Key     Initials Effective Dates Name Provider Type Discipline     06/16/21 -  Kala Landa Occupational Therapist OT    LB 06/16/21 -  Chiara Andrea, RN Registered Nurse Nurse     06/16/21 -  Toshia Wan OTR Occupational Therapist OT              OT Recommendation and Plan  Planned Therapy Interventions (OT): activity tolerance training, BADL retraining, patient/caregiver education/training, transfer/mobility retraining, strengthening exercise  Therapy Frequency (OT): 3 times/wk  Plan of Care Review  Plan of Care Reviewed With: patient  Progress: improving  Outcome Evaluation: Pt reports feeling stronger today, sat eob min assist of 2, stood min assist of 2 and walked 3' cga/min assist of 2 with RW.  Pt sat eob ~8 min and performed UB ex using theraband for resistance.  Pt completed grooming tasks sitting eob.  Pt progressing with therapy.  Cont OT per POC.     Time Calculation:    Time Calculation- OT     Row Name 04/11/23 1632             Time Calculation- OT    OT Start Time 1540  -      OT Stop Time 1606  -      OT Time Calculation (min) 26 min  -      OT Received On 04/11/23  -      OT Goal Re-Cert Due Date 04/16/23  -         Timed Charges    94624 - OT Therapeutic Exercise Minutes 8  -      22126 - OT Therapeutic Activity Minutes 18  -         Total Minutes    Timed Charges Total Minutes 26  -       Total Minutes 26  -            User Key  (r) = Recorded By, (t) = Taken By, (c) = Cosigned By    Initials Name Provider Type     Kala Landa Occupational Therapist              Therapy Charges for Today     Code Description Service Date Service Provider Modifiers Qty    60412136797 HC OT THERAPEUTIC ACT EA 15 MIN 4/10/2023 Kala Landa 2    51994326155 HC OT THER PROC EA 15 MIN 4/11/2023 Kala Landa 1    [id number]   OT THERAPEUTIC ACT EA 15 MIN 4/11/2023 Kala Landa GO 1               Kala Landa  4/11/2023

## 2023-04-11 NOTE — DISCHARGE PLACEMENT REQUEST
"АЛЕКСАНДР  Ivania King (62 y.o. Female)     Date of Birth   1960    Social Security Number       Address   [address] [place] [address] [place] [id number]    Home Phone   [phone]    MRN   [id number]       Mormon   Nazarene    Marital Status                               Admission Date   23    Admission Type   Emergency    Admitting Provider       Attending Provider   Kerley, Brian Joseph, DO    Department, Room/Bed   [facility]YN, W210/       Discharge Date       Discharge Disposition       Discharge Destination                               Attending Provider: Kerley, Brian Joseph, DO    Allergies: No Known Allergies    Isolation: None   Infection: None   Code Status: CPR    Ht: 165.1 cm (65\")   Wt: 62.6 kg (138 lb 1 oz)    Admission Cmt: None   Principal Problem: Pneumonia of left lung due to infectious organism, unspecified part of lung [J18.9]                 Active Insurance as of 2023     Primary Coverage     Payor Plan Insurance Group Employer/Plan Group    HUMANA MEDICARE REPLACEMENT HUMANA MEDICARE REPLACEMENT [id number]     Payor Plan Address Payor Plan Phone Number Payor Plan Fax Number Effective Dates    [address] [address] [phone]  2021 - None Entered    [facility] [id number]       Subscriber Name Subscriber Birth Date Member ID       IVANIA KING 1960 [id number]                 Emergency Contacts      (Rel.) Home Phone Work Phone Mobile Phone    GENARO BUCHANAN (Brother) [phone] -- --               History & Physical      Jens Mandujano MD at 23 0738          H&P reviewed. The patient was examined and there are no changes to the H&P.          Electronically signed by Jens Mandujano MD at 23 0738   Source Note               In Patient Consult      Date of Consultation: 2023  Patient Name: Ivania King  MRN: [id number]  : 1960     Referring provider: Skip" Abhilash Salter MD    Primary care provider:  Alessandro Mercer MD    Reason for consultation: Cirrhosis, ascites, suspected GI bleed    History of Present Illness: This is a 62-year-old morbidly obese female patient with a prior history of hypertension, hyperlipidemia, hypothyroidism, diabetes mellitus was seen in the emergency room today on 04/04/2023 with complaints of generalized weakness since last few days and, nausea and diarrhea since 1 day duration    Patient states that she has been having loose stools past week or so with the associated nausea without any vomiting.  She did not see any blood in the stool however bowel movement she had in the emergency room was dark as per nursing staff.  She denies any fever chills.  She generally felt very weak and came to emergency room for evaluation.  She has some associated recent cough since few days.  She states that her bowel movement normally will be regular except occasional constipation.  She is currently on aspirin 325 mg p.o. daily    She had some blood work done by PCP's office recently and had a ultrasound ordered for further evaluation that has not been done.  She was not been told of any cirrhosis or chronic liver disease in the past.  No family history of any liver cancer or liver cirrhosis.    No prior history of an EGD or colonoscopy no family history of any colon cancer or GI malignancy.  She is nonalcoholic and non-smoker.    In the emergency room she had a CT abdomen pelvis done which revealed signs of cirrhosis with portal hypertension with a large ascites.  She also had a small nonobstructing right renal calculi and bilateral pleural effusions and possible pneumonia.  Stool occult blood test was positive.  Her hemoglobin was 8.5 g/dL with MCV of 98 and platelets 120,000.  No prior lab work to compare.  Lab work also revealed creatinine of 1.46 with a BUN of 23.  Her alkaline phosphatase 100 ALT 19 AST 42 total bilirubin 1 albumin is 1.6.  Given her  significant CT findings and suspicion of GI bleed and anemia, GI was consulted.      Subjective     Past Medical History:   Diagnosis Date   • Anemia    • Diabetes mellitus    • Hyperlipidemia    • Hypertension    • Hypothyroidism    • Short-term memory loss        Past Surgical History:   Procedure Laterality Date   • CHOLECYSTECTOMY         History reviewed. No pertinent family history.    Social History     Socioeconomic History   • Marital status:    Tobacco Use   • Smoking status: Never   • Smokeless tobacco: Never   Vaping Use   • Vaping Use: Never used   Substance and Sexual Activity   • Alcohol use: Yes     Comment: a couple margaritas a year   • Drug use: Never   • Sexual activity: Defer         Current Facility-Administered Medications:   •  octreotide (sandoSTATIN) 500 mcg in sodium chloride 0.9 % 100 mL (5 mcg/mL) infusion, 25 mcg/hr, Intravenous, Continuous, Meccariello Gerardo Oswaldo PA-C  •  pantoprazole (PROTONIX) 40 mg in 100mL NS IVPB, 8 mg/hr, Intravenous, Continuous, Meccariello, Gerardo Oswaldo PA-C, Last Rate: 20 mL/hr at 04/04/23 1900, 8 mg/hr at 04/04/23 1900  •  [COMPLETED] Insert Peripheral IV, , , Once **AND** sodium chloride 0.9 % flush 10 mL, 10 mL, Intravenous, PRN, Meccariello, Gerardo Oswaldo PA-C    Current Outpatient Medications:   •  albuterol sulfate  (90 Base) MCG/ACT inhaler, Inhale 2 puffs Every 4 (Four) Hours As Needed for Wheezing., Disp: 18 g, Rfl: 0  •  Aspirin 325 MG capsule, Take 325 mg by mouth Daily., Disp: , Rfl:   •  atorvastatin (LIPITOR) 40 MG tablet, Take 40 mg by mouth Daily., Disp: , Rfl:   •  azithromycin (ZITHROMAX) 500 MG tablet, 1 po daily, Disp: 10 tablet, Rfl: 0  •  buPROPion XL (WELLBUTRIN XL) 150 MG 24 hr tablet, Take 150 mg by mouth Daily., Disp: , Rfl:   •  donepezil (ARICEPT) 10 MG tablet, Take 10 mg by mouth Every Night., Disp: , Rfl:   •  insulin aspart (NovoLOG) 100 UNIT/ML injection, Inject  under the skin into the appropriate area as  directed 3 (Three) Times a Day Before Meals., Disp: , Rfl:   •  insulin glargine (Lantus) 100 UNIT/ML injection, Inject 60 Units under the skin into the appropriate area as directed 2 (Two) Times a Day., Disp: , Rfl:   •  ipratropium-albuterol (DUO-NEB) 0.5-2.5 mg/3 ml nebulizer, Nebulize q 4 h prn wheezing / shortness of breath, Disp: 360 mL, Rfl: 0  •  levothyroxine (SYNTHROID, LEVOTHROID) 100 MCG tablet, Take 300 mcg by mouth Daily., Disp: , Rfl:   •  lisinopril (PRINIVIL,ZESTRIL) 10 MG tablet, Take 10 mg by mouth Daily., Disp: , Rfl:   •  minocycline (MINOCIN,DYNACIN) 100 MG capsule, Take 100 mg by mouth Daily., Disp: , Rfl:   •  multivitamin with minerals (SENIOR MULTIVITAMIN PLUS PO), Take 1 tablet by mouth Daily., Disp: , Rfl:   •  predniSONE (DELTASONE) 20 MG tablet, 3 po daily for 3 days, 2 po daily for 3 days, 1 po daily for 3 days, Disp: 18 tablet, Rfl: 0  •  SITagliptin (JANUVIA) 50 MG tablet, Take 50 mg by mouth Daily., Disp: , Rfl:     No Known Allergies    Review of Systems   Constitutional: Positive for fatigue. Negative for fever.   HENT: Negative for sore throat and trouble swallowing.    Eyes: Negative for visual disturbance.   Respiratory: Negative for cough, chest tightness and shortness of breath.    Cardiovascular: Negative for chest pain, palpitations and leg swelling.   Gastrointestinal: Positive for diarrhea and nausea. Negative for abdominal pain, blood in stool, constipation, vomiting and indigestion.   Endocrine: Negative for polyphagia.   Genitourinary: Negative for dysuria and hematuria.   Musculoskeletal: Negative for back pain, joint swelling and neck pain.   Skin: Negative for rash, skin lesions and wound.   Neurological: Positive for weakness (Generalized weakness). Negative for dizziness, seizures, speech difficulty, numbness and confusion.   Hematological: Negative for adenopathy. Does not bruise/bleed easily.   Psychiatric/Behavioral: Negative for hallucinations and depressed  mood.        The following portions of the patient's history were reviewed and updated as appropriate: allergies, current medications, past family history, past medical history, past social history, past surgical history and problem list.    Objective     Vitals:    04/04/23 1830 04/04/23 1900 04/04/23 1930 04/04/23 1931   BP: 112/50 131/53 128/60    Pulse: 80 80  81   Resp:       Temp:       TempSrc:       SpO2: 97% 99%  99%   Weight:       Height:           Physical Exam  Vitals and nursing note reviewed.   Constitutional:       Appearance: She is well-developed. She is obese.   HENT:      Head: Normocephalic and atraumatic.      Right Ear: External ear normal.      Left Ear: External ear normal.   Eyes:      Comments: Pallor present   Neck:      Thyroid: No thyromegaly.      Trachea: No tracheal deviation.   Cardiovascular:      Rate and Rhythm: Normal rate and regular rhythm.      Heart sounds: No murmur heard.  Pulmonary:      Effort: Pulmonary effort is normal. No respiratory distress.      Breath sounds: Normal breath sounds.   Abdominal:      General: Abdomen is flat. Bowel sounds are normal. There is distension (Moderate abdominal distention partly gaseous in the center).      Palpations: Abdomen is soft. There is no mass.      Tenderness: There is no abdominal tenderness. There is no guarding or rebound.      Hernia: A hernia (Reducible umbilical hernia) is present.      Comments: Difficult to assess for free fluid due to large abdominal wall   Musculoskeletal:         General: Normal range of motion.      Cervical back: Normal range of motion and neck supple.   Skin:     General: Skin is warm and dry.      Findings: Rash present.      Comments: Chronic eczematoid changes in both lower extremities below the knee and also around the groin   Neurological:      General: No focal deficit present.      Mental Status: She is alert and oriented to person, place, and time.      Cranial Nerves: No cranial nerve  deficit.      Sensory: No sensory deficit.   Psychiatric:         Mood and Affect: Mood normal.         Behavior: Behavior normal.         Thought Content: Thought content normal.         Judgment: Judgment normal.         Results from last 7 days   Lab Units 04/04/23  1431   SODIUM mmol/L 142   POTASSIUM mmol/L 3.9   CHLORIDE mmol/L 109*   CO2 mmol/L 24.5   BUN mg/dL 23   CREATININE mg/dL 1.46*   CALCIUM mg/dL 8.2*   ALBUMIN g/dL 1.6*   BILIRUBIN mg/dL 1.0   ALK PHOS U/L 100   ALT (SGPT) U/L 19   AST (SGOT) U/L 42*   GLUCOSE mg/dL 105*   WBC 10*3/mm3 6.32   HEMOGLOBIN g/dL 8.5*   PLATELETS 10*3/mm3 120*       Imaging Results (Last 24 Hours)     Procedure Component Value Units Date/Time    CT Abdomen Pelvis Without Contrast [[id number]] Collected: 04/04/23 1820     Updated: 04/04/23 1821    Narrative:      FINAL REPORT    TECHNIQUE:  Routine axial images through the abdomen and pelvis were  obtained.    CLINICAL HISTORY:  diarrhea, abd discomfort    FINDINGS:  Lower chest: There are left greater than right pleural  effusions.  Abdomen:  The gallbladder has been removed.  There  is cirrhosis with mild splenomegaly.  There are upper abdominal  venous collaterals indicative of portal hypertension.  There are  2 small nonobstructing right renal calculi, the largest  measuring 4 mm in diameter.  The other solid abdominal organs  and ureters are unremarkable.  The GI tract is unremarkable,  including the appendix.  Pelvis: The uterus, ovaries, and  urinary bladder are normal.  There is large volume ascites and  anasarca.  There is no pelvic or abdominal adenopathy or acute  osseous abnormality.      Impression:      Cirrhosis with portal hypertension and ascites.  Small  nonobstructing right renal calculi.  Bilateral pleural effusions.    Authenticated and Electronically Signed by Salomon Cantu M.D. on  04/04/2023 06:20:35 PM    XR Chest 1 View [[id number]] Collected: 04/04/23 1515     Updated: 04/04/23 1518    Narrative:        PROCEDURE: XR CHEST 1 VW-     HISTORY: generalized weakness     COMPARISON: 12/03/2021.     FINDINGS: The heart is mildly enlarged.. The mediastinum is  unremarkable. Decreased inspiratory effort noted with crowding of the  lung markings in both lung bases. There is new left basilar airspace  disease, possible pneumonia. Right lung is clear. Calcified granuloma  identified on the left... There is no pneumothorax.  There are no acute  osseous abnormalities.       Impression:      New left basilar airspace disease suggesting pneumonia,  recommend follow-up..     .     This report was signed and finalized on 4/4/2023 3:16 PM by Katt Muse MD.          Assessment / Plan      Assessment/Recommendations:     1.  Suspected viral illness with cough and gastroenteritis/suspected pneumonia  2.  Suspected decompensated Leo cirrhosis with ascites; MELD 15 (4/2023)  3.  Suspected chronic normocytic anemia  4.  Occult  blood positive stool  5.  Morbid obesity with a BMI 41.60 with a serious comorbidity  6.  Acute kidney injury versus CKD vs hepatorenal syndrome  7.  Severe hypoalbuminemia  8.  Acquired thrombocytopenia associated with the cirrhosis    Patient history is more in favor of possible mild viral illness with a cough loose stool and nausea without any vomiting.  No history suggestive any overt GI bleed.  Patient likely has a chronic anemia and minimal mucosal bleeding with possible melena cannot be ruled out.  No prior hemoglobin to compare.  She does take ibuprofen intermittently and peptic ulcer disease needs to be ruled out    Her creatinine is 1.4.  No prior values to compare.  With a dehydration patient may have some prerenal component and some degree of hepatorenal syndrome suspected.    She appears to have a decompensated Leo cirrhosis with ascites.  History is not suggestive any variceal bleeding however that cannot be ruled out.  I have discussed the risk and benefits involved with the procedure and  patient is agreeable to proceed with the procedure.    She needs a full liver work-up  Patient clinically appears dehydrated with acute kidney injury  Clear liquids p.o.  Gentle IV fluid hydration findable x  IR guided diagnostic and therapeutic paracentesis  Protonix IV for 48 hours  Octreotide drip for now for 48 hours  Albumin 25% 3 times daily for 2 days  Rocephin 1 g IV daily  Transfuse to keep the Hgb more than 7 g/dL  Patient is currently on high-dose aspirin, will hold aspirin.  If patient is stable without any signs of overt GI bleed we will consider EGD on Friday with a possible variceal band ligation if present.  If any overt bleeding we will schedule her for endoscopy anytime interim.        Thank you very much for letting me participate in the care of this patient.  Please do not hesitate to call me if you have any questions.    Jens Mandujano MD  [facility]  2023  20:09 EDT    Please note that portions of this note may have been completed with a voice recognition program.     Electronically signed by Jens Mandujano MD at 23             Jens Mandujano MD at 23               In Patient Consult      Date of Consultation: 2023  Patient Name: Ivania Harris  MRN: [id number]  : 1960     Referring provider: Abhilash Valdivia MD    Primary care provider:  Alessandro Mercer MD    Reason for consultation: Cirrhosis, ascites, suspected GI bleed    History of Present Illness: This is a 62-year-old morbidly obese female patient with a prior history of hypertension, hyperlipidemia, hypothyroidism, diabetes mellitus was seen in the emergency room today on 2023 with complaints of generalized weakness since last few days and, nausea and diarrhea since 1 day duration    Patient states that she has been having loose stools past week or so with the associated nausea without any vomiting.  She did not see any blood in the stool  however bowel movement she had in the emergency room was dark as per nursing staff.  She denies any fever chills.  She generally felt very weak and came to emergency room for evaluation.  She has some associated recent cough since few days.  She states that her bowel movement normally will be regular except occasional constipation.  She is currently on aspirin 325 mg p.o. daily    She had some blood work done by PCP's office recently and had a ultrasound ordered for further evaluation that has not been done.  She was not been told of any cirrhosis or chronic liver disease in the past.  No family history of any liver cancer or liver cirrhosis.    No prior history of an EGD or colonoscopy no family history of any colon cancer or GI malignancy.  She is nonalcoholic and non-smoker.    In the emergency room she had a CT abdomen pelvis done which revealed signs of cirrhosis with portal hypertension with a large ascites.  She also had a small nonobstructing right renal calculi and bilateral pleural effusions and possible pneumonia.  Stool occult blood test was positive.  Her hemoglobin was 8.5 g/dL with MCV of 98 and platelets 120,000.  No prior lab work to compare.  Lab work also revealed creatinine of 1.46 with a BUN of 23.  Her alkaline phosphatase 100 ALT 19 AST 42 total bilirubin 1 albumin is 1.6.  Given her significant CT findings and suspicion of GI bleed and anemia, GI was consulted.      Subjective     Past Medical History:   Diagnosis Date   • Anemia    • Diabetes mellitus    • Hyperlipidemia    • Hypertension    • Hypothyroidism    • Short-term memory loss        Past Surgical History:   Procedure Laterality Date   • CHOLECYSTECTOMY         History reviewed. No pertinent family history.    Social History     Socioeconomic History   • Marital status:    Tobacco Use   • Smoking status: Never   • Smokeless tobacco: Never   Vaping Use   • Vaping Use: Never used   Substance and Sexual Activity   • Alcohol use:  Yes     Comment: a couple margaritas a year   • Drug use: Never   • Sexual activity: Defer         Current Facility-Administered Medications:   •  octreotide (sandoSTATIN) 500 mcg in sodium chloride 0.9 % 100 mL (5 mcg/mL) infusion, 25 mcg/hr, Intravenous, Continuous, Gerardo Rogers PA-C  •  pantoprazole (PROTONIX) 40 mg in 100mL NS IVPB, 8 mg/hr, Intravenous, Continuous, Gerardo Rogers PA-C, Last Rate: 20 mL/hr at 04/04/23 1900, 8 mg/hr at 04/04/23 1900  •  [COMPLETED] Insert Peripheral IV, , , Once **AND** sodium chloride 0.9 % flush 10 mL, 10 mL, Intravenous, PRN, Gerardo Rogers PA-C    Current Outpatient Medications:   •  albuterol sulfate  (90 Base) MCG/ACT inhaler, Inhale 2 puffs Every 4 (Four) Hours As Needed for Wheezing., Disp: 18 g, Rfl: 0  •  Aspirin 325 MG capsule, Take 325 mg by mouth Daily., Disp: , Rfl:   •  atorvastatin (LIPITOR) 40 MG tablet, Take 40 mg by mouth Daily., Disp: , Rfl:   •  azithromycin (ZITHROMAX) 500 MG tablet, 1 po daily, Disp: 10 tablet, Rfl: 0  •  buPROPion XL (WELLBUTRIN XL) 150 MG 24 hr tablet, Take 150 mg by mouth Daily., Disp: , Rfl:   •  donepezil (ARICEPT) 10 MG tablet, Take 10 mg by mouth Every Night., Disp: , Rfl:   •  insulin aspart (NovoLOG) 100 UNIT/ML injection, Inject  under the skin into the appropriate area as directed 3 (Three) Times a Day Before Meals., Disp: , Rfl:   •  insulin glargine (Lantus) 100 UNIT/ML injection, Inject 60 Units under the skin into the appropriate area as directed 2 (Two) Times a Day., Disp: , Rfl:   •  ipratropium-albuterol (DUO-NEB) 0.5-2.5 mg/3 ml nebulizer, Nebulize q 4 h prn wheezing / shortness of breath, Disp: 360 mL, Rfl: 0  •  levothyroxine (SYNTHROID, LEVOTHROID) 100 MCG tablet, Take 300 mcg by mouth Daily., Disp: , Rfl:   •  lisinopril (PRINIVIL,ZESTRIL) 10 MG tablet, Take 10 mg by mouth Daily., Disp: , Rfl:   •  minocycline (MINOCIN,DYNACIN) 100 MG capsule, Take 100 mg by mouth Daily.,  Disp: , Rfl:   •  multivitamin with minerals (SENIOR MULTIVITAMIN PLUS PO), Take 1 tablet by mouth Daily., Disp: , Rfl:   •  predniSONE (DELTASONE) 20 MG tablet, 3 po daily for 3 days, 2 po daily for 3 days, 1 po daily for 3 days, Disp: 18 tablet, Rfl: 0  •  SITagliptin (JANUVIA) 50 MG tablet, Take 50 mg by mouth Daily., Disp: , Rfl:     No Known Allergies    Review of Systems   Constitutional: Positive for fatigue. Negative for fever.   HENT: Negative for sore throat and trouble swallowing.    Eyes: Negative for visual disturbance.   Respiratory: Negative for cough, chest tightness and shortness of breath.    Cardiovascular: Negative for chest pain, palpitations and leg swelling.   Gastrointestinal: Positive for diarrhea and nausea. Negative for abdominal pain, blood in stool, constipation, vomiting and indigestion.   Endocrine: Negative for polyphagia.   Genitourinary: Negative for dysuria and hematuria.   Musculoskeletal: Negative for back pain, joint swelling and neck pain.   Skin: Negative for rash, skin lesions and wound.   Neurological: Positive for weakness (Generalized weakness). Negative for dizziness, seizures, speech difficulty, numbness and confusion.   Hematological: Negative for adenopathy. Does not bruise/bleed easily.   Psychiatric/Behavioral: Negative for hallucinations and depressed mood.        The following portions of the patient's history were reviewed and updated as appropriate: allergies, current medications, past family history, past medical history, past social history, past surgical history and problem list.    Objective     Vitals:    04/04/23 1830 04/04/23 1900 04/04/23 1930 04/04/23 1931   BP: 112/50 131/53 128/60    Pulse: 80 80  81   Resp:       Temp:       TempSrc:       SpO2: 97% 99%  99%   Weight:       Height:           Physical Exam  Vitals and nursing note reviewed.   Constitutional:       Appearance: She is well-developed. She is obese.   HENT:      Head: Normocephalic and  atraumatic.      Right Ear: External ear normal.      Left Ear: External ear normal.   Eyes:      Comments: Pallor present   Neck:      Thyroid: No thyromegaly.      Trachea: No tracheal deviation.   Cardiovascular:      Rate and Rhythm: Normal rate and regular rhythm.      Heart sounds: No murmur heard.  Pulmonary:      Effort: Pulmonary effort is normal. No respiratory distress.      Breath sounds: Normal breath sounds.   Abdominal:      General: Abdomen is flat. Bowel sounds are normal. There is distension (Moderate abdominal distention partly gaseous in the center).      Palpations: Abdomen is soft. There is no mass.      Tenderness: There is no abdominal tenderness. There is no guarding or rebound.      Hernia: A hernia (Reducible umbilical hernia) is present.      Comments: Difficult to assess for free fluid due to large abdominal wall   Musculoskeletal:         General: Normal range of motion.      Cervical back: Normal range of motion and neck supple.   Skin:     General: Skin is warm and dry.      Findings: Rash present.      Comments: Chronic eczematoid changes in both lower extremities below the knee and also around the groin   Neurological:      General: No focal deficit present.      Mental Status: She is alert and oriented to person, place, and time.      Cranial Nerves: No cranial nerve deficit.      Sensory: No sensory deficit.   Psychiatric:         Mood and Affect: Mood normal.         Behavior: Behavior normal.         Thought Content: Thought content normal.         Judgment: Judgment normal.         Results from last 7 days   Lab Units 04/04/23  1431   SODIUM mmol/L 142   POTASSIUM mmol/L 3.9   CHLORIDE mmol/L 109*   CO2 mmol/L 24.5   BUN mg/dL 23   CREATININE mg/dL 1.46*   CALCIUM mg/dL 8.2*   ALBUMIN g/dL 1.6*   BILIRUBIN mg/dL 1.0   ALK PHOS U/L 100   ALT (SGPT) U/L 19   AST (SGOT) U/L 42*   GLUCOSE mg/dL 105*   WBC 10*3/mm3 6.32   HEMOGLOBIN g/dL 8.5*   PLATELETS 10*3/mm3 120*       Imaging  Results (Last 24 Hours)     Procedure Component Value Units Date/Time    CT Abdomen Pelvis Without Contrast [[id number]] Collected: 04/04/23 1820     Updated: 04/04/23 1821    Narrative:      FINAL REPORT    TECHNIQUE:  Routine axial images through the abdomen and pelvis were  obtained.    CLINICAL HISTORY:  diarrhea, abd discomfort    FINDINGS:  Lower chest: There are left greater than right pleural  effusions.  Abdomen:  The gallbladder has been removed.  There  is cirrhosis with mild splenomegaly.  There are upper abdominal  venous collaterals indicative of portal hypertension.  There are  2 small nonobstructing right renal calculi, the largest  measuring 4 mm in diameter.  The other solid abdominal organs  and ureters are unremarkable.  The GI tract is unremarkable,  including the appendix.  Pelvis: The uterus, ovaries, and  urinary bladder are normal.  There is large volume ascites and  anasarca.  There is no pelvic or abdominal adenopathy or acute  osseous abnormality.      Impression:      Cirrhosis with portal hypertension and ascites.  Small  nonobstructing right renal calculi.  Bilateral pleural effusions.    Authenticated and Electronically Signed by Salomon Cantu M.D. on  04/04/2023 06:20:35 PM    XR Chest 1 View [[id number]] Collected: 04/04/23 1515     Updated: 04/04/23 1518    Narrative:       PROCEDURE: XR CHEST 1 VW-     HISTORY: generalized weakness     COMPARISON: 12/03/2021.     FINDINGS: The heart is mildly enlarged.. The mediastinum is  unremarkable. Decreased inspiratory effort noted with crowding of the  lung markings in both lung bases. There is new left basilar airspace  disease, possible pneumonia. Right lung is clear. Calcified granuloma  identified on the left... There is no pneumothorax.  There are no acute  osseous abnormalities.       Impression:      New left basilar airspace disease suggesting pneumonia,  recommend follow-up..     .     This report was signed and finalized on 4/4/2023  3:16 PM by Katt Muse MD.          Assessment / Plan      Assessment/Recommendations:     1.  Suspected viral illness with cough and gastroenteritis/suspected pneumonia  2.  Suspected decompensated Leo cirrhosis with ascites; MELD 15 (4/2023)  3.  Suspected chronic normocytic anemia  4.  Occult  blood positive stool  5.  Morbid obesity with a BMI 41.60 with a serious comorbidity  6.  Acute kidney injury versus CKD vs hepatorenal syndrome  7.  Severe hypoalbuminemia  8.  Acquired thrombocytopenia associated with the cirrhosis    Patient history is more in favor of possible mild viral illness with a cough loose stool and nausea without any vomiting.  No history suggestive any overt GI bleed.  Patient likely has a chronic anemia and minimal mucosal bleeding with possible melena cannot be ruled out.  No prior hemoglobin to compare.  She does take ibuprofen intermittently and peptic ulcer disease needs to be ruled out    Her creatinine is 1.4.  No prior values to compare.  With a dehydration patient may have some prerenal component and some degree of hepatorenal syndrome suspected.    She appears to have a decompensated Leo cirrhosis with ascites.  History is not suggestive any variceal bleeding however that cannot be ruled out.  I have discussed the risk and benefits involved with the procedure and patient is agreeable to proceed with the procedure.    She needs a full liver work-up  Patient clinically appears dehydrated with acute kidney injury  Clear liquids p.o.  Gentle IV fluid hydration findable x  IR guided diagnostic and therapeutic paracentesis  Protonix IV for 48 hours  Octreotide drip for now for 48 hours  Albumin 25% 3 times daily for 2 days  Rocephin 1 g IV daily  Transfuse to keep the Hgb more than 7 g/dL  Patient is currently on high-dose aspirin, will hold aspirin.  If patient is stable without any signs of overt GI bleed we will consider EGD on Friday with a possible variceal band ligation if  present.  If any overt bleeding we will schedule her for endoscopy anytime interim.        Thank you very much for letting me participate in the care of this patient.  Please do not hesitate to call me if you have any questions.    Jens Mandujano MD  [facility]  2023  20:09 EDT    Please note that portions of this note may have been completed with a voice recognition program.     Electronically signed by Jens Mandujano MD at 23     Marcos Altman MD at 23            [facility]      Name:  Ivania Harris   Age:  62 y.o.  Sex:  female  :  1960  MRN:  [id number]   Visit Number:  [id number]  Admission Date:  2023  Date Of Service:  23  Primary Care Physician:  Alessandro Mercer MD    Chief Complaint:     Generalized weakness, diarrhea    History Of Presenting Illness:      Patient 62 years old female with a past medical history of diabetes mellitus, anemia, hyperlipidemia, hypertension and hypothyroidism who presented to the ER with a chief complaint of generalized weakness along with diarrhea.  Patient reports that she has had watery dark-colored diarrhea over the past 3 days associated with nausea but no vomiting.  Patient denies any abdominal pain or urinary symptoms.  Patient reports feeling weak all over which promoted her to come to the ER.  Patient also reporting mild cough that is nonproductive.  Patient denies any previous diagnosis of liver disease that she is aware of.     On ER evaluation, patient was hemodynamically stable, her work-up showed creatinine of 1.46, hemoglobin of 8.5, platelets of 120.  Hemoccult blood was positive.  Chest x-ray showed New left basilar airspace disease suggesting pneumonia. Cirrhosis with portal hypertension and ascites. Small nonobstructing right renal calculi.  Bilateral pleural effusions.  Case was discussed with gastroenterology Dr. Collado who  recommended octreotide and Protonix drip and admission for the patient.  Patient received Rocephin, normal saline bolus in addition to Protonix and octreotide while in the ER.  Hospitalist consulted for admission, further evaluation and treatment.     Review Of Systems:    All systems were reviewed and negative except as mentioned in history of presenting illness, assessment and plan.    Past Medical History: Patient  has a past medical history of Anemia, Diabetes mellitus, Hyperlipidemia, Hypertension, Hypothyroidism, and Short-term memory loss.    Past Surgical History: Patient  has a past surgical history that includes Cholecystectomy.    Social History: Patient  reports that she has never smoked. She has never used smokeless tobacco. She reports current alcohol use. She reports that she does not use drugs.    Family History:  Patient's family history has been reviewed and found to be noncontributory.    Allergies:      Patient has no known allergies.    Home Medications:    Prior to Admission Medications     Prescriptions Last Dose Informant Patient Reported? Taking?    albuterol sulfate  (90 Base) MCG/ACT inhaler   No No    Inhale 2 puffs Every 4 (Four) Hours As Needed for Wheezing.    Aspirin 325 MG capsule   Yes No    Take 325 mg by mouth Daily.    atorvastatin (LIPITOR) 40 MG tablet   Yes No    Take 40 mg by mouth Daily.    azithromycin (ZITHROMAX) 500 MG tablet   No No    1 po daily    buPROPion XL (WELLBUTRIN XL) 150 MG 24 hr tablet   Yes No    Take 150 mg by mouth Daily.    donepezil (ARICEPT) 10 MG tablet   Yes No    Take 10 mg by mouth Every Night.    insulin aspart (NovoLOG) 100 UNIT/ML injection   Yes No    Inject  under the skin into the appropriate area as directed 3 (Three) Times a Day Before Meals.    insulin glargine (Lantus) 100 UNIT/ML injection   Yes No    Inject 60 Units under the skin into the appropriate area as directed 2 (Two) Times a Day.    ipratropium-albuterol (DUO-NEB) 0.5-2.5  "mg/3 ml nebulizer   No No    Nebulize q 4 h prn wheezing / shortness of breath    levothyroxine (SYNTHROID, LEVOTHROID) 100 MCG tablet   Yes No    Take 300 mcg by mouth Daily.    lisinopril (PRINIVIL,ZESTRIL) 10 MG tablet   Yes No    Take 10 mg by mouth Daily.    minocycline (MINOCIN,DYNACIN) 100 MG capsule   Yes No    Take 100 mg by mouth Daily.    multivitamin with minerals (SENIOR MULTIVITAMIN PLUS PO)   Yes No    Take 1 tablet by mouth Daily.    predniSONE (DELTASONE) 20 MG tablet   No No    3 po daily for 3 days, 2 po daily for 3 days, 1 po daily for 3 days    SITagliptin (JANUVIA) 50 MG tablet   Yes No    Take 50 mg by mouth Daily.        ED Medications:    Medications   sodium chloride 0.9 % flush 10 mL (has no administration in time range)   pantoprazole (PROTONIX) 40 mg in 100mL NS IVPB (8 mg/hr Intravenous New Bag 4/4/23 1900)   octreotide (sandoSTATIN) 500 mcg in sodium chloride 0.9 % 100 mL (5 mcg/mL) infusion (has no administration in time range)   sodium chloride 0.9 % bolus 1,000 mL (0 mL Intravenous Stopped 4/4/23 1736)   cefTRIAXone (ROCEPHIN) IVPB 1 g/50ml dextrose (premix) (0 g Intravenous Stopped 4/4/23 1845)   pantoprazole (PROTONIX) injection 80 mg (80 mg Intravenous Given 4/4/23 1858)   octreotide (sandoSTATIN) injection 50 mcg (50 mcg Intravenous Given 4/4/23 1859)     Vital Signs:  Temp:  [98.7 °F (37.1 °C)] 98.7 °F (37.1 °C)  Heart Rate:  [74-92] 81  Resp:  [18] 18  BP: ()/(49-64) 128/60        04/04/23  1409   Weight: 113 kg (250 lb)     Body mass index is 41.6 kg/m².    Physical Exam:     Most recent vital Signs: /60   Pulse 81   Temp 98.7 °F (37.1 °C) (Oral)   Resp 18   Ht 165.1 cm (65\")   Wt 113 kg (250 lb)   SpO2 99%   BMI 41.60 kg/m²     Physical Exam  Vitals and nursing note reviewed.   Constitutional:       General: She is not in acute distress.     Appearance: She is obese. She is ill-appearing.   HENT:      Head: Normocephalic and atraumatic.      Right Ear: " External ear normal.      Left Ear: External ear normal.      Nose: Nose normal.      Mouth/Throat:      Mouth: Mucous membranes are moist.   Eyes:      Extraocular Movements: Extraocular movements intact.      Conjunctiva/sclera: Conjunctivae normal.      Pupils: Pupils are equal, round, and reactive to light.   Cardiovascular:      Rate and Rhythm: Normal rate and regular rhythm.      Pulses: Normal pulses.      Heart sounds: Normal heart sounds.   Pulmonary:      Effort: Pulmonary effort is normal. No respiratory distress.      Breath sounds: Normal breath sounds. Decreased air movement present. No wheezing or rhonchi.   Abdominal:      General: Bowel sounds are normal. There is distension.      Palpations: Abdomen is soft.      Tenderness: There is no abdominal tenderness. There is no guarding or rebound.      Comments: Obese abdomen   Musculoskeletal:         General: Normal range of motion.      Cervical back: Normal range of motion and neck supple.      Right lower leg: No edema.      Left lower leg: No edema.   Skin:     General: Skin is warm and dry.      Findings: Rash present.      Comments: Chronic venous stasis in bilateral lower extremities.   Neurological:      General: No focal deficit present.      Mental Status: She is alert and oriented to person, place, and time. Mental status is at baseline.      Motor: No weakness.   Psychiatric:         Mood and Affect: Mood normal.         Behavior: Behavior normal.         Thought Content: Thought content normal.         Laboratory data:    I have reviewed the labs done in the emergency room.    Results from last 7 days   Lab Units 04/04/23  1431   SODIUM mmol/L 142   POTASSIUM mmol/L 3.9   CHLORIDE mmol/L 109*   CO2 mmol/L 24.5   BUN mg/dL 23   CREATININE mg/dL 1.46*   CALCIUM mg/dL 8.2*   BILIRUBIN mg/dL 1.0   ALK PHOS U/L 100   ALT (SGPT) U/L 19   AST (SGOT) U/L 42*   GLUCOSE mg/dL 105*     Results from last 7 days   Lab Units 04/04/23  1431   WBC  10*3/mm3 6.32   HEMOGLOBIN g/dL 8.5*   HEMATOCRIT % 25.3*   PLATELETS 10*3/mm3 120*                     Results from last 7 days   Lab Units 04/04/23  1431   LIPASE U/L 15               Invalid input(s): USDES,  BLOODU, NITRITITE, BACT, EP    Pain Management Panel    There is no flowsheet data to display.         Radiology:    CT Abdomen Pelvis Without Contrast    Result Date: 4/4/2023  FINAL REPORT TECHNIQUE: Routine axial images through the abdomen and pelvis were obtained. CLINICAL HISTORY: diarrhea, abd discomfort FINDINGS: Lower chest: There are left greater than right pleural effusions.  Abdomen:  The gallbladder has been removed.  There is cirrhosis with mild splenomegaly.  There are upper abdominal venous collaterals indicative of portal hypertension.  There are 2 small nonobstructing right renal calculi, the largest measuring 4 mm in diameter.  The other solid abdominal organs and ureters are unremarkable.  The GI tract is unremarkable, including the appendix.  Pelvis: The uterus, ovaries, and urinary bladder are normal.  There is large volume ascites and anasarca.  There is no pelvic or abdominal adenopathy or acute osseous abnormality.     Cirrhosis with portal hypertension and ascites.  Small nonobstructing right renal calculi.  Bilateral pleural effusions. Authenticated and Electronically Signed by Salomon Cantu M.D. on 04/04/2023 06:20:35 PM    XR Chest 1 View    Result Date: 4/4/2023   PROCEDURE: XR CHEST 1 VW-  HISTORY: generalized weakness  COMPARISON: 12/03/2021.  FINDINGS: The heart is mildly enlarged.. The mediastinum is unremarkable. Decreased inspiratory effort noted with crowding of the lung markings in both lung bases. There is new left basilar airspace disease, possible pneumonia. Right lung is clear. Calcified granuloma identified on the left... There is no pneumothorax.  There are no acute osseous abnormalities.      New left basilar airspace disease suggesting pneumonia, recommend follow-up..   .  This report was signed and finalized on 4/4/2023 3:16 PM by Katt Muse MD.      Assessment:    Left lower lobe pneumonia, unspecified further, POA  Suspected GI bleed, POA  Acute on chronic anemia, POA  Acute kidney injury, POA  Suspected decompensated cirrhosis with ascites  Morbid obesity, BMI 41.60  Thrombocytopenia associated with the cirrhosis  Diabetes mellitus  Hyperlipidemia  Hypertension  Hypothyroidism    Plan:    Patient is admitted for further evaluation and treatment.    Suspected GI bleed  Acute on chronic anemia  Suspected decompensated cirrhosis with ascites  -Started on Protonix and octreotide drip.  -Dr. Collado consulted, appreciate his recommendations.  -We will hold aspirin  -We will monitor hemoglobin and transfuse as indicated to keep hemoglobin above 7.  - EGD per Dr. Collado recommendations for possible variceal bleed.  -We will keep patient on clear liquid diet.  -Ultrasound check for ascites, will consult with IR in a.m. for paracentesis  -Hepatitis panel and GI panel ordered.    Left lower lobe pneumonia  -On Rocephin  -Blood cultures obtained after patient received first dose of Rocephin in the ER.  Will follow.    SILVINA  -Could be hepatorenal  -Avoid nephrotoxic drugs, holding lisinopril  -Continue to monitor renal function  -Urinalysis pending.    Sliding-scale insulin, will check hemoglobin A1c, continue home meds otherwise as warranted.  Further orders as indicated per clinical course.    Risk Assessment: Moderate to high  DVT Prophylaxis: SCDs, avoid chemoprophylaxis due to suspected GI bleed  Code Status: Full  Diet: Clear liquid diet    Advance Care Planning   ACP discussion was held with the patient during this visit. Patient does not have an advance directive, information provided.      Marcos Altman MD  04/04/23  19:59 EDT    Dictated utilizing Dragon dictation.    Electronically signed by Marcos Altman MD at 04/05/23 9478          Physician Progress Notes (last 24  hours)      Radha Saavedra APRN at 23 0915              [facility]    Name:  Ivania Harris   Age:  62 y.o.  Sex:  female  :  1960  MRN:  [id number]   Visit Number:  [id number]  Admission Date:  2023  Date Of Service:  23  Primary Care Physician:  Alessandro Mercer MD     LOS: 7 days :    Chief Complaint:      Generalized weakness    Subjective:    Patient seen and examined at bedside this morning.  Patient is found resting in bed with family at bedside.  She is very pleasant in conversation.  States she is feeling much better with every day.  Resting on room air with stable saturations noted.  Has no complaints.  Awaiting STR placement.    Hospital Course:    The patient is a 62-year-old woman with past medical history of obesity BMI 53, type 2 diabetes, apparent anemia unknown type, hyperlipidemia, hypertension, hypothyroidism, who presented to the emergency room with concern for generalized weakness and diarrhea for 3 days with no vomiting.     Upon ER work-up, she had a creatinine of 1.46 with a hemoglobin of 8.5 and platelets of 120.  She had positive FOBT testing.  A chest x-ray was possibly showing a left basilar pneumonia.  There was evidence of cirrhosis and portal hypertension with ascites with a nonobstructing right renal calculi.  There was bilateral effusions.  GI was consulted from the emergency room and she was started on octreotide and Protonix.  She received Rocephin and a normal saline bolus in addition.  She was admitted to the hospital service.     Patient had evidence of worsening anemia, was ordered 1 unit PRBCs on morning of 2023, acute on chronic.       EGD  by gastroenterology-Dr. Mandujano; no gross lesions entire esophagus, no varices, small amount of food residue in stomach suggesting gastroparesis, nonbleeding gastric ulcer with a flat pigmented spot, nonbleeding gastric ulcer with no stigmata of bleeding,  ulcer scarring gastric antrum, erythematous mucosa gastric body, antrum, prepyloric region of stomach biopsied, mild portal hypertensive gastropathy, normal duodenal portions, no current bleeding, likely NSAID/ASA induced ulcer.     Gastroenterology recommends low fiber diet, low-salt small meals, PPI daily, avoid NSAIDs, hold ASA 2 weeks, iron pills daily, await pathology results, repeat upper endoscopy in 6 months for surveillance, return to GI office in 8 weeks.  Hepatic testing for cirrhosis ordered by GI.  Peritoneal fluid and blood cultures no growth.     Completed 5-day treatment for left lower lobe pneumonia, low suspicion for true active infection, may have been chronic CXR changes.     Required oxygen while sleeping, recommend outpatient sleep study.     Nephrology consulted for SILVINA on CKD.  Stable for DC per nephrology perspective as of 4/10, awaiting STR placement .  Nephrology to provide medication recommendations for discharge.       Review of Systems:     All systems were reviewed and negative except as mentioned in subjective, assessment and plan.    Vital Signs:    Temp:  [97.8 °F (36.6 °C)-98.7 °F (37.1 °C)] 97.8 °F (36.6 °C)  Heart Rate:  [58-81] 66  Resp:  [16-18] 16  BP: (139-175)/(40-76) 154/74    Intake and output:    I/O last 3 completed shifts:  In: 660 [P.O.:660]  Out: 2900 [Urine:2900]  I/O this shift:  In: 330 [P.O.:330]  Out: -     Physical Examination:    General Appearance:  Alert and cooperative, pleasant morbidly obese female   Head:  Atraumatic and normocephalic.   Eyes: Conjunctivae and sclerae normal, no icterus. No pallor.   Throat: No oral lesions, no thrush, oral mucosa moist.   Neck: Supple, trachea midline   Lungs:   Breath sounds heard bilaterally equally.  No wheezing or crackles.  Unlabored on room air   Heart:  Normal S1 and S2, no murmur, no gallop, no rub. No JVD.   Abdomen:   Normal bowel sounds, no masses, no organomegaly. Soft, nontender, distention with ascites, no  rebound tenderness.   Extremities: Lower extremity edema bilaterally with chronic venous insufficiency changes, scaly and dark in appearance   Skin: No bleeding or rash, Skin is jaundiced   Neurologic: Alert and oriented x 3. No facial asymmetry. Moves all four limbs. No tremors. Generalized weakness is present     Laboratory results:    Results from last 7 days   Lab Units 04/11/23  0548 04/10/23  0620 04/09/23  0454 04/08/23  0542 04/07/23  0520 04/05/23  0529 04/04/23  1431   SODIUM mmol/L 146* 145 144   < > 141   < > 142   POTASSIUM mmol/L 3.3* 3.3* 3.7   < > 3.7   < > 3.9   CHLORIDE mmol/L 111* 109* 111*   < > 109*   < > 109*   CO2 mmol/L 26.9 26.4 24.3   < > 23.6   < > 24.5   BUN mg/dL 26* 25* 28*   < > 29*   < > 23   CREATININE mg/dL 1.59* 1.64* 1.59*   < > 1.72*   < > 1.46*   CALCIUM mg/dL 7.2* 7.5* 7.6*   < > 8.1*   < > 8.2*   BILIRUBIN mg/dL  --   --   --   --  1.0  --  1.0   ALK PHOS U/L  --   --   --   --  77  --  100   ALT (SGPT) U/L  --   --   --   --  19  --  19   AST (SGOT) U/L  --   --   --   --  55*  --  42*   GLUCOSE mg/dL 160* 165* 148*   < > 160*   < > 105*    < > = values in this interval not displayed.     Results from last 7 days   Lab Units 04/11/23  0548 04/10/23  0620 04/09/23  0454   WBC 10*3/mm3 4.95 5.02 4.98   HEMOGLOBIN g/dL 7.8* 8.1* 7.8*   HEMATOCRIT % 20.9* 23.5* 22.7*   PLATELETS 10*3/mm3 113* 107* 85*     Results from last 7 days   Lab Units 04/05/23  0529   INR  1.48*         Results from last 7 days   Lab Units 04/04/23  2315 04/04/23  2300   BLOODCX  No growth at 5 days No growth at 5 days     No results for input(s): PHART, IFS3ELH, PO2ART, AYC8XZR, BASEEXCESS in the last 8760 hours.   I have reviewed the patient's laboratory results.    Radiology results:    No radiology results from the last 24 hrs  I have reviewed the patient's radiology reports.    Medication Review:     I have reviewed the patient's active and prn medications.     Problem List:      Pneumonia of left  lung due to infectious organism, unspecified part of lung    Gastrointestinal hemorrhage    Cirrhosis of liver with ascites    Sleep apnea    Iron deficiency anemia    SILVINA (acute kidney injury)    CKD (chronic kidney disease)    Stasis dermatitis of both legs    Debility    Type 2 diabetes mellitus    Essential hypertension    Dyslipidemia    Hypothyroidism (acquired)      Assessment:      Pneumonia of left lung due to infectious organism, unspecified part     of lung    Gastrointestinal hemorrhage    Cirrhosis of liver with ascites    Sleep apnea    Iron deficiency anemia    SILVINA on CKD    Stasis dermatitis of both legs    Debility/ Impaired ADLs and mobility    Type 2 diabetes mellitus    Essential hypertension    Dyslipidemia    Hypothyroidism (acquired)    Plan:    Sleep apnea, suspected  -Oxygen while sleeping.  Recommend outpatient sleep study with likely underlying BUTCH     Iron deficiency  -Continue iron pills daily.    -Iron panel--Iron 22, iron saturation 21, transferrin 72, TIBC 107.     Suspected upper GI bleed, resolved  Acute on chronic anemia, resolved  Suspected decompensated cirrhosis with ascites, likely nonalcoholic fatty liver disease  -- GI consulted with EGD 4/6 with Dr. Mandujano.   -- PPI daily  -- Avoid NSAIDS  -- Hold ASA for 2 weeks  -- Iron Pills daily  -- Repeat upper endoscopy in 6 months for surveillance. Outpatient GI follow up 8-week  --Hepatic testing for causes of cirrhosis of already been ordered by GI.  -- peritoneum fluid with no growth  -Suspected gastroparesis     Left lower lobe pneumonia, resolved  -Was started on Rocephin, transitioned to oral ceftin, completed 5-day treatment.  -Blood cultures and peritoneal fluid negative.  -On room air     SILVINA  -Could be hepatorenal  -Avoid nephrotoxic drugs, holding lisinopril  -Continue to monitor renal function  -UA with pyria, no culture, already on cephalosporin  -Nephrology consultation, recommendations appreciated.  -Nephrology  planning to start oral Lasix today     Debility  -- lives alone  -PT/OT consulted  -Awaiting STR placement      Wound care  -Per wound care consult; dry flaking skin on back, hyperkeratotic skin to bilateral lower extremities and lower panus. Skin folds moist red. Left lower skin fold with redness, blisters and open blister. Was not able to lay patient flat to assess all skin folds due to patient complaints of difficulty breathing.  -Recommendations for care include a turning schedule, barrier cream twice daily and prn after cleansing, using dry sheets in folds, float heels off bed with pillows, encourage increase mobility as appropriate and tolerated to reduce pressure, for dry skin apply lotion/cream and a nutrition consult for dietary needs.    DVT Prophylaxis: SCDs  Code Status: Full Code  Diet: Diabetic/ Low Na/ Fluid restriction  Discharge Plan: STR placement pending    MITRA Salinas  23  11:52 EDT    Dictated utilizing Dragon dictation.      Electronically signed by Radha Saavedra APRN at 23 1203     Kerley, Brian Joseph, DO at 04/10/23 1646              [facility]    Name:  Ivania Harris   Age:  62 y.o.  Sex:  female  :  1960  MRN:  [id number]   Visit Number:  [id number]  Admission Date:  2023  Date Of Service:  04/10/23  Primary Care Physician:  Alessandro Mercer MD     LOS: 6 days :    Chief Complaint:      Follow-up; generalized weakness, diarrhea    Subjective:    Feeling good today.  Reviewed again her conditions and plans for follow-ups.  Thorough discussion of severity of cirrhosis, CKD.  No immediate concerns.    Hospital Course:    The patient is a 62-year-old woman with past medical history of obesity BMI 53, type 2 diabetes, apparent anemia unknown type, hyperlipidemia, hypertension, hypothyroidism, who presented to the emergency room with concern for generalized weakness and diarrhea for 3 days with no  vomiting.     Upon ER work-up, she had a creatinine of 1.46 with a hemoglobin of 8.5 and platelets of 120.  She had positive FOBT testing.  A chest x-ray was possibly showing a left basilar pneumonia.  There was evidence of cirrhosis and portal hypertension with ascites with a nonobstructing right renal calculi.  There was bilateral effusions.  GI was consulted from the emergency room and she was started on octreotide and Protonix.  She received Rocephin and a normal saline bolus in addition.  She was admitted to the hospital service.     Patient had evidence of worsening anemia, was ordered 1 unit PRBCs on morning of 4/5/2023, acute on chronic.      EGD 4/6 by gastroenterology-Dr. Mandujano; no gross lesions entire esophagus, no varices, small amount of food residue in stomach suggesting gastroparesis, nonbleeding gastric ulcer with a flat pigmented spot, nonbleeding gastric ulcer with no stigmata of bleeding, ulcer scarring gastric antrum, erythematous mucosa gastric body, antrum, prepyloric region of stomach biopsied, mild portal hypertensive gastropathy, normal duodenal portions, no current bleeding, likely NSAID/ASA induced ulcer.    Gastroenterology recommends low fiber diet, low-salt small meals, PPI daily, avoid NSAIDs, hold ASA 2 weeks, iron pills daily, await pathology results, repeat upper endoscopy in 6 months for surveillance, return to GI office in 8 weeks.  Hepatic testing for cirrhosis ordered by GI.  Peritoneal fluid and blood cultures no growth.    Completed 5-day treatment for left lower lobe pneumonia, low suspicion for true active infection, may have been chronic CXR changes.    Required oxygen while sleeping, recommend outpatient sleep study.    Nephrology consulted for SILVINA on CKD.  Stable for DC per nephrology perspective as of 4/10, awaiting STR placement .  Nephrology to provide medication recommendations for discharge.    Wound care  Per wound care consult; dry flaking skin on back,  hyperkeratotic skin to bilateral lower extremities and lower panus. Skin folds moist red. Left lower skin fold with redness, blisters and open blister. Was not able to lay patient flat to assess all skin folds due to patient complaints of difficulty breathing.  Orders written per standing order sets for dry skin and red skin folds.   Recommendations for care include a turning schedule, barrier cream twice daily and prn after cleansing, using dry sheets in folds, float heels off bed with pillows, encourage increase mobility as appropriate and tolerated to reduce pressure, for dry skin apply lotion/cream and a nutrition consult for dietary needs.     Medically stable.  STR pending.    Review of Systems:     All systems were reviewed and negative except as mentioned in subjective, assessment and plan.    Vital Signs:    Temp:  [97.6 °F (36.4 °C)-98.4 °F (36.9 °C)] 98.2 °F (36.8 °C)  Heart Rate:  [58-77] 69  Resp:  [16-18] 16  BP: (112-167)/(39-64) 141/40    Intake and output:    I/O last 3 completed shifts:  In: 1620 [P.O.:1620]  Out: 5175 [Urine:5175]  I/O this shift:  In: 240 [P.O.:240]  Out: 350 [Urine:350]    Physical Examination:    General Appearance:  Alert and cooperative.  Obese   Head:  Atraumatic and normocephalic.   Eyes: Conjunctivae and sclerae normal, no icterus. No pallor.   Throat: No oral lesions, no thrush, oral mucosa moist.   Neck: Supple, trachea midline, no thyromegaly.   Lungs:   Breath sounds heard bilaterally equally.  No wheezing or crackles. No Pleural rub or bronchial breathing.   Heart:  Normal S1 and S2, no murmur, no gallop, no rub. No JVD.   Abdomen:    Obese.  Normal bowel sounds, no masses, no organomegaly. Soft, nontender, nondistended, no rebound tenderness.   Extremities:  Dark chronic venous insufficiency and scaled changes bilateral lower extremities with trace edema   Skin:  Jaundice.   Neurologic: Alert and oriented x 3. No facial asymmetry. Moves all four limbs. No tremors.       Laboratory results:    Results from last 7 days   Lab Units 04/10/23  0620 04/09/23  0454 04/08/23  0542 04/07/23  0520 04/05/23  0529 04/04/23  1431   SODIUM mmol/L 145 144 143 141   < > 142   POTASSIUM mmol/L 3.3* 3.7 3.7 3.7   < > 3.9   CHLORIDE mmol/L 109* 111* 111* 109*   < > 109*   CO2 mmol/L 26.4 24.3 23.1 23.6   < > 24.5   BUN mg/dL 25* 28* 27* 29*   < > 23   CREATININE mg/dL 1.64* 1.59* 1.71* 1.72*   < > 1.46*   CALCIUM mg/dL 7.5* 7.6* 7.7* 8.1*   < > 8.2*   BILIRUBIN mg/dL  --   --   --  1.0  --  1.0   ALK PHOS U/L  --   --   --  77  --  100   ALT (SGPT) U/L  --   --   --  19  --  19   AST (SGOT) U/L  --   --   --  55*  --  42*   GLUCOSE mg/dL 165* 148* 149* 160*   < > 105*    < > = values in this interval not displayed.     Results from last 7 days   Lab Units 04/10/23  0620 04/09/23  0454 04/08/23  0542   WBC 10*3/mm3 5.02 4.98 5.44   HEMOGLOBIN g/dL 8.1* 7.8* 8.0*   HEMATOCRIT % 23.5* 22.7* 22.6*   PLATELETS 10*3/mm3 107* 85* 91*     Results from last 7 days   Lab Units 04/05/23  0529   INR  1.48*         Results from last 7 days   Lab Units 04/04/23  2315 04/04/23  2300   BLOODCX  No growth at 5 days No growth at 5 days     No results for input(s): PHART, SPS9YJO, PO2ART, UVV8NXU, BASEEXCESS in the last 8760 hours.   I have reviewed the patient's laboratory results.    Radiology results:    No radiology results from the last 24 hrs  I have reviewed the patient's radiology reports.    Medication Review:     I have reviewed the patient's active and prn medications.     Problem List:      Pneumonia of left lung due to infectious organism, unspecified part of lung    Gastrointestinal hemorrhage    Cirrhosis of liver with ascites    Sleep apnea    Iron deficiency anemia    SILVINA (acute kidney injury)    CKD (chronic kidney disease)    Stasis dermatitis of both legs    Debility    Type 2 diabetes mellitus    Essential hypertension    Dyslipidemia    Hypothyroidism  (acquired)      Assessment/Plan:    Afebrile, vital signs stable on room air.  Comfortable in chair.  All questions answered.  Creatinine waxing and waning, currently 1.64.  Per nephrology she is stable for DC from renal point of view.  Dr. Ellis will have medication recommendations.  She will follow-up with nephrology as well as gastroenterology.  Had thorough discussion with patient regarding severity of diagnoses like cirrhosis and CKD.    Sleep apnea, suspected  Oxygen while sleeping.  Recommend outpatient sleep study.    Iron deficiency  Continue iron pills daily.  Iron 22, iron saturation 21, transferrin 72, TIBC 107.    Suspected upper GI bleed, resolved  Acute on chronic anemia, resolved  Suspected decompensated cirrhosis with ascites, likely nonalcoholic fatty liver disease  -- GI followed  -- EGD 4/6 with Dr. Mandujano.   -- PPI daily  -- Avoid NSAIDS  -- Hold ASA for 2 weeks  -- Iron Pills daily  -- Repeat upper endoscopy in 6 months for surveillance. Outpatient GI follow up 8-week  --Hepatic testing for causes of cirrhosis of already been ordered by GI.  -- peritoneum fluid with no growth  -Suspected gastroparesis     Left lower lobe pneumonia, resolved  -Was started on Rocephin, transitioned to oral ceftin, completed 5-day treatment.  Blood cultures and peritoneal fluid negative.     SILVINA  -Could be hepatorenal  -Avoid nephrotoxic drugs, holding lisinopril  -Continue to monitor renal function  -UA with pyria, no culture, already on cephalosporin  -Nephrology consultation, recommendations appreciated.     Debility  -- lives alone, PT/OT recommends rehab, patient agreeable     Wound care  Per wound care consult; dry flaking skin on back, hyperkeratotic skin to bilateral lower extremities and lower panus. Skin folds moist red. Left lower skin fold with redness, blisters and open blister. Was not able to lay patient flat to assess all skin folds due to patient complaints of difficulty breathing.  Orders  written per standing order sets for dry skin and red skin folds.   Recommendations for care include a turning schedule, barrier cream twice daily and prn after cleansing, using dry sheets in folds, float heels off bed with pillows, encourage increase mobility as appropriate and tolerated to reduce pressure, for dry skin apply lotion/cream and a nutrition consult for dietary needs.      DVT Prophylaxis: SCDs  Code Status: Full  Diet:  Carbohydrate controlled diet, low-sodium, fluid restriction  Discharge Plan:  Rehab placement pending    Brian Joseph Kerley, DO  04/10/23  16:46 EDT    Dictated utilizing Dragon dictation.      Electronically signed by Kerley, Brian Joseph, DO at 04/10/23 1657          Physical Therapy Notes (last 48 hours)      Taisha La PTA at 23  Version 1 of        Goal Outcome Evaluation:  Plan of Care Reviewed With: patient        Progress: no change  Outcome Evaluation: Pt agreeable to therapy this AM. Pt performed B LE ex in supine AP, heelslides, hip abd, SAQ, QS, glut sets 1x10 reps. Rolling L side for bed pan placement with min/CGA and VC. Pt declined sitting EOB and standing d/t given lasix and with any streneous activty causes incontence. Con't with PT POC and progress as tolerated    Electronically signed by Taisha La PTA at 23     Taisha La PTA at 23  Version 1 of 1         Patient Name: Ivania Harris  : 1960    MRN: [id number]                              Today's Date: 2023       Admit Date: 2023    Visit Dx:     ICD-10-CM ICD-9-CM   1. Pneumonia of left lung due to infectious organism, unspecified part of lung  J18.9 486   2. Gastrointestinal hemorrhage, unspecified gastrointestinal hemorrhage type  K92.2 578.9   3. Other cirrhosis of liver  K74.69 571.5   4. Cirrhosis of liver with ascites, unspecified hepatic cirrhosis type  K74.60 571.5    R18.8      Patient Active Problem List   Diagnosis   •  Pneumonia of left lung due to infectious organism, unspecified part of lung   • Gastrointestinal hemorrhage   • Cirrhosis of liver with ascites   • Sleep apnea   • Iron deficiency anemia   • SILVINA (acute kidney injury)   • CKD (chronic kidney disease)   • Stasis dermatitis of both legs   • Debility   • Type 2 diabetes mellitus   • Essential hypertension   • Dyslipidemia   • Hypothyroidism (acquired)     Past Medical History:   Diagnosis Date   • Anemia    • Diabetes mellitus    • Hyperlipidemia    • Hypertension    • Hypothyroidism    • Impaired mobility    • Short-term memory loss      Past Surgical History:   Procedure Laterality Date   • CHOLECYSTECTOMY     • ENDOSCOPY W/ BANDING N/A 4/6/2023    Procedure: ESOPHAGOGASTRODUODENOSCOPY WITH BIOPSY;  Surgeon: Jens Mandujano MD;  Location: [facility] ENDOSCOPY;  Service: Gastroenterology;  Laterality: N/A;      General Information     Row Name 04/09/23 1727          Physical Therapy Time and Intention    Document Type therapy note (daily note)  -     Mode of Treatment physical therapy  -CC     Row Name 04/09/23 1727          General Information    Patient Profile Reviewed yes  -     Existing Precautions/Restrictions fall  -CC     Row Name 04/09/23 1727          Safety Issues, Functional Mobility    Safety Issues Affecting Function (Mobility) awareness of need for assistance;insight into deficits/self-awareness;safety precautions follow-through/compliance;sequencing abilities  -CC     Impairments Affecting Function (Mobility) balance;endurance/activity tolerance;shortness of breath;strength  -CC           User Key  (r) = Recorded By, (t) = Taken By, (c) = Cosigned By    Initials Name Provider Type    CC Taisha La PTA Physical Therapist Assistant               Mobility    No documentation.                Obj/Interventions    No documentation.                Goals/Plan    No documentation.                Clinical Impression     Row Name 04/09/23 1720           Pain    Pretreatment Pain Rating 0/10 - no pain  -CC     Posttreatment Pain Rating 0/10 - no pain  -CC     Row Name 04/09/23 1728          Plan of Care Review    Plan of Care Reviewed With patient  -CC     Outcome Evaluation Pt agreeable to therapy this AM. Pt performed B LE ex in supine AP, heelslides, hip abd, SAQ, QS, glut sets 1x10 reps. Rolling L side for bed pan placement with min/CGA and VC. Pt declined sitting EOB and standing d/t given lasix and with any streneous activty causes incontence. Con't with PT POC and progress as tolerated  -CC     Row Name 04/09/23 1728          Positioning and Restraints    Pre-Treatment Position in bed  -CC     Post Treatment Position bed  -CC     In Bed supine;call light within reach;encouraged to call for assist  -CC           User Key  (r) = Recorded By, (t) = Taken By, (c) = Cosigned By    Initials Name Provider Type    Taisha Alvarado PTA Physical Therapist Assistant               Outcome Measures     Row Name 04/09/23 1731          How much help from another person do you currently need...    Turning from your back to your side while in flat bed without using bedrails? 3  -CC     Moving from lying on back to sitting on the side of a flat bed without bedrails? 2  -CC     Moving to and from a bed to a chair (including a wheelchair)? 1  -CC     Standing up from a chair using your arms (e.g., wheelchair, bedside chair)? 2  -CC     Climbing 3-5 steps with a railing? 1  -CC     To walk in hospital room? 1  -CC     AM-PAC 6 Clicks Score (PT) 10  -CC     Highest level of mobility 4 --> Transferred to chair/commode  -CC     Row Name 04/09/23 1731          Functional Assessment    Outcome Measure Options AM-PAC 6 Clicks Basic Mobility (PT)  -CC           User Key  (r) = Recorded By, (t) = Taken By, (c) = Cosigned By    Initials Name Provider Type    Taisha Alvarado PTA Physical Therapist Assistant                             Physical Therapy Education     Title: PT  OT SLP Therapies (In Progress)     Topic: Physical Therapy (Done)     Point: Mobility training (Done)     Learning Progress Summary           Patient Acceptance, E, VU by  at 4/6/2023 1656    Comment: Importance of mobility                   Point: Home exercise program (Done)     Learning Progress Summary           Patient Acceptance, E,TB, VU by  at 4/8/2023 1731    Comment: Perform B LE ex btw therapy sessions                   Point: Body mechanics (Done)     Learning Progress Summary           Patient Acceptance, E,TB, VU,NR by  at 4/7/2023 1526    Comment: Upright posture in standing                   Point: Precautions (Done)     Learning Progress Summary           Patient Acceptance, E,TB, VU,NR by  at 4/9/2023 1732    Comment: Importance of frequent position changes to decrease risk of skin breakdown.                               User Key     Initials Effective Dates Name Provider Type Discipline     06/16/21 -  Taisha La, PTA Physical Therapist Assistant PT     12/29/22 -  Haydee Red, BALJEET Student PT Student PT              PT Recommendation and Plan     Plan of Care Reviewed With: patient  Progress: no change  Outcome Evaluation: Pt agreeable to therapy this AM. Pt performed B LE ex in supine AP, heelslides, hip abd, SAQ, QS, glut sets 1x10 reps. Rolling L side for bed pan placement with min/CGA and VC. Pt declined sitting EOB and standing d/t given lasix and with any streneous activty causes incontence. Con't with PT POC and progress as tolerated     Time Calculation:    PT Charges     Row Name 04/09/23 1733             Time Calculation    PT Received On 04/09/23  -CC      PT Goal Re-Cert Due Date 04/16/23  -CC         Time Calculation- PT    Total Timed Code Minutes- PT 25 minute(s)  -CC         Timed Charges    79425 - PT Therapeutic Exercise Minutes 17  -CC      90173 - PT Therapeutic Activity Minutes 8  -CC         Total Minutes    Timed Charges Total Minutes 25  -CC        Total Minutes 25  -CC            User Key  (r) = Recorded By, (t) = Taken By, (c) = Cosigned By    Initials Name Provider Type    CC Taisha La PTA Physical Therapist Assistant              Therapy Charges for Today     Code Description Service Date Service Provider Modifiers Qty    23713918712 HC PT THER PROC EA 15 MIN 2023 Taisha La, SHARMILA GP 1    10218290730 HC PT THER PROC EA 15 MIN 2023 Taisha La, SHARMILA GP 1    67351363029 HC PT THERAPEUTIC ACT EA 15 MIN 2023 Taisha La PTA GP 1          PT G-Codes  Outcome Measure Options: AM-PAC 6 Clicks Basic Mobility (PT)  AM-PAC 6 Clicks Score (PT): 10  AM-PAC 6 Clicks Score (OT): 16       Taisha La PTA  2023      Electronically signed by Taisha La PTA at 23 1734     Sean Mercado PTA at 04/10/23 141  Version 1 of        Goal Outcome Evaluation:  Plan of Care Reviewed With: patient        Progress: improving  Outcome Evaluation: Pt supine in bed and willing to work with therapy.  Pt performed transfer to EOB with min a x 2 . Pt perfomed sit to stand x 2 reps with rw min a.  Pt performed sit step from bed to chair min a rw x 2 assist .  See flowsheet for details.    Electronically signed by Sean Mercado PTA at 04/10/23 1411     Sean Mercado PTA at 04/10/23 1412  Version 1 of          Patient Name: Ivania Harris  : 1960    MRN: [id number]                              Today's Date: 4/10/2023       Admit Date: 2023    Visit Dx:     ICD-10-CM ICD-9-CM   1. Pneumonia of left lung due to infectious organism, unspecified part of lung  J18.9 486   2. Gastrointestinal hemorrhage, unspecified gastrointestinal hemorrhage type  K92.2 578.9   3. Other cirrhosis of liver  K74.69 571.5   4. Cirrhosis of liver with ascites, unspecified hepatic cirrhosis type  K74.60 571.5    R18.8      Patient Active Problem List   Diagnosis   • Pneumonia of left lung due to infectious organism,  unspecified part of lung   • Gastrointestinal hemorrhage   • Cirrhosis of liver with ascites   • Sleep apnea   • Iron deficiency anemia   • SILVINA (acute kidney injury)   • CKD (chronic kidney disease)   • Stasis dermatitis of both legs   • Debility   • Type 2 diabetes mellitus   • Essential hypertension   • Dyslipidemia   • Hypothyroidism (acquired)     Past Medical History:   Diagnosis Date   • Anemia    • Diabetes mellitus    • Hyperlipidemia    • Hypertension    • Hypothyroidism    • Impaired mobility    • Short-term memory loss      Past Surgical History:   Procedure Laterality Date   • CHOLECYSTECTOMY     • ENDOSCOPY W/ BANDING N/A 4/6/2023    Procedure: ESOPHAGOGASTRODUODENOSCOPY WITH BIOPSY;  Surgeon: Jens Mandujano MD;  Location: [facility] ENDOSCOPY;  Service: Gastroenterology;  Laterality: N/A;      General Information     Row Name 04/10/23 1406          Physical Therapy Time and Intention    Document Type therapy note (daily note)  -RM     Mode of Treatment co-treatment;physical therapy;occupational therapy  -RM     Row Name 04/10/23 1406          General Information    Patient Profile Reviewed yes  -RM     Existing Precautions/Restrictions fall  -RM     Row Name 04/10/23 1406          Cognition    Orientation Status (Cognition) oriented x 4  -RM     Row Name 04/10/23 1406          Safety Issues, Functional Mobility    Safety Issues Affecting Function (Mobility) sequencing abilities;positioning of assistive device;safety precautions follow-through/compliance  -RM     Impairments Affecting Function (Mobility) balance;endurance/activity tolerance;shortness of breath;strength  -RM           User Key  (r) = Recorded By, (t) = Taken By, (c) = Cosigned By    Initials Name Provider Type    RM Sean Mercado, PTA Physical Therapist Assistant               Mobility     Row Name 04/10/23 1407          Bed Mobility    Scooting/Bridging [place] (Bed Mobility) minimum assist (75% patient effort);2 person  assist  -RM     Supine-Sit [place] (Bed Mobility) minimum assist (75% patient effort);2 person assist  -RM     Assistive Device (Bed Mobility) head of bed elevated;draw sheet  -RM     Row Name 04/10/23 1407          Bed-Chair Transfer    Bed-Chair [place] (Transfers) minimum assist (75% patient effort);2 person assist;verbal cues;nonverbal cues (demo/gesture)  -RM     Assistive Device (Bed-Chair Transfers) walker, front-wheeled  -RM     Row Name 04/10/23 1407          Sit-Stand Transfer    Sit-Stand [place] (Transfers) contact guard;minimum assist (75% patient effort);nonverbal cues (demo/gesture)  -RM     Assistive Device (Sit-Stand Transfers) walker, front-wheeled  -RM     Comment, (Sit-Stand Transfer) performed x 2  -RM     Row Name 04/10/23 1407          Gait/Stairs (Locomotion)    [place] Level (Gait) unable to assess  -RM           User Key  (r) = Recorded By, (t) = Taken By, (c) = Cosigned By    Initials Name Provider Type    RM Sean Mercado, PTA Physical Therapist Assistant               Obj/Interventions    No documentation.                Goals/Plan    No documentation.                Clinical Impression     Row Name 04/10/23 1408          Pain    Pretreatment Pain Rating 0/10 - no pain  -RM     Posttreatment Pain Rating 0/10 - no pain  -RM     Row Name 04/10/23 1408          Plan of Care Review    Plan of Care Reviewed With patient  -RM     Progress improving  -RM     Outcome Evaluation Pt supine in bed and willing to work with therapy.  Pt performed transfer to EOB with min a x 2 . Pt perfomed sit to stand x 2 reps with rw min a.  Pt performed sit step from bed to chair min a rw x 2 assist .  See flowsheet for details.  -RM     Row Name 04/10/23 1408          Positioning and Restraints    Pre-Treatment Position in bed  -RM     Post Treatment Position chair  -RM     In Chair reclined;call light within reach;encouraged to call for assist;exit alarm on;notified nsg  -RM            User Key  (r) = Recorded By, (t) = Taken By, (c) = Cosigned By    Initials Name Provider Type    Sean Flores, PTA Physical Therapist Assistant               Outcome Measures     Row Name 04/10/23 1410          How much help from another person do you currently need...    Turning from your back to your side while in flat bed without using bedrails? 3  -RM     Moving from lying on back to sitting on the side of a flat bed without bedrails? 2  -RM     Moving to and from a bed to a chair (including a wheelchair)? 2  -RM     Standing up from a chair using your arms (e.g., wheelchair, bedside chair)? 2  -RM     Climbing 3-5 steps with a railing? 1  -RM     To walk in hospital room? 1  -RM     AM-PAC 6 Clicks Score (PT) 11  -RM     Highest level of mobility 4 --> Transferred to chair/commode  -RM     Row Name 04/10/23 1410 04/10/23 1159       Functional Assessment    Outcome Measure Options AM-PAC 6 Clicks Basic Mobility (PT)  -RM AM-PAC 6 Clicks Daily Activity (OT)  -AH          User Key  (r) = Recorded By, (t) = Taken By, (c) = Cosigned By    Initials Name Provider Type     Kala Landa Occupational Therapist    Sean Flores, PTA Physical Therapist Assistant                             Physical Therapy Education     Title: PT OT SLP Therapies (In Progress)     Topic: Physical Therapy (Done)     Point: Mobility training (Done)     Learning Progress Summary           Patient Acceptance, E,TB,D, VU,NR by RM at 4/10/2023 1411    Comment: sequencing with mobility    Acceptance, E, VU by  at 4/6/2023 1656    Comment: Importance of mobility                   Point: Home exercise program (Done)     Learning Progress Summary           Patient Acceptance, E,TB, VU by CC at 4/8/2023 1731    Comment: Perform B LE ex btw therapy sessions                   Point: Body mechanics (Done)     Learning Progress Summary           Patient Acceptance, E,TB, VU,NR by CC at 4/7/2023 1526    Comment: Upright posture in  standing                   Point: Precautions (Done)     Learning Progress Summary           Patient Acceptance, E,TB, VU,NR by  at 4/9/2023 1732    Comment: Importance of frequent position changes to decrease risk of skin breakdown.                               User Key     Initials Effective Dates Name Provider Type Discipline     06/16/21 -  Taisha La, PTA Physical Therapist Assistant PT     06/16/21 -  Sean Mercado, PTA Physical Therapist Assistant PT     12/29/22 -  Haydee Red, BALJEET Student PT Student PT              PT Recommendation and Plan     Plan of Care Reviewed With: patient  Progress: improving  Outcome Evaluation: Pt supine in bed and willing to work with therapy.  Pt performed transfer to EOB with min a x 2 . Pt perfomed sit to stand x 2 reps with rw min a.  Pt performed sit step from bed to chair min a rw x 2 assist .  See flowsheet for details.     Time Calculation:    PT Charges     Row Name 04/10/23 1411             Time Calculation    Start Time 0959  -RM      Stop Time 1023  -RM      Time Calculation (min) 24 min  -RM      PT Received On 04/10/23  -RM      PT Goal Re-Cert Due Date 04/16/23  -RM         Time Calculation- PT    Total Timed Code Minutes- PT 24 minute(s)  -RM         Timed Charges    41962 - PT Therapeutic Activity Minutes 24  -RM         Total Minutes    Timed Charges Total Minutes 24  -RM       Total Minutes 24  -RM            User Key  (r) = Recorded By, (t) = Taken By, (c) = Cosigned By    Initials Name Provider Type     Sean Mercado, SHARMILA Physical Therapist Assistant              Therapy Charges for Today     Code Description Service Date Service Provider Modifiers Qty    [id number]  PT THERAPEUTIC ACT EA 15 MIN 4/10/2023 Sean Mercado, PTA GP 2          PT G-Codes  Outcome Measure Options: AM-PAC 6 Clicks Basic Mobility (PT)  AM-PAC 6 Clicks Score (PT): 11  AM-PAC 6 Clicks Score (OT): 16       Sean Mercado  PTA  4/10/2023      Electronically signed by Sean Mercado, PTA at 04/10/23 6426

## 2023-04-11 NOTE — PLAN OF CARE
Goal Outcome Evaluation:  Plan of Care Reviewed With: patient        Progress: improving  Outcome Evaluation: VSS, I & O adequate, A & O x 4. Bowel movement today

## 2023-04-11 NOTE — CASE MANAGEMENT/SOCIAL WORK
Case Management/Social Work    Patient Name:  Ivania Harris  YOB: 1960  MRN: [id number]  Admit Date:  4/4/2023        SW spoke with Pt. and her brother at bedside to discuss placement. ORI explained that she spoke with Roslyn at the [facility] in [place] and they are unable to accept Pt. at this time. Pt. And her brother are agreeable to referrals being sent to [facility], and [facility] for placement. ORI will make these referrals. ORI/CM will continue to follow for discharge placement.       Electronically signed by:  Aman Frost  04/11/23 12:09 EDT

## 2023-04-11 NOTE — PLAN OF CARE
Goal Outcome Evaluation:  Plan of Care Reviewed With: patient        Progress: no change  Outcome Evaluation: Pt participated in PTx this date. Pt transferred to EOB with Geoff x2. Pt sat EOB for 8 mins with CGA. Pt stood with Geoff x2 and ambulated 3' with Geoff x2 to the HOB with Rwx. Pt progressing, cont per POC.

## 2023-04-11 NOTE — PROGRESS NOTES
[facility] Progress Note  [facility]. [place]        Patient Name: Ivania Harris  : 1960  MRN: [id number]   LOS: 7 days    Patient Care Team:  Alessandro Mercer MD as PCP - General (Internal Medicine)    Chief Complaint:    Chief Complaint   Patient presents with   • Weakness - Generalized     Primary Care Physician:  Alessandro Mercer MD  Date of admission: 2023    Subjective     Interval History:   Follow-up chronic kidney disease stage IIIb.  Events noted from last 24 hours.  Patient is awake alert and interactive denies having any chest pain or shortness of breath.    Review of Systems:   As noted above.    Objective     Vitals:   Temp:  [97.9 °F (36.6 °C)-98.7 °F (37.1 °C)] 97.9 °F (36.6 °C)  Heart Rate:  [58-81] 72  Resp:  [16-18] 18  BP: (112-175)/(39-76) 145/68    Intake/Output Summary (Last 24 hours) at 2023 0841  Last data filed at 2023 0600  Gross per 24 hour   Intake 360 ml   Output 1100 ml   Net -740 ml       Physical Exam:    General Appearance: alert, oriented x 3, no acute distress   Skin: warm and dry  HEENT: oral mucosa normal, nonicteric sclera  Neck: supple, no JVD  Lungs: Occasional crackles with fair air movement.  Heart: RRR, normal S1 and S2  Abdomen: Obese, soft, nontender, nondistended positive bowel sounds  : no palpable bladder  Extremities: Trace to 1+ edema, no cyanosis or clubbing  Neuro: normal speech and mental status     Scheduled Meds:     Current Facility-Administered Medications   Medication Dose Route Frequency Provider Last Rate Last Admin   • acetaminophen (TYLENOL) tablet 650 mg  650 mg Oral Q4H PRN Jens Mandujano MD   650 mg at 23 1200    Or   • acetaminophen (TYLENOL) 160 MG/5ML solution 650 mg  650 mg Oral Q4H PRN Jens Mandujano MD        Or   • acetaminophen (TYLENOL) suppository 650 mg  650 mg Rectal Q4H PRN Jens Mandujano MD       • albuterol (PROVENTIL) nebulizer solution  0.083% 2.5 mg/3mL  2.5 mg Nebulization Q6H PRN Jens Mandujano MD   2.5 mg at 04/08/23 0035   • atorvastatin (LIPITOR) tablet 40 mg  40 mg Oral Daily Jens Mandujano MD   40 mg at 04/10/23 1025   • buPROPion XL (WELLBUTRIN XL) 24 hr tablet 150 mg  150 mg Oral Daily Jens Mandujano MD   150 mg at 04/10/23 1024   • dextrose (D50W) (25 g/50 mL) IV injection 25 g  25 g Intravenous Q15 Min PRN Kerley, Brian Joseph, DO       • dextrose (GLUTOSE) oral gel 15 g  15 g Oral Q15 Min PRN Kerley, Brian Joseph, DO       • donepezil (ARICEPT) tablet 10 mg  10 mg Oral Nightly Jens Mandujano MD   10 mg at 04/10/23 2047   • furosemide (LASIX) tablet 40 mg  40 mg Oral BID Denis Ellis MD, FASN       • glucagon (human recombinant) (GLUCAGEN DIAGNOSTIC) injection 1 mg  1 mg Intramuscular Q15 Min PRN Kerley, Brian Joseph, DO       • HYDROcodone-acetaminophen (NORCO) 5-325 MG per tablet 1 tablet  1 tablet Oral Q8H PRN Jens Mandujano MD   1 tablet at 04/10/23 2358   • hydrOXYzine (ATARAX) tablet 50 mg  50 mg Oral Q6H PRN Kerley, Brian Joseph, DO   50 mg at 04/11/23 0559   • Insulin Aspart (novoLOG) injection 0-14 Units  0-14 Units Subcutaneous TID AC Kerley, Brian Joseph, DO   3 Units at 04/11/23 0722   • ipratropium-albuterol (DUO-NEB) nebulizer solution 3 mL  3 mL Nebulization Q6H PRN Jens Mandujano MD   3 mL at 04/08/23 0904   • iron polysaccharides (NIFEREX) capsule 150 mg  150 mg Oral Daily Denis Ellis MD, FASN   150 mg at 04/10/23 1025   • levothyroxine (SYNTHROID, LEVOTHROID) tablet 300 mcg  300 mcg Oral Daily Jens Mandujano MD   300 mcg at 04/10/23 1024   • ondansetron (ZOFRAN) injection 4 mg  4 mg Intravenous Q6H PRN Jens Mandujano MD       • pantoprazole (PROTONIX) EC tablet 40 mg  40 mg Oral Q AM Esperanza Garrison APRN   40 mg at 04/11/23 0600   • sodium chloride 0.9 % flush 10 mL  10 mL Intravenous PRN Jens Mandujano MD   10 mL at 04/10/23 1025   •  spironolactone (ALDACTONE) tablet 50 mg  50 mg Oral Daily Denis Ellis MD, FASN   50 mg at 04/10/23 1025       atorvastatin, 40 mg, Oral, Daily  buPROPion XL, 150 mg, Oral, Daily  donepezil, 10 mg, Oral, Nightly  furosemide, 40 mg, Oral, BID  Insulin Aspart, 0-14 Units, Subcutaneous, TID AC  iron polysaccharides, 150 mg, Oral, Daily  levothyroxine, 300 mcg, Oral, Daily  pantoprazole, 40 mg, Oral, Q AM  spironolactone, 50 mg, Oral, Daily        IV Meds:        Results Reviewed:   I have personally reviewed the results from the time of this admission to 4/11/2023 08:41 EDT     Results from last 7 days   Lab Units 04/11/23  0548 04/10/23  0620 04/09/23  0454 04/08/23  0542 04/07/23  0520 04/05/23  0529 04/04/23  1431   SODIUM mmol/L 146* 145 144   < > 141   < > 142   POTASSIUM mmol/L 3.3* 3.3* 3.7   < > 3.7   < > 3.9   CHLORIDE mmol/L 111* 109* 111*   < > 109*   < > 109*   CO2 mmol/L 26.9 26.4 24.3   < > 23.6   < > 24.5   BUN mg/dL 26* 25* 28*   < > 29*   < > 23   CREATININE mg/dL 1.59* 1.64* 1.59*   < > 1.72*   < > 1.46*   CALCIUM mg/dL 7.2* 7.5* 7.6*   < > 8.1*   < > 8.2*   BILIRUBIN mg/dL  --   --   --   --  1.0  --  1.0   ALK PHOS U/L  --   --   --   --  77  --  100   ALT (SGPT) U/L  --   --   --   --  19  --  19   AST (SGOT) U/L  --   --   --   --  55*  --  42*   GLUCOSE mg/dL 160* 165* 148*   < > 160*   < > 105*    < > = values in this interval not displayed.       Estimated Creatinine Clearance: 35.8 mL/min (A) (by C-G formula based on SCr of 1.59 mg/dL (H)).    Results from last 7 days   Lab Units 04/11/23  0548 04/10/23  0620 04/09/23  0454   PHOSPHORUS mg/dL 2.8 3.3 3.6             Results from last 7 days   Lab Units 04/11/23  0548 04/10/23  0620 04/09/23  0454 04/08/23  0542 04/07/23  0520   WBC 10*3/mm3 4.95 5.02 4.98 5.44 5.60   HEMOGLOBIN g/dL 7.8* 8.1* 7.8* 8.0* 8.5*   PLATELETS 10*3/mm3 113* 107* 85* 91* 81*       Results from last 7 days   Lab Units 04/05/23  0529   INR  1.48*       Brief Urine Lab  Results  (Last result in the past 365 days)      Color   Clarity   Blood   Leuk Est   Nitrite   Protein   CREAT   Urine HCG        04/08/23 0305             118.1               No results found for: UTPCR    Imaging Results (Last 24 Hours)     ** No results found for the last 24 hours. **              Assessment / Plan     ASSESSMENT:  1. Acute kidney injury: It is not clear if this is all acute or likely has chronic kidney disease that patient is not aware of, she said she has been a diabetic for greater than 30 years.  She is also morbidly obese may also have some component of chronic kidney disease associated with obesity.  2. Type 2 diabetes: Treated as per hospitalist service.  3. Hypertension with chronic kidney disease: Continue to optimize volume status before increasing some of the blood pressure medications.  4. Anemia: Check iron stores and treat as needed.  5. Morbid obesity with a BMI of 53: Complicates all forms of care  6. Pneumonia of left lung due to infectious organism, unspecified part of lung: Treated as per hospitalist service  7. Cirrhosis of liver with ascites: GI following the patient.  8. Hypothyroidism: Continue home dose.  9. Dyslipidemia: Treated       PLAN:  · Renal function slightly better, serum sodium is still slightly elevated and potassium is persistently low.  We will go ahead and give 40 mg of potassium, continue with 50 mg of spironolactone today and will also continue with Lasix 40 mg p.o. daily.  I think she will probably be okay to be on 40 of Lasix and 25 of spironolactone on daily basis.  · Low iron stores noted, will start her on oral iron supplements.  · Details were discussed with the patient as well as her brother in the room.    · Details were also discussed with the hospitalist service.  Patient will need likely long-term rehab/nursing home placement.  · Continue with rest of the current treatment plan, and monitor with surveillance labs.  · Further recommendations  will depend on clinical course of the patient during the current hospitalization.     Thank you for involving us in the care of Ivania Harris.  Please feel free to call with any questions.    Denis Ellis MD, JANNN  04/11/23  08:41 EDT    [facility]  [phone]      Part of this note may be an electronic transcription/translation of spoken language to printed text using the Dragon Dictation System.

## 2023-04-11 NOTE — PROGRESS NOTES
[facility]    PROGRESS NOTE    Name:  Ivania Harris   Age:  62 y.o.  Sex:  female  :  1960  MRN:  [id number]   Visit Number:  [id number]  Admission Date:  2023  Date Of Service:  23  Primary Care Physician:  Alessandro Mercer MD     LOS: 7 days :    Chief Complaint:      Generalized weakness    Subjective:    Patient seen and examined at bedside this morning.  Patient is found resting in bed with family at bedside.  She is very pleasant in conversation.  States she is feeling much better with every day.  Resting on room air with stable saturations noted.  Has no complaints.  Awaiting STR placement.    Hospital Course:    The patient is a 62-year-old woman with past medical history of obesity BMI 53, type 2 diabetes, apparent anemia unknown type, hyperlipidemia, hypertension, hypothyroidism, who presented to the emergency room with concern for generalized weakness and diarrhea for 3 days with no vomiting.     Upon ER work-up, she had a creatinine of 1.46 with a hemoglobin of 8.5 and platelets of 120.  She had positive FOBT testing.  A chest x-ray was possibly showing a left basilar pneumonia.  There was evidence of cirrhosis and portal hypertension with ascites with a nonobstructing right renal calculi.  There was bilateral effusions.  GI was consulted from the emergency room and she was started on octreotide and Protonix.  She received Rocephin and a normal saline bolus in addition.  She was admitted to the hospital service.     Patient had evidence of worsening anemia, was ordered 1 unit PRBCs on morning of 2023, acute on chronic.       EGD  by gastroenterology-Dr. Mandujano; no gross lesions entire esophagus, no varices, small amount of food residue in stomach suggesting gastroparesis, nonbleeding gastric ulcer with a flat pigmented spot, nonbleeding gastric ulcer with no stigmata of bleeding, ulcer scarring gastric antrum, erythematous mucosa gastric  body, antrum, prepyloric region of stomach biopsied, mild portal hypertensive gastropathy, normal duodenal portions, no current bleeding, likely NSAID/ASA induced ulcer.     Gastroenterology recommends low fiber diet, low-salt small meals, PPI daily, avoid NSAIDs, hold ASA 2 weeks, iron pills daily, await pathology results, repeat upper endoscopy in 6 months for surveillance, return to GI office in 8 weeks.  Hepatic testing for cirrhosis ordered by GI.  Peritoneal fluid and blood cultures no growth.     Completed 5-day treatment for left lower lobe pneumonia, low suspicion for true active infection, may have been chronic CXR changes.     Required oxygen while sleeping, recommend outpatient sleep study.     Nephrology consulted for SILVINA on CKD.  Stable for DC per nephrology perspective as of 4/10, awaiting STR placement .  Nephrology to provide medication recommendations for discharge.       Review of Systems:     All systems were reviewed and negative except as mentioned in subjective, assessment and plan.    Vital Signs:    Temp:  [97.8 °F (36.6 °C)-98.7 °F (37.1 °C)] 97.8 °F (36.6 °C)  Heart Rate:  [58-81] 66  Resp:  [16-18] 16  BP: (139-175)/(40-76) 154/74    Intake and output:    I/O last 3 completed shifts:  In: 660 [P.O.:660]  Out: 2900 [Urine:2900]  I/O this shift:  In: 330 [P.O.:330]  Out: -     Physical Examination:    General Appearance:  Alert and cooperative, pleasant morbidly obese female   Head:  Atraumatic and normocephalic.   Eyes: Conjunctivae and sclerae normal, no icterus. No pallor.   Throat: No oral lesions, no thrush, oral mucosa moist.   Neck: Supple, trachea midline   Lungs:   Breath sounds heard bilaterally equally.  No wheezing or crackles.  Unlabored on room air   Heart:  Normal S1 and S2, no murmur, no gallop, no rub. No JVD.   Abdomen:   Normal bowel sounds, no masses, no organomegaly. Soft, nontender, distention with ascites, no rebound tenderness.   Extremities: Lower extremity edema  bilaterally with chronic venous insufficiency changes, scaly and dark in appearance   Skin: No bleeding or rash, Skin is jaundiced   Neurologic: Alert and oriented x 3. No facial asymmetry. Moves all four limbs. No tremors. Generalized weakness is present     Laboratory results:    Results from last 7 days   Lab Units 04/11/23  0548 04/10/23  0620 04/09/23  0454 04/08/23  0542 04/07/23  0520 04/05/23  0529 04/04/23  1431   SODIUM mmol/L 146* 145 144   < > 141   < > 142   POTASSIUM mmol/L 3.3* 3.3* 3.7   < > 3.7   < > 3.9   CHLORIDE mmol/L 111* 109* 111*   < > 109*   < > 109*   CO2 mmol/L 26.9 26.4 24.3   < > 23.6   < > 24.5   BUN mg/dL 26* 25* 28*   < > 29*   < > 23   CREATININE mg/dL 1.59* 1.64* 1.59*   < > 1.72*   < > 1.46*   CALCIUM mg/dL 7.2* 7.5* 7.6*   < > 8.1*   < > 8.2*   BILIRUBIN mg/dL  --   --   --   --  1.0  --  1.0   ALK PHOS U/L  --   --   --   --  77  --  100   ALT (SGPT) U/L  --   --   --   --  19  --  19   AST (SGOT) U/L  --   --   --   --  55*  --  42*   GLUCOSE mg/dL 160* 165* 148*   < > 160*   < > 105*    < > = values in this interval not displayed.     Results from last 7 days   Lab Units 04/11/23  0548 04/10/23  0620 04/09/23  0454   WBC 10*3/mm3 4.95 5.02 4.98   HEMOGLOBIN g/dL 7.8* 8.1* 7.8*   HEMATOCRIT % 20.9* 23.5* 22.7*   PLATELETS 10*3/mm3 113* 107* 85*     Results from last 7 days   Lab Units 04/05/23  0529   INR  1.48*         Results from last 7 days   Lab Units 04/04/23  2315 04/04/23  2300   BLOODCX  No growth at 5 days No growth at 5 days     No results for input(s): PHART, ZFP3FLB, PO2ART, BCF2RAS, BASEEXCESS in the last 8760 hours.   I have reviewed the patient's laboratory results.    Radiology results:    No radiology results from the last 24 hrs  I have reviewed the patient's radiology reports.    Medication Review:     I have reviewed the patient's active and prn medications.     Problem List:      Pneumonia of left lung due to infectious organism, unspecified part of lung     Gastrointestinal hemorrhage    Cirrhosis of liver with ascites    Sleep apnea    Iron deficiency anemia    SILVINA (acute kidney injury)    CKD (chronic kidney disease)    Stasis dermatitis of both legs    Debility    Type 2 diabetes mellitus    Essential hypertension    Dyslipidemia    Hypothyroidism (acquired)      Assessment:      Pneumonia of left lung due to infectious organism, unspecified part     of lung    Gastrointestinal hemorrhage    Cirrhosis of liver with ascites    Sleep apnea    Iron deficiency anemia    SILVINA on CKD    Stasis dermatitis of both legs    Debility/ Impaired ADLs and mobility    Type 2 diabetes mellitus    Essential hypertension    Dyslipidemia    Hypothyroidism (acquired)    Plan:    Sleep apnea, suspected  -Oxygen while sleeping.  Recommend outpatient sleep study with likely underlying BUTCH     Iron deficiency  -Continue iron pills daily.    -Iron panel--Iron 22, iron saturation 21, transferrin 72, TIBC 107.     Suspected upper GI bleed, resolved  Acute on chronic anemia, resolved  Suspected decompensated cirrhosis with ascites, likely nonalcoholic fatty liver disease  -- GI consulted with EGD 4/6 with Dr. Mandujano.   -- PPI daily  -- Avoid NSAIDS  -- Hold ASA for 2 weeks  -- Iron Pills daily  -- Repeat upper endoscopy in 6 months for surveillance. Outpatient GI follow up 8-week  --Hepatic testing for causes of cirrhosis of already been ordered by GI.  -- peritoneum fluid with no growth  -Suspected gastroparesis     Left lower lobe pneumonia, resolved  -Was started on Rocephin, transitioned to oral ceftin, completed 5-day treatment.  -Blood cultures and peritoneal fluid negative.  -On room air     SILVINA  -Could be hepatorenal  -Avoid nephrotoxic drugs, holding lisinopril  -Continue to monitor renal function  -UA with pyria, no culture, already on cephalosporin  -Nephrology consultation, recommendations appreciated.  -Nephrology planning to start oral Lasix today     Debility  -- lives  alone  -PT/OT consulted  -Awaiting STR placement      Wound care  -Per wound care consult; dry flaking skin on back, hyperkeratotic skin to bilateral lower extremities and lower panus. Skin folds moist red. Left lower skin fold with redness, blisters and open blister. Was not able to lay patient flat to assess all skin folds due to patient complaints of difficulty breathing.  -Recommendations for care include a turning schedule, barrier cream twice daily and prn after cleansing, using dry sheets in folds, float heels off bed with pillows, encourage increase mobility as appropriate and tolerated to reduce pressure, for dry skin apply lotion/cream and a nutrition consult for dietary needs.    DVT Prophylaxis: SCDs  Code Status: Full Code  Diet: Diabetic/ Low Na/ Fluid restriction  Discharge Plan: STR placement pending    Radha Saavedra, APRN  04/11/23  11:52 EDT    Dictated utilizing Dragon dictation.

## 2023-04-11 NOTE — NURSING NOTE
0130 Report given to Chiara BEAL on 2nd floor.     0200 belongings packed and sent with patient to room 210. Care turned over

## 2023-04-12 VITALS
OXYGEN SATURATION: 99 % | RESPIRATION RATE: 18 BRPM | SYSTOLIC BLOOD PRESSURE: 152 MMHG | BODY MASS INDEX: 23 KG/M2 | TEMPERATURE: 98.3 F | HEIGHT: 65 IN | DIASTOLIC BLOOD PRESSURE: 66 MMHG | HEART RATE: 79 BPM | WEIGHT: 138.06 LBS

## 2023-04-12 LAB
ANION GAP SERPL CALCULATED.3IONS-SCNC: 6.3 MMOL/L (ref 5–15)
BASOPHILS # BLD AUTO: 0.05 10*3/MM3 (ref 0–0.2)
BASOPHILS NFR BLD AUTO: 1 % (ref 0–1.5)
BUN SERPL-MCNC: 25 MG/DL (ref 8–23)
BUN/CREAT SERPL: 18.9 (ref 7–25)
CALCIUM SPEC-SCNC: 7.3 MG/DL (ref 8.6–10.5)
CHLORIDE SERPL-SCNC: 111 MMOL/L (ref 98–107)
CO2 SERPL-SCNC: 27.7 MMOL/L (ref 22–29)
CREAT SERPL-MCNC: 1.32 MG/DL (ref 0.57–1)
DEPRECATED RDW RBC AUTO: 52.6 FL (ref 37–54)
EGFRCR SERPLBLD CKD-EPI 2021: 45.5 ML/MIN/1.73
EOSINOPHIL # BLD AUTO: 0.27 10*3/MM3 (ref 0–0.4)
EOSINOPHIL NFR BLD AUTO: 5.2 % (ref 0.3–6.2)
ERYTHROCYTE [DISTWIDTH] IN BLOOD BY AUTOMATED COUNT: 17.1 % (ref 12.3–15.4)
GLUCOSE BLDC GLUCOMTR-MCNC: 152 MG/DL (ref 70–130)
GLUCOSE BLDC GLUCOMTR-MCNC: 187 MG/DL (ref 70–130)
GLUCOSE SERPL-MCNC: 155 MG/DL (ref 65–99)
HCT VFR BLD AUTO: 21.2 % (ref 34–46.6)
HGB BLD-MCNC: 7.9 G/DL (ref 12–15.9)
IMM GRANULOCYTES # BLD AUTO: 0.04 10*3/MM3 (ref 0–0.05)
IMM GRANULOCYTES NFR BLD AUTO: 0.8 % (ref 0–0.5)
LYMPHOCYTES # BLD AUTO: 0.88 10*3/MM3 (ref 0.7–3.1)
LYMPHOCYTES NFR BLD AUTO: 17.1 % (ref 19.6–45.3)
MCH RBC QN AUTO: 35.6 PG (ref 26.6–33)
MCHC RBC AUTO-ENTMCNC: 37.3 G/DL (ref 31.5–35.7)
MCV RBC AUTO: 95.5 FL (ref 79–97)
MONOCYTES # BLD AUTO: 0.69 10*3/MM3 (ref 0.1–0.9)
MONOCYTES NFR BLD AUTO: 13.4 % (ref 5–12)
NEUTROPHILS NFR BLD AUTO: 3.22 10*3/MM3 (ref 1.7–7)
NEUTROPHILS NFR BLD AUTO: 62.5 % (ref 42.7–76)
NRBC BLD AUTO-RTO: 0 /100 WBC (ref 0–0.2)
PLATELET # BLD AUTO: 116 10*3/MM3 (ref 140–450)
PMV BLD AUTO: 11.3 FL (ref 6–12)
POTASSIUM SERPL-SCNC: 3.5 MMOL/L (ref 3.5–5.2)
RBC # BLD AUTO: 2.22 10*6/MM3 (ref 3.77–5.28)
SODIUM SERPL-SCNC: 145 MMOL/L (ref 136–145)
WBC NRBC COR # BLD: 5.15 10*3/MM3 (ref 3.4–10.8)

## 2023-04-12 PROCEDURE — 82962 GLUCOSE BLOOD TEST: CPT

## 2023-04-12 PROCEDURE — 99239 HOSP IP/OBS DSCHRG MGMT >30: CPT | Performed by: NURSE PRACTITIONER

## 2023-04-12 PROCEDURE — 80048 BASIC METABOLIC PNL TOTAL CA: CPT | Performed by: NURSE PRACTITIONER

## 2023-04-12 PROCEDURE — 85025 COMPLETE CBC W/AUTO DIFF WBC: CPT | Performed by: STUDENT IN AN ORGANIZED HEALTH CARE EDUCATION/TRAINING PROGRAM

## 2023-04-12 PROCEDURE — 63710000001 INSULIN ASPART PER 5 UNITS: Performed by: STUDENT IN AN ORGANIZED HEALTH CARE EDUCATION/TRAINING PROGRAM

## 2023-04-12 PROCEDURE — 25010000002 HYDRALAZINE PER 20 MG: Performed by: FAMILY MEDICINE

## 2023-04-12 RX ORDER — PANTOPRAZOLE SODIUM 40 MG/1
40 TABLET, DELAYED RELEASE ORAL
Start: 2023-04-13

## 2023-04-12 RX ORDER — POTASSIUM CHLORIDE 750 MG/1
20 CAPSULE, EXTENDED RELEASE ORAL ONCE
Status: COMPLETED | OUTPATIENT
Start: 2023-04-12 | End: 2023-04-12

## 2023-04-12 RX ORDER — INSULIN ASPART 100 [IU]/ML
0-14 INJECTION, SOLUTION INTRAVENOUS; SUBCUTANEOUS
Refills: 12
Start: 2023-04-12

## 2023-04-12 RX ORDER — SPIRONOLACTONE 25 MG/1
25 TABLET ORAL DAILY
Status: DISCONTINUED | OUTPATIENT
Start: 2023-04-12 | End: 2023-04-12 | Stop reason: HOSPADM

## 2023-04-12 RX ORDER — ASPIRIN 81 MG/1
81 TABLET ORAL DAILY
Start: 2023-04-12

## 2023-04-12 RX ORDER — IRON POLYSACCHARIDE COMPLEX 150 MG
150 CAPSULE ORAL DAILY
Start: 2023-04-13

## 2023-04-12 RX ORDER — SPIRONOLACTONE 25 MG/1
25 TABLET ORAL DAILY
Start: 2023-04-13

## 2023-04-12 RX ORDER — FUROSEMIDE 40 MG/1
40 TABLET ORAL DAILY
Start: 2023-04-13

## 2023-04-12 RX ORDER — HYDRALAZINE HYDROCHLORIDE 20 MG/ML
10 INJECTION INTRAMUSCULAR; INTRAVENOUS EVERY 6 HOURS PRN
Status: DISCONTINUED | OUTPATIENT
Start: 2023-04-12 | End: 2023-04-12 | Stop reason: HOSPADM

## 2023-04-12 RX ADMIN — PANTOPRAZOLE SODIUM 40 MG: 40 TABLET, DELAYED RELEASE ORAL at 05:56

## 2023-04-12 RX ADMIN — ATORVASTATIN CALCIUM 40 MG: 40 TABLET, FILM COATED ORAL at 08:41

## 2023-04-12 RX ADMIN — INSULIN ASPART 3 UNITS: 100 INJECTION, SOLUTION INTRAVENOUS; SUBCUTANEOUS at 07:39

## 2023-04-12 RX ADMIN — HYDRALAZINE HYDROCHLORIDE 10 MG: 20 INJECTION INTRAMUSCULAR; INTRAVENOUS at 02:43

## 2023-04-12 RX ADMIN — POTASSIUM CHLORIDE 20 MEQ: 10 CAPSULE, COATED, EXTENDED RELEASE ORAL at 08:41

## 2023-04-12 RX ADMIN — SPIRONOLACTONE 25 MG: 25 TABLET, FILM COATED ORAL at 08:41

## 2023-04-12 RX ADMIN — Medication 10 ML: at 02:43

## 2023-04-12 RX ADMIN — Medication 150 MG: at 08:41

## 2023-04-12 RX ADMIN — INSULIN ASPART 3 UNITS: 100 INJECTION, SOLUTION INTRAVENOUS; SUBCUTANEOUS at 11:38

## 2023-04-12 RX ADMIN — FUROSEMIDE 40 MG: 40 TABLET ORAL at 08:41

## 2023-04-12 RX ADMIN — BUPROPION HYDROCHLORIDE 150 MG: 150 TABLET, FILM COATED, EXTENDED RELEASE ORAL at 08:41

## 2023-04-12 RX ADMIN — LEVOTHYROXINE SODIUM 300 MCG: 0.05 TABLET ORAL at 08:41

## 2023-04-12 NOTE — DISCHARGE SUMMARY
[facility]   DISCHARGE SUMMARY      Name:  Ivania Harris   Age:  62 y.o.  Sex:  female  :  1960  MRN:  [id number]   Visit Number:  [id number]    Admission Date:  2023  Date of Discharge:  2023  Primary Care Physician:  Alessandro Mercer MD    Important issues to note:    1.  Admitted to the hospital with pneumonia, GI bleed, and noted to have cirrhosis of liver with ascites as well as SILVINA on CKD.    2.  EGD with GI during admission noted no current bleeding.  Recommended  low fiber diet, low-salt small meals, PPI daily, avoid NSAIDs, hold ASA 2 weeks (May restart in 6 days), iron pills daily, await pathology results, repeat upper endoscopy in 6 months for surveillance, return to GI office in 8 weeks.  Hepatic testing for cirrhosis ordered by GI. Peritoneal fluid and blood cultures no growth.    3.  Consulted by Nephrology for SILVINA on CKD.  Recommends continuing Lasix 40mg daily and Spironolactone 25mg daily at discharge.  Started on iron supplementation.        4.  Completed 5-day treatment for left lower lobe pneumonia.  Currently stable on room air. Blood sugars have been stable on sliding scale coverage, would recommend continuing at discharge.    5.  Stable for discharge to [facility] today.  Follow up with GI and Nephrology outpatient.  Return to the hospital as needed.    Discharge Diagnoses:          Pneumonia of left lung due to infectious organism, unspecified part     of lung    Gastrointestinal hemorrhage    Cirrhosis of liver with ascites    Sleep apnea    Iron deficiency anemia    SILVINA on CKD    Stasis dermatitis of both legs    Debility/ Impaired ADLs and mobility    Type 2 diabetes mellitus    Essential hypertension    Dyslipidemia    Hypothyroidism (acquired)    Problem List:     Active Hospital Problems    Diagnosis  POA   • **Pneumonia of left lung due to infectious organism, unspecified part of lung [J18.9]  Yes   • Sleep apnea [G47.30]  Yes    • Iron deficiency anemia [D50.9]  Yes   • SILVINA (acute kidney injury) [N17.9]  Yes   • CKD (chronic kidney disease) [N18.9]  Yes   • Stasis dermatitis of both legs [I87.2]  Yes   • Debility [R53.81]  Yes   • Type 2 diabetes mellitus [E11.9]  Yes   • Essential hypertension [I10]  Yes   • Dyslipidemia [E78.5]  Yes   • Hypothyroidism (acquired) [E03.9]  Yes   • Gastrointestinal hemorrhage [K92.2]  Yes   • Cirrhosis of liver with ascites [K74.60, R18.8]  Yes      Resolved Hospital Problems   No resolved problems to display.     Presenting Problem:    Chief Complaint   Patient presents with   • Weakness - Generalized      Consults:     Consulting Physician(s)     Provider Relationship Specialty    Denis Ellis MD, KIMBERLY Consulting Physician Nephrology    Jens Mandujano MD Consulting Physician Gastroenterology        Procedures Performed:    Procedure(s):  ESOPHAGOGASTRODUODENOSCOPY WITH BIOPSY    History of presenting illness/Hospital Course:    The patient is a 62 year old female with past medical history significant for morbid obesity BMI 53, type 2 diabetes, apparent anemia unknown type, hyperlipidemia, hypertension, hypothyroidism, who presented to the emergency room 4/4/2023 with concern for generalized weakness and diarrhea for 3 days with no vomiting.     Upon ER work-up, patient had a creatinine of 1.46 with a hemoglobin of 8.5 and platelets of 120.  She had positive FOBT testing.  A chest x-ray was possibly showing a left basilar pneumonia.  There was evidence of cirrhosis and portal hypertension with ascites with a nonobstructing right renal calculi.  There was bilateral effusions.  GI was consulted from the emergency room and she was started on octreotide and Protonix.  She received Rocephin and a normal saline bolus in addition.  She was admitted to the hospital service.  Patient had evidence of worsening anemia and received 1 unit PRBCs on morning of 4/5/2023, this is acute on chronic.       EGD  4/6 by gastroenterology-Dr. Mandujano revealed no gross lesions entire esophagus, no varices, small amount of food residue in stomach suggesting gastroparesis, nonbleeding gastric ulcer with a flat pigmented spot, nonbleeding gastric ulcer with no stigmata of bleeding, ulcer scarring gastric antrum, erythematous mucosa gastric body, antrum, prepyloric region of stomach biopsied, mild portal hypertensive gastropathy, normal duodenal portions, no current bleeding, likely NSAID/ASA induced ulcer.  Gastroenterology recommends low fiber diet, low-salt small meals, PPI daily, avoid NSAIDs, hold ASA 2 weeks (may restart in 6 days), iron pills daily, await pathology results, repeat upper endoscopy in 6 months for surveillance, return to GI office in 8 weeks.  Hepatic testing for cirrhosis ordered by GI. Peritoneal fluid and blood cultures no growth.     Completed 5-day treatment for left lower lobe pneumonia, low suspicion for true active infection, may have been chronic CXR changes.  Patient has been stable on room air, however was noted to require oxygen while sleeping which is likely due to obstructive sleep apnea.  Recommend outpatient sleep study.  Blood sugars have been stable on sliding scale coverage alone--would recommend continuing this at discharge.     Nephrology consulted for SILVINA on CKD.  Stable for DC per nephrology perspective as of 4/10, awaiting STR placement . Nephrology to provide medication recommendations for discharge.  Recommends continuing Lasix 40mg daily and Spironolactone 25mg daily at discharge.  Started on iron supplementation.      Case management consulted for placement.  Patient has been accepted to [facility] for continued care.  Stable for discharge there today.  Medications as reconciled.  Follow up with gastroenterology.  Return to the hospital as needed.    Vital Signs:    Temp:  [97.5 °F (36.4 °C)-98.3 °F (36.8 °C)] 97.5 °F (36.4 °C)  Heart Rate:  [68-99] 75  Resp:  [16-18] 16  BP:  (145-192)/(68-77) 151/71    Physical Exam:    General Appearance:  Alert and cooperative, pleasant middle aged female, morbidly obese, appears chronically ill   Head:  Atraumatic and normocephalic.   Eyes: Conjunctivae and sclerae normal, no icterus. No pallor.   Ears:  Ears with no abnormalities noted.   Throat: No oral lesions, no thrush, oral mucosa moist.   Neck: Supple, trachea midline   Back:   No kyphoscoliosis present. No tenderness to palpation.   Lungs:   Breath sounds heard bilaterally equally.  No crackles or wheezing. Unlabored on room air   Heart:  Normal S1 and S2, no murmur, no gallop, no rub. No JVD.   Abdomen:   Normal bowel sounds, no masses, no organomegaly. Soft, nontender, + distention, no rebound tenderness.   Extremities: Lower extremity edema bilaterally with chronic venous insufficiency changes, scaly and dark in appearance   Pulses: Pulses palpable bilaterally.   Skin: No bleeding or rash, skin is jaundiced   Neurologic: Alert and oriented x 3. No facial asymmetry. Moves all four limbs. No tremors.  Generalized weakness is present     Pertinent Lab Results:     Results from last 7 days   Lab Units 04/12/23  0522 04/11/23  0548 04/10/23  0620 04/08/23  0542 04/07/23  0520   SODIUM mmol/L 145 146* 145   < > 141   POTASSIUM mmol/L 3.5 3.3* 3.3*   < > 3.7   CHLORIDE mmol/L 111* 111* 109*   < > 109*   CO2 mmol/L 27.7 26.9 26.4   < > 23.6   BUN mg/dL 25* 26* 25*   < > 29*   CREATININE mg/dL 1.32* 1.59* 1.64*   < > 1.72*   CALCIUM mg/dL 7.3* 7.2* 7.5*   < > 8.1*   BILIRUBIN mg/dL  --   --   --   --  1.0   ALK PHOS U/L  --   --   --   --  77   ALT (SGPT) U/L  --   --   --   --  19   AST (SGOT) U/L  --   --   --   --  55*   GLUCOSE mg/dL 155* 160* 165*   < > 160*    < > = values in this interval not displayed.     Results from last 7 days   Lab Units 04/12/23 0522 04/11/23  0548 04/10/23  0620   WBC 10*3/mm3 5.15 4.95 5.02   HEMOGLOBIN g/dL 7.9* 7.8* 8.1*   HEMATOCRIT % 21.2* 20.9* 23.5*    PLATELETS 10*3/mm3 116* 113* 107*                                   Pertinent Radiology Results:    Imaging Results (All)     Procedure Component Value Units Date/Time     Paracentesis [[id number]] Collected: 04/05/23 1604    Specimen: Body Fluid Updated: 04/05/23 1608    Narrative:      ULTRASOUND-GUIDED PARACENTESIS     HISTORY: Ascites.     FINDINGS: After informed consent was obtained and time-out procedure  performed, the patient was placed sitting upright in the ultrasound  suite. Fluid was localized in the right lower quadrant under ultrasound  guidance and marked on the skin appropriately. The patient was then  prepped and draped in the usual sterile fashion and the skin was  anesthetized with 1% lidocaine.      An ultrasound-guided paracentesis was then performed using a Turkel  needle. The pocket was localized and needle was introduced.  Approximately 2 to 3 cc of fluid was obtained and the no more could be  removed. The pocket was read localized and a new site was anesthetized  and small incision was made. Surgical needle was introduced and  approximately 8 cc of clear yellow fluid was removed. Catheter was then  advanced and needle was removed and no more fluid could be obtained.  Findings discussed with the patient. The patient tolerated the procedure  well and there were no immediate complications.       Impression:      Technically difficult ultrasound guided right lower quadrant  paracentesis, as discussed above with approximately 10 cc of fluid  removed in total; this was sent to the laboratory for analysis..     This report was signed and finalized on 4/5/2023 4:06 PM by Katt Muse MD.    CT Abdomen Pelvis Without Contrast [[id number]] Collected: 04/04/23 1820     Updated: 04/04/23 1821    Narrative:      FINAL REPORT    TECHNIQUE:  Routine axial images through the abdomen and pelvis were  obtained.    CLINICAL HISTORY:  diarrhea, abd discomfort    FINDINGS:  Lower chest: There are left  greater than right pleural  effusions.  Abdomen:  The gallbladder has been removed.  There  is cirrhosis with mild splenomegaly.  There are upper abdominal  venous collaterals indicative of portal hypertension.  There are  2 small nonobstructing right renal calculi, the largest  measuring 4 mm in diameter.  The other solid abdominal organs  and ureters are unremarkable.  The GI tract is unremarkable,  including the appendix.  Pelvis: The uterus, ovaries, and  urinary bladder are normal.  There is large volume ascites and  anasarca.  There is no pelvic or abdominal adenopathy or acute  osseous abnormality.      Impression:      Cirrhosis with portal hypertension and ascites.  Small  nonobstructing right renal calculi.  Bilateral pleural effusions.    Authenticated and Electronically Signed by Salomon Cantu M.D. on  04/04/2023 06:20:35 PM    XR Chest 1 View [[id number]] Collected: 04/04/23 1515     Updated: 04/04/23 1518    Narrative:       PROCEDURE: XR CHEST 1 VW-     HISTORY: generalized weakness     COMPARISON: 12/03/2021.     FINDINGS: The heart is mildly enlarged.. The mediastinum is  unremarkable. Decreased inspiratory effort noted with crowding of the  lung markings in both lung bases. There is new left basilar airspace  disease, possible pneumonia. Right lung is clear. Calcified granuloma  identified on the left... There is no pneumothorax.  There are no acute  osseous abnormalities.       Impression:      New left basilar airspace disease suggesting pneumonia,  recommend follow-up..     .     This report was signed and finalized on 4/4/2023 3:16 PM by Katt Muse MD.          Condition on Discharge:      Stable.    Code status during the hospital stay:    Code Status and Medical Interventions:   Ordered at: 04/04/23 2222     Code Status (Patient has no pulse and is not breathing):    CPR (Attempt to Resuscitate)     Medical Interventions (Patient has pulse or is breathing):    Full Support     Discharge  Disposition:    [place]    Discharge Medications:       Discharge Medications      New Medications      Instructions Start Date   furosemide 40 MG tablet  Commonly known as: LASIX   40 mg, Oral, Daily   Start Date: April 13, 2023     iron polysaccharides 150 MG capsule  Commonly known as: NIFEREX   150 mg, Oral, Daily   Start Date: April 13, 2023     pantoprazole 40 MG EC tablet  Commonly known as: PROTONIX   40 mg, Oral, Every Early Morning   Start Date: April 13, 2023     spironolactone 25 MG tablet  Commonly known as: ALDACTONE   25 mg, Oral, Daily   Start Date: April 13, 2023        Changes to Medications      Instructions Start Date   aspirin 81 MG EC tablet  What changed: additional instructions   81 mg, Oral, Daily, May restart in 1 week      insulin aspart 100 UNIT/ML injection  Commonly known as: novoLOG  What changed: Another medication with the same name was added. Make sure you understand how and when to take each.   Subcutaneous, 3 Times Daily Before Meals, Sliding Scale as needed      Insulin Aspart 100 UNIT/ML injection  Commonly known as: novoLOG  What changed: You were already taking a medication with the same name, and this prescription was added. Make sure you understand how and when to take each.   0-14 Units, Subcutaneous, 3 Times Daily Before Meals         Continue These Medications      Instructions Start Date   albuterol sulfate  (90 Base) MCG/ACT inhaler  Commonly known as: PROVENTIL HFA;VENTOLIN HFA;PROAIR HFA   2 puffs, Inhalation, Every 4 Hours PRN      atorvastatin 20 MG tablet  Commonly known as: LIPITOR   20 mg, Oral, Daily      buPROPion  MG 24 hr tablet  Commonly known as: WELLBUTRIN XL   150 mg, Oral, Daily      donepezil 10 MG tablet  Commonly known as: ARICEPT   10 mg, Oral, Nightly      ipratropium-albuterol 0.5-2.5 mg/3 ml nebulizer  Commonly known as: DUO-NEB   Nebulize q 4 h prn wheezing / shortness of breath      levothyroxine 100 MCG tablet  Commonly  known as: SYNTHROID, LEVOTHROID   300 mcg, Oral, Daily      multivitamin with minerals tablet tablet   1 tablet, Oral, Daily      SITagliptin 50 MG tablet  Commonly known as: JANUVIA   50 mg, Oral, Daily         Stop These Medications    ibuprofen 200 MG tablet  Commonly known as: ADVIL,MOTRIN     Lantus 100 UNIT/ML injection  Generic drug: insulin glargine     lisinopril 10 MG tablet  Commonly known as: PRINIVIL,ZESTRIL     minocycline 100 MG capsule  Commonly known as: MINOCIN,DYNACIN          Discharge Diet:     Diet Instructions     Diet: Regular/House Diet, Cardiac Diets, Diabetic Diets, Fluid Restriction (240 mL/tray) Diets; Healthy Heart (2-3 Na+); Regular Texture (IDDSI 7); Thin (IDDSI 0); Consistent Carbohydrate; 1500 mL/day      Discharge Diet:  Regular/House Diet  Cardiac Diets  Diabetic Diets  Fluid Restriction (240 mL/tray) Diets       Cardiac Diet: Healthy Heart (2-3 Na+)    Texture: Regular Texture (IDDSI 7)    Fluid Consistency: Thin (IDDSI 0)    Diabetic Diet: Consistent Carbohydrate    Fluid Restriction Diet (240 mL/tray): 1500 mL/day        Activity at Discharge:     Activity Instructions     Activity as Tolerated          Follow-up Appointments:     Contact information for follow-up providers     Jens Mandujano MD Follow up on 6/1/2023.    Specialty: Gastroenterology  Why: Please schedule follow-up prior to DC.  Contact information:  [address] [place], [place] [address]  [address] [address]  [phone]             Denis Ellis MD, FASN Follow up in 1 month(s).    Specialty: Nephrology  Why: Please schedule follow-up prior to DC  Contact information:  [address] [place] [address]  [phone]             [facility] .    Specialty: Gastroenterology  Contact information:  [address] [place] [address] [address] [address] [phone]  Additional information:  Phone Number: [phone]   The practice is located in the  building to the right of the ER at the [facility] ([address], [address]).           Alessandro Mercer MD .    Specialty: Internal Medicine  Contact information:  [address] [facility] [address]  [phone]                   Contact information for after-discharge care     Destination     [facility] .    Service: Skilled Nursing  Contact information:  [address] [facility] [address]  [phone]                           Future Appointments   Date Time Provider Department Center   4/13/2023  7:15 [facility]   6/5/2023  9:30 AM Elke Shelley PA-C [facility]         MITRA Salinas  04/12/23  13:13 EDT    Time: I spent 35 minutes on this discharge activity which included: face-to-face encounter with the patient, reviewing the data in the system, coordination of the care with the nursing staff as well as consultants, documentation, and entering orders.     Dictated utilizing Dragon dictation.

## 2023-04-12 NOTE — CASE MANAGEMENT/SOCIAL WORK
Case Management Discharge Note           Provided Post Acute Provider List?: N/A  Provided Post Acute Provider Quality & Resource List?: N/A    Selected Continued Care - Admitted Since 4/4/2023     Destination Coordination complete.    Service Provider Selected Services Address Phone Fax Patient Preferred    [facility] Skilled Nursing [address] [place] [phone] --          Durable Medical Equipment    No services have been selected for the patient.              Dialysis/Infusion    No services have been selected for the patient.              Home Medical Care    No services have been selected for the patient.              Therapy    No services have been selected for the patient.              Community Resources    No services have been selected for the patient.              Community & DME    No services have been selected for the patient.                  Transportation Services  Ambulance: Alessandra Unger    Final Discharge Disposition Code: 04 - intermediate care facility

## 2023-04-12 NOTE — PROGRESS NOTES
[facility] Progress Note  [facility]. [place]        Patient Name: Ivania Harris  : 1960  MRN: [id number]   LOS: 8 days    Patient Care Team:  Alessandro Mercer MD as PCP - General (Internal Medicine)    Chief Complaint:    Chief Complaint   Patient presents with   • Weakness - Generalized     Primary Care Physician:  Alessandro Mercer MD  Date of admission: 2023    Subjective     Interval History:   Follow-up chronic kidney disease stage IIIb.  Events noted from last 24 hours.  Patient is awake alert and interactive denies having any chest pain or shortness of breath.    Review of Systems:   As noted above.    Objective     Vitals:   Temp:  [97.6 °F (36.4 °C)-98.3 °F (36.8 °C)] 98.2 °F (36.8 °C)  Heart Rate:  [66-99] 75  Resp:  [16-18] 16  BP: (145-192)/(68-77) 162/68    Intake/Output Summary (Last 24 hours) at 2023 0820  Last data filed at 2023 0700  Gross per 24 hour   Intake 600 ml   Output 700 ml   Net -100 ml       Physical Exam:    General Appearance: alert, oriented x 3, no acute distress   Skin: warm and dry  HEENT: oral mucosa normal, nonicteric sclera  Neck: supple, no JVD  Lungs: Occasional crackles with fair air movement.  Heart: RRR, normal S1 and S2  Abdomen: Obese, soft, nontender, nondistended positive bowel sounds  : no palpable bladder  Extremities: Trace to 1+ edema, no cyanosis or clubbing  Neuro: normal speech and mental status     Scheduled Meds:     Current Facility-Administered Medications   Medication Dose Route Frequency Provider Last Rate Last Admin   • acetaminophen (TYLENOL) tablet 650 mg  650 mg Oral Q4H PRN Jens Mandujano MD   650 mg at 23 1200    Or   • acetaminophen (TYLENOL) 160 MG/5ML solution 650 mg  650 mg Oral Q4H PRN Jens Mandujano MD        Or   • acetaminophen (TYLENOL) suppository 650 mg  650 mg Rectal Q4H PRN Jens Mandujano MD       • albuterol (PROVENTIL) nebulizer solution  0.083% 2.5 mg/3mL  2.5 mg Nebulization Q6H PRN Jens Mandujano MD   2.5 mg at 04/08/23 0035   • atorvastatin (LIPITOR) tablet 40 mg  40 mg Oral Daily Jens Mandujano MD   40 mg at 04/11/23 0954   • buPROPion XL (WELLBUTRIN XL) 24 hr tablet 150 mg  150 mg Oral Daily Jens Mandujano MD   150 mg at 04/11/23 1048   • dextrose (D50W) (25 g/50 mL) IV injection 25 g  25 g Intravenous Q15 Min PRN Kerley, Brian Joseph, DO       • dextrose (GLUTOSE) oral gel 15 g  15 g Oral Q15 Min PRN Kerley, Brian Joseph, DO       • donepezil (ARICEPT) tablet 10 mg  10 mg Oral Nightly Jens Mandujano MD   10 mg at 04/11/23 2131   • furosemide (LASIX) tablet 40 mg  40 mg Oral Daily Denis Ellis MD, FASN   40 mg at 04/11/23 0954   • glucagon (human recombinant) (GLUCAGEN DIAGNOSTIC) injection 1 mg  1 mg Intramuscular Q15 Min PRN Kerley, Brian Joseph, DO       • hydrALAZINE (APRESOLINE) injection 10 mg  10 mg Intravenous Q6H PRN Gabriella Ross DO   10 mg at 04/12/23 0243   • HYDROcodone-acetaminophen (NORCO) 5-325 MG per tablet 1 tablet  1 tablet Oral Q8H PRN Jens Mandujano MD   1 tablet at 04/10/23 2358   • hydrOXYzine (ATARAX) tablet 50 mg  50 mg Oral Q6H PRN Kerley, Brian Joseph, DO   50 mg at 04/11/23 0559   • Insulin Aspart (novoLOG) injection 0-14 Units  0-14 Units Subcutaneous TID AC Kerley, Brian Joseph, DO   3 Units at 04/12/23 0739   • ipratropium-albuterol (DUO-NEB) nebulizer solution 3 mL  3 mL Nebulization Q6H PRN Jens Mandujano MD   3 mL at 04/08/23 0904   • iron polysaccharides (NIFEREX) capsule 150 mg  150 mg Oral Daily Denis Ellis MD, FASN   150 mg at 04/11/23 1047   • levothyroxine (SYNTHROID, LEVOTHROID) tablet 300 mcg  300 mcg Oral Daily Jens Mandujano MD   300 mcg at 04/11/23 0954   • ondansetron (ZOFRAN) injection 4 mg  4 mg Intravenous Q6H PRN Jens Mandujano MD       • pantoprazole (PROTONIX) EC tablet 40 mg  40 mg Oral Q AM Esperanza Garrison,  APRN   40 mg at 04/12/23 0556   • sodium chloride 0.9 % flush 10 mL  10 mL Intravenous PRN Jens Mandujano MD   10 mL at 04/12/23 0243   • spironolactone (ALDACTONE) tablet 50 mg  50 mg Oral Daily Denis Ellis MD, FASN   50 mg at 04/11/23 0954       atorvastatin, 40 mg, Oral, Daily  buPROPion XL, 150 mg, Oral, Daily  donepezil, 10 mg, Oral, Nightly  furosemide, 40 mg, Oral, Daily  Insulin Aspart, 0-14 Units, Subcutaneous, TID AC  iron polysaccharides, 150 mg, Oral, Daily  levothyroxine, 300 mcg, Oral, Daily  pantoprazole, 40 mg, Oral, Q AM  spironolactone, 50 mg, Oral, Daily        IV Meds:        Results Reviewed:   I have personally reviewed the results from the time of this admission to 4/12/2023 08:20 EDT     Results from last 7 days   Lab Units 04/12/23  0522 04/11/23  0548 04/10/23  0620 04/08/23  0542 04/07/23  0520   SODIUM mmol/L 145 146* 145   < > 141   POTASSIUM mmol/L 3.5 3.3* 3.3*   < > 3.7   CHLORIDE mmol/L 111* 111* 109*   < > 109*   CO2 mmol/L 27.7 26.9 26.4   < > 23.6   BUN mg/dL 25* 26* 25*   < > 29*   CREATININE mg/dL 1.32* 1.59* 1.64*   < > 1.72*   CALCIUM mg/dL 7.3* 7.2* 7.5*   < > 8.1*   BILIRUBIN mg/dL  --   --   --   --  1.0   ALK PHOS U/L  --   --   --   --  77   ALT (SGPT) U/L  --   --   --   --  19   AST (SGOT) U/L  --   --   --   --  55*   GLUCOSE mg/dL 155* 160* 165*   < > 160*    < > = values in this interval not displayed.       Estimated Creatinine Clearance: 43.1 mL/min (A) (by C-G formula based on SCr of 1.32 mg/dL (H)).    Results from last 7 days   Lab Units 04/11/23  0548 04/10/23  0620 04/09/23  0454   PHOSPHORUS mg/dL 2.8 3.3 3.6             Results from last 7 days   Lab Units 04/12/23  0522 04/11/23  0548 04/10/23  0620 04/09/23  0454 04/08/23  0542   WBC 10*3/mm3 5.15 4.95 5.02 4.98 5.44   HEMOGLOBIN g/dL 7.9* 7.8* 8.1* 7.8* 8.0*   PLATELETS 10*3/mm3 116* 113* 107* 85* 91*             Brief Urine Lab Results  (Last result in the past 365 days)      Color   Clarity    Blood   Leuk Est   Nitrite   Protein   CREAT   Urine HCG        04/08/23 0305             118.1               No results found for: UTPCR    Imaging Results (Last 24 Hours)     ** No results found for the last 24 hours. **              Assessment / Plan     ASSESSMENT:  1. Acute kidney injury: It is not clear if this is all acute or likely has chronic kidney disease that patient is not aware of, she said she has been a diabetic for greater than 30 years.  She is also morbidly obese may also have some component of chronic kidney disease associated with obesity.  2. Type 2 diabetes: Treated as per hospitalist service.  3. Hypertension with chronic kidney disease: Continue to optimize volume status before increasing some of the blood pressure medications.  4. Anemia: Check iron stores and treat as needed.  5. Morbid obesity with a BMI of 53: Complicates all forms of care  6. Pneumonia of left lung due to infectious organism, unspecified part of lung: Treated as per hospitalist service  7. Cirrhosis of liver with ascites: GI following the patient.  8. Hypothyroidism: Continue home dose.  9. Dyslipidemia: Treated       PLAN:  · Renal function slightly better, I think she will do fairly well with 40 of Lasix every day and spironolactone 25 mg a day.  Patient's brother is in the room and he said he will make sure that she gets to follow-up with me.  I will see her in about a month.  · Low iron stores noted, will start her on oral iron supplements.  · Details were discussed with the patient as well as her brother in the room.    · Details were also discussed with the hospitalist service.  Patient will need likely long-term rehab/nursing home placement.  · Continue with rest of the current treatment plan, and monitor with surveillance labs.  · Further recommendations will depend on clinical course of the patient during the current hospitalization.     Thank you for involving us in the care of Ivania Steven.  Please  feel free to call with any questions.    Denis Ellis MD, FASN  04/12/23  08:20 EDT    [facility]  [phone]      Part of this note may be an electronic transcription/translation of spoken language to printed text using the Dragon Dictation System.

## 2023-04-12 NOTE — PLAN OF CARE
Goal Outcome Evaluation:  Plan of Care Reviewed With: patient        Progress: improving  Outcome Evaluation: VSS, Patient discharged to [facility], no c/o pain or discomfort at this time. Discharge summary faxed to nursing home and report called. Patient transferred via [facility].

## 2023-04-12 NOTE — CASE MANAGEMENT/SOCIAL WORK
Case Management/Social Work    Patient Name:  Ivania Harris  YOB: 1960  MRN: [id number]  Admit Date:  4/4/2023        ORI spoke with Jorge A at [facility], Pt. Has been approved to transfer today. ORI has notified Pt. care team and pt. brother. Pt. brother did not have any other questions or concerns about discharge planning at this time.       Electronically signed by:  Aman Frost  04/12/23 10:31 EDT

## 2023-06-09 ENCOUNTER — APPOINTMENT (OUTPATIENT)
Dept: CT IMAGING | Facility: HOSPITAL | Age: 63
End: 2023-06-09
Payer: MEDICARE

## 2023-06-09 ENCOUNTER — HOSPITAL ENCOUNTER (EMERGENCY)
Facility: HOSPITAL | Age: 63
Discharge: HOME | End: 2023-06-09
Attending: STUDENT IN AN ORGANIZED HEALTH CARE EDUCATION/TRAINING PROGRAM
Payer: MEDICARE

## 2023-06-09 VITALS
HEIGHT: 65 IN | WEIGHT: 150 LBS | SYSTOLIC BLOOD PRESSURE: 168 MMHG | BODY MASS INDEX: 24.99 KG/M2 | RESPIRATION RATE: 16 BRPM | DIASTOLIC BLOOD PRESSURE: 77 MMHG | TEMPERATURE: 97.7 F | OXYGEN SATURATION: 100 % | HEART RATE: 80 BPM

## 2023-06-09 DIAGNOSIS — R53.83 OTHER FATIGUE: ICD-10-CM

## 2023-06-09 DIAGNOSIS — R11.0 NAUSEA: ICD-10-CM

## 2023-06-09 DIAGNOSIS — R18.8 CIRRHOSIS OF LIVER WITH ASCITES, UNSPECIFIED HEPATIC CIRRHOSIS TYPE: ICD-10-CM

## 2023-06-09 DIAGNOSIS — K74.60 CIRRHOSIS OF LIVER WITH ASCITES, UNSPECIFIED HEPATIC CIRRHOSIS TYPE: ICD-10-CM

## 2023-06-09 DIAGNOSIS — N20.1 RIGHT URETERAL STONE: ICD-10-CM

## 2023-06-09 DIAGNOSIS — R19.7 DIARRHEA, UNSPECIFIED TYPE: Primary | ICD-10-CM

## 2023-06-09 LAB
ALBUMIN SERPL-MCNC: 1.8 G/DL (ref 3.5–5.2)
ALBUMIN/GLOB SERPL: 0.3 G/DL
ALP SERPL-CCNC: 106 U/L (ref 39–117)
ALT SERPL W P-5'-P-CCNC: 17 U/L (ref 1–33)
ANION GAP SERPL CALCULATED.3IONS-SCNC: 9.6 MMOL/L (ref 5–15)
AST SERPL-CCNC: 49 U/L (ref 1–32)
BACTERIA UR QL AUTO: ABNORMAL /HPF
BASOPHILS # BLD AUTO: 0.05 10*3/MM3 (ref 0–0.2)
BASOPHILS NFR BLD AUTO: 1 % (ref 0–1.5)
BILIRUB SERPL-MCNC: 0.9 MG/DL (ref 0–1.2)
BILIRUB UR QL STRIP: NEGATIVE
BUN SERPL-MCNC: 14 MG/DL (ref 8–23)
BUN/CREAT SERPL: 11.7 (ref 7–25)
CALCIUM SPEC-SCNC: 8.6 MG/DL (ref 8.6–10.5)
CHLORIDE SERPL-SCNC: 106 MMOL/L (ref 98–107)
CLARITY UR: CLEAR
CO2 SERPL-SCNC: 22.4 MMOL/L (ref 22–29)
COLOR UR: ABNORMAL
CREAT SERPL-MCNC: 1.2 MG/DL (ref 0.57–1)
DEPRECATED RDW RBC AUTO: 56.6 FL (ref 37–54)
EGFRCR SERPLBLD CKD-EPI 2021: 51 ML/MIN/1.73
EOSINOPHIL # BLD AUTO: 0.13 10*3/MM3 (ref 0–0.4)
EOSINOPHIL NFR BLD AUTO: 2.5 % (ref 0.3–6.2)
ERYTHROCYTE [DISTWIDTH] IN BLOOD BY AUTOMATED COUNT: 16.5 % (ref 12.3–15.4)
FLUAV SUBTYP SPEC NAA+PROBE: NOT DETECTED
FLUBV RNA ISLT QL NAA+PROBE: NOT DETECTED
GLOBULIN UR ELPH-MCNC: 5.4 GM/DL
GLUCOSE SERPL-MCNC: 135 MG/DL (ref 65–99)
GLUCOSE UR STRIP-MCNC: NEGATIVE MG/DL
HCT VFR BLD AUTO: 25.2 % (ref 34–46.6)
HGB BLD-MCNC: 9.1 G/DL (ref 12–15.9)
HGB UR QL STRIP.AUTO: ABNORMAL
HYALINE CASTS UR QL AUTO: ABNORMAL /LPF
IMM GRANULOCYTES # BLD AUTO: 0.01 10*3/MM3 (ref 0–0.05)
IMM GRANULOCYTES NFR BLD AUTO: 0.2 % (ref 0–0.5)
KETONES UR QL STRIP: ABNORMAL
LEUKOCYTE ESTERASE UR QL STRIP.AUTO: NEGATIVE
LIPASE SERPL-CCNC: 20 U/L (ref 13–60)
LYMPHOCYTES # BLD AUTO: 0.53 10*3/MM3 (ref 0.7–3.1)
LYMPHOCYTES NFR BLD AUTO: 10.1 % (ref 19.6–45.3)
MCH RBC QN AUTO: 35.7 PG (ref 26.6–33)
MCHC RBC AUTO-ENTMCNC: 36.1 G/DL (ref 31.5–35.7)
MCV RBC AUTO: 98.8 FL (ref 79–97)
MONOCYTES # BLD AUTO: 0.7 10*3/MM3 (ref 0.1–0.9)
MONOCYTES NFR BLD AUTO: 13.4 % (ref 5–12)
NEUTROPHILS NFR BLD AUTO: 3.82 10*3/MM3 (ref 1.7–7)
NEUTROPHILS NFR BLD AUTO: 72.8 % (ref 42.7–76)
NITRITE UR QL STRIP: NEGATIVE
NRBC BLD AUTO-RTO: 0 /100 WBC (ref 0–0.2)
PH UR STRIP.AUTO: 6 [PH] (ref 5–8)
PLATELET # BLD AUTO: 123 10*3/MM3 (ref 140–450)
PMV BLD AUTO: 11.1 FL (ref 6–12)
POTASSIUM SERPL-SCNC: 4.6 MMOL/L (ref 3.5–5.2)
PROT SERPL-MCNC: 7.2 G/DL (ref 6–8.5)
PROT UR QL STRIP: ABNORMAL
RBC # BLD AUTO: 2.55 10*6/MM3 (ref 3.77–5.28)
RBC # UR STRIP: ABNORMAL /HPF
REF LAB TEST METHOD: ABNORMAL
SARS-COV-2 RNA RESP QL NAA+PROBE: NOT DETECTED
SODIUM SERPL-SCNC: 138 MMOL/L (ref 136–145)
SP GR UR STRIP: 1.02 (ref 1–1.03)
SQUAMOUS #/AREA URNS HPF: ABNORMAL /HPF
UROBILINOGEN UR QL STRIP: ABNORMAL
WBC # UR STRIP: ABNORMAL /HPF
WBC NRBC COR # BLD: 5.24 10*3/MM3 (ref 3.4–10.8)

## 2023-06-09 PROCEDURE — 83690 ASSAY OF LIPASE: CPT | Performed by: STUDENT IN AN ORGANIZED HEALTH CARE EDUCATION/TRAINING PROGRAM

## 2023-06-09 PROCEDURE — 99283 EMERGENCY DEPT VISIT LOW MDM: CPT

## 2023-06-09 PROCEDURE — 96374 THER/PROPH/DIAG INJ IV PUSH: CPT

## 2023-06-09 PROCEDURE — 87636 SARSCOV2 & INF A&B AMP PRB: CPT | Performed by: STUDENT IN AN ORGANIZED HEALTH CARE EDUCATION/TRAINING PROGRAM

## 2023-06-09 PROCEDURE — 81001 URINALYSIS AUTO W/SCOPE: CPT | Performed by: STUDENT IN AN ORGANIZED HEALTH CARE EDUCATION/TRAINING PROGRAM

## 2023-06-09 PROCEDURE — 74176 CT ABD & PELVIS W/O CONTRAST: CPT

## 2023-06-09 PROCEDURE — 85025 COMPLETE CBC W/AUTO DIFF WBC: CPT | Performed by: STUDENT IN AN ORGANIZED HEALTH CARE EDUCATION/TRAINING PROGRAM

## 2023-06-09 PROCEDURE — 80053 COMPREHEN METABOLIC PANEL: CPT | Performed by: STUDENT IN AN ORGANIZED HEALTH CARE EDUCATION/TRAINING PROGRAM

## 2023-06-09 PROCEDURE — 25010000002 ONDANSETRON PER 1 MG: Performed by: STUDENT IN AN ORGANIZED HEALTH CARE EDUCATION/TRAINING PROGRAM

## 2023-06-09 PROCEDURE — 36415 COLL VENOUS BLD VENIPUNCTURE: CPT

## 2023-06-09 PROCEDURE — 93005 ELECTROCARDIOGRAM TRACING: CPT | Performed by: STUDENT IN AN ORGANIZED HEALTH CARE EDUCATION/TRAINING PROGRAM

## 2023-06-09 RX ORDER — ONDANSETRON 4 MG/1
4 TABLET, ORALLY DISINTEGRATING ORAL EVERY 6 HOURS PRN
Qty: 8 TABLET | Refills: 0 | Status: SHIPPED | OUTPATIENT
Start: 2023-06-09 | End: 2023-06-11

## 2023-06-09 RX ORDER — ONDANSETRON 2 MG/ML
4 INJECTION INTRAMUSCULAR; INTRAVENOUS ONCE
Status: COMPLETED | OUTPATIENT
Start: 2023-06-09 | End: 2023-06-09

## 2023-06-09 RX ADMIN — ONDANSETRON 4 MG: 2 INJECTION INTRAMUSCULAR; INTRAVENOUS at 14:12

## 2023-06-09 NOTE — DISCHARGE INSTRUCTIONS
Your CT scan revealed a kidney stone in the right distal ureter, will need to follow-up with urology if stone does not pass, may contact Dr. Hastings as needed.  Your CT did also reveal cirrhosis and ascites which have been present on previous work-up.  Your lab work actually looked quite stable in comparison to previous.  Urine did not have any signs of infection.  Use Zofran to help with nausea and vomiting.  Try to eat a bland diet for the next few days and progress as tolerated.  You will need to follow-up with your primary care provider and GI specialist Dr. Mandujano as early as possible to reevaluate symptoms and ensure you are improving.  Return to the ER for any change or worsening of symptoms, or any additional concerns including but not limited to abdominal pain, fever greater than 100.4, intractable vomiting, hematemesis, melena or hematochezia.

## 2023-06-09 NOTE — ED PROVIDER NOTES
Subjective  History of Present Illness:    Chief Complaint: Generalized Weakness   History of Present Illness: Patient is a 62-year-old female with history of anemia, diabetes, hyperlipidemia, hypertension, hypothyroidism, impaired mobility, short-term memory loss cirrhosis with ascites and, GI bleeding presenting to the ER for evaluation of generalized fatigue.  Patient reports that around Monday or Tuesday of this week she had a few episodes of loose nonbloody stool.  She states for the past 3 days she has felt more generally weak and fatigued than usual.  She states that she is currently only having 2 or 3 bowel movements per day at this time.  She states she feels nauseous after eating but denies any vomiting.  She denies any hematemesis, melena or hematochezia.  She denies any known fever, chills, cough, chest pain, dizziness, syncope, dysuria, hematuria, or any other symptoms.  She denies any undercooked foods, recent travel or sick contacts.  She does tell me that she was admitted in the hospital a few weeks ago, she was unsure of the reason.  Per chart review it appears that she had pneumonia and GI bleeding.  It appears that she had a paracentesis and EGD performed at that time with Dr. Daugherty.  Onset: 5 days   Duration: Persistent   Exacerbating / Alleviating factors: None  Associated symptoms: None      Nurses Notes reviewed and agree, including vitals, allergies, social history and prior medical history.     REVIEW OF SYSTEMS: All systems reviewed and not pertinent unless noted.  Review of Systems      Positive for: Generalized fatigue, nausea, diarrhea    Negative for: Fever, chills, chest pain, cough, shortness of breath, abdominal pain, dysuria, hematuria, melena, hematochezia, hematemesis, dysuria, hematuria    Past Medical History:   Diagnosis Date   • Anemia    • Diabetes mellitus    • Hyperlipidemia    • Hypertension    • Hypothyroidism    • Impaired mobility    • Short-term memory loss   "      Allergies:    Patient has no known allergies.      Past Surgical History:   Procedure Laterality Date   • CHOLECYSTECTOMY     • ENDOSCOPY W/ BANDING N/A 4/6/2023    Procedure: ESOPHAGOGASTRODUODENOSCOPY WITH BIOPSY;  Surgeon: Jens Mandujano MD;  Location: [facility] ENDOSCOPY;  Service: Gastroenterology;  Laterality: N/A;         Social History     Socioeconomic History   • Marital status:    Tobacco Use   • Smoking status: Never   • Smokeless tobacco: Never   Vaping Use   • Vaping Use: Never used   Substance and Sexual Activity   • Alcohol use: Yes     Comment: a couple margaritas a year   • Drug use: Never   • Sexual activity: Defer         History reviewed. No pertinent family history.    Objective  Physical Exam:  /77   Pulse 80   Temp 97.7 °F (36.5 °C) (Oral)   Resp 16   Ht 165.1 cm (65\")   Wt 68 kg (150 lb)   SpO2 100%   BMI 24.96 kg/m²      Physical Exam    CONSTITUTIONAL: Well developed, appears chronically ill.  She is awake, alert, speaking in full sentences  VITAL SIGNS: per nursing, reviewed and noted  SKIN: exposed skin with no rashes, ulcerations or petechiae  EYES: Grossly EOMI, no icterus  ENT: Normal voice.  Nares patent, no facial asymmetry  RESPIRATORY:  No increased work of breathing. No retractions.  Lung sounds are clear bilaterally  CARDIOVASCULAR:  regular rate and rhythm, radial and dorsalis pedis pulses are 2+ and equal  GI: Abdomen is very large, bowel sounds are hypoactive, no specific guarding or tenderness  MUSCULOSKELETAL:  No tenderness. Full ROM. Strength and tone grossly normal.    NEUROLOGIC: Alert, oriented x 3. No gross deficits. GCS 15.   PSYCH: appropriate affect.      Procedures    ED Course:        ED Course as of 06/09/23 2237 Fri Jun 09, 2023   1315 EKG interpreted by me, normal sinus rhythm no concerning ST changes noted, rate 81 [JE]   1355 WBC: 5.24 [LR]   1355 Hemoglobin(!): 9.1  Improved in comparison to previous. [LR]   1355 " Platelets(!): 123 [LR]   1358 Glucose(!): 135 [LR]   1358 BUN: 14 [LR]   1358 Creatinine(!): 1.20  Improved in comparison to previous. [LR]   1358 Sodium: 138 [LR]   1358 Potassium: 4.6 [LR]   1358 Chloride: 106 [LR]   1358 CO2: 22.4 [LR]   1358 Calcium: 8.6 [LR]   1358 Total Protein: 7.2 [LR]   1358 Albumin(!): 1.8 [LR]   1358 ALT (SGPT): 17 [LR]   1358 AST (SGOT)(!): 49 [LR]   1358 Alkaline Phosphatase: 106 [LR]   1358 Total Bilirubin: 0.9 [LR]   1358 Globulin: 5.4 [LR]   1358 A/G Ratio: 0.3 [LR]   1358 BUN/Creatinine Ratio: 11.7 [LR]   1358 Anion Gap: 9.6 [LR]   1358 eGFR(!): 51.0 [LR]   1358 Lipase: 20 [LR]   1358 COVID19: Not Detected [LR]   1358 Influenza A PCR: Not Detected [LR]   1358 Influenza B PCR: Not Detected [LR]   1426 Discussed with Dr. Santos, awaiting CT read and UA [LR]   1440 IMPRESSION:  Cirrhosis. Splenomegaly. Evidence of portal hypertension.     Small-to-moderate bilateral pleural effusions, large volume ascites, and  diffuse body wall edema are increased in comparison to prior exam.  Questionable portal hypertensive colopathy.     Large circumscribed subcutaneous cystic lesion in the medial right upper  thigh, uncertain etiology, loculated fluid collection such as seroma or  hematoma or a cystic mass. Correlate clinically.     Nonobstructing right nephrolithiasis. There is a 6 mm stone at the right  ureteropelvic junction without hydronephrosis.           Images personally reviewed, interpreted and dictated by JENELLE Ochoa.                    This report was signed and finalized on 6/9/2023 2:30 PM by JENELLE Ochoa. [LR]   1442 Reviewed CT with Dr. Santos.  Patient is not having any severe abdominal pain at this time, they do see a 6 mm stone at the UPJ without hydro.  The cystic lesion in the right medial thigh, was likely related to seroma from her body wall edema. [LR]   1504 Color, UA(!): Dark Yellow [LR]   1504 Appearance, UA: Clear [LR]   1504 pH, UA: 6.0 [LR]   1504  Specific Gravity, UA: 1.019 [LR]   1504 Glucose: Negative [LR]   1504 Ketones, UA(!): Trace [LR]   1504 Bilirubin, UA: Negative [LR]   1504 Blood, UA(!): Trace [LR]   1504 Protein, UA(!): 100 mg/dL (2+) [LR]   1504 Leukocytes, UA: Negative [LR]   1504 Nitrite, UA: Negative [LR]   1504 Urobilinogen, UA(!): 2.0 E.U./dL [LR]   1522 RBC, UA(!): 6-12 [LR]   1522 WBC, UA: None Seen [LR]   1522 Bacteria, UA: None Seen [LR]   1522 Squamous Epithelial Cells, UA: 0-2 [LR]   1522 Hyaline Casts, UA: None Seen [LR]   1522 Methodology:: Manual Light Microscopy [LR]   1522 Discussed all findings with patient.  She states she has had kidney stones in the past that she has passed on her own.  Denies any pain at this time.  She states the nausea medicine did help.  Examined her right thigh.  There is a fluid collection but no erythema or warmth.  Discussed that her findings appear to be stable in comparison to previous, urine does not appear infected. Discussed outpatient vs inpatient treatment.  Do not see any significant acute changes that would warrant admission at this time and patient does prefer to go home at this time which I believe is reasonable.  Will give Zofran, discussed diet changes, follow-up with GI and urology as well as strict return precautions. [LR]      ED Course User Index  [JE] Steven Santos MD  [LR] Katt Villareal PA-C       Lab Results (last 24 hours)     Procedure Component Value Units Date/Time    CBC & Differential [[id number]]  (Abnormal) Collected: 06/09/23 1320    Specimen: Blood Updated: 06/09/23 1344    Narrative:      The following orders were created for panel order CBC & Differential.  Procedure                               Abnormality         Status                     ---------                               -----------         ------                     CBC Auto Differential[[id number]]        Abnormal            Final result                 Please view results for these tests on the  individual orders.    Comprehensive Metabolic Panel [[id number]]  (Abnormal) Collected: 06/09/23 1320    Specimen: Blood Updated: 06/09/23 1357     Glucose 135 mg/dL      BUN 14 mg/dL      Creatinine 1.20 mg/dL      Sodium 138 mmol/L      Potassium 4.6 mmol/L      Comment: Specimen hemolyzed.  Results may be affected.        Chloride 106 mmol/L      CO2 22.4 mmol/L      Calcium 8.6 mg/dL      Total Protein 7.2 g/dL      Albumin 1.8 g/dL      ALT (SGPT) 17 U/L      Comment: Specimen hemolyzed.  Results may be affected.        AST (SGOT) 49 U/L      Comment: Specimen hemolyzed.  Results may be affected.        Alkaline Phosphatase 106 U/L      Total Bilirubin 0.9 mg/dL      Globulin 5.4 gm/dL      A/G Ratio 0.3 g/dL      BUN/Creatinine Ratio 11.7     Anion Gap 9.6 mmol/L      eGFR 51.0 mL/min/1.73     Narrative:      GFR Normal >60  Chronic Kidney Disease <60  Kidney Failure <15      Lipase [[id number]]  (Normal) Collected: 06/09/23 1320    Specimen: Blood Updated: 06/09/23 1356     Lipase 20 U/L     CBC Auto Differential [[id number]]  (Abnormal) Collected: 06/09/23 1320    Specimen: Blood Updated: 06/09/23 1344     WBC 5.24 10*3/mm3      RBC 2.55 10*6/mm3      Hemoglobin 9.1 g/dL      Hematocrit 25.2 %      MCV 98.8 fL      MCH 35.7 pg      MCHC 36.1 g/dL      RDW 16.5 %      RDW-SD 56.6 fl      MPV 11.1 fL      Platelets 123 10*3/mm3      Neutrophil % 72.8 %      Lymphocyte % 10.1 %      Monocyte % 13.4 %      Eosinophil % 2.5 %      Basophil % 1.0 %      Immature Grans % 0.2 %      Neutrophils, Absolute 3.82 10*3/mm3      Lymphocytes, Absolute 0.53 10*3/mm3      Monocytes, Absolute 0.70 10*3/mm3      Eosinophils, Absolute 0.13 10*3/mm3      Basophils, Absolute 0.05 10*3/mm3      Immature Grans, Absolute 0.01 10*3/mm3      nRBC 0.0 /100 WBC     COVID-19 and FLU A/B PCR - Swab, Nasopharynx [[id number]]  (Normal) Collected: 06/09/23 1334    Specimen: Swab from Nasopharynx Updated: 06/09/23 1357     COVID19 Not Detected      Influenza A PCR Not Detected     Influenza B PCR Not Detected    Narrative:      Fact sheet for providers: https://www.fda.gov/media/194956/download    Fact sheet for patients: https://www.fda.gov/media/695527/download    Test performed by PCR.    Urinalysis With Culture If Indicated - Urine, Clean Catch [[id number]]  (Abnormal) Collected: 06/09/23 1445    Specimen: Urine, Clean Catch Updated: 06/09/23 1504     Color, UA Dark Yellow     Appearance, UA Clear     pH, UA 6.0     Specific Gravity, UA 1.019     Glucose, UA Negative     Ketones, UA Trace     Bilirubin, UA Negative     Blood, UA Trace     Protein,  mg/dL (2+)     Leuk Esterase, UA Negative     Nitrite, UA Negative     Urobilinogen, UA 2.0 E.U./dL    Narrative:      In absence of clinical symptoms, the presence of pyuria, bacteria, and/or nitrites on the urinalysis result does not correlate with infection.    Urinalysis, Microscopic Only - Urine, Clean Catch [[id number]]  (Abnormal) Collected: 06/09/23 1445    Specimen: Urine, Clean Catch Updated: 06/09/23 1513     RBC, UA 6-12 /HPF      WBC, UA None Seen /HPF      Comment: Urine culture not indicated.        Bacteria, UA None Seen /HPF      Squamous Epithelial Cells, UA 0-2 /HPF      Hyaline Casts, UA None Seen /LPF      Methodology Manual Light Microscopy           CT Abdomen Pelvis Without Contrast    Result Date: 6/9/2023  CT SCAN OF THE ABDOMEN AND PELVIS WITHOUT CONTRAST     6/9/2023 2:01 PM  HISTORY: nausea, diarrhea Abdominal pain.  PROCEDURE: Axial CT images were obtained from the lung bases to the pubic symphysis without IV contrast. Reformatted images were generated from the axial data set and provided for interpretation. This study was performed with techniques to keep radiation doses as low as reasonably achievable (ALARA). Individualized dose reduction techniques using automated exposure control or adjustment of mA and/or kV according to the patient size were employed. 2331.94 mGy.cm  31.44 mGy  COMPARISON: CT abdomen pelvis dated 04/04/2023.  FINDINGS: Lack of IV contrast limits evaluation of the abdominal and pelvic solid organs.  LOWER CHEST: The heart is normal in size. Coronary artery disease present. Small bilateral pleural effusions, increased in comparison to prior exam.  ABDOMEN/PELVIS: Liver, gallbladder and bile ducts: Cirrhosis. No discrete focal liver lesion identified. Prior cholecystectomy. No definite biliary ductal dilatation.  Adrenal glands: The adrenal glands are morphologically unremarkable without suspicious lesion.  Kidneys, ureters and urinary bladder: Right nephrolithiasis with 5 mm stone in the right renal midpole. There is additional 6 mm stone at the ureteropelvic junction without hydronephrosis. Small left kidney. No hydronephrosis. Unremarkable urinary bladder.  Spleen: Splenomegaly measuring 14 cm in craniocaudal dimension.  Pancreas: The pancreas is grossly unremarkable.  Gastrointestinal system and mesentery: There is no evidence of bowel obstruction. The appendix is not identified but there is no significant inflammation in the right lower quadrant. No significant mesenteric inflammation. Small hiatal hernia. No evidence of pneumatosis or pneumoperitoneum or portal venous gas. Mild diffuse wall thickening of the right hemicolon.  Lymph nodes: No definite pathologically enlarged abdominal or pelvic lymph nodes are present within the limits of a noncontrast enhanced examination.  Vessels: The abdominal aorta is normal in caliber. The inferior vena cava is unremarkable.  Peritoneum: Large volume ascites. No pneumoperitoneum.  Pelvic viscera: No acute findings. Calcifications in the left adnexa noted again. Uterus is grossly unremarkable. Ovaries are grossly unremarkable.  Body wall: Mild diffuse body wall edema, anasarca. There is a circumscribed subcutaneous cystic lesion in the medial right upper thigh, measuring 10.7 x 8.5 x 13.7 cm (series 3, image 50; series 5,  images 52). No significant body wall hernias.  Bones: No acute fracture.      Impression: Cirrhosis. Splenomegaly. Evidence of portal hypertension.  Small-to-moderate bilateral pleural effusions, large volume ascites, and diffuse body wall edema are increased in comparison to prior exam. Questionable portal hypertensive colopathy.  Large circumscribed subcutaneous cystic lesion in the medial right upper thigh, uncertain etiology, loculated fluid collection such as seroma or hematoma or a cystic mass. Correlate clinically.  Nonobstructing right nephrolithiasis. There is a 6 mm stone at the right ureteropelvic junction without hydronephrosis.    Images personally reviewed, interpreted and dictated by JENELLE Ochoa.       This report was signed and finalized on 6/9/2023 2:30 PM by JENELLE Ochoa.         MDM    Initial impression of presenting illness: Patient is a 62-year-old female presenting to the ER for evaluation of nausea and diarrhea.  On arrival she is stable, no acute distress, appears chronically ill    DDX: includes but is not limited to: Viral illness, gastroenteritis, dehydration, electro abnormalities, gastroparesis, and other concerns.    Patient arrives in stable condition with vitals interpreted by myself.     Pertinent features from physical exam: Large abdomen, decreased bowel sounds, afebrile, awake and alert, no guarding or tenderness of the abdomen.    Initial diagnostic plan: We will obtain basic labs, lipase, urinalysis, COVID and influenza as well as CT abdomen pelvis.  We will give 500 cc fluid bolus and Zofran.    Per chart review it appears that the EGD on 4 6 revealed no gross lesions, small amount of food residue in the stomach suggesting gastroparesis, nonbleeding gastric ulcers with no current bleeding likely thought to be due to NSAID/ASA.  Hepatic testing for cirrhosis was ordered by GI.    Results from initial plan were reviewed and interpreted by me revealing Overall  stable work-up.  Hemoglobin was within normal limits and stable in comparison to previous.  Her creatinine improved in comparison to previous.  COVID influenza negative.  Lipase was not elevated.  Urine had protein and trace blood with red blood cells but no signs of infection.  CT abdomen pelvis was read by radiologist as cirrhosis, splenomegaly and portal hypertension.  They did see small to moderate bilateral pleural effusions, large volume ascites and diffuse body wall edema.  They did also note a large circumscribed subcutaneous cystic lesion in the right medial thigh of uncertain etiology, loculated fluid collection such as seroma or hematoma or cystic mass, correlate clinically.  They also revealed nonobstructing right nephrolithiasis and a 6 mm stone at the right ureteropelvic junction with hydronephrosis.    Diagnostic information from other sources: Chart review    Interventions / Re-evaluation: Patient remained stable throughout visit.  She states no more nausea medicine helped tremendously.  She was unable to produce a bowel movement here throughout her visit.  The swelling on the thigh was present but there is no erythema, tenderness or warmth to the area.  Discussed with Dr. Santos, thought it may be related to her body wall edema.  Discussed that she had a stone on CT scan but she denied any pain, there were no signs of infection believe she can follow-up.  I discussed that she needs to follow-up with her GI specialist very closely as well.  We will also give urology follow-up    Results/clinical rationale were discussed with patient.  We discussed follow-up and strict return precautions.  We will call in Zofran use. Discussed very strict return precautions.  She verbalized understanding and was in agreement with this plan of care.    Consultations/Discussion of results with other physicians: Dr. Santos    Disposition plan: Discharge  -----    Final diagnoses:   Diarrhea, unspecified type   Nausea   Other  fatigue   Right ureteral stone   Cirrhosis of liver with ascites, unspecified hepatic cirrhosis type        Katt Villareal PA-C  06/09/23 2237       Katt Villareal PA-C  06/09/23 2230

## 2023-06-09 NOTE — ED NOTES
Pt states she is having to call her brother to help with transportation home. Awaiting brother's arrival at this time.

## 2023-07-02 ENCOUNTER — HOSPITAL ENCOUNTER (EMERGENCY)
Facility: HOSPITAL | Age: 63
Discharge: HOME | DRG: 377 | End: 2023-07-02
Attending: EMERGENCY MEDICINE | Admitting: EMERGENCY MEDICINE
Payer: MEDICARE

## 2023-07-02 ENCOUNTER — APPOINTMENT (OUTPATIENT)
Dept: GENERAL RADIOLOGY | Facility: HOSPITAL | Age: 63
DRG: 377 | End: 2023-07-02
Payer: MEDICARE

## 2023-07-02 ENCOUNTER — APPOINTMENT (OUTPATIENT)
Dept: CT IMAGING | Facility: HOSPITAL | Age: 63
DRG: 377 | End: 2023-07-02
Payer: MEDICARE

## 2023-07-02 VITALS
HEART RATE: 75 BPM | RESPIRATION RATE: 18 BRPM | TEMPERATURE: 98 F | HEIGHT: 65 IN | DIASTOLIC BLOOD PRESSURE: 60 MMHG | WEIGHT: 266 LBS | SYSTOLIC BLOOD PRESSURE: 135 MMHG | OXYGEN SATURATION: 100 % | BODY MASS INDEX: 44.32 KG/M2

## 2023-07-02 DIAGNOSIS — R11.2 NAUSEA AND VOMITING, UNSPECIFIED VOMITING TYPE: ICD-10-CM

## 2023-07-02 DIAGNOSIS — N20.0 KIDNEY STONE: ICD-10-CM

## 2023-07-02 DIAGNOSIS — S91.302A OPEN WOUND OF LEFT FOOT, INITIAL ENCOUNTER: ICD-10-CM

## 2023-07-02 DIAGNOSIS — K74.60 CIRRHOSIS OF LIVER WITH ASCITES, UNSPECIFIED HEPATIC CIRRHOSIS TYPE: Primary | ICD-10-CM

## 2023-07-02 DIAGNOSIS — R18.8 CIRRHOSIS OF LIVER WITH ASCITES, UNSPECIFIED HEPATIC CIRRHOSIS TYPE: Primary | ICD-10-CM

## 2023-07-02 LAB
ALBUMIN SERPL-MCNC: 1.9 G/DL (ref 3.5–5.2)
ALBUMIN/GLOB SERPL: 0.3 G/DL
ALP SERPL-CCNC: 134 U/L (ref 39–117)
ALT SERPL W P-5'-P-CCNC: 14 U/L (ref 1–33)
ANION GAP SERPL CALCULATED.3IONS-SCNC: 13.4 MMOL/L (ref 5–15)
AST SERPL-CCNC: 35 U/L (ref 1–32)
BACTERIA UR QL AUTO: ABNORMAL /HPF
BASOPHILS # BLD AUTO: 0.08 10*3/MM3 (ref 0–0.2)
BASOPHILS NFR BLD AUTO: 1.3 % (ref 0–1.5)
BILIRUB SERPL-MCNC: 0.9 MG/DL (ref 0–1.2)
BILIRUB UR QL STRIP: NEGATIVE
BUN SERPL-MCNC: 15 MG/DL (ref 8–23)
BUN/CREAT SERPL: 12.2 (ref 7–25)
CALCIUM SPEC-SCNC: 9.2 MG/DL (ref 8.6–10.5)
CHLORIDE SERPL-SCNC: 107 MMOL/L (ref 98–107)
CLARITY UR: CLEAR
CO2 SERPL-SCNC: 21.6 MMOL/L (ref 22–29)
COLOR UR: ABNORMAL
CREAT SERPL-MCNC: 1.23 MG/DL (ref 0.57–1)
DEPRECATED RDW RBC AUTO: 53.4 FL (ref 37–54)
EGFRCR SERPLBLD CKD-EPI 2021: 49.5 ML/MIN/1.73
EOSINOPHIL # BLD AUTO: 0.3 10*3/MM3 (ref 0–0.4)
EOSINOPHIL NFR BLD AUTO: 4.7 % (ref 0.3–6.2)
ERYTHROCYTE [DISTWIDTH] IN BLOOD BY AUTOMATED COUNT: 16.1 % (ref 12.3–15.4)
GLOBULIN UR ELPH-MCNC: 5.8 GM/DL
GLUCOSE SERPL-MCNC: 133 MG/DL (ref 65–99)
GLUCOSE UR STRIP-MCNC: NEGATIVE MG/DL
HCT VFR BLD AUTO: 28.7 % (ref 34–46.6)
HGB BLD-MCNC: 9.3 G/DL (ref 12–15.9)
HGB UR QL STRIP.AUTO: ABNORMAL
HYALINE CASTS UR QL AUTO: ABNORMAL /LPF
IMM GRANULOCYTES # BLD AUTO: 0.03 10*3/MM3 (ref 0–0.05)
IMM GRANULOCYTES NFR BLD AUTO: 0.5 % (ref 0–0.5)
KETONES UR QL STRIP: ABNORMAL
LEUKOCYTE ESTERASE UR QL STRIP.AUTO: ABNORMAL
LIPASE SERPL-CCNC: 17 U/L (ref 13–60)
LYMPHOCYTES # BLD AUTO: 0.73 10*3/MM3 (ref 0.7–3.1)
LYMPHOCYTES NFR BLD AUTO: 11.5 % (ref 19.6–45.3)
MCH RBC QN AUTO: 29.7 PG (ref 26.6–33)
MCHC RBC AUTO-ENTMCNC: 32.4 G/DL (ref 31.5–35.7)
MCV RBC AUTO: 91.7 FL (ref 79–97)
MONOCYTES # BLD AUTO: 0.73 10*3/MM3 (ref 0.1–0.9)
MONOCYTES NFR BLD AUTO: 11.5 % (ref 5–12)
NEUTROPHILS NFR BLD AUTO: 4.49 10*3/MM3 (ref 1.7–7)
NEUTROPHILS NFR BLD AUTO: 70.5 % (ref 42.7–76)
NITRITE UR QL STRIP: NEGATIVE
NRBC BLD AUTO-RTO: 0 /100 WBC (ref 0–0.2)
PH UR STRIP.AUTO: 5.5 [PH] (ref 5–8)
PLATELET # BLD AUTO: 136 10*3/MM3 (ref 140–450)
PMV BLD AUTO: 10.1 FL (ref 6–12)
POTASSIUM SERPL-SCNC: 3.9 MMOL/L (ref 3.5–5.2)
PROT SERPL-MCNC: 7.7 G/DL (ref 6–8.5)
PROT UR QL STRIP: ABNORMAL
RBC # BLD AUTO: 3.13 10*6/MM3 (ref 3.77–5.28)
RBC # UR STRIP: ABNORMAL /HPF
REF LAB TEST METHOD: ABNORMAL
SODIUM SERPL-SCNC: 142 MMOL/L (ref 136–145)
SP GR UR STRIP: 1.02 (ref 1–1.03)
SQUAMOUS #/AREA URNS HPF: ABNORMAL /HPF
UROBILINOGEN UR QL STRIP: ABNORMAL
WBC # UR STRIP: ABNORMAL /HPF
WBC NRBC COR # BLD: 6.36 10*3/MM3 (ref 3.4–10.8)

## 2023-07-02 PROCEDURE — 25010000002 FUROSEMIDE PER 20 MG: Performed by: PHYSICIAN ASSISTANT

## 2023-07-02 PROCEDURE — 96375 TX/PRO/DX INJ NEW DRUG ADDON: CPT

## 2023-07-02 PROCEDURE — 73630 X-RAY EXAM OF FOOT: CPT

## 2023-07-02 PROCEDURE — 83690 ASSAY OF LIPASE: CPT | Performed by: PHYSICIAN ASSISTANT

## 2023-07-02 PROCEDURE — 85025 COMPLETE CBC W/AUTO DIFF WBC: CPT | Performed by: PHYSICIAN ASSISTANT

## 2023-07-02 PROCEDURE — 99284 EMERGENCY DEPT VISIT MOD MDM: CPT

## 2023-07-02 PROCEDURE — 96376 TX/PRO/DX INJ SAME DRUG ADON: CPT

## 2023-07-02 PROCEDURE — 81001 URINALYSIS AUTO W/SCOPE: CPT | Performed by: PHYSICIAN ASSISTANT

## 2023-07-02 PROCEDURE — 96361 HYDRATE IV INFUSION ADD-ON: CPT

## 2023-07-02 PROCEDURE — 96374 THER/PROPH/DIAG INJ IV PUSH: CPT

## 2023-07-02 PROCEDURE — 80053 COMPREHEN METABOLIC PANEL: CPT | Performed by: PHYSICIAN ASSISTANT

## 2023-07-02 PROCEDURE — 87086 URINE CULTURE/COLONY COUNT: CPT | Performed by: PHYSICIAN ASSISTANT

## 2023-07-02 PROCEDURE — 74176 CT ABD & PELVIS W/O CONTRAST: CPT

## 2023-07-02 PROCEDURE — 25010000002 ONDANSETRON PER 1 MG: Performed by: PHYSICIAN ASSISTANT

## 2023-07-02 RX ORDER — ONDANSETRON 4 MG/1
4 TABLET, ORALLY DISINTEGRATING ORAL EVERY 8 HOURS PRN
Qty: 12 TABLET | Refills: 0 | Status: SHIPPED | OUTPATIENT
Start: 2023-07-02

## 2023-07-02 RX ORDER — BACITRACIN ZINC 500 [USP'U]/G
1 OINTMENT TOPICAL ONCE
Status: COMPLETED | OUTPATIENT
Start: 2023-07-02 | End: 2023-07-02

## 2023-07-02 RX ORDER — CEPHALEXIN 250 MG/1
500 CAPSULE ORAL ONCE
Status: COMPLETED | OUTPATIENT
Start: 2023-07-02 | End: 2023-07-02

## 2023-07-02 RX ORDER — FUROSEMIDE 10 MG/ML
80 INJECTION INTRAMUSCULAR; INTRAVENOUS ONCE
Status: COMPLETED | OUTPATIENT
Start: 2023-07-02 | End: 2023-07-02

## 2023-07-02 RX ORDER — ONDANSETRON 2 MG/ML
4 INJECTION INTRAMUSCULAR; INTRAVENOUS ONCE
Status: COMPLETED | OUTPATIENT
Start: 2023-07-02 | End: 2023-07-02

## 2023-07-02 RX ORDER — CEPHALEXIN 500 MG/1
500 CAPSULE ORAL 4 TIMES DAILY
Qty: 27 CAPSULE | Refills: 0 | Status: SHIPPED | OUTPATIENT
Start: 2023-07-02 | End: 2023-07-15 | Stop reason: HOSPADM

## 2023-07-02 RX ADMIN — BACITRACIN ZINC 1 APPLICATION: 500 OINTMENT TOPICAL at 13:17

## 2023-07-02 RX ADMIN — CEPHALEXIN 500 MG: 250 CAPSULE ORAL at 17:02

## 2023-07-02 RX ADMIN — ONDANSETRON 4 MG: 2 INJECTION INTRAMUSCULAR; INTRAVENOUS at 14:06

## 2023-07-02 RX ADMIN — SODIUM CHLORIDE 1000 ML: 9 INJECTION, SOLUTION INTRAVENOUS at 10:28

## 2023-07-02 RX ADMIN — ONDANSETRON 4 MG: 2 INJECTION INTRAMUSCULAR; INTRAVENOUS at 10:28

## 2023-07-02 RX ADMIN — FUROSEMIDE 80 MG: 10 INJECTION, SOLUTION INTRAMUSCULAR; INTRAVENOUS at 17:02

## 2023-07-02 NOTE — ED PROVIDER NOTES
"Subjective:  Chief Complaint:  N/V    History of Present Illness:  Patient is a 62-year-old female with history of anemia, diabetes, hyperlipidemia, hypertension, hypothyroidism presenting to the ER with complaints of nausea, vomiting, lower abdominal discomfort which she says is not really a pain but more of a fullness since Thursday, 3 to 4 days ago.  Nursing staff also reports that she has a wound to her left foot which she did not mention during interview.  Patient denies additional symptoms or complaints at this time.      Nurses Notes reviewed and agree, including vitals, allergies, social history and prior medical history.     REVIEW OF SYSTEMS: All systems reviewed and not pertinent unless noted.  Review of Systems   Gastrointestinal:  Positive for abdominal pain, nausea and vomiting.   Skin:  Positive for wound.   All other systems reviewed and are negative.    Past Medical History:   Diagnosis Date    Anemia     Diabetes mellitus     Hyperlipidemia     Hypertension     Hypothyroidism     Impaired mobility     Short-term memory loss        Allergies:    Patient has no known allergies.      Past Surgical History:   Procedure Laterality Date    CHOLECYSTECTOMY      ENDOSCOPY W/ BANDING N/A 4/6/2023    Procedure: ESOPHAGOGASTRODUODENOSCOPY WITH BIOPSY;  Surgeon: Jens Mandujano MD;  Location: [facility] ENDOSCOPY;  Service: Gastroenterology;  Laterality: N/A;         Social History     Socioeconomic History    Marital status:    Tobacco Use    Smoking status: Never    Smokeless tobacco: Never   Vaping Use    Vaping Use: Never used   Substance and Sexual Activity    Alcohol use: Yes     Comment: a couple margaritas a year    Drug use: Never    Sexual activity: Defer         History reviewed. No pertinent family history.    Objective  Physical Exam:  /60 (BP Location: Right arm, Patient Position: Lying)   Pulse 75   Temp 98 °F (36.7 °C) (Oral)   Resp 18   Ht 165.1 cm (65\")   Wt 121 kg (266 " lb)   SpO2 100%   BMI 44.26 kg/m²      Physical Exam  Vitals and nursing note reviewed.   Constitutional:       General: She is not in acute distress.     Appearance: She is not toxic-appearing.   HENT:      Head: Normocephalic and atraumatic.   Eyes:      Extraocular Movements: Extraocular movements intact.   Cardiovascular:      Rate and Rhythm: Normal rate.      Heart sounds: Normal heart sounds.   Pulmonary:      Effort: Pulmonary effort is normal. No respiratory distress.   Abdominal:      General: There is no distension.      Palpations: Abdomen is soft.      Tenderness: There is no abdominal tenderness. There is no guarding or rebound.   Skin:     General: Skin is warm and dry.   Neurological:      General: No focal deficit present.      Mental Status: She is alert and oriented to person, place, and time.   Psychiatric:         Mood and Affect: Mood normal.         Behavior: Behavior normal.       Procedures    ED Course:         Lab Results (last 24 hours)       Procedure Component Value Units Date/Time    CBC & Differential [[id number]]  (Abnormal) Collected: 07/02/23 1029    Specimen: Blood Updated: 07/02/23 1123    Narrative:      The following orders were created for panel order CBC & Differential.  Procedure                               Abnormality         Status                     ---------                               -----------         ------                     CBC Auto Differential[[id number]]        Abnormal            Final result               Scan Slide[[id number]]                                                                    Please view results for these tests on the individual orders.    Comprehensive Metabolic Panel [[id number]]  (Abnormal) Collected: 07/02/23 1029    Specimen: Blood Updated: 07/02/23 1050     Glucose 133 mg/dL      BUN 15 mg/dL      Creatinine 1.23 mg/dL      Sodium 142 mmol/L      Potassium 3.9 mmol/L      Chloride 107 mmol/L      CO2 21.6 mmol/L      Calcium 9.2  mg/dL      Total Protein 7.7 g/dL      Albumin 1.9 g/dL      ALT (SGPT) 14 U/L      AST (SGOT) 35 U/L      Alkaline Phosphatase 134 U/L      Total Bilirubin 0.9 mg/dL      Globulin 5.8 gm/dL      A/G Ratio 0.3 g/dL      BUN/Creatinine Ratio 12.2     Anion Gap 13.4 mmol/L      eGFR 49.5 mL/min/1.73     Narrative:      GFR Normal >60  Chronic Kidney Disease <60  Kidney Failure <15      Lipase [[id number]]  (Normal) Collected: 07/02/23 1029    Specimen: Blood Updated: 07/02/23 1050     Lipase 17 U/L     CBC Auto Differential [[id number]]  (Abnormal) Collected: 07/02/23 1118    Specimen: Blood Updated: 07/02/23 1123     WBC 6.36 10*3/mm3      RBC 3.13 10*6/mm3      Hemoglobin 9.3 g/dL      Hematocrit 28.7 %      MCV 91.7 fL      MCH 29.7 pg      MCHC 32.4 g/dL      RDW 16.1 %      RDW-SD 53.4 fl      MPV 10.1 fL      Platelets 136 10*3/mm3      Neutrophil % 70.5 %      Lymphocyte % 11.5 %      Monocyte % 11.5 %      Eosinophil % 4.7 %      Basophil % 1.3 %      Immature Grans % 0.5 %      Neutrophils, Absolute 4.49 10*3/mm3      Lymphocytes, Absolute 0.73 10*3/mm3      Monocytes, Absolute 0.73 10*3/mm3      Eosinophils, Absolute 0.30 10*3/mm3      Basophils, Absolute 0.08 10*3/mm3      Immature Grans, Absolute 0.03 10*3/mm3      nRBC 0.0 /100 WBC     Urinalysis With Microscopic If Indicated (No Culture) - Urine, Clean Catch [[id number]]  (Abnormal) Collected: 07/02/23 1611    Specimen: Urine, Clean Catch Updated: 07/02/23 1624     Color, UA Dark Yellow     Appearance, UA Clear     pH, UA 5.5     Specific Gravity, UA 1.022     Glucose, UA Negative     Ketones, UA Trace     Bilirubin, UA Negative     Blood, UA Moderate (2+)     Protein,  mg/dL (2+)     Leuk Esterase, UA Small (1+)     Nitrite, UA Negative     Urobilinogen, UA 1.0 E.U./dL    Urinalysis, Microscopic Only - Urine, Clean Catch [[id number]]  (Abnormal) Collected: 07/02/23 1611    Specimen: Urine, Clean Catch Updated: 07/02/23 1628     RBC, UA 3-5 /HPF       WBC, UA 3-5 /HPF      Bacteria, UA None Seen /HPF      Squamous Epithelial Cells, UA 0-2 /HPF      Hyaline Casts, UA None Seen /LPF      Methodology Manual Light Microscopy    Urine Culture - Urine, Urine, Clean Catch [[id number]] Collected: 07/02/23 1611    Specimen: Urine, Clean Catch Updated: 07/02/23 1653             CT Abdomen Pelvis Without Contrast    Result Date: 7/2/2023  PROCEDURE: CT ABDOMEN PELVIS WO CONTRAST-  HISTORY: n/v and lower abdominal discomfort X 3-4 days  COMPARISON: June 9, 2023.  PROCEDURE: Axial images were obtained from the lung bases to the pubic symphysis by computed tomography without intravenous contrast.  FINDINGS: Lack of intravenous contrast limits assessment of the solid organs, the mediastinum, and the vasculature.  ABDOMEN:Anasarca is noted with a large volume of ascites. There are small to moderate-sized bilateral pleural effusions with compressive atelectasis. The heart is enlarged. There is evidence calcified granulomatous disease. Moderate-sized hiatal hernia is present. Atherosclerosis without aortic aneurysm. Cirrhotic appearing liver again noted. The gallbladder is absent. Spleen is upper limits of normal in size. No obvious pancreatic mass. Adrenal glands and left kidney is atrophic. Nonobstructing stone present in the right collecting system. There is a 5 mm right UPJ stone without significant hydronephrosis. No distal ureteral stones were identified. There are no abnormally dilated loops of bowel. No free air was identified.  PELVIS: The appendix is not identified, assessment is limited secondary to significant ascites. The bladder is decompressed. The uterus is present. Significant free fluid again noted in the pelvis. No acute osseous changes.      Impression: Large volume ascites and anasarca with cirrhosis noted.  Small to moderate-sized bilateral pleural effusions with compressive atelectasis.  5 mm right UPJ stone without significant hydronephrosis.     This study  was performed with techniques to keep radiation doses as low as reasonably achievable (ALARA). Individualized dose reduction techniques using automated exposure control or adjustment of mA and/or kV according to the patient size were employed.  This report was signed and finalized on 7/2/2023 11:09 AM by Mamadou Domínguez DO.    XR Foot 3+ View Left    Result Date: 7/2/2023  PROCEDURE: XR FOOT 3+ VW LEFT-  History: large ulcer, patient reports unsure what caused it  COMPARISON: None.  FINDINGS:  A 3 view exam demonstrates no displaced fracture or dislocation. Degenerative changes are present. Significant soft tissue swelling about the mid and forefoot. Consider MRI if symptoms persist.      Impression: No displaced fracture.     This report was signed and finalized on 7/2/2023 12:32 PM by Mamadou Domínguez DO.        MDM  Patient was evaluated in the ER for nausea, vomiting, abdominal fullness/discomfort x3 to 4 days.  She is hemodynamically stable, afebrile, nontoxic-appearing on exam.  Differential diagnosis includes but is not limited to viral gastroenteritis, electrolyte abnormalities, dehydration, appendicitis, among others.  Initial plan includes CBC, CMP, lipase, urinalysis, CT of abdomen pelvis without contrast, IV fluids and Zofran.    Urinalysis is not convincing of UTI though there are small leukocytes and 3-5 white blood cells.  Patient does have a 5 mm stone at the UPJ.  Urine culture was sent.  Patient started on Keflex with first dose given in the ER as she has a wound on her left foot that is high risk for infection as well.  She is not having any abdominal pain and states she does feel much better after medications.  She was given 90 mg IV Lasix to help with fluid overload as she does have a large amount of ascites with cirrhosis noted on her CT as well as bilateral pleural effusions.  Patient has maintained oxygen saturation of 100%.  She is agreeable with plan for discharge.  She has establish care with   [facility] office.  She states she does not have an appointment till September.  She was advised to call them and try to schedule an earlier appointment given the large amount of ascites.  He was also given information for Dr. Hastings, urology, and advised to follow-up with him regarding the kidney stone at the UPJ.  She was also advised to follow-up with her PCP for further outpatient evaluation and to try to get set up with home health and wound care.  She states her brother can help clean and change dressings until then.  Will also place a case management consult to have them follow-up with the patient.  She was given return precautions for any new or worsening symptoms or acute concerns.    Final diagnoses:   Cirrhosis of liver with ascites, unspecified hepatic cirrhosis type   Kidney stone   Open wound of left foot, initial encounter   Nausea and vomiting, unspecified vomiting type          Estrellita Patel, PA-C  07/02/23 7342

## 2023-07-03 ENCOUNTER — HOSPITAL ENCOUNTER (EMERGENCY)
Facility: HOSPITAL | Age: 63
Discharge: ANOTHER HOSPITAL | DRG: 377 | End: 2023-07-04
Attending: STUDENT IN AN ORGANIZED HEALTH CARE EDUCATION/TRAINING PROGRAM | Admitting: STUDENT IN AN ORGANIZED HEALTH CARE EDUCATION/TRAINING PROGRAM
Payer: MEDICARE

## 2023-07-03 DIAGNOSIS — K92.2 UPPER GI BLEED: Primary | ICD-10-CM

## 2023-07-03 LAB
ALBUMIN SERPL-MCNC: 1.8 G/DL (ref 3.5–5.2)
ALBUMIN/GLOB SERPL: 0.3 G/DL
ALP SERPL-CCNC: 113 U/L (ref 39–117)
ALT SERPL W P-5'-P-CCNC: 13 U/L (ref 1–33)
ANION GAP SERPL CALCULATED.3IONS-SCNC: 9.1 MMOL/L (ref 5–15)
AST SERPL-CCNC: 29 U/L (ref 1–32)
BASOPHILS # BLD AUTO: 0.07 10*3/MM3 (ref 0–0.2)
BASOPHILS NFR BLD AUTO: 1.3 % (ref 0–1.5)
BILIRUB SERPL-MCNC: 1.1 MG/DL (ref 0–1.2)
BUN SERPL-MCNC: 19 MG/DL (ref 8–23)
BUN/CREAT SERPL: 13.9 (ref 7–25)
CALCIUM SPEC-SCNC: 9 MG/DL (ref 8.6–10.5)
CHLORIDE SERPL-SCNC: 106 MMOL/L (ref 98–107)
CO2 SERPL-SCNC: 23.9 MMOL/L (ref 22–29)
CREAT SERPL-MCNC: 1.37 MG/DL (ref 0.57–1)
CRP SERPL-MCNC: 3.21 MG/DL (ref 0–0.5)
D-LACTATE SERPL-SCNC: 2 MMOL/L (ref 0.5–2)
DEPRECATED RDW RBC AUTO: 56.5 FL (ref 37–54)
EGFRCR SERPLBLD CKD-EPI 2021: 43.5 ML/MIN/1.73
EOSINOPHIL # BLD AUTO: 0.19 10*3/MM3 (ref 0–0.4)
EOSINOPHIL NFR BLD AUTO: 3.5 % (ref 0.3–6.2)
ERYTHROCYTE [DISTWIDTH] IN BLOOD BY AUTOMATED COUNT: 16.4 % (ref 12.3–15.4)
FLUAV RNA RESP QL NAA+PROBE: NOT DETECTED
FLUBV RNA RESP QL NAA+PROBE: NOT DETECTED
GEN 5 2HR TROPONIN T REFLEX: 169 NG/L
GLOBULIN UR ELPH-MCNC: 5.4 GM/DL
GLUCOSE SERPL-MCNC: 105 MG/DL (ref 65–99)
HCT VFR BLD AUTO: 29.2 % (ref 34–46.6)
HGB BLD-MCNC: 9.3 G/DL (ref 12–15.9)
IMM GRANULOCYTES # BLD AUTO: 0.02 10*3/MM3 (ref 0–0.05)
IMM GRANULOCYTES NFR BLD AUTO: 0.4 % (ref 0–0.5)
INR PPP: 1.57 (ref 0.9–1.1)
LIPASE SERPL-CCNC: 16 U/L (ref 13–60)
LYMPHOCYTES # BLD AUTO: 0.66 10*3/MM3 (ref 0.7–3.1)
LYMPHOCYTES NFR BLD AUTO: 12.2 % (ref 19.6–45.3)
MAGNESIUM SERPL-MCNC: 1.9 MG/DL (ref 1.6–2.4)
MCH RBC QN AUTO: 30.9 PG (ref 26.6–33)
MCHC RBC AUTO-ENTMCNC: 31.8 G/DL (ref 31.5–35.7)
MCV RBC AUTO: 97 FL (ref 79–97)
MONOCYTES # BLD AUTO: 0.4 10*3/MM3 (ref 0.1–0.9)
MONOCYTES NFR BLD AUTO: 7.4 % (ref 5–12)
NEUTROPHILS NFR BLD AUTO: 4.05 10*3/MM3 (ref 1.7–7)
NEUTROPHILS NFR BLD AUTO: 75.2 % (ref 42.7–76)
NRBC BLD AUTO-RTO: 0 /100 WBC (ref 0–0.2)
PLATELET # BLD AUTO: 155 10*3/MM3 (ref 140–450)
PMV BLD AUTO: 10.2 FL (ref 6–12)
POTASSIUM SERPL-SCNC: 4 MMOL/L (ref 3.5–5.2)
PROCALCITONIN SERPL-MCNC: 0.14 NG/ML (ref 0–0.25)
PROT SERPL-MCNC: 7.2 G/DL (ref 6–8.5)
PROTHROMBIN TIME: 19.3 SECONDS (ref 12.3–15.1)
RBC # BLD AUTO: 3.01 10*6/MM3 (ref 3.77–5.28)
SARS-COV-2 RNA RESP QL NAA+PROBE: NOT DETECTED
SODIUM SERPL-SCNC: 139 MMOL/L (ref 136–145)
TROPONIN T DELTA: -29 NG/L
TROPONIN T SERPL HS-MCNC: 198 NG/L
WBC NRBC COR # BLD: 5.39 10*3/MM3 (ref 3.4–10.8)

## 2023-07-03 PROCEDURE — 84145 PROCALCITONIN (PCT): CPT | Performed by: STUDENT IN AN ORGANIZED HEALTH CARE EDUCATION/TRAINING PROGRAM

## 2023-07-03 PROCEDURE — 80053 COMPREHEN METABOLIC PANEL: CPT | Performed by: STUDENT IN AN ORGANIZED HEALTH CARE EDUCATION/TRAINING PROGRAM

## 2023-07-03 PROCEDURE — 85025 COMPLETE CBC W/AUTO DIFF WBC: CPT | Performed by: STUDENT IN AN ORGANIZED HEALTH CARE EDUCATION/TRAINING PROGRAM

## 2023-07-03 PROCEDURE — 83735 ASSAY OF MAGNESIUM: CPT | Performed by: STUDENT IN AN ORGANIZED HEALTH CARE EDUCATION/TRAINING PROGRAM

## 2023-07-03 PROCEDURE — 36415 COLL VENOUS BLD VENIPUNCTURE: CPT

## 2023-07-03 PROCEDURE — 96366 THER/PROPH/DIAG IV INF ADDON: CPT

## 2023-07-03 PROCEDURE — 25010000002 CEFTRIAXONE SODIUM-DEXTROSE 1000-3.74 MG-%(50ML) RECONSTITUTED SOLUTION: Performed by: STUDENT IN AN ORGANIZED HEALTH CARE EDUCATION/TRAINING PROGRAM

## 2023-07-03 PROCEDURE — 85610 PROTHROMBIN TIME: CPT | Performed by: STUDENT IN AN ORGANIZED HEALTH CARE EDUCATION/TRAINING PROGRAM

## 2023-07-03 PROCEDURE — 86140 C-REACTIVE PROTEIN: CPT | Performed by: STUDENT IN AN ORGANIZED HEALTH CARE EDUCATION/TRAINING PROGRAM

## 2023-07-03 PROCEDURE — 84484 ASSAY OF TROPONIN QUANT: CPT | Performed by: STUDENT IN AN ORGANIZED HEALTH CARE EDUCATION/TRAINING PROGRAM

## 2023-07-03 PROCEDURE — 96365 THER/PROPH/DIAG IV INF INIT: CPT

## 2023-07-03 PROCEDURE — 83605 ASSAY OF LACTIC ACID: CPT | Performed by: STUDENT IN AN ORGANIZED HEALTH CARE EDUCATION/TRAINING PROGRAM

## 2023-07-03 PROCEDURE — 87636 SARSCOV2 & INF A&B AMP PRB: CPT | Performed by: STUDENT IN AN ORGANIZED HEALTH CARE EDUCATION/TRAINING PROGRAM

## 2023-07-03 PROCEDURE — 25010000002 OCTREOTIDE PER 25 MCG: Performed by: STUDENT IN AN ORGANIZED HEALTH CARE EDUCATION/TRAINING PROGRAM

## 2023-07-03 PROCEDURE — 96375 TX/PRO/DX INJ NEW DRUG ADDON: CPT

## 2023-07-03 PROCEDURE — 25010000002 ONDANSETRON PER 1 MG: Performed by: STUDENT IN AN ORGANIZED HEALTH CARE EDUCATION/TRAINING PROGRAM

## 2023-07-03 PROCEDURE — 25010000002 CEFTRIAXONE SODIUM-DEXTROSE 1-3.74 GM-%(50ML) RECONSTITUTED SOLUTION: Performed by: STUDENT IN AN ORGANIZED HEALTH CARE EDUCATION/TRAINING PROGRAM

## 2023-07-03 PROCEDURE — 96368 THER/DIAG CONCURRENT INF: CPT

## 2023-07-03 PROCEDURE — 83690 ASSAY OF LIPASE: CPT | Performed by: STUDENT IN AN ORGANIZED HEALTH CARE EDUCATION/TRAINING PROGRAM

## 2023-07-03 PROCEDURE — 93005 ELECTROCARDIOGRAM TRACING: CPT | Performed by: STUDENT IN AN ORGANIZED HEALTH CARE EDUCATION/TRAINING PROGRAM

## 2023-07-03 PROCEDURE — 99285 EMERGENCY DEPT VISIT HI MDM: CPT

## 2023-07-03 PROCEDURE — 99284 EMERGENCY DEPT VISIT MOD MDM: CPT

## 2023-07-03 RX ORDER — CEFTRIAXONE 1 G/50ML
1 INJECTION, SOLUTION INTRAVENOUS ONCE
Status: COMPLETED | OUTPATIENT
Start: 2023-07-03 | End: 2023-07-03

## 2023-07-03 RX ORDER — ONDANSETRON 2 MG/ML
4 INJECTION INTRAMUSCULAR; INTRAVENOUS ONCE
Status: COMPLETED | OUTPATIENT
Start: 2023-07-03 | End: 2023-07-03

## 2023-07-03 RX ORDER — PANTOPRAZOLE SODIUM 40 MG/10ML
80 INJECTION, POWDER, LYOPHILIZED, FOR SOLUTION INTRAVENOUS ONCE
Status: COMPLETED | OUTPATIENT
Start: 2023-07-03 | End: 2023-07-03

## 2023-07-03 RX ADMIN — OCTREOTIDE ACETATE 50 MCG/HR: 100 INJECTION, SOLUTION INTRAVENOUS; SUBCUTANEOUS at 21:05

## 2023-07-03 RX ADMIN — PANTOPRAZOLE SODIUM 80 MG: 40 INJECTION, POWDER, FOR SOLUTION INTRAVENOUS at 20:58

## 2023-07-03 RX ADMIN — CEFTRIAXONE 1 G: 1 INJECTION, SOLUTION INTRAVENOUS at 21:04

## 2023-07-03 RX ADMIN — ONDANSETRON 4 MG: 2 INJECTION INTRAMUSCULAR; INTRAVENOUS at 21:03

## 2023-07-03 NOTE — CASE MANAGEMENT/SOCIAL WORK
1030: CM called pt regarding a consult for HH and Wound care.  Upon speaking with the patient she stated that she didn't need home health for wound care due to the ED showing her how to care for it yesterday and her brother, Jak could dress it daily.  CM advised pt of the importance of cleansed and daily dressing changes due to risk of infection.  Pt advised that if her brother was unable she would call me.  CM provided phone number to pt. Pt asked if HH would come out and help her do laundry, take a bath, and little chores around the house.  CM advised that HH would not provide that kind of help.  CM told pt that if private pay caregivers was an option that we have a list.  She advised yes that would be option that she could afford.    [facility]   [phone]  [facility]  [phone]  [facility] [phone]  [facility] [phone], [phone], [phone]  [facility] [phone]  [facility] [phone]

## 2023-07-04 ENCOUNTER — APPOINTMENT (OUTPATIENT)
Dept: CARDIOLOGY | Facility: HOSPITAL | Age: 63
DRG: 377 | End: 2023-07-04
Payer: MEDICARE

## 2023-07-04 ENCOUNTER — HOSPITAL ENCOUNTER (INPATIENT)
Facility: HOSPITAL | Age: 63
LOS: 10 days | Discharge: NURSING, REHAB OR LONG-TERM CARE | DRG: 377 | End: 2023-07-14
Attending: INTERNAL MEDICINE | Admitting: INTERNAL MEDICINE
Payer: MEDICARE

## 2023-07-04 ENCOUNTER — APPOINTMENT (OUTPATIENT)
Dept: GENERAL RADIOLOGY | Facility: HOSPITAL | Age: 63
DRG: 377 | End: 2023-07-04
Payer: MEDICARE

## 2023-07-04 VITALS
HEIGHT: 65 IN | HEART RATE: 78 BPM | BODY MASS INDEX: 44.32 KG/M2 | OXYGEN SATURATION: 99 % | WEIGHT: 266 LBS | RESPIRATION RATE: 18 BRPM | SYSTOLIC BLOOD PRESSURE: 140 MMHG | DIASTOLIC BLOOD PRESSURE: 73 MMHG | TEMPERATURE: 98.4 F

## 2023-07-04 DIAGNOSIS — I87.8 VENOUS STASIS OF BOTH LOWER EXTREMITIES: Primary | ICD-10-CM

## 2023-07-04 DIAGNOSIS — K92.0 HEMATEMESIS WITH NAUSEA: ICD-10-CM

## 2023-07-04 PROBLEM — E88.09 HYPOALBUMINEMIA: Status: ACTIVE | Noted: 2023-07-04

## 2023-07-04 PROBLEM — D64.9 ANEMIA: Status: ACTIVE | Noted: 2023-07-04

## 2023-07-04 LAB
ALBUMIN SERPL-MCNC: 1.4 G/DL (ref 3.5–5.2)
ALBUMIN/GLOB SERPL: 0.3 G/DL
ALP SERPL-CCNC: 85 U/L (ref 39–117)
ALT SERPL W P-5'-P-CCNC: 10 U/L (ref 1–33)
ANION GAP SERPL CALCULATED.3IONS-SCNC: 10 MMOL/L (ref 5–15)
ASCENDING AORTA: 3.7 CM
AST SERPL-CCNC: 24 U/L (ref 1–32)
BACTERIA SPEC AEROBE CULT: NO GROWTH
BASOPHILS # BLD AUTO: 0.06 10*3/MM3 (ref 0–0.2)
BASOPHILS NFR BLD AUTO: 1.1 % (ref 0–1.5)
BH CV ECHO MEAS - AO MAX PG: 20.5 MMHG
BH CV ECHO MEAS - AO MEAN PG: 11.8 MMHG
BH CV ECHO MEAS - AO ROOT AREA (BSA CORRECTED): 1.3 CM2
BH CV ECHO MEAS - AO ROOT DIAM: 2.9 CM
BH CV ECHO MEAS - AO V2 MAX: 226.3 CM/SEC
BH CV ECHO MEAS - AO V2 VTI: 43.9 CM
BH CV ECHO MEAS - AVA(I,D): 1.81 CM2
BH CV ECHO MEAS - EDV(CUBED): 153.1 ML
BH CV ECHO MEAS - EDV(MOD-SP2): 185 ML
BH CV ECHO MEAS - EDV(MOD-SP4): 171 ML
BH CV ECHO MEAS - EF(MOD-BP): 73.7 %
BH CV ECHO MEAS - EF(MOD-SP2): 75.5 %
BH CV ECHO MEAS - EF(MOD-SP4): 71.9 %
BH CV ECHO MEAS - ESV(CUBED): 32.8 ML
BH CV ECHO MEAS - ESV(MOD-SP2): 45.4 ML
BH CV ECHO MEAS - ESV(MOD-SP4): 48 ML
BH CV ECHO MEAS - FS: 40.2 %
BH CV ECHO MEAS - IVS/LVPW: 0.9 CM
BH CV ECHO MEAS - IVSD: 0.95 CM
BH CV ECHO MEAS - LA DIMENSION: 5 CM
BH CV ECHO MEAS - LAT PEAK E' VEL: 14 CM/SEC
BH CV ECHO MEAS - LV MASS(C)D: 203.6 GRAMS
BH CV ECHO MEAS - LV MAX PG: 8.4 MMHG
BH CV ECHO MEAS - LV MEAN PG: 5 MMHG
BH CV ECHO MEAS - LV V1 MAX: 145 CM/SEC
BH CV ECHO MEAS - LV V1 VTI: 28.1 CM
BH CV ECHO MEAS - LVIDD: 5.4 CM
BH CV ECHO MEAS - LVIDS: 3.2 CM
BH CV ECHO MEAS - LVOT AREA: 2.8 CM2
BH CV ECHO MEAS - LVOT DIAM: 1.9 CM
BH CV ECHO MEAS - LVPWD: 1.05 CM
BH CV ECHO MEAS - MED PEAK E' VEL: 7.5 CM/SEC
BH CV ECHO MEAS - MV A MAX VEL: 113 CM/SEC
BH CV ECHO MEAS - MV DEC SLOPE: 654 CM/SEC2
BH CV ECHO MEAS - MV DEC TIME: 0.27 MSEC
BH CV ECHO MEAS - MV E MAX VEL: 98.5 CM/SEC
BH CV ECHO MEAS - MV E/A: 0.87
BH CV ECHO MEAS - MV MAX PG: 11.6 MMHG
BH CV ECHO MEAS - MV MEAN PG: 5 MMHG
BH CV ECHO MEAS - MV P1/2T: 74.3 MSEC
BH CV ECHO MEAS - MV V2 VTI: 40.1 CM
BH CV ECHO MEAS - MVA(P1/2T): 3 CM2
BH CV ECHO MEAS - MVA(VTI): 1.99 CM2
BH CV ECHO MEAS - PA ACC TIME: 0.14 SEC
BH CV ECHO MEAS - RAP SYSTOLE: 3 MMHG
BH CV ECHO MEAS - RVSP: 50 MMHG
BH CV ECHO MEAS - SV(LVOT): 79.7 ML
BH CV ECHO MEAS - SV(MOD-SP2): 139.6 ML
BH CV ECHO MEAS - SV(MOD-SP4): 123 ML
BH CV ECHO MEAS - TAPSE (>1.6): 2.5 CM
BH CV ECHO MEAS - TR MAX PG: 47 MMHG
BH CV ECHO MEAS - TR MAX VEL: 342 CM/SEC
BH CV ECHO MEASUREMENTS AVERAGE E/E' RATIO: 9.16
BH CV VAS BP LEFT ARM: NORMAL MMHG
BH CV XLRA - RV BASE: 3.7 CM
BH CV XLRA - RV LENGTH: 7.4 CM
BH CV XLRA - RV MID: 3 CM
BH CV XLRA - TDI S': 20.4 CM/SEC
BILIRUB SERPL-MCNC: 0.8 MG/DL (ref 0–1.2)
BUN SERPL-MCNC: 21 MG/DL (ref 8–23)
BUN/CREAT SERPL: 15 (ref 7–25)
CALCIUM SPEC-SCNC: 8.7 MG/DL (ref 8.6–10.5)
CHLORIDE SERPL-SCNC: 110 MMOL/L (ref 98–107)
CO2 SERPL-SCNC: 22 MMOL/L (ref 22–29)
CREAT SERPL-MCNC: 1.4 MG/DL (ref 0.57–1)
DEPRECATED RDW RBC AUTO: 55.8 FL (ref 37–54)
EGFRCR SERPLBLD CKD-EPI 2021: 42.4 ML/MIN/1.73
EOSINOPHIL # BLD AUTO: 0.22 10*3/MM3 (ref 0–0.4)
EOSINOPHIL NFR BLD AUTO: 4 % (ref 0.3–6.2)
ERYTHROCYTE [DISTWIDTH] IN BLOOD BY AUTOMATED COUNT: 16.7 % (ref 12.3–15.4)
FERRITIN SERPL-MCNC: 96.07 NG/ML (ref 13–150)
FOLATE SERPL-MCNC: 15.5 NG/ML (ref 4.78–24.2)
GLOBULIN UR ELPH-MCNC: 4.2 GM/DL
GLUCOSE BLDC GLUCOMTR-MCNC: 102 MG/DL (ref 70–130)
GLUCOSE BLDC GLUCOMTR-MCNC: 107 MG/DL (ref 70–130)
GLUCOSE BLDC GLUCOMTR-MCNC: 159 MG/DL (ref 70–130)
GLUCOSE BLDC GLUCOMTR-MCNC: 217 MG/DL (ref 70–130)
GLUCOSE SERPL-MCNC: 101 MG/DL (ref 65–99)
HBA1C MFR BLD: 5.2 % (ref 4.8–5.6)
HCT VFR BLD AUTO: 23.2 % (ref 34–46.6)
HGB BLD-MCNC: 8 G/DL (ref 12–15.9)
IMM GRANULOCYTES # BLD AUTO: 0.02 10*3/MM3 (ref 0–0.05)
IMM GRANULOCYTES NFR BLD AUTO: 0.4 % (ref 0–0.5)
INR PPP: 1.75 (ref 0.89–1.12)
INR PPP: 1.79 (ref 0.89–1.12)
IRON 24H UR-MRATE: 65 MCG/DL (ref 37–145)
IRON SATN MFR SERPL: 43 % (ref 20–50)
LEFT ATRIUM VOLUME INDEX: 42.7 ML/M2
LYMPHOCYTES # BLD AUTO: 0.64 10*3/MM3 (ref 0.7–3.1)
LYMPHOCYTES NFR BLD AUTO: 11.7 % (ref 19.6–45.3)
MAGNESIUM SERPL-MCNC: 1.9 MG/DL (ref 1.6–2.4)
MCH RBC QN AUTO: 34.2 PG (ref 26.6–33)
MCHC RBC AUTO-ENTMCNC: 34.5 G/DL (ref 31.5–35.7)
MCV RBC AUTO: 99.1 FL (ref 79–97)
MONOCYTES # BLD AUTO: 0.51 10*3/MM3 (ref 0.1–0.9)
MONOCYTES NFR BLD AUTO: 9.4 % (ref 5–12)
NEUTROPHILS NFR BLD AUTO: 4 10*3/MM3 (ref 1.7–7)
NEUTROPHILS NFR BLD AUTO: 73.4 % (ref 42.7–76)
NRBC BLD AUTO-RTO: 0 /100 WBC (ref 0–0.2)
PLATELET # BLD AUTO: 136 10*3/MM3 (ref 140–450)
PMV BLD AUTO: 11.1 FL (ref 6–12)
POTASSIUM SERPL-SCNC: 4.2 MMOL/L (ref 3.5–5.2)
PROT SERPL-MCNC: 5.6 G/DL (ref 6–8.5)
PROTHROMBIN TIME: 20.6 SECONDS (ref 12.2–14.5)
PROTHROMBIN TIME: 21 SECONDS (ref 12.2–14.5)
RBC # BLD AUTO: 2.34 10*6/MM3 (ref 3.77–5.28)
RETICS # AUTO: 0.06 10*6/MM3 (ref 0.02–0.13)
RETICS # AUTO: NORMAL 10*3/UL
RETICS/RBC NFR AUTO: 2.7 % (ref 0.7–1.9)
RETICS/RBC NFR AUTO: NORMAL %
SODIUM SERPL-SCNC: 142 MMOL/L (ref 136–145)
T4 FREE SERPL-MCNC: 1.22 NG/DL (ref 0.93–1.7)
TIBC SERPL-MCNC: 152 MCG/DL (ref 298–536)
TRANSFERRIN SERPL-MCNC: 102 MG/DL (ref 200–360)
TROPONIN T SERPL HS-MCNC: 177 NG/L
TSH SERPL DL<=0.05 MIU/L-ACNC: 0.48 UIU/ML (ref 0.27–4.2)
VIT B12 BLD-MCNC: 1320 PG/ML (ref 211–946)
WBC NRBC COR # BLD: 5.45 10*3/MM3 (ref 3.4–10.8)

## 2023-07-04 PROCEDURE — 83036 HEMOGLOBIN GLYCOSYLATED A1C: CPT | Performed by: PHYSICIAN ASSISTANT

## 2023-07-04 PROCEDURE — 84439 ASSAY OF FREE THYROXINE: CPT | Performed by: INTERNAL MEDICINE

## 2023-07-04 PROCEDURE — 25010000002 METOCLOPRAMIDE PER 10 MG: Performed by: INTERNAL MEDICINE

## 2023-07-04 PROCEDURE — 84484 ASSAY OF TROPONIN QUANT: CPT | Performed by: INTERNAL MEDICINE

## 2023-07-04 PROCEDURE — 84443 ASSAY THYROID STIM HORMONE: CPT | Performed by: INTERNAL MEDICINE

## 2023-07-04 PROCEDURE — 82948 REAGENT STRIP/BLOOD GLUCOSE: CPT

## 2023-07-04 PROCEDURE — 83540 ASSAY OF IRON: CPT | Performed by: INTERNAL MEDICINE

## 2023-07-04 PROCEDURE — 99222 1ST HOSP IP/OBS MODERATE 55: CPT | Performed by: INTERNAL MEDICINE

## 2023-07-04 PROCEDURE — 96366 THER/PROPH/DIAG IV INF ADDON: CPT

## 2023-07-04 PROCEDURE — 80053 COMPREHEN METABOLIC PANEL: CPT | Performed by: INTERNAL MEDICINE

## 2023-07-04 PROCEDURE — 82607 VITAMIN B-12: CPT | Performed by: INTERNAL MEDICINE

## 2023-07-04 PROCEDURE — 85045 AUTOMATED RETICULOCYTE COUNT: CPT | Performed by: INTERNAL MEDICINE

## 2023-07-04 PROCEDURE — 93306 TTE W/DOPPLER COMPLETE: CPT | Performed by: INTERNAL MEDICINE

## 2023-07-04 PROCEDURE — 85025 COMPLETE CBC W/AUTO DIFF WBC: CPT | Performed by: INTERNAL MEDICINE

## 2023-07-04 PROCEDURE — 93005 ELECTROCARDIOGRAM TRACING: CPT | Performed by: INTERNAL MEDICINE

## 2023-07-04 PROCEDURE — 82746 ASSAY OF FOLIC ACID SERUM: CPT | Performed by: INTERNAL MEDICINE

## 2023-07-04 PROCEDURE — 85045 AUTOMATED RETICULOCYTE COUNT: CPT | Performed by: PHYSICIAN ASSISTANT

## 2023-07-04 PROCEDURE — 83735 ASSAY OF MAGNESIUM: CPT | Performed by: INTERNAL MEDICINE

## 2023-07-04 PROCEDURE — 0 PHYTONADIONE 10 MG/ML SOLUTION 1 ML AMPULE: Performed by: INTERNAL MEDICINE

## 2023-07-04 PROCEDURE — 84466 ASSAY OF TRANSFERRIN: CPT | Performed by: INTERNAL MEDICINE

## 2023-07-04 PROCEDURE — 25010000002 ONDANSETRON PER 1 MG: Performed by: PHYSICIAN ASSISTANT

## 2023-07-04 PROCEDURE — 85610 PROTHROMBIN TIME: CPT | Performed by: INTERNAL MEDICINE

## 2023-07-04 PROCEDURE — P9047 ALBUMIN (HUMAN), 25%, 50ML: HCPCS | Performed by: INTERNAL MEDICINE

## 2023-07-04 PROCEDURE — 93010 ELECTROCARDIOGRAM REPORT: CPT | Performed by: INTERNAL MEDICINE

## 2023-07-04 PROCEDURE — 82728 ASSAY OF FERRITIN: CPT | Performed by: PHYSICIAN ASSISTANT

## 2023-07-04 PROCEDURE — 25010000002 ALBUMIN HUMAN 25% PER 50 ML: Performed by: INTERNAL MEDICINE

## 2023-07-04 PROCEDURE — 73630 X-RAY EXAM OF FOOT: CPT

## 2023-07-04 PROCEDURE — 63710000001 INSULIN LISPRO (HUMAN) PER 5 UNITS: Performed by: PHYSICIAN ASSISTANT

## 2023-07-04 PROCEDURE — 93306 TTE W/DOPPLER COMPLETE: CPT

## 2023-07-04 RX ORDER — CHOLECALCIFEROL (VITAMIN D3) 125 MCG
5 CAPSULE ORAL NIGHTLY PRN
Status: DISCONTINUED | OUTPATIENT
Start: 2023-07-04 | End: 2023-07-15 | Stop reason: HOSPADM

## 2023-07-04 RX ORDER — ACETAMINOPHEN 325 MG/1
650 TABLET ORAL EVERY 4 HOURS PRN
Status: DISCONTINUED | OUTPATIENT
Start: 2023-07-04 | End: 2023-07-15 | Stop reason: HOSPADM

## 2023-07-04 RX ORDER — NICOTINE POLACRILEX 4 MG
15 LOZENGE BUCCAL
Status: DISCONTINUED | OUTPATIENT
Start: 2023-07-04 | End: 2023-07-08

## 2023-07-04 RX ORDER — INSULIN LISPRO 100 [IU]/ML
3-14 INJECTION, SOLUTION INTRAVENOUS; SUBCUTANEOUS
Status: DISCONTINUED | OUTPATIENT
Start: 2023-07-04 | End: 2023-07-08

## 2023-07-04 RX ORDER — DONEPEZIL HYDROCHLORIDE 10 MG/1
10 TABLET, FILM COATED ORAL NIGHTLY
Status: DISCONTINUED | OUTPATIENT
Start: 2023-07-04 | End: 2023-07-15 | Stop reason: HOSPADM

## 2023-07-04 RX ORDER — METOCLOPRAMIDE HYDROCHLORIDE 5 MG/ML
5 INJECTION INTRAMUSCULAR; INTRAVENOUS EVERY 6 HOURS
Status: COMPLETED | OUTPATIENT
Start: 2023-07-04 | End: 2023-07-06

## 2023-07-04 RX ORDER — LEVOTHYROXINE SODIUM 0.15 MG/1
300 TABLET ORAL DAILY
Status: DISCONTINUED | OUTPATIENT
Start: 2023-07-04 | End: 2023-07-15 | Stop reason: HOSPADM

## 2023-07-04 RX ORDER — PANTOPRAZOLE SODIUM 40 MG/10ML
40 INJECTION, POWDER, LYOPHILIZED, FOR SOLUTION INTRAVENOUS
Status: DISCONTINUED | OUTPATIENT
Start: 2023-07-04 | End: 2023-07-11

## 2023-07-04 RX ORDER — DEXTROSE MONOHYDRATE 25 G/50ML
25 INJECTION, SOLUTION INTRAVENOUS
Status: DISCONTINUED | OUTPATIENT
Start: 2023-07-04 | End: 2023-07-08

## 2023-07-04 RX ORDER — BUPROPION HYDROCHLORIDE 150 MG/1
150 TABLET ORAL DAILY
Status: DISCONTINUED | OUTPATIENT
Start: 2023-07-04 | End: 2023-07-15 | Stop reason: HOSPADM

## 2023-07-04 RX ORDER — SODIUM CHLORIDE 0.9 % (FLUSH) 0.9 %
10 SYRINGE (ML) INJECTION EVERY 12 HOURS SCHEDULED
Status: DISCONTINUED | OUTPATIENT
Start: 2023-07-04 | End: 2023-07-15 | Stop reason: HOSPADM

## 2023-07-04 RX ORDER — SODIUM CHLORIDE 0.9 % (FLUSH) 0.9 %
10 SYRINGE (ML) INJECTION AS NEEDED
Status: DISCONTINUED | OUTPATIENT
Start: 2023-07-04 | End: 2023-07-15 | Stop reason: HOSPADM

## 2023-07-04 RX ORDER — ALBUMIN (HUMAN) 12.5 G/50ML
50 SOLUTION INTRAVENOUS DAILY
Status: DISCONTINUED | OUTPATIENT
Start: 2023-07-04 | End: 2023-07-05

## 2023-07-04 RX ORDER — ONDANSETRON 4 MG/1
4 TABLET, FILM COATED ORAL EVERY 6 HOURS PRN
Status: DISCONTINUED | OUTPATIENT
Start: 2023-07-04 | End: 2023-07-15 | Stop reason: HOSPADM

## 2023-07-04 RX ORDER — ONDANSETRON 2 MG/ML
4 INJECTION INTRAMUSCULAR; INTRAVENOUS EVERY 6 HOURS PRN
Status: DISCONTINUED | OUTPATIENT
Start: 2023-07-04 | End: 2023-07-15 | Stop reason: HOSPADM

## 2023-07-04 RX ORDER — SODIUM CHLORIDE 9 MG/ML
40 INJECTION, SOLUTION INTRAVENOUS AS NEEDED
Status: DISCONTINUED | OUTPATIENT
Start: 2023-07-04 | End: 2023-07-15 | Stop reason: HOSPADM

## 2023-07-04 RX ORDER — IBUPROFEN 600 MG/1
1 TABLET ORAL
Status: DISCONTINUED | OUTPATIENT
Start: 2023-07-04 | End: 2023-07-08

## 2023-07-04 RX ORDER — ATORVASTATIN CALCIUM 20 MG/1
20 TABLET, FILM COATED ORAL DAILY
Status: DISCONTINUED | OUTPATIENT
Start: 2023-07-04 | End: 2023-07-15 | Stop reason: HOSPADM

## 2023-07-04 RX ADMIN — METOCLOPRAMIDE HYDROCHLORIDE 5 MG: 5 INJECTION INTRAMUSCULAR; INTRAVENOUS at 21:29

## 2023-07-04 RX ADMIN — PANTOPRAZOLE SODIUM 40 MG: 40 INJECTION, POWDER, FOR SOLUTION INTRAVENOUS at 08:48

## 2023-07-04 RX ADMIN — METOCLOPRAMIDE HYDROCHLORIDE 5 MG: 5 INJECTION INTRAMUSCULAR; INTRAVENOUS at 16:04

## 2023-07-04 RX ADMIN — PHYTONADIONE 10 MG: 10 INJECTION, EMULSION INTRAMUSCULAR; INTRAVENOUS; SUBCUTANEOUS at 10:26

## 2023-07-04 RX ADMIN — Medication 5 MG: at 21:29

## 2023-07-04 RX ADMIN — LEVOTHYROXINE SODIUM 300 MCG: 0.15 TABLET ORAL at 05:03

## 2023-07-04 RX ADMIN — ALBUMIN (HUMAN) 50 G: 0.25 INJECTION, SOLUTION INTRAVENOUS at 10:32

## 2023-07-04 RX ADMIN — ATORVASTATIN CALCIUM 20 MG: 20 TABLET, FILM COATED ORAL at 08:48

## 2023-07-04 RX ADMIN — INSULIN LISPRO 3 UNITS: 100 INJECTION, SOLUTION INTRAVENOUS; SUBCUTANEOUS at 16:04

## 2023-07-04 RX ADMIN — METOCLOPRAMIDE HYDROCHLORIDE 5 MG: 5 INJECTION INTRAMUSCULAR; INTRAVENOUS at 10:24

## 2023-07-04 RX ADMIN — BUPROPION HYDROCHLORIDE 150 MG: 150 TABLET, FILM COATED, EXTENDED RELEASE ORAL at 08:48

## 2023-07-04 RX ADMIN — ONDANSETRON 4 MG: 2 INJECTION INTRAMUSCULAR; INTRAVENOUS at 05:02

## 2023-07-04 RX ADMIN — Medication 10 ML: at 21:29

## 2023-07-04 RX ADMIN — DONEPEZIL HYDROCHLORIDE 10 MG: 10 TABLET, FILM COATED ORAL at 21:29

## 2023-07-04 RX ADMIN — Medication 10 ML: at 08:49

## 2023-07-04 RX ADMIN — PANTOPRAZOLE SODIUM 40 MG: 40 INJECTION, POWDER, FOR SOLUTION INTRAVENOUS at 16:04

## 2023-07-04 NOTE — CONSULTS
"                  Clinical Nutrition   Nutrition Assessment  Reason for Visit: MST score 2+, Physician consult, Unintentional weight loss, Reduced oral intake    Patient Name: Ivania Fields  YOB: 1960  MRN: [id number]  Date of Encounter: 07/04/23 15:16 EDT  Admission date: 7/4/2023    Pt meets criteria severe malnutrition in the context of chronic illness, see MSA for full assessment.    Very unsure actual weight or if pt has had weight loss due to discrepancies in prior documentation, ordered zeroed/standing weight as able to help clarify.     Education/counseling for cirrhosis/malnutrition provided with good reception from pt. Please do note pt with apparent limited cognitive function/self reported \"memory issues.\" Ample written education provided as well and pt encouraged to f/u with OP nutrition counseling. Clinical RD will follow for ongoing education during this admission as feasible.     Nutrition Assessment   Admission Diagnosis:  GI bleed [K92.2]  Hematemesis [K92.0]    Problem List:    Hematemesis    GI bleed    Cirrhosis of liver with ascites    Sleep apnea    SILVINA (acute kidney injury)    CKD (chronic kidney disease)    Debility    Type 2 diabetes mellitus    Essential hypertension    Dyslipidemia    Hypothyroidism (acquired)    Hypoalbuminemia    Anemia      PMH:   She  has a past medical history of Anemia, Diabetes mellitus, Hyperlipidemia, Hypertension, Hypothyroidism, Impaired mobility, and Short-term memory loss.    PSH:  She  has a past surgical history that includes Cholecystectomy and Esophagogastroduodenoscopy w/ banding (N/A, 4/6/2023).    Applicable Nutrition Concerns:   Skin:  Oral:  GI:    Applicable Interval History:       Reported/Observed/Food/Nutrition Related History:     Visited with this pleasant pt at bedside, reports nothing to eat past 3 days r/t N/V. Prior to this she at first describes oral intakes as \"normal.\" Pt is not a good historian and it is difficult " "to determine timelines or specifics of diet hx, however with further questioning it is clear diet is low in energy/protein overall and likely inconsistent intakes. She surprisingly denies any symptoms with eating or symptoms impacting appetite/ability to eat. Pt reports that PAREDES cirrhosis was newly dx/she was recently made aware of this dx in the past couple months. She has been on diuretics for her ascites. She has a recent hx nonbleeding gastric ulcers and mild poral htn gastropathy. She has significant proteinuria and a wound. She tried premier protein earlier ordered by MD and could not stomach it, open to trying other options.   Pt very receptive and appreciative of assessment/education, voices understanding importance nutrition. However, RD with concern for pt's ability to comprehend severity condition and treatment plan as pt presents with apparent cognitive limitations during assessment. Pt reports self described \"memory issues.\" Let pt know would follow to educate t/o admission as feasible and recommended f/u with OP Nutrition Counseling. Pt with no further nutritional concerns/questions at this time. She denied further dietary needs/preferences, NKFA.     Anthropometrics     Height: Height: 165.1 cm (65\")  Last Filed Weight: Weight: 129 kg (285 lb) (07/04/23 1419)  Method: Weight Method: Bed scale  BMI: BMI (Calculated): 47.4  BMI classification: Obese Class III extreme obesity: > or equal to 40kg/m2  IBW:   125 lb    UBW:  ?suspect dry weight ~250 lb, pt unsure   Weight      Weight (kg) Weight (lbs) Weight Method   12/3/2021 122.471 kg  270 lb  Stated    7/27/2022 113.399 kg  250 lb  Stated    4/4/2023 113.399 kg  250 lb  Stated     140.3 kg (H)  309 lb 4.9 oz (H)  Bed scale    4/5/2023 141 kg (H)  310 lb 13.6 oz (H)  Bed scale    4/6/2023 142.3 kg (H)  313 lb 11.4 oz (H)  Bed scale     142 kg (H)  313 lb 0.9 oz (H)  Bed scale    4/7/2023 144.7 kg (H)  319 lb 0.1 oz (H)  Bed scale    4/8/2023 147.2 kg " (H)  324 lb 8.3 oz (H)  Bed scale    4/11/2023 62.625 kg  138 lb 1 oz  Bed scale    6/9/2023 68.04 kg  150 lb  Estimated    6/20/2023 127.007 kg  280 lb     7/2/2023 120.657 kg  266 lb  Stated    7/3/2023 120.657 kg  266 lb  Stated    7/4/2023 129.638 kg  285 lb 12.8 oz  Bed scale     129.275 kg  285 lb        Weight change:  ? Difficult to determine due to wide range previously documented weights. Likely holding fluid, suspect recent loss, ordered zeroed/standing wt as able to help clarify    Nutrition Focused Physical Exam     Date:  7/4     Patient meets criteria for malnutrition diagnosis, see MSA note.  *of note anasarca BLE lymphedema and anasarca likely masking further findings, severe muscle wasting and moderate fat loss to upper body evident    Current Nutrition Prescription   PO: Diet: Liquid Diets, Diabetic Diets, Cardiac Diets; Full Liquid; Low Sodium (2g); Consistent Carbohydrate; Texture: Regular Texture (IDDSI 7); Fluid Consistency: Thin (IDDSI 0)  NPO Diet NPO Type: Sips with Meds  Oral Nutrition Supplement:   Intake: 33% X 2 meals documented    Nutrition Diagnosis   Date:  7/4            Updated:    Problem Malnutrition  severe/chronic   Etiology Energy needs>energy intake 2/2 decompensated PAREDES cirrhosis   Signs/Symptoms PO intakes <75% EEN at least past 2 months, severe muscle wasting, moderate subcutaneous fat loss.    Status:     Goal:   General: Nutrition to support treatment  PO: Increase intake  EN/PN: N/A    Nutrition Intervention      Follow treatment progress, Care plan reviewed, Advise alternate selection, Advised available snacks, Interview for preferences, Menu provided, Encourage intake, Supplement provided, Education provided for malnutrition, cirrhosis    Trial several different ONS options - prosource, breeze, magic cup    Monitoring/Evaluation:   Per protocol, I&O, PO intake, Supplement intake, Pertinent labs, Weight, Skin status, GI status, Symptoms, POC/GOC      Mile  NAYELY Hicks  Time Spent: 45m      used

## 2023-07-04 NOTE — PLAN OF CARE
Problem: Adult Inpatient Plan of Care  Goal: Plan of Care Review  Outcome: Ongoing, Progressing  Goal: Patient-Specific Goal (Individualized)  Outcome: Ongoing, Progressing  Goal: Absence of Hospital-Acquired Illness or Injury  Outcome: Ongoing, Progressing  Intervention: Identify and Manage Fall Risk  Description: Perform standard risk assessment on admission using a validated tool or comprehensive approach appropriate to the patient; reassess fall risk frequently, with change in status or transfer to another level of care.  Communicate fall injury risk to interprofessional healthcare team.  Determine need for increased observation, equipment and environmental modification, such as low bed, signage and supportive, nonskid footwear.  Adjust safety measures to individual developmental age, stage and identified risk factors.  Reinforce the importance of safety and physical activity with patient and family.  Perform regular intentional rounding to assess need for position change, pain assessment and personal needs, including assistance with toileting.  Recent Flowsheet Documentation  Taken 7/4/2023 0400 by Jessica Sabillon, RN  Safety Promotion/Fall Prevention:   activity supervised   assistive device/personal items within reach   clutter free environment maintained   fall prevention program maintained   lighting adjusted   mobility aid in reach   nonskid shoes/slippers when out of bed   room organization consistent   safety round/check completed  Taken 7/4/2023 0201 by Jessica Sabillon, RN  Safety Promotion/Fall Prevention:   activity supervised   assistive device/personal items within reach   clutter free environment maintained   fall prevention program maintained   lighting adjusted   mobility aid in reach   nonskid shoes/slippers when out of bed   room organization consistent   safety round/check completed  Intervention: Prevent Skin Injury  Description: Perform a screening for skin injury risk, such as pressure or  moisture associated skin damage on admission and at regular intervals throughout hospital stay.  Keep all areas of skin (especially folds) clean and dry.  Maintain adequate skin hydration.  Relieve and redistribute pressure and protect bony prominences; implement measures based on patient-specific risk factors.  Match turning and repositioning schedule to clinical condition.  Encourage weight shift frequently; assist with reposition if unable to complete independently.  Float heels off bed; avoid pressure on the Achilles tendon.  Keep skin free from extended contact with medical devices.  Encourage functional activity and mobility, as early as tolerated.  Use aids (e.g., slide boards, mechanical lift) during transfer.  Recent Flowsheet Documentation  Taken 7/4/2023 0400 by Jessica Sabillon RN  Body Position: position changed independently  Skin Protection:   adhesive use limited   tubing/devices free from skin contact   transparent dressing maintained   skin-to-skin areas padded   skin-to-device areas padded   incontinence pads utilized   skin sealant/moisture barrier applied  Taken 7/4/2023 0201 by Jessica Sabillon RN  Body Position: position changed independently  Skin Protection:   adhesive use limited   tubing/devices free from skin contact   transparent dressing maintained   skin-to-skin areas padded   skin-to-device areas padded  Intervention: Prevent and Manage VTE (Venous Thromboembolism) Risk  Description: Assess for VTE (venous thromboembolism) risk.  Encourage and assist with early ambulation.  Initiate and maintain compression or other therapy, as indicated, based on identified risk in accordance with organizational protocol and provider order.  Encourage both active and passive leg exercises while in bed, if unable to ambulate.  Recent Flowsheet Documentation  Taken 7/4/2023 0400 by Jessica Sabillon, RN  Activity Management: activity encouraged  Taken 7/4/2023 0201 by Jessica Sabillon RN  Activity  Management: activity encouraged  Intervention: Prevent Infection  Description: Maintain skin and mucous membrane integrity; promote hand, oral and pulmonary hygiene.  Optimize fluid balance, nutrition, sleep and glycemic control to maximize infection resistance.  Identify potential sources of infection early to prevent or mitigate progression of infection (e.g., wound, lines, devices).  Evaluate ongoing need for invasive devices; remove promptly when no longer indicated.  Recent Flowsheet Documentation  Taken 7/4/2023 0400 by Jessica Sabillon RN  Infection Prevention:   environmental surveillance performed   equipment surfaces disinfected   hand hygiene promoted   personal protective equipment utilized  Taken 7/4/2023 0201 by Jessica Sabillon RN  Infection Prevention:   environmental surveillance performed   equipment surfaces disinfected   hand hygiene promoted   personal protective equipment utilized   rest/sleep promoted  Goal: Optimal Comfort and Wellbeing  Outcome: Ongoing, Progressing  Intervention: Provide Person-Centered Care  Description: Use a family-focused approach to care.  Develop trust and rapport by proactively providing information, encouraging questions, addressing concerns and offering reassurance.  Acknowledge emotional response to hospitalization.  Recognize and utilize personal coping strategies.  Honor spiritual and cultural preferences.  Recent Flowsheet Documentation  Taken 7/4/2023 0201 by Jessica Sabillon RN  Trust Relationship/Rapport:   care explained   choices provided   emotional support provided   empathic listening provided   questions answered   questions encouraged   thoughts/feelings acknowledged  Goal: Readiness for Transition of Care  Outcome: Ongoing, Progressing  Intervention: Mutually Develop Transition Plan  Description: Identify available resources for support (e.g., family, friends, community).  Identify and address barriers to ongoing treatment and home management (e.g.,  environmental, financial).  Provide opportunities to practice self-management skills.  Assess and monitor emotional readiness for transition.  Establish or reconnect linkage with outpatient providers or community-based services.  Recent Flowsheet Documentation  Taken 7/4/2023 0204 by Jessica Sabillon, RN  Transportation Anticipated: family or friend will provide  Patient/Family Anticipated Services at Transition: none  Patient/Family Anticipates Transition to: home with family  Taken 7/4/2023 0201 by Jessica Sabillon, RN  Equipment Currently Used at Home: none   Goal Outcome Evaluation:
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
## 2023-07-04 NOTE — LETTER
EMS Transport Request  For use at [facility], [place], [place], and [place] only   Patient Name: Ivania Fields : 1960   Weight:(!) 159 kg (350 lb 9.6 oz) Pick-up Location: [facility] BLS/ALS: BLS/ALS: BLS   Insurance: [employer] MEDICARE REPLACEMENT Auth End Date:    Pre-Cert #: D/C Summary complete:    Destination: Other [facility]   Contact Precautions: None   Equipment (O2, Fluids, etc.): None   Arrive By Date/Time: 23 Stretcher/WC: Stretcher   CM Requesting: Priscilla Mercado RN Ext: [phone]   Notes/Medical Necessity: immobility, wounds     ______________________________________________________________________    *Only 2 patient bags OR 1 carry-on size bag are permitted.  Wheelchairs and walkers CANNOT transported with the patient. Acknowledge: Yes

## 2023-07-04 NOTE — CONSULTS
[facility] Consult    Referring Provider: Cecile Foreman MD    PCP: Alessandro Mercer MD    Reason for Consultation: Coffee ground emesis.    Chief complaint: Coffee ground emesis    History of present illness:    Ivania Fields is a 62 y.o. female who is admitted with 3-day history of nausea and vomiting.  She developed 3 episodes of hematemesis of coffee-ground's, and presented to ER at [place].  There is no associated abdominal pain or melena.  Nausea resolved with IV antemetic at outside ER. She was recently treated with Keflex for an open foot wound.  Patient has recent history of gastric ulcers noted on EGD, in the setting of ibuprofen use, on 4/6/2023 with Dr. Júnior Mandujano MD. EGD also revealed undigested food in the stomach.  EGD showed no signs of portal hypertension.  Patient has known history of PAREDES cirrhosis, and recent decompensation with ascites.  She has been on Lasix and Aldactone.    The patient was transferred from [place] to [facility] for higher level of care.  She is treated for left foot wound and acute kidney injury.    Allergies:  Patient has no known allergies.    Scheduled Meds:  atorvastatin, 20 mg, Oral, Daily  buPROPion XL, 150 mg, Oral, Daily  donepezil, 10 mg, Oral, Nightly  insulin lispro, 3-14 Units, Subcutaneous, TID With Meals  levothyroxine, 300 mcg, Oral, Daily  pantoprazole, 40 mg, Intravenous, BID AC  sodium chloride, 10 mL, Intravenous, Q12H         Infusions:       PRN Meds:    acetaminophen    dextrose    dextrose    glucagon (human recombinant)    Magnesium Standard Dose Replacement - Follow Nurse / BPA Driven Protocol    melatonin    ondansetron **OR** ondansetron    sodium chloride    sodium chloride    Home Meds:  Medications Prior to Admission   Medication Sig Dispense Refill Last Dose    atorvastatin (LIPITOR) 20 MG tablet Take 1 tablet by mouth Daily.   Past Week    buPROPion XL (WELLBUTRIN XL) 150 MG 24 hr tablet Take 1 tablet by mouth  Daily.   Past Week    insulin aspart (novoLOG) 100 UNIT/ML injection Inject  under the skin into the appropriate area as directed 3 (Three) Times a Day Before Meals. Sliding Scale as needed   Past Week    levothyroxine (SYNTHROID, LEVOTHROID) 100 MCG tablet Take 3 tablets by mouth Daily.   Past Week    multivitamin with minerals tablet tablet Take 1 tablet by mouth Daily.   Past Week    albuterol sulfate  (90 Base) MCG/ACT inhaler Inhale 2 puffs Every 4 (Four) Hours As Needed for Wheezing. (Patient not taking: Reported on 4/5/2023) 18 g 0     aspirin 81 MG EC tablet Take 1 tablet by mouth Daily. May restart in 1 week   More than a month    cephalexin (KEFLEX) 500 MG capsule Take 1 capsule by mouth 4 (Four) Times a Day for 7 days. 27 capsule 0     diphenoxylate-atropine (LOMOTIL) 2.5-0.025 MG per tablet Take 1 tablet by mouth 4 (Four) Times a Day As Needed for Diarrhea. 15 tablet 0     donepezil (ARICEPT) 10 MG tablet Take 1 tablet by mouth Every Night.       furosemide (LASIX) 40 MG tablet Take 1 tablet by mouth Daily.       Insulin Aspart (novoLOG) 100 UNIT/ML injection Inject 0-14 Units under the skin into the appropriate area as directed 3 (Three) Times a Day Before Meals.  12     ipratropium-albuterol (DUO-NEB) 0.5-2.5 mg/3 ml nebulizer Nebulize q 4 h prn wheezing / shortness of breath (Patient not taking: Reported on 4/5/2023) 360 mL 0     iron polysaccharides (NIFEREX) 150 MG capsule Take 1 capsule by mouth Daily.       ondansetron ODT (ZOFRAN-ODT) 4 MG disintegrating tablet Place 1 tablet on the tongue Every 8 (Eight) Hours As Needed for Nausea or Vomiting. 12 tablet 0     pantoprazole (PROTONIX) 40 MG EC tablet Take 1 tablet by mouth Every Morning.       SITagliptin (JANUVIA) 50 MG tablet Take 1 tablet by mouth Daily.       spironolactone (ALDACTONE) 25 MG tablet Take 1 tablet by mouth Daily.          ROS: Review of Systems   Constitutional:  Positive for activity change and appetite change. Negative  for chills, diaphoresis, fever and unexpected weight change.   HENT:  Negative for nosebleeds and trouble swallowing.    Eyes: Negative.    Respiratory: Negative.  Negative for cough, chest tightness, shortness of breath, wheezing and stridor.    Cardiovascular:  Positive for leg swelling. Negative for chest pain and palpitations.   Gastrointestinal:  Positive for abdominal distention, nausea and vomiting. Negative for blood in stool, constipation and diarrhea.   Endocrine: Negative.    Genitourinary: Negative.    Musculoskeletal:  Positive for arthralgias, back pain and gait problem.   Skin: Negative.  Negative for color change and pallor.   Allergic/Immunologic: Negative.    Neurological:  Negative for tremors, seizures, syncope, light-headedness and headaches.   Hematological: Negative.  Negative for adenopathy. Does not bruise/bleed easily.   Psychiatric/Behavioral: Negative.  Negative for agitation and behavioral problems.      PAST MED HX:  Past Medical History:   Diagnosis Date    Anemia     Diabetes mellitus     Hyperlipidemia     Hypertension     Hypothyroidism     Impaired mobility     Short-term memory loss        PAST SURG HX:  Past Surgical History:   Procedure Laterality Date    CHOLECYSTECTOMY      ENDOSCOPY W/ BANDING N/A 4/6/2023    Procedure: ESOPHAGOGASTRODUODENOSCOPY WITH BIOPSY;  Surgeon: Jens Mandujano MD;  Location: [facility] ENDOSCOPY;  Service: Gastroenterology;  Laterality: N/A;       FAM HX:  Family History   Problem Relation Age of Onset    No Known Problems Mother     No Known Problems Father        SOC HX:  Social History     Socioeconomic History    Marital status:    Tobacco Use    Smoking status: Never    Smokeless tobacco: Never   Vaping Use    Vaping Use: Never used   Substance and Sexual Activity    Alcohol use: Yes     Comment: a couple margaritas a year    Drug use: Never    Sexual activity: Defer       PHYSICAL EXAM  /60 (BP Location: Left arm, Patient  "Position: Lying)   Pulse 83   Temp 98.2 °F (36.8 °C) (Oral)   Resp 18   Ht 165.1 cm (65\")   Wt 130 kg (285 lb 12.8 oz)   SpO2 92%   BMI 47.56 kg/m²   Wt Readings from Last 3 Encounters:   07/04/23 130 kg (285 lb 12.8 oz)   07/03/23 121 kg (266 lb)   07/02/23 121 kg (266 lb)   ,body mass index is 47.56 kg/m².  Physical Exam  Vitals and nursing note reviewed.   Constitutional:       General: She is not in acute distress.     Appearance: She is obese. She is ill-appearing. She is not toxic-appearing or diaphoretic.   HENT:      Head: Normocephalic.      Nose: Nose normal. No congestion.      Mouth/Throat:      Mouth: Mucous membranes are moist.      Pharynx: No oropharyngeal exudate.   Eyes:      General: No scleral icterus.     Conjunctiva/sclera: Conjunctivae normal.   Cardiovascular:      Rate and Rhythm: Normal rate.      Pulses: Normal pulses.   Pulmonary:      Effort: Pulmonary effort is normal. No respiratory distress.      Breath sounds: Normal breath sounds. No stridor. No wheezing or rales.   Abdominal:      General: Bowel sounds are normal. There is no distension.      Palpations: Abdomen is soft.      Tenderness: There is no abdominal tenderness. There is no guarding.      Comments: +Large ascites. Obesity limits exam for organomegaly and masses.   Genitourinary:     Comments: deferred  Musculoskeletal:      Cervical back: Normal range of motion.      Right lower leg: Edema present.      Left lower leg: Edema present.      Comments: Woody edema with severe venous stasis changes on lower extremities, with clear bullae on lower extremities. Severe sarcopenia, notable over shoulders, forearms, and temple regions.   Skin:     General: Skin is warm and dry.      Coloration: Skin is pale. Skin is not jaundiced.      Findings: No bruising.   Neurological:      General: No focal deficit present.      Mental Status: She is alert and oriented to person, place, and time.      Comments: No asterixis. "   Psychiatric:         Mood and Affect: Mood normal.         Behavior: Behavior normal.     Results Review:   I reviewed the patient's new clinical results.    Lab Results   Component Value Date    WBC 5.39 07/03/2023    HGB 9.3 (L) 07/03/2023    HGB 9.3 (L) 07/02/2023    HGB 9.9 (L) 06/20/2023    HCT 29.2 (L) 07/03/2023    MCV 97.0 07/03/2023     07/03/2023       Lab Results   Component Value Date    INR 1.57 (H) 07/03/2023    INR 1.48 (H) 04/05/2023       Lab Results   Component Value Date    GLUCOSE 105 (H) 07/03/2023    BUN 19 07/03/2023    CREATININE 1.37 (H) 07/03/2023    BCR 13.9 07/03/2023     07/03/2023    K 4.0 07/03/2023    CO2 23.9 07/03/2023    CALCIUM 9.0 07/03/2023    ALBUMIN 1.8 (L) 07/03/2023    ALKPHOS 113 07/03/2023    BILITOT 1.1 07/03/2023    ALT 13 07/03/2023    AST 29 07/03/2023      Latest Reference Range & Units 04/05/23 05:28   Hemoglobin A1C 4.80 - 5.60 % 5.50      Latest Reference Range & Units 04/07/23 22:51   Iron 37 - 145 mcg/dL 22 (L)   Iron Saturation (TSAT) 20 - 50 % 21   Transferrin 200 - 360 mg/dL 72 (L)   TIBC 298 - 536 mcg/dL 107 (L)      Latest Reference Range & Units 04/05/23 05:28   Hep A Total Ab Negative  Positive !   Hepatitis B Surface Ag Non-Reactive  Non-Reactive   Hep B S Ab Non-Reactive  Non-Reactive   Hep B Core Total Ab Negative  Negative   Hepatitis C Ab Non-Reactive  Non-Reactive   NASIR  Negative      Latest Reference Range & Units Most Recent   ALPHA -1 ANTITRYPSIN 90 - 200 mg/dL 126  4/5/23 05:28   Ceruloplasmin 19 - 39 mg/dL 13 (L)  4/5/23 05:28      Latest Reference Range & Units 04/06/23 00:43   Amphetamine, Urine Qual Negative  Negative   Barbiturates Screen, Urine Negative  Negative   Benzodiazepine Screen, Urine Negative  Negative   Buprenorphine, Screen, Urine Negative  Negative   Cocaine Screen, Urine Negative  Negative   Methamphetamine, Ur Negative  Negative   Methadone Screen , Urine Negative  Negative   Opiate Screen Negative  Negative    Oxycodone Screen, Urine Negative  Negative   Phencyclidine (PCP), Urine Negative  Negative   Propoxyphene Screen Negative  Negative   THC Screen, Urine Negative  Negative   Tricyclic Antidepressants Screen Negative  Negative      Latest Reference Range & Units Most Recent   Creatinine, Urine mg/dL 118.1  4/8/23 03:05   Total Protein, Urine mg/dL 154.5  4/8/23 03:05   Protein/Creatinine Ratio 0.0 - 200.0 mg/G Crea 1,308.2 (H)  4/8/23 03:05     ASSESSMENTS/PLANS    1.  Nausea and vomiting.  2.  Gastroparesis.  3.  Coffee-ground emesis.  Recent history of peptic ulcer disease in the setting of ibuprofen and full dose aspirin.  4.  PAREDES cirrhosis with ascites.  No signs of portal hypertension on recent EGD.  MELD-Na = 15. Child Class B.  Has immunity to hepatitis A.  Needs repeat hepatitis B vaccine series.  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  5.  Acute kidney injury on diuretics for ascites.  6.  Significant proteinuria.  7.  Coagulopathy of liver disease.  8.  Severe hypoalbuminemia.  9.  Severe protein calorie malnutrition, as evidenced by profound hypoalbuminemia, anasarca, and diffuse sarcopenia.  10.  Morbid obesity.  11.  Anemia of chronic disease and iron deficiency.    >> Agree with IV PPI.  >> Continue octreotide for possibility of hepatorenal syndrome.  >> Will give albumin for acute kidney injury.  >> Add Reglan.  >> IV vitamin K and repeat INR tomorrow.  >> Agree with holding diuretics.  >> Will check serum protein electrophoresis, as serum total protein is normal in setting of profound hypoalbuminemia.  >> Will check alpha-fetoprotein.  >> Recommend Nephrology consult.  >> Plan EGD tomorrow.  >> IR paracentesis tomorrow.  There were technical difficulties with her last ultrasound-guided paracentesis, which yielded a small amount of fluid for diagnostics.  Large-volume paracentesis could not be performed.  >> Counseled regarding nutrition and liver  disease.  Specifically, needs frequent protein-containing meals and snacks, including at bedtime.  Counseled regarding 2 g sodium restriction. Normal serum sodium argues there is indiscretion with dietary salt intake.  >> Recommend PT/OT.  >> Management of ascites with diuretics appears limited due to kidney injury.  Pending Nephrology opinion, may consider TIPS for management of ascites.  >> CT liver mass protocol (with contrast), when renal function permits.    I discussed the patient's findings and my recommendations with patient and nursing staff    Mark I. Brunner, MD  07/04/23  07:52 EDT

## 2023-07-04 NOTE — ED NOTES
[facility] called again at this time for an update on the Hospitalist being paged. Informed they just repaged the physician at this time.

## 2023-07-04 NOTE — ED NOTES
Dr. Foreman, Hospitalist at [facility] on the phone to speak with Dr. Santos. Call transferred.

## 2023-07-04 NOTE — ED PROVIDER NOTES
Subjective:  History of Present Illness:    Patient is a 62-year-old female history of morbid obesity, hypertension, hyperlipidemia, diabetes mellitus, cirrhosis, gastric ulcers with past GI bleeds, recent scope in March showing no esophageal varices who presents today with hematemesis.  Reports that she has been vomiting for the last 2 to 3 days.  Presented yesterday for an evaluation, CT scan was negative.  She now presents back today complaining of 3 episodes of hematemesis prior to arrival.  Denies any preceding fevers.  No increase in abdominal pain.  Denies any dysuria.  Had been placed on Keflex for urinary tract infection during last ER visit.  No flank pain at this time.  Denies any chest pain, shortness of breath.      Nurses Notes reviewed and agree, including vitals, allergies, social history and prior medical history.     REVIEW OF SYSTEMS: All systems reviewed and not pertinent unless noted.  Review of Systems   Constitutional:  Positive for activity change. Negative for appetite change, chills, fatigue and fever.   HENT:  Negative for rhinorrhea, sinus pressure and sinus pain.    Eyes:  Negative for discharge and itching.   Respiratory:  Negative for cough and shortness of breath.    Cardiovascular:  Negative for chest pain and leg swelling.   Gastrointestinal:  Positive for vomiting. Negative for abdominal distention, abdominal pain and nausea.   Endocrine: Negative for cold intolerance and heat intolerance.   Genitourinary:  Negative for decreased urine volume, difficulty urinating, flank pain, frequency, urgency, vaginal bleeding, vaginal discharge and vaginal pain.   Musculoskeletal:  Negative for gait problem, neck pain and neck stiffness.   Skin:  Negative for color change.   Allergic/Immunologic: Negative for environmental allergies.   Neurological:  Negative for seizures, syncope, facial asymmetry and speech difficulty.   Psychiatric/Behavioral:  Negative for self-injury and suicidal ideas.   "    Past Medical History:   Diagnosis Date    Anemia     Diabetes mellitus     Hyperlipidemia     Hypertension     Hypothyroidism     Impaired mobility     Short-term memory loss        Allergies:    Patient has no known allergies.      Past Surgical History:   Procedure Laterality Date    CHOLECYSTECTOMY      ENDOSCOPY W/ BANDING N/A 4/6/2023    Procedure: ESOPHAGOGASTRODUODENOSCOPY WITH BIOPSY;  Surgeon: Jens Mandujano MD;  Location: [facility] ENDOSCOPY;  Service: Gastroenterology;  Laterality: N/A;         Social History     Socioeconomic History    Marital status:    Tobacco Use    Smoking status: Never    Smokeless tobacco: Never   Vaping Use    Vaping Use: Never used   Substance and Sexual Activity    Alcohol use: Yes     Comment: a couple margaritas a year    Drug use: Never    Sexual activity: Defer         Family History   Problem Relation Age of Onset    No Known Problems Mother     No Known Problems Father        Objective  Physical Exam:  /73   Pulse 78   Temp 98.4 °F (36.9 °C) (Oral)   Resp 18   Ht 165.1 cm (65\")   Wt 121 kg (266 lb)   SpO2 99%   BMI 44.26 kg/m²      Physical Exam  Constitutional:       General: She is not in acute distress.     Appearance: Normal appearance. She is obese. She is not ill-appearing.   HENT:      Head: Normocephalic and atraumatic.      Nose: Nose normal. No congestion or rhinorrhea.      Mouth/Throat:      Mouth: Mucous membranes are dry.      Pharynx: Oropharynx is clear. No oropharyngeal exudate or posterior oropharyngeal erythema.   Eyes:      Extraocular Movements: Extraocular movements intact.      Conjunctiva/sclera: Conjunctivae normal.      Pupils: Pupils are equal, round, and reactive to light.   Cardiovascular:      Rate and Rhythm: Normal rate and regular rhythm.      Pulses: Normal pulses.      Heart sounds: Normal heart sounds.   Pulmonary:      Effort: Pulmonary effort is normal. No respiratory distress.      Breath sounds: Normal " breath sounds.   Abdominal:      General: Abdomen is flat. Bowel sounds are normal. There is no distension.      Palpations: Abdomen is soft.      Tenderness: There is no abdominal tenderness. There is no guarding or rebound.   Musculoskeletal:         General: No swelling or tenderness. Normal range of motion.      Cervical back: Normal range of motion and neck supple.   Skin:     General: Skin is warm and dry.      Capillary Refill: Capillary refill takes less than 2 seconds.   Neurological:      General: No focal deficit present.      Mental Status: She is alert and oriented to person, place, and time. Mental status is at baseline.      Cranial Nerves: No cranial nerve deficit.      Sensory: No sensory deficit.      Motor: No weakness.      Coordination: Coordination normal.   Psychiatric:         Mood and Affect: Mood normal.         Behavior: Behavior normal.         Thought Content: Thought content normal.         Judgment: Judgment normal.       Procedures    ED Course:    ED Course as of 07/04/23 0311 Mon Jul 03, 2023 2021 EKG interpreted by me, normal sinus rhythm no concerning ST changes noted, rate of 78 [JE]      ED Course User Index  [JE] Steven Santos MD       Lab Results (last 24 hours)       Procedure Component Value Units Date/Time    CBC & Differential [[id number]]  (Abnormal) Collected: 07/03/23 2006    Specimen: Blood Updated: 07/03/23 2023    Narrative:      The following orders were created for panel order CBC & Differential.  Procedure                               Abnormality         Status                     ---------                               -----------         ------                     CBC Auto Differential[[id number]]        Abnormal            Final result                 Please view results for these tests on the individual orders.    Comprehensive Metabolic Panel [[id number]]  (Abnormal) Collected: 07/03/23 2006    Specimen: Blood Updated: 07/03/23 2035     Glucose 105  mg/dL      BUN 19 mg/dL      Creatinine 1.37 mg/dL      Sodium 139 mmol/L      Potassium 4.0 mmol/L      Chloride 106 mmol/L      CO2 23.9 mmol/L      Calcium 9.0 mg/dL      Total Protein 7.2 g/dL      Albumin 1.8 g/dL      ALT (SGPT) 13 U/L      AST (SGOT) 29 U/L      Alkaline Phosphatase 113 U/L      Total Bilirubin 1.1 mg/dL      Globulin 5.4 gm/dL      A/G Ratio 0.3 g/dL      BUN/Creatinine Ratio 13.9     Anion Gap 9.1 mmol/L      eGFR 43.5 mL/min/1.73     Narrative:      GFR Normal >60  Chronic Kidney Disease <60  Kidney Failure <15      Lipase [[id number]]  (Normal) Collected: 07/03/23 2006    Specimen: Blood Updated: 07/03/23 2035     Lipase 16 U/L     Single High Sensitivity Troponin T [[id number]]  (Abnormal) Collected: 07/03/23 2006    Specimen: Blood Updated: 07/03/23 2049     HS Troponin T 198 ng/L     Narrative:      High Sensitive Troponin T Reference Range:  <10.0 ng/L- Negative Female for AMI  <15.0 ng/L- Negative Male for AMI  >=10 - Abnormal Female indicating possible myocardial injury.  >=15 - Abnormal Male indicating possible myocardial injury.   Clinicians would have to utilize clinical acumen, EKG, Troponin, and serial changes to determine if it is an Acute Myocardial Infarction or myocardial injury due to an underlying chronic condition.         C-reactive Protein [[id number]]  (Abnormal) Collected: 07/03/23 2006    Specimen: Blood Updated: 07/03/23 2035     C-Reactive Protein 3.21 mg/dL     Procalcitonin [[id number]]  (Normal) Collected: 07/03/23 2006    Specimen: Blood Updated: 07/03/23 2042     Procalcitonin 0.14 ng/mL     Narrative:      As a Marker for Sepsis (Non-Neonates):    1. <0.5 ng/mL represents a low risk of severe sepsis and/or septic shock.  2. >2 ng/mL represents a high risk of severe sepsis and/or septic shock.    As a Marker for Lower Respiratory Tract Infections that require antibiotic therapy:    PCT on Admission    Antibiotic Therapy       6-12 Hrs later    >0.5                 "Strongly Recommended  >0.25 - <0.5        Recommended   0.1 - 0.25          Discouraged              Remeasure/reassess PCT  <0.1                Strongly Discouraged     Remeasure/reassess PCT    As 28 day mortality risk marker: \"Change in Procalcitonin Result\" (>80% or <=80%) if Day 0 (or Day 1) and Day 4 values are available. Refer to http://www.[facility]-pct-calculator.com    Change in PCT <=80%  A decrease of PCT levels below or equal to 80% defines a positive change in PCT test result representing a higher risk for 28-day all-cause mortality of patients diagnosed with severe sepsis for septic shock.    Change in PCT >80%  A decrease of PCT levels of more than 80% defines a negative change in PCT result representing a lower risk for 28-day all-cause mortality of patients diagnosed with severe sepsis or septic shock.       Lactic Acid, Plasma [[id number]]  (Normal) Collected: 07/03/23 2006    Specimen: Blood Updated: 07/03/23 2033     Lactate 2.0 mmol/L     Magnesium [[id number]]  (Normal) Collected: 07/03/23 2006    Specimen: Blood Updated: 07/03/23 2035     Magnesium 1.9 mg/dL     CBC Auto Differential [[id number]]  (Abnormal) Collected: 07/03/23 2006    Specimen: Blood Updated: 07/03/23 2023     WBC 5.39 10*3/mm3      RBC 3.01 10*6/mm3      Hemoglobin 9.3 g/dL      Hematocrit 29.2 %      MCV 97.0 fL      MCH 30.9 pg      MCHC 31.8 g/dL      RDW 16.4 %      RDW-SD 56.5 fl      MPV 10.2 fL      Platelets 155 10*3/mm3      Neutrophil % 75.2 %      Lymphocyte % 12.2 %      Monocyte % 7.4 %      Eosinophil % 3.5 %      Basophil % 1.3 %      Immature Grans % 0.4 %      Neutrophils, Absolute 4.05 10*3/mm3      Lymphocytes, Absolute 0.66 10*3/mm3      Monocytes, Absolute 0.40 10*3/mm3      Eosinophils, Absolute 0.19 10*3/mm3      Basophils, Absolute 0.07 10*3/mm3      Immature Grans, Absolute 0.02 10*3/mm3      nRBC 0.0 /100 WBC     COVID-19 and FLU A/B PCR - Swab, Nasopharynx [[id number]]  (Normal) Collected: 07/03/23 2033 "    Specimen: Swab from Nasopharynx Updated: 07/03/23 2056     COVID19 Not Detected     Influenza A PCR Not Detected     Influenza B PCR Not Detected    Narrative:      Fact sheet for providers: https://www.fda.gov/media/075665/download    Fact sheet for patients: https://www.fda.gov/media/162686/download    Test performed by PCR.    Protime-INR [[id number]]  (Abnormal) Collected: 07/03/23 2101    Specimen: Blood Updated: 07/03/23 2136     Protime 19.3 Seconds      INR 1.57    Narrative:      Suggested INR therapeutic range for stable oral anticoagulant therapy:    Low Intensity therapy:   1.5-2.0  Moderate Intensity therapy:   2.0-3.0  High Intensity therapy:   2.5-4.0    High Sensitivity Troponin T 2Hr [[id number]]  (Abnormal) Collected: 07/03/23 2217    Specimen: Blood Updated: 07/03/23 2253     HS Troponin T 169 ng/L      Troponin T Delta -29 ng/L     Narrative:      High Sensitive Troponin T Reference Range:  <10.0 ng/L- Negative Female for AMI  <15.0 ng/L- Negative Male for AMI  >=10 - Abnormal Female indicating possible myocardial injury.  >=15 - Abnormal Male indicating possible myocardial injury.   Clinicians would have to utilize clinical acumen, EKG, Troponin, and serial changes to determine if it is an Acute Myocardial Infarction or myocardial injury due to an underlying chronic condition.                  CT Abdomen Pelvis Without Contrast    Result Date: 7/2/2023  PROCEDURE: CT ABDOMEN PELVIS WO CONTRAST-  HISTORY: n/v and lower abdominal discomfort X 3-4 days  COMPARISON: June 9, 2023.  PROCEDURE: Axial images were obtained from the lung bases to the pubic symphysis by computed tomography without intravenous contrast.  FINDINGS: Lack of intravenous contrast limits assessment of the solid organs, the mediastinum, and the vasculature.  ABDOMEN:Anasarca is noted with a large volume of ascites. There are small to moderate-sized bilateral pleural effusions with compressive atelectasis. The heart is enlarged.  There is evidence calcified granulomatous disease. Moderate-sized hiatal hernia is present. Atherosclerosis without aortic aneurysm. Cirrhotic appearing liver again noted. The gallbladder is absent. Spleen is upper limits of normal in size. No obvious pancreatic mass. Adrenal glands and left kidney is atrophic. Nonobstructing stone present in the right collecting system. There is a 5 mm right UPJ stone without significant hydronephrosis. No distal ureteral stones were identified. There are no abnormally dilated loops of bowel. No free air was identified.  PELVIS: The appendix is not identified, assessment is limited secondary to significant ascites. The bladder is decompressed. The uterus is present. Significant free fluid again noted in the pelvis. No acute osseous changes.      Impression: Large volume ascites and anasarca with cirrhosis noted.  Small to moderate-sized bilateral pleural effusions with compressive atelectasis.  5 mm right UPJ stone without significant hydronephrosis.     This study was performed with techniques to keep radiation doses as low as reasonably achievable (ALARA). Individualized dose reduction techniques using automated exposure control or adjustment of mA and/or kV according to the patient size were employed.  This report was signed and finalized on 7/2/2023 11:09 AM by Mamadou Domínguez DO.    XR Foot 3+ View Left    Result Date: 7/2/2023  PROCEDURE: XR FOOT 3+ VW LEFT-  History: large ulcer, patient reports unsure what caused it  COMPARISON: None.  FINDINGS:  A 3 view exam demonstrates no displaced fracture or dislocation. Degenerative changes are present. Significant soft tissue swelling about the mid and forefoot. Consider MRI if symptoms persist.      Impression: No displaced fracture.     This report was signed and finalized on 7/2/2023 12:32 PM by Mamadou Domínguez DO.        MDM     Amount and/or Complexity of Data Reviewed  Decide to obtain previous medical records or to obtain history  from someone other than the patient: yes        Initial impression of presenting illness: Hematemesis    DDX: includes but is not limited to: Bleeding esophageal varices, gastric ulcers, SBP    Patient arrives stable with vitals interpreted by myself.     Pertinent features from physical exam: Tender to abdominal palpation at the time my exam.    Initial diagnostic plan: CBC, CMP, lipase, UA, lactic acid, PT/INR, EKG, troponin    Results from initial plan were reviewed and interpreted by me revealing no concern for acute blood loss, patient has stable H&H    Diagnostic information from other sources: Reviewed past medical records    Interventions / Re-evaluation: Given concerns for upper GI bleed in a patient with cirrhosis given Rocephin, octreotide, Protonix    Results/clinical rationale were discussed with patient at bedside    Consultations/Discussion of results with other physicians: My concerns for multiple episodes of hematemesis with patient's high risk status, discussed with hospitalist service at [facility] given our lack of GI coverage.  Accept the patient in transfer for evaluation by gastroenterology    Disposition plan: Transfer  -----        Final diagnoses:   Upper GI bleed          Steven Santos MD  07/04/23 0312

## 2023-07-04 NOTE — PROGRESS NOTES
2nd visit today. Patient was admitted by partner earlier this morning    Ivania Fields is a 61 yo f w/ hx PAREDES cirrhosis, probable ckd 3 (baseline cr ~1.4-1.5 per labs earlier this year), hypothyroidism, dm2, bilateral chronic lower extremity lymphedema, previous egd 4/6/23 ([facility]) w/ multiple nonbleeding gastric ulcers and mild portal htn gastropathy. Patient presented in transfer from [facility] for higher level of care after presenting there w/ multiple bouts of coffee emesis.       Decompensated Paredes cirrhosis w/ ascites  Coffee emesis  Coagulopathy of liver dz  -q8h hgb transfuse prn  -protonix iv bid, octreotide  -albumin  -vitamin k  -GI following, planning egd tomorrow; IR paracentesis tomorrow    CKD, likely stage 3 (baseline cr ~1.4-1.5 per labs months ago at [facility])  -asking nephrology to see in a.m.  -will likely require diuretics (holding currently), appreciate nephrology assistance given renal dysfunction (also getting albumin currently)    Elevated high sensisitivity troponin  -EKG w nonspecific twave changes laterally  -troponin 198, 169, 177. So flat, not rising  -no chest pain  -doubt coronary ischemia as flat troponins, no chest pain  -echo (7/4/23) w/ normal ef and wall motion    DM2  -A1c pending  -sliding scale humalog for now    BLE lymphedema  -PT wound for leg wraps    Hypothyroidism  -tsh ok, continue synthroid    Morbid obesity    Am labs: cbc,cmp,inr, afb]p, karishma & pe  Q8h hgb hgb

## 2023-07-04 NOTE — PROGRESS NOTES
Malnutrition Severity Assessment    Patient Name:  Ivania Fields  YOB: 1960  MRN: [id number]  Admit Date:  7/4/2023    Patient meets criteria for : Severe Malnutrition (PO intakes <75% EEN at least past 2 months, severe muscle wasting, moderate subcutaneous fat loss.)    Malnutrition Severity Assessment  Malnutrition Type: Chronic Disease - Related Malnutrition  Malnutrition Type (last 8 hours)       Malnutrition Severity Assessment       Row Name 07/04/23 1549       Malnutrition Severity Assessment    Malnutrition Type Chronic Disease - Related Malnutrition      Row Name 07/04/23 1549       Insufficient Energy Intake     Insufficient Energy Intake Findings Moderate  PO intakes <75% EEN at least past 2 months    Insufficient Energy Intake  <75% of est. energy requirement for > or equal to 1 month      Row Name 07/04/23 1549       Unintentional Weight Loss     Unintentional Weight Loss Findings --  PABLO- awaiting reliable weight      Row Name 07/04/23 1549       Muscle Loss    Loss of Muscle Mass Findings Severe    [place] Severe - deep hollowing/scooping, lack of muscle to touch, facial bones well defined    Clavicle Bone Region Severe - protruding prominent bone    Acromion Bone Region Moderate - acromion may slightly protrude    Scapular Bone Region Moderate - mild depression, bones may show slightly    Dorsal Hand Region Severe - prominent depression      Row Name 07/04/23 1549       Fat Loss    Subcutaneous Fat Loss Findings Moderate    Orbital Region  Moderate -  somewhat hollowness, slightly dark circles    Upper Arm Region Moderate - some fat tissue, not ample      Row Name 07/04/23 1549       Criteria Met (Must meet criteria for severity in at least 2 of these categories: M Wasting, Fat Loss, Fluid, Secondary Signs, Wt. Status, Intake)    Patient meets criteria for  Severe Malnutrition  PO intakes <75% EEN at least past 2 months, severe muscle wasting, moderate subcutaneous fat  loss.                    Electronically signed by:  Mile Hicks RD  07/04/23 15:50 EDT

## 2023-07-04 NOTE — H&P
[facility] Medicine Services  HISTORY AND PHYSICAL    Patient Name: Ivania Harris  : 1960  MRN: [id number]  Primary Care Physician: Provider, No Known  Date of admission: 2023    Subjective   Subjective     Chief Complaint:  GI Bleed    HPI:  Ivania Harris is a 62 y.o. female with a history of HTN, HLD, T2DM, hypothyroidism, Liver cirrhosis, and debility who has transferred here from [facility] for higher level of care. Its reported that she had presented to their ED 2 days ago for abdominal discomfort. Workup was unremarkable at that time, though she was also found to have a large open wound on her left foot. She was started on Keflex and was discharged home. She states she began having nausea/vomiting with several episodes of what she describes as coffee-ground emesis. She presented back to their ED several hours ago. Given her history of liver cirrhosis, there was concern for variceal bleed, so she was transferred here. Of note, she underwent a EGD on  which showed multiple non-bleeding gastric ulcers, but no varices. She is a non-smoker, denies alcohol abuse or illicit drug use.         Review of Systems   Constitutional: Positive for appetite change and fatigue. Negative for chills, fever and unexpected weight change.   HENT: Negative for nosebleeds, sore throat and trouble swallowing.    Eyes: Negative for photophobia and visual disturbance.   Respiratory: Negative for cough, shortness of breath and wheezing.    Cardiovascular: Positive for leg swelling. Negative for chest pain and palpitations.   Gastrointestinal: Positive for abdominal distention, nausea and vomiting. Negative for abdominal pain and diarrhea.   Genitourinary: Negative for dysuria and hematuria.   Musculoskeletal: Negative for arthralgias and myalgias.   Skin: Positive for wound (left foot).   Neurological: Positive for weakness. Negative for tremors, syncope, speech difficulty and  headaches.   Psychiatric/Behavioral: Negative for confusion. The patient is not nervous/anxious.           Personal History     Past Medical History:   Diagnosis Date    Anemia     Diabetes mellitus     Hyperlipidemia     Hypertension     Hypothyroidism     Impaired mobility     Short-term memory loss              Past Surgical History:   Procedure Laterality Date    CHOLECYSTECTOMY      ENDOSCOPY W/ BANDING N/A 4/6/2023    Procedure: ESOPHAGOGASTRODUODENOSCOPY WITH BIOPSY;  Surgeon: Jens Mandujano MD;  Location: [facility] ENDOSCOPY;  Service: Gastroenterology;  Laterality: N/A;       Family History: family history includes No Known Problems in her father and mother.     Social History:  reports that she has never smoked. She has never used smokeless tobacco. She reports current alcohol use. She reports that she does not use drugs.  Social History     Social History Narrative    Not on file       Medications:  Insulin Aspart, SITagliptin, albuterol sulfate HFA, aspirin, atorvastatin, buPROPion XL, cephalexin, diphenoxylate-atropine, donepezil, furosemide, insulin aspart, ipratropium-albuterol, iron polysaccharides, levothyroxine, multivitamin with minerals, ondansetron ODT, pantoprazole, and spironolactone    No Known Allergies    Objective   Objective     Vital Signs:   Temp:  [98.2 °F (36.8 °C)-98.4 °F (36.9 °C)] 98.2 °F (36.8 °C)  Heart Rate:  [75-83] 83  Resp:  [18] 18  BP: (130-170)/(53-80) 144/60    Physical Exam  Constitutional:       General: She is not in acute distress.     Appearance: Normal appearance. She is obese.   HENT:      Head: Atraumatic.      Right Ear: External ear normal.      Left Ear: External ear normal.      Nose: Nose normal.      Mouth/Throat:      Comments: Poor dentation  Eyes:      Extraocular Movements: Extraocular movements intact.      Conjunctiva/sclera: Conjunctivae normal.      Pupils: Pupils are equal, round, and reactive to light.   Cardiovascular:      Rate and Rhythm:  Normal rate and regular rhythm.      Pulses: Normal pulses.      Heart sounds: Murmur heard.   Pulmonary:      Effort: Pulmonary effort is normal. No respiratory distress.      Breath sounds: Normal breath sounds. No wheezing, rhonchi or rales.   Abdominal:      General: Bowel sounds are normal. There is distension.      Tenderness: There is no abdominal tenderness. There is no guarding or rebound.   Musculoskeletal:         General: Normal range of motion.      Cervical back: No rigidity.      Right lower leg: Edema present.      Left lower leg: Edema present.   Skin:     General: Skin is warm and dry.      Coloration: Skin is not jaundiced.      Findings: No lesion or rash.      Comments: Stasis dermatitis of lower extremities bilaterally. Ulceration of plantar area of left foot.     Neurological:      General: No focal deficit present.      Mental Status: She is alert and oriented to person, place, and time.   Psychiatric:         Attention and Perception: Attention normal.         Mood and Affect: Mood normal.         Behavior: Behavior normal.         Thought Content: Thought content normal.        Result Review:  I have personally reviewed the results from the time of this admission to 7/4/2023 02:53 EDT and agree with these findings:  [x]  Laboratory list / accordion  []  Microbiology  [x]  Radiology  [x]  EKG/Telemetry   []  Cardiology/Vascular   []  Pathology  [x]  Old records  []  Other:      LAB RESULTS:      Lab 07/03/23  2101 07/03/23 2006 07/02/23  1118   WBC  --  5.39 6.36   HEMOGLOBIN  --  9.3* 9.3*   HEMATOCRIT  --  29.2* 28.7*   PLATELETS  --  155 136*   NEUTROS ABS  --  4.05 4.49   IMMATURE GRANS (ABS)  --  0.02 0.03   LYMPHS ABS  --  0.66* 0.73   MONOS ABS  --  0.40 0.73   EOS ABS  --  0.19 0.30   MCV  --  97.0 91.7   CRP  --  3.21*  --    PROCALCITONIN  --  0.14  --    LACTATE  --  2.0  --    PROTIME 19.3*  --   --          Lab 07/03/23 2006 07/02/23  1029   SODIUM 139 142   POTASSIUM 4.0 3.9    CHLORIDE 106 107   CO2 23.9 21.6*   ANION GAP 9.1 13.4   BUN 19 15   CREATININE 1.37* 1.23*   EGFR 43.5* 49.5*   GLUCOSE 105* 133*   CALCIUM 9.0 9.2   MAGNESIUM 1.9  --          Lab 07/03/23 2006 07/02/23  1029   TOTAL PROTEIN 7.2 7.7   ALBUMIN 1.8* 1.9*   GLOBULIN 5.4 5.8   ALT (SGPT) 13 14   AST (SGOT) 29 35*   BILIRUBIN 1.1 0.9   ALK PHOS 113 134*   LIPASE 16 17         Lab 07/03/23  2217 07/03/23  2101 07/03/23 2006   HSTROP T 169*  --  198*   PROTIME  --  19.3*  --    INR  --  1.57*  --                  Brief Urine Lab Results  (Last result in the past 365 days)        Color   Clarity   Blood   Leuk Est   Nitrite   Protein   CREAT   Urine HCG        07/02/23 1611 Dark Yellow   Clear   Moderate (2+)   Small (1+)   Negative   100 mg/dL (2+)                 Microbiology Results (last 10 days)       Procedure Component Value - Date/Time    COVID-19 and FLU A/B PCR - Swab, Nasopharynx [[id number]]  (Normal) Collected: 07/03/23 2033    Lab Status: Final result Specimen: Swab from Nasopharynx Updated: 07/03/23 2056     COVID19 Not Detected     Influenza A PCR Not Detected     Influenza B PCR Not Detected    Narrative:      Fact sheet for providers: https://www.fda.gov/media/260341/download    Fact sheet for patients: https://www.fda.gov/media/018748/download    Test performed by PCR.            CT Abdomen Pelvis Without Contrast    Result Date: 7/2/2023  PROCEDURE: CT ABDOMEN PELVIS WO CONTRAST-  HISTORY: n/v and lower abdominal discomfort X 3-4 days  COMPARISON: June 9, 2023.  PROCEDURE: Axial images were obtained from the lung bases to the pubic symphysis by computed tomography without intravenous contrast.  FINDINGS: Lack of intravenous contrast limits assessment of the solid organs, the mediastinum, and the vasculature.  ABDOMEN:Anasarca is noted with a large volume of ascites. There are small to moderate-sized bilateral pleural effusions with compressive atelectasis. The heart is enlarged. There is evidence  calcified granulomatous disease. Moderate-sized hiatal hernia is present. Atherosclerosis without aortic aneurysm. Cirrhotic appearing liver again noted. The gallbladder is absent. Spleen is upper limits of normal in size. No obvious pancreatic mass. Adrenal glands and left kidney is atrophic. Nonobstructing stone present in the right collecting system. There is a 5 mm right UPJ stone without significant hydronephrosis. No distal ureteral stones were identified. There are no abnormally dilated loops of bowel. No free air was identified.  PELVIS: The appendix is not identified, assessment is limited secondary to significant ascites. The bladder is decompressed. The uterus is present. Significant free fluid again noted in the pelvis. No acute osseous changes.      Impression: Large volume ascites and anasarca with cirrhosis noted.  Small to moderate-sized bilateral pleural effusions with compressive atelectasis.  5 mm right UPJ stone without significant hydronephrosis.     This study was performed with techniques to keep radiation doses as low as reasonably achievable (ALARA). Individualized dose reduction techniques using automated exposure control or adjustment of mA and/or kV according to the patient size were employed.  This report was signed and finalized on 7/2/2023 11:09 AM by Mamadou Domínguez DO.    XR Foot 3+ View Left    Result Date: 7/2/2023  PROCEDURE: XR FOOT 3+ VW LEFT-  History: large ulcer, patient reports unsure what caused it  COMPARISON: None.  FINDINGS:  A 3 view exam demonstrates no displaced fracture or dislocation. Degenerative changes are present. Significant soft tissue swelling about the mid and forefoot. Consider MRI if symptoms persist.      Impression: No displaced fracture.     This report was signed and finalized on 7/2/2023 12:32 PM by Mamadou Domínguez DO.          Assessment & Plan   Assessment & Plan       GI bleed    Cirrhosis of liver with ascites    Anemia    SILVINA (acute kidney injury)     CKD (chronic kidney disease)    Type 2 diabetes mellitus    Essential hypertension    Dyslipidemia    Sleep apnea    Debility    Hypothyroidism (acquired)    Hypoalbuminemia      Ivania Harris is a 62 y.o. female with a history of HTN, HLD, T2DM, hypothyroidism, Liver cirrhosis, and debility who has transferred here from [facility] for higher level of care for possible upper GI bleed.     Hematemesis  Upper GI Bleed?  Anemia  Liver Cirrhosis w/ ascites  -Hgb 9.3, Hct 29.2. Slightly decreased from prior.  -She underwent a EGD on 4/6 which did show multiple non-bleeding gastric ulcers. No varices at that time.   -Was started on Octreotide at [place] prior to coming here.  -GI consult for the am  -IV Protonix 40mg IV BID  -NPO tonight.   -Zofram for nausea    Wound on Left Foot  -Wound care to see in the am    HTN  HLD  -Takes spironolactone and lasix. Hold for now given Marcela  -Continue statin    T2DM  -Blood glucose 105 tonight.   -Check A1C  -Fingerstick achs. SSI    MARCELA on CKD  -Cr 1.37. Slightly increased from recent results  -eGFR 43.5  -Holding home lasix and spironolactone for now.   -Repeat bmp in the am    Hypothyroidism  -Check TSH, T4  -Continue home synthroid     Hypoalbuminemia  -Alb 1.8  -Nutrition consult         DVT prophylaxis:  scds    CODE STATUS:  Full code       Expected DischargeTBD    This note has been completed as part of a split-shared workflow.     Signature: Electronically signed by Hugo Martinez PA-C, 07/04/23, 3:03 AM EDT      Attending   Admission Attestation       I have performed an independent face-to-face diagnostic evaluation including performing an independent physical examination as documented here.  The documented plan of care above was reviewed and developed with the advanced practice clinician (APC).      Brief Summary Statement:   Ivania Fields is a 62 y.o. female  with hx of PAREDES and reportedly PUD w/ EGD 3/2023 which revealed nonbleeding ulcers who  presents now w/ what the [facility] ER physician tells me is multiple episodes of hematemesis today however pt herself tells me she only had one episode of coffee ground emesis and no abd pain. . No blood in stool or dark tarry stool.  No f/c.      Remainder of detailed HPI is as noted by APC and has been reviewed and/or edited by me for completeness.    Attending Physical Exam:  Temp:  [98.2 °F (36.8 °C)-98.4 °F (36.9 °C)] 98.2 °F (36.8 °C)  Heart Rate:  [75-83] 83  Resp:  [18] 18  BP: (130-170)/(53-80) 144/60     Separate exam performed by me w/ no changes to the above    Brief Assessment/Plan :  See detailed assessment and plan developed with APC which I have reviewed and/or edited for completeness.    63 y/o female w/ hx of PAREDES/cirrhosis, PUD, DM, HTN here now w/  GI BLeed  -pt has only had one episode of self-limiting hematemesis w/ stable h/h but will monitor w/ repeat h/h and continue iv protonix w/ GI consult this a.m.  2. Left foot wound  -wound care to see; pt not aware of extent of wound  -obtain xray now but may need further imaging    Total time spent: 20 min additional time   Time spent includes time reviewing chart, face-to-face time, counseling patient/family/caregiver, ordering medications/tests/procedures, communicating with other health care professionals, documenting clinical information in the electronic health record, and coordination of care.      Electronically signed by Cecile Foreman MD, 07/04/23, 5:24 AM EDT.        Cecile Foreman MD  07/04/23

## 2023-07-05 ENCOUNTER — APPOINTMENT (OUTPATIENT)
Dept: GENERAL RADIOLOGY | Facility: HOSPITAL | Age: 63
DRG: 377 | End: 2023-07-05
Payer: MEDICARE

## 2023-07-05 PROBLEM — K75.81 LIVER CIRRHOSIS SECONDARY TO NASH: Status: ACTIVE | Noted: 2023-04-04

## 2023-07-05 PROBLEM — E03.9 HYPOTHYROIDISM (ACQUIRED): Chronic | Status: ACTIVE | Noted: 2023-04-08

## 2023-07-05 PROBLEM — E78.5 DYSLIPIDEMIA: Chronic | Status: ACTIVE | Noted: 2023-04-08

## 2023-07-05 PROBLEM — E11.9 TYPE 2 DIABETES MELLITUS: Chronic | Status: ACTIVE | Noted: 2023-04-08

## 2023-07-05 PROBLEM — K75.81 LIVER CIRRHOSIS SECONDARY TO NASH: Chronic | Status: ACTIVE | Noted: 2023-04-04

## 2023-07-05 PROBLEM — R18.8 CIRRHOSIS OF LIVER WITH ASCITES: Chronic | Status: ACTIVE | Noted: 2023-04-04

## 2023-07-05 PROBLEM — K74.60 CIRRHOSIS OF LIVER WITH ASCITES: Chronic | Status: ACTIVE | Noted: 2023-04-04

## 2023-07-05 PROBLEM — I10 ESSENTIAL HYPERTENSION: Chronic | Status: ACTIVE | Noted: 2023-04-08

## 2023-07-05 LAB
ABO GROUP BLD: NORMAL
ARTERIAL PATENCY WRIST A: ABNORMAL
ATMOSPHERIC PRESS: ABNORMAL MM[HG]
BACTERIA UR QL AUTO: ABNORMAL /HPF
BASE EXCESS BLDA CALC-SCNC: 1.1 MMOL/L (ref 0–2)
BDY SITE: ABNORMAL
BILIRUB UR QL STRIP: NEGATIVE
BLD GP AB SCN SERPL QL: POSITIVE
BODY TEMPERATURE: 37 C
CLARITY UR: CLEAR
CO2 BLDA-SCNC: 26.2 MMOL/L (ref 22–33)
COHGB MFR BLD: 1.3 % (ref 0–2)
COLOR UR: ABNORMAL
DEPRECATED RDW RBC AUTO: 54.5 FL (ref 37–54)
EPAP: 0
ERYTHROCYTE [DISTWIDTH] IN BLOOD BY AUTOMATED COUNT: 15.9 % (ref 12.3–15.4)
GLUCOSE BLDC GLUCOMTR-MCNC: 159 MG/DL (ref 70–130)
GLUCOSE BLDC GLUCOMTR-MCNC: 169 MG/DL (ref 70–130)
GLUCOSE UR STRIP-MCNC: NEGATIVE MG/DL
GRAN CASTS URNS QL MICRO: ABNORMAL /LPF
HCO3 BLDA-SCNC: 25.1 MMOL/L (ref 20–26)
HCT VFR BLD AUTO: 19.7 % (ref 34–46.6)
HCT VFR BLD AUTO: 27 % (ref 34–46.6)
HCT VFR BLD CALC: 24.1 % (ref 38–51)
HGB BLD-MCNC: 6.9 G/DL (ref 12–15.9)
HGB BLD-MCNC: 8.7 G/DL (ref 12–15.9)
HGB BLDA-MCNC: 7.9 G/DL (ref 14–18)
HGB UR QL STRIP.AUTO: ABNORMAL
HYALINE CASTS UR QL AUTO: ABNORMAL /LPF
INHALED O2 CONCENTRATION: 50 %
IPAP: 0
KETONES UR QL STRIP: ABNORMAL
LEUKOCYTE ESTERASE UR QL STRIP.AUTO: ABNORMAL
MCH RBC QN AUTO: 30.5 PG (ref 26.6–33)
MCHC RBC AUTO-ENTMCNC: 32.2 G/DL (ref 31.5–35.7)
MCV RBC AUTO: 94.7 FL (ref 79–97)
METHGB BLD QL: 0.7 % (ref 0–1.5)
MODALITY: ABNORMAL
NITRITE UR QL STRIP: NEGATIVE
NONSPECIFIC GEL REACTION: NORMAL
NOTE: ABNORMAL
OXYHGB MFR BLDV: 95.2 % (ref 94–99)
PAW @ PEAK INSP FLOW SETTING VENT: 0 CMH2O
PCO2 BLDA: 36 MM HG (ref 35–45)
PCO2 TEMP ADJ BLD: 36 MM HG (ref 35–45)
PEEP RESPIRATORY: 5 CM[H2O]
PH BLDA: 7.45 PH UNITS (ref 7.35–7.45)
PH UR STRIP.AUTO: 5.5 [PH] (ref 5–8)
PH, TEMP CORRECTED: 7.45 PH UNITS
PLATELET # BLD AUTO: 97 10*3/MM3 (ref 140–450)
PMV BLD AUTO: 10.4 FL (ref 6–12)
PO2 BLDA: 81.3 MM HG (ref 83–108)
PO2 TEMP ADJ BLD: 81.3 MM HG (ref 83–108)
PROT UR QL STRIP: ABNORMAL
QT INTERVAL: 452 MS
QTC INTERVAL: 515 MS
RBC # BLD AUTO: 2.85 10*6/MM3 (ref 3.77–5.28)
RBC # UR STRIP: ABNORMAL /HPF
REF LAB TEST METHOD: ABNORMAL
RH BLD: POSITIVE
SP GR UR STRIP: 1.02 (ref 1–1.03)
SQUAMOUS #/AREA URNS HPF: ABNORMAL /HPF
T&S EXPIRATION DATE: NORMAL
TOTAL RATE: 16 BREATHS/MINUTE
UROBILINOGEN UR QL STRIP: ABNORMAL
VENTILATOR MODE: ABNORMAL
VT ON VENT VENT: 0.42 ML
WBC # UR STRIP: ABNORMAL /HPF
WBC NRBC COR # BLD: 5.09 10*3/MM3 (ref 3.4–10.8)

## 2023-07-05 PROCEDURE — 82948 REAGENT STRIP/BLOOD GLUCOSE: CPT

## 2023-07-05 PROCEDURE — 86900 BLOOD TYPING SEROLOGIC ABO: CPT | Performed by: NURSE PRACTITIONER

## 2023-07-05 PROCEDURE — 82375 ASSAY CARBOXYHB QUANT: CPT

## 2023-07-05 PROCEDURE — 0DB78ZX EXCISION OF STOMACH, PYLORUS, VIA NATURAL OR ARTIFICIAL OPENING ENDOSCOPIC, DIAGNOSTIC: ICD-10-PCS | Performed by: INTERNAL MEDICINE

## 2023-07-05 PROCEDURE — P9047 ALBUMIN (HUMAN), 25%, 50ML: HCPCS | Performed by: INTERNAL MEDICINE

## 2023-07-05 PROCEDURE — 86920 COMPATIBILITY TEST SPIN: CPT

## 2023-07-05 PROCEDURE — 71045 X-RAY EXAM CHEST 1 VIEW: CPT

## 2023-07-05 PROCEDURE — 25010000002 ALBUMIN HUMAN 25% PER 50 ML: Performed by: INTERNAL MEDICINE

## 2023-07-05 PROCEDURE — 81001 URINALYSIS AUTO W/SCOPE: CPT | Performed by: INTERNAL MEDICINE

## 2023-07-05 PROCEDURE — 94002 VENT MGMT INPAT INIT DAY: CPT

## 2023-07-05 PROCEDURE — 99291 CRITICAL CARE FIRST HOUR: CPT | Performed by: INTERNAL MEDICINE

## 2023-07-05 PROCEDURE — 85018 HEMOGLOBIN: CPT | Performed by: PHYSICIAN ASSISTANT

## 2023-07-05 PROCEDURE — 94799 UNLISTED PULMONARY SVC/PX: CPT

## 2023-07-05 PROCEDURE — 36430 TRANSFUSION BLD/BLD COMPNT: CPT

## 2023-07-05 PROCEDURE — P9016 RBC LEUKOCYTES REDUCED: HCPCS

## 2023-07-05 PROCEDURE — 88342 IMHCHEM/IMCYTCHM 1ST ANTB: CPT | Performed by: INTERNAL MEDICINE

## 2023-07-05 PROCEDURE — 86850 RBC ANTIBODY SCREEN: CPT | Performed by: NURSE PRACTITIONER

## 2023-07-05 PROCEDURE — 43239 EGD BIOPSY SINGLE/MULTIPLE: CPT | Performed by: INTERNAL MEDICINE

## 2023-07-05 PROCEDURE — 36600 WITHDRAWAL OF ARTERIAL BLOOD: CPT

## 2023-07-05 PROCEDURE — 83050 HGB METHEMOGLOBIN QUAN: CPT

## 2023-07-05 PROCEDURE — 86900 BLOOD TYPING SEROLOGIC ABO: CPT

## 2023-07-05 PROCEDURE — 5A09457 ASSISTANCE WITH RESPIRATORY VENTILATION, 24-96 CONSECUTIVE HOURS, CONTINUOUS POSITIVE AIRWAY PRESSURE: ICD-10-PCS | Performed by: INTERNAL MEDICINE

## 2023-07-05 PROCEDURE — 82805 BLOOD GASES W/O2 SATURATION: CPT

## 2023-07-05 PROCEDURE — 25010000002 ONDANSETRON PER 1 MG: Performed by: INTERNAL MEDICINE

## 2023-07-05 PROCEDURE — 86870 RBC ANTIBODY IDENTIFICATION: CPT | Performed by: NURSE PRACTITIONER

## 2023-07-05 PROCEDURE — 25010000002 METOCLOPRAMIDE PER 10 MG: Performed by: INTERNAL MEDICINE

## 2023-07-05 PROCEDURE — 86922 COMPATIBILITY TEST ANTIGLOB: CPT

## 2023-07-05 PROCEDURE — 85014 HEMATOCRIT: CPT | Performed by: PHYSICIAN ASSISTANT

## 2023-07-05 PROCEDURE — 88305 TISSUE EXAM BY PATHOLOGIST: CPT | Performed by: INTERNAL MEDICINE

## 2023-07-05 PROCEDURE — 85027 COMPLETE CBC AUTOMATED: CPT | Performed by: INTERNAL MEDICINE

## 2023-07-05 PROCEDURE — 25010000002 PROPOFOL 10 MG/ML EMULSION: Performed by: INTERNAL MEDICINE

## 2023-07-05 PROCEDURE — 86901 BLOOD TYPING SEROLOGIC RH(D): CPT | Performed by: NURSE PRACTITIONER

## 2023-07-05 PROCEDURE — 94003 VENT MGMT INPAT SUBQ DAY: CPT

## 2023-07-05 RX ORDER — IPRATROPIUM BROMIDE AND ALBUTEROL SULFATE 2.5; .5 MG/3ML; MG/3ML
3 SOLUTION RESPIRATORY (INHALATION) ONCE AS NEEDED
Status: DISCONTINUED | OUTPATIENT
Start: 2023-07-05 | End: 2023-07-10

## 2023-07-05 RX ORDER — BUMETANIDE 0.25 MG/ML
2 INJECTION INTRAMUSCULAR; INTRAVENOUS EVERY 12 HOURS
Status: DISCONTINUED | OUTPATIENT
Start: 2023-07-05 | End: 2023-07-06

## 2023-07-05 RX ORDER — BUMETANIDE 0.25 MG/ML
2 INJECTION INTRAMUSCULAR; INTRAVENOUS ONCE
Status: COMPLETED | OUTPATIENT
Start: 2023-07-05 | End: 2023-07-05

## 2023-07-05 RX ORDER — POLYVINYL ALCOHOL 14 MG/ML
2 SOLUTION/ DROPS OPHTHALMIC
Status: DISCONTINUED | OUTPATIENT
Start: 2023-07-05 | End: 2023-07-15 | Stop reason: HOSPADM

## 2023-07-05 RX ORDER — ALBUMIN (HUMAN) 12.5 G/50ML
25 SOLUTION INTRAVENOUS 2 TIMES DAILY
Status: DISCONTINUED | OUTPATIENT
Start: 2023-07-05 | End: 2023-07-10

## 2023-07-05 RX ORDER — CHLORHEXIDINE GLUCONATE 0.12 MG/ML
15 RINSE ORAL EVERY 12 HOURS SCHEDULED
Status: DISCONTINUED | OUTPATIENT
Start: 2023-07-05 | End: 2023-07-07

## 2023-07-05 RX ORDER — ONDANSETRON 2 MG/ML
4 INJECTION INTRAMUSCULAR; INTRAVENOUS ONCE AS NEEDED
Status: DISCONTINUED | OUTPATIENT
Start: 2023-07-05 | End: 2023-07-05

## 2023-07-05 RX ADMIN — CHLORHEXIDINE GLUCONATE 15 ML: 1.2 SOLUTION ORAL at 14:37

## 2023-07-05 RX ADMIN — METOCLOPRAMIDE HYDROCHLORIDE 5 MG: 5 INJECTION INTRAMUSCULAR; INTRAVENOUS at 16:20

## 2023-07-05 RX ADMIN — ALBUMIN (HUMAN) 50 G: 0.25 INJECTION, SOLUTION INTRAVENOUS at 16:41

## 2023-07-05 RX ADMIN — METOCLOPRAMIDE HYDROCHLORIDE 5 MG: 5 INJECTION INTRAMUSCULAR; INTRAVENOUS at 12:32

## 2023-07-05 RX ADMIN — PROPOFOL 35 MCG/KG/MIN: 10 INJECTION, EMULSION INTRAVENOUS at 20:47

## 2023-07-05 RX ADMIN — METOCLOPRAMIDE HYDROCHLORIDE 5 MG: 5 INJECTION INTRAMUSCULAR; INTRAVENOUS at 21:00

## 2023-07-05 RX ADMIN — BUMETANIDE 2 MG: 0.25 INJECTION, SOLUTION INTRAMUSCULAR; INTRAVENOUS at 20:47

## 2023-07-05 RX ADMIN — PANTOPRAZOLE SODIUM 40 MG: 40 INJECTION, POWDER, FOR SOLUTION INTRAVENOUS at 16:34

## 2023-07-05 RX ADMIN — METOCLOPRAMIDE HYDROCHLORIDE 5 MG: 5 INJECTION INTRAMUSCULAR; INTRAVENOUS at 05:02

## 2023-07-05 RX ADMIN — ONDANSETRON 4 MG: 2 INJECTION INTRAMUSCULAR; INTRAVENOUS at 13:35

## 2023-07-05 RX ADMIN — PROPOFOL 20 MCG/KG/MIN: 10 INJECTION, EMULSION INTRAVENOUS at 12:14

## 2023-07-05 RX ADMIN — PROPOFOL 30 MCG/KG/MIN: 10 INJECTION, EMULSION INTRAVENOUS at 16:45

## 2023-07-05 RX ADMIN — ALBUMIN (HUMAN) 25 G: 0.25 INJECTION, SOLUTION INTRAVENOUS at 20:47

## 2023-07-05 RX ADMIN — CHLORHEXIDINE GLUCONATE 15 ML: 1.2 SOLUTION ORAL at 20:47

## 2023-07-05 RX ADMIN — LEVOTHYROXINE SODIUM 300 MCG: 0.15 TABLET ORAL at 05:02

## 2023-07-05 RX ADMIN — BUMETANIDE 2 MG: 0.25 INJECTION, SOLUTION INTRAMUSCULAR; INTRAVENOUS at 16:34

## 2023-07-05 RX ADMIN — PROPOFOL 35 MCG/KG/MIN: 10 INJECTION, EMULSION INTRAVENOUS at 23:55

## 2023-07-05 RX ADMIN — Medication 10 ML: at 20:47

## 2023-07-05 RX ADMIN — PANTOPRAZOLE SODIUM 40 MG: 40 INJECTION, POWDER, FOR SOLUTION INTRAVENOUS at 09:09

## 2023-07-05 NOTE — NURSING NOTE
[facility] specialty bed ordered from [facility]. Patient should be placed on specialty bed when it arrives.     At discharge, please wipe down bed with appropriate cleaning wipes, place in hallway near patient room and place an EVS work order to have specialty bed taken to the [facility] for . Thank you.    Deon Villarreal RN, BSN, CCRN, CWOCN  Wound, Ostomy and Continence (WOC) Department  [facility]

## 2023-07-05 NOTE — SIGNIFICANT NOTE
Patient was extubated after EGD, but failed to ventilate sufficiently on her own.  She was reintubated, and will be transferred to the ICU.

## 2023-07-05 NOTE — PROGRESS NOTES
Critical Care Note     LOS: 1 day   Patient Care Team:  Provider, No Known as PCP - General    Chief Complaint/Reason for visit:      Upper GI bleed  Gastric ulcer  Liver cirrhosis secondary to Paredes  Diabetes mellitus type 2  Hypothyroid  Acute on chronic kidney injury      Subjective     History of Present Illness:   Ivania Fields is a 62 y.o. female who was admitted to [facility] as a transfer from [facility] on 7/4/23 for higher level of care & management of acute GIB.     Patient has a PMH of HTN, T2DM, dyslipidemia, hypothyroidism, PAREDES cirrhosis, and prior GI bleeding.    She initially presented to [facility] ED on 7/2 with c/o N/V and abdominal discomfort, as well as a left foot wound. CT A/P was performed at this time which showed large volume ascites and anasarca with cirrhosis noted, small to mod bilateral pleural effusions with compressive atelectasis, and a 5 mm right UPJ stone without significant hydronephrosis. Symptoms improved with IV antiemetic and she was discharged home on Keflex wand encouraged to follow up with Urology.     She then returned to [facility] on 7/4 with persistent N/V and development of hematemesis over the past 48 hrs ultimately transferred to [facility] in the setting of her liver cirrhosis history with concern for possible variceal bleeding. GI was consulted she was placed on PPI and octreotide and underwent EGD today revealing LA grade C esophagitis with mild portal gastropathy throughout the stomach and a large 2.5 cm prepyloric ulcer with pigment spots but no active bleeding identified. Postprocedure following extubation she was unable to ventilate sufficiently and was subsequently reintubated. She will be transferred to ICU for further management.    Time spent: 10 minutes  Electronically signed by Sylvie Trejo DNP, APRN, 07/05/23, 11:24 AM EDT.     Interval History:     Endoscopy revealed an antral ulcer without active bleeding, without active bleeding, antral gastropathy and 3+ reflux.  Varices  "were not identified and octreotide was stopped.  She is currently on propofol drip.  She is in sinus rhythm.  Blood pressure did dip into the 80s but is currently 147 systolic.  Hemoglobin at midnight was 6.9.  She received 1 unit of packed cells in endoscopy.  She is in sinus rhythm.  Oxygen saturation is 98% on 50% FiO2    Review of Systems:    All systems were reviewed and negative except as noted in subjective.    Medical history, surgical history, social history, family history reviewed    Objective     Intake/Output:    Intake/Output Summary (Last 24 hours) at 7/5/2023 1221  Last data filed at 7/5/2023 1003  Gross per 24 hour   Intake 1940 ml   Output 250 ml   Net 1690 ml       Nutrition:  Diet: Diabetic Diets, Gastrointestinal Diets; Consistent Carbohydrate; Fiber-Restricted; Texture: Regular Texture (IDDSI 7); Fluid Consistency: Thin (IDDSI 0)    Infusions:  propofol, 5-50 mcg/kg/min, Last Rate: 15 mcg/kg/min (07/05/23 1220)        Mechanical Ventilator Settings:  AC 14, tidal volume 420, PEEP 4.5, FiO2 50%                                              Telemetry: Sinus rhythm             Vital Signs  Blood pressure 147/55, pulse 74, temperature 97 °F (36.1 °C), temperature source Temporal, resp. rate 16, height 165.1 cm (65\"), weight 129 kg (285 lb), SpO2 98 %.    Physical Exam:  General Appearance:  Morbidly obese middle-aged woman sedated   Head:  Atraumatic   Eyes:          No jaundice   Ears:     Throat: Orally intubated   Neck: Trachea midline, no palpable thyroid   Back:      Lungs:   Symmetric chest expansion without wheeze, diminished lateral bases bilaterally    Heart:  Regular rhythm, S1, S2 auscultated, no murmur   Abdomen:   Large panniculus with some edema, palpable fluid wave, hypoactive bowel sounds   Rectal:   Deferred   Extremities: Brawny changes lower extremities bilaterally   Pulses: Palpable radial pulses   Skin: Nodular changes over the lower legs below the knees bilaterally   Lymph " nodes: No cervical adenopathy   Neurologic: Sedated will spontaneously move upper extremities with stimulation      Results Review:     I reviewed the patient's new clinical results.   Results from last 7 days   Lab Units 07/04/23  0901 07/03/23 2006 07/02/23  1029   SODIUM mmol/L 142 139 142   POTASSIUM mmol/L 4.2 4.0 3.9   CHLORIDE mmol/L 110* 106 107   CO2 mmol/L 22.0 23.9 21.6*   BUN mg/dL 21 19 15   CREATININE mg/dL 1.40* 1.37* 1.23*   CALCIUM mg/dL 8.7 9.0 9.2   BILIRUBIN mg/dL 0.8 1.1 0.9   ALK PHOS U/L 85 113 134*   ALT (SGPT) U/L 10 13 14   AST (SGOT) U/L 24 29 35*   GLUCOSE mg/dL 101* 105* 133*     Results from last 7 days   Lab Units 07/05/23  0058 07/04/23  1034 07/03/23 2006 07/02/23  1118   WBC 10*3/mm3  --  5.45 5.39 6.36   HEMOGLOBIN g/dL 6.9* 8.0* 9.3* 9.3*   HEMATOCRIT % 19.7* 23.2* 29.2* 28.7*   PLATELETS 10*3/mm3  --  136* 155 136*         Lab Results   Component Value Date    BLOODCX No growth at 5 days 04/04/2023     Lab Results   Component Value Date    URINECX No growth 07/02/2023       I reviewed the patient's new imaging including images and reports.    INDINGS: Lack of intravenous contrast limits assessment of the solid  organs, the mediastinum, and the vasculature.     ABDOMEN:Anasarca is noted with a large volume of ascites. There are  small to moderate-sized bilateral pleural effusions with compressive  atelectasis. The heart is enlarged. There is evidence calcified  granulomatous disease. Moderate-sized hiatal hernia is present.  Atherosclerosis without aortic aneurysm. Cirrhotic appearing liver again  noted. The gallbladder is absent. Spleen is upper limits of normal in  size. No obvious pancreatic mass. Adrenal glands and left kidney is  atrophic. Nonobstructing stone present in the right collecting system.  There is a 5 mm right UPJ stone without significant hydronephrosis. No  distal ureteral stones were identified. There are no abnormally dilated  loops of bowel. No free air was  identified.     PELVIS: The appendix is not identified, assessment is limited secondary  to significant ascites. The bladder is decompressed. The uterus is  present. Significant free fluid again noted in the pelvis. No acute  osseous changes.      Impression:     Large volume ascites and anasarca with cirrhosis noted.     Small to moderate-sized bilateral pleural effusions with compressive  atelectasis.     5 mm right UPJ stone without significant hydronephrosis.              This study was performed with techniques to keep radiation doses as low  as reasonably achievable (ALARA). Individualized dose reduction  techniques using automated exposure control or adjustment of mA and/or  kV according to the patient size were employed.     This report was signed and finalized on 7/2/2023 11:09 AM by Mamadou Domínguez DO.       All medications reviewed.   albumin human, 50 g, Intravenous, Daily  atorvastatin, 20 mg, Oral, Daily  buPROPion XL, 150 mg, Oral, Daily  donepezil, 10 mg, Oral, Nightly  insulin lispro, 3-14 Units, Subcutaneous, TID With Meals  levothyroxine, 300 mcg, Oral, Daily  metoclopramide, 5 mg, Intravenous, Q6H  pantoprazole, 40 mg, Intravenous, BID AC  sodium chloride, 10 mL, Intravenous, Q12H          Assessment & Plan       Hematemesis    GI bleed    Liver cirrhosis secondary to PAREDES    SILVINA (acute kidney injury)    Type 2 diabetes mellitus    Essential hypertension    Dyslipidemia    Hypothyroidism    Anemia      GI bleed  Gastric ulcer  -EGD July 5, antral ulcer without active bleeding, reflux esophagitis, mild portal gastropathy  -Hemoglobin 6.9 at midnight, 1 unit given in endoscopy  -Octreotide discontinued  -Proton pump inhibitor  -Serial H&H's  -Serum iron 65    Liver cirrhosis secondary to Paredes  Ascites  Pleural effusions  -Hold off on paracentesis  -INR 1.79 after vitamin K  -Bilirubin 0.8, AST 24, ALT 10, alk phos 85, albumin 1.4      Acute on chronic kidney disease  -Creatinine 1.4, BUN 21,  potassium 4.2, this is baseline  -Incontinent of urine  -Bilateral lymphedema    Respiratory failure postprocedure  -AC 14 tidal volume 420 PEEP 4.5, FiO2 50%  -Bilateral pleural effusions secondary to liver cirrhosis  -No history of underlying lung disease  -Morbid obesity, BMI 47    Hypothyroid  -Replacement therapy, Synthroid 300 mcg daily  -TSH 0.478, free T41.22    Diabetes mellitus type 2  -A1c 5.2  -Januvia, insulin as an outpatient  -Left foot ulcer  -Gastroparesis    PLAN:    -Support with mechanical ventilation  -Propofol for sedation  -Hold sedation and spontaneous trial in a couple hours  -Transfusion additional unit of packed cells  -Serial H&H's every 6 hours  -Continue twice daily proton pump inhibitor  -She needs a paracentesis, with albumin, discussed with IR and plan to do this tomorrow  -Royersford Petersen  -Monitor urine output, BUN, creatinine  -Sliding scale insulin  -Thyroid replacement, p.o. once she is extubated      VTE Prophylaxis:SCDS    Stress Ulcer Prophylaxis:PPI    Abbi Amin MD      Time: Critical care 40 min  I personally provided care to this critically ill patient as documented above.  Critical care time does not include time spent on separately billed procedures.  None of my critical care time was concurrent with other critical care providers.

## 2023-07-05 NOTE — CASE MANAGEMENT/SOCIAL WORK
Discharge Planning Assessment  [facility]     Patient Name: Ivania Fields  MRN: [id number]  Today's Date: 7/5/2023    Admit Date: 7/4/2023    Plan: home   Discharge Needs Assessment       Row Name 07/05/23 0904       Living Environment    People in Home alone    Unique Family Situation 1st floor apt    Current Living Arrangements apartment    Primary Care Provided by self       Transition Planning    Patient/Family Anticipates Transition to home       Discharge Needs Assessment    Readmission Within the Last 30 Days no previous admission in last 30 days    Equipment Currently Used at Home walker, rolling    Concerns to be Addressed discharge planning;basic needs                   Discharge Plan       Row Name 07/05/23 0904       Plan    Plan home    Patient/Family in Agreement with Plan yes    Plan Comments I met with this patient at the bedside. She lives alone in a 1st floor apt in [facility]. She is independent with self care, but needs assistance with housekeeping and shopping. She is independent with mobility with the use of a rolling walker. PT has been consulted. She states she has PT come to her apt once every couple of weeks through her insurance, but cannot remember the name of the company. She anticipates returning home after this hospitalization and her family can transport. Case management will continue to follow her plan of care and assist with any discharge planning needs.    Final Discharge Disposition Code 01 - home or self-care                  Continued Care and Services - Admitted Since 7/4/2023    Coordination has not been started for this encounter.       Selected Continued Care - Prior Encounters Includes continued care and service providers with selected services from prior encounters from 4/5/2023 to 7/5/2023      Discharged on 4/12/2023 Admission date: 4/4/2023 - Discharge disposition: Intermediate Care       Destination       Service Provider Selected Services Address Phone  Fax Patient Preferred    [facility] [address] [address] [phone] --                          Expected Discharge Date and Time       Expected Discharge Date Expected Discharge Time    Jul 12, 2023            Demographic Summary       Row Name 07/05/23 0903       General Information    General Information Comments I confirmed that Kala Beaver is Ms Bladimir' PCP and she has [employer] Medicare for insurance                   Functional Status       Row Name 07/05/23 0903       Functional Status, IADL    Medications independent    Meal Preparation independent    Housekeeping completely dependent    Laundry independent    Shopping completely dependent                   Psychosocial    No documentation.                  Abuse/Neglect    No documentation.                  Legal    No documentation.                  Substance Abuse    No documentation.                  Patient Forms    No documentation.                     Mary Bella RN

## 2023-07-05 NOTE — NURSING NOTE
"                             Wound, Ostomy and Continence (WOC) Note    Reason for WOC Consultation: \"Skin tear on gluteal, left leg blisters and wound on left foot\"     Patient intubated.  Family/support person not present.     RN reports the patient has a ruptured blister on her right gluteal, poa.    Skin/Wound Assessment:     Wound Type: Blisters r/t suspect chronic vascular changes - PVD  Location: Left lateral calf  Wound Bed: blanchable, dermis, and pink  Wound Edges: Callused, Irregular, and Open  Periwound Skin: intact, blanchable, dry, and hemosiderin staining , scattered nodules  Drainage Characteristics/Odor: serous  Drainage Amount: moderate  Pain: No   Care provided: Patient's legs were cleansed.  Dry flaky skin debrided. multilayer Xeroform applied to ruptured blister on left lateral calf.  Optifoam dressing applied.  Image:       Patient also has a full-thickness wound on her left foot distal plantar surface.  Wound edges are defined.  Etiology unknown. Palpable pulses.  Measures approximately 4x6x0.25 cm.Granulation tissue present with intermittent areas of slough.  Debrided wound edges and nonviable tissue from wound bed. Scant bleeding noted; hemostasis achieved.  Xeroform applied to the open wound and covered with Optifoam dressing.    Summary and Recommendation(s):     -PT wound care to provide compression therapy which should also help promote wound healing for the ruptured blister at the left lateral calf and plantar surface foot wound.  -Keep legs elevated and offloaded.  May want to apply dry flow pad under bilateral legs for any exudate that is exiting open wounds.  - Refer to wound care instructions in the \"Orders\" tab  - Specialty support surface in place: [employer]ol[employer]rium      -Will order Bariatric Mattress with Low Air Loss Topper from [employer]      Pressure Injury Prevention Measures (initiate for a David Scale Score of <18):     Most recent David Scale score:  Sensory Perception: " 3-->slightly limited  Moisture: 3-->occasionally moist  Activity: 2-->chairfast  Mobility: 3-->slightly limited  Nutrition: 2-->probably inadequate  Friction and Shear: 2-->potential problem  David Score: 15 (07/05/23 0800)     -Turn q 2 hr. using an offloading foam wedge, keep heels elevated and offloaded with offloading heel boots.    -Raise knee-gatch before elevating HOB to reduce shearing   -Follow C.A.R.E protocol if medical devices (Bipap, williamson, Ng tube, etc) are being used.  -Apply moisture barrier cream to bottom BID & PRN, if incontinent.  -If incontinent, consider applying an external catheter. External catheters are finicky and should be checked every few hours for placement.   -Clean skin gently with no-rinse PH-balanced foam cleanser and a soft, disposable cloth (barrier wipes-blue pack).   -Reduce layers under patient (one sheet as drawsheet and two incontinence pads)    Thank you for consulting the WOC Nurse.  WOC Team will continue to follow.  Please re consult if the wound(s) worsens.     Deon Villarreal RN, BSN, CCRN, CWOCN  Wound, Ostomy and Continence (WOC) Department  [facility]

## 2023-07-05 NOTE — BRIEF OP NOTE
ESOPHAGOGASTRODUODENOSCOPY  Progress Note    Ivania MITCHELL Diamond  7/5/2023    EGD reveals LA grade C reflux esophagitis.  There is mild portal gastropathy throughout the stomach.  There is a large 2.5 cm prepyloric ulcer with pigment spots but no active bleeding.  Ulcers biopsied for histology.  Antrum biopsied for H. pylori.  Duodenum is normal.    >> Twice daily PPI for 1 month, then qDaily  >> Strictly avoid NSAIDs  >> Await pathology    Mark I. Brunner, MD     Date: 7/5/2023  Time: 10:04 EDT

## 2023-07-05 NOTE — PLAN OF CARE
Goal Outcome Evaluation:   Patient remains intubated and sedated. SBT and SAT completed. Patient able to follow commands and nod appropriately. Failed SBT due to low volumes. Resedated. Petersen placed with 200 viktor urine output. Albumin and bumex given. Restraints applied. @nd unit of PRBCs given - hemoglobin recheck WNL. Brothdonnell Benedict at bedside and updated on plan of care. Consent received for paracentesis 7/6. Patient turned q2h, wound care assessed patient. ETT pulled back 3cm by RT per xray.

## 2023-07-05 NOTE — CONSULTS
Patient Care Team:  Provider, No Known as PCP - General    Chief complaint: Abnormal renal function Anemia   GI bleed.    History of Present Illness: Ivania Fields is a 62 y.o. female who is admitted with 3-day history of nausea and vomiting.  She developed 3 episodes of hematemesis of coffee-ground's Patient was admitted at [facility] for further evaluation. Nephrology has been consulted for evaluation and management of abnormal renal function. Per the review of old records. Patient was admitted in April at [facility] seen by Nephrologist Dr Ellis for abnormal renal function. It was thought she may have SILVINA vs CKD. Labs from last few months showed cr around ~ 1.5-1.8mg/dl. Patient underwent EGD on this admission which again showed large non bleeding ulcer and reflux esophagitis. She is currently in the ICU. Intubated on MV following EGD    Review of Systems   Unable to perform ROS: Acuity of condition      Past Medical History:   Diagnosis Date    Anemia     Diabetes mellitus     Hyperlipidemia     Hypertension     Hypothyroidism     Impaired mobility     Short-term memory loss    ,   Past Surgical History:   Procedure Laterality Date    CHOLECYSTECTOMY      ENDOSCOPY W/ BANDING N/A 4/6/2023    Procedure: ESOPHAGOGASTRODUODENOSCOPY WITH BIOPSY;  Surgeon: Jens Mandujano MD;  Location: [facility] ENDOSCOPY;  Service: Gastroenterology;  Laterality: N/A;   ,   Family History   Problem Relation Age of Onset    No Known Problems Mother     No Known Problems Father    ,   Social History     Socioeconomic History    Marital status:    Tobacco Use    Smoking status: Never    Smokeless tobacco: Never   Vaping Use    Vaping Use: Never used   Substance and Sexual Activity    Alcohol use: Yes     Comment: a couple margaritas a year    Drug use: Never    Sexual activity: Defer     E-cigarette/Vaping    E-cigarette/Vaping Use Never User      E-cigarette/Vaping Substances     E-cigarette/Vaping  Devices         ,   Medications Prior to Admission   Medication Sig Dispense Refill Last Dose    atorvastatin (LIPITOR) 20 MG tablet Take 1 tablet by mouth Daily.   Past Week    buPROPion XL (WELLBUTRIN XL) 150 MG 24 hr tablet Take 1 tablet by mouth Daily.   Past Week    insulin aspart (novoLOG) 100 UNIT/ML injection Inject  under the skin into the appropriate area as directed 3 (Three) Times a Day Before Meals. Sliding Scale as needed   Past Week    levothyroxine (SYNTHROID, LEVOTHROID) 100 MCG tablet Take 3 tablets by mouth Daily.   Past Week    multivitamin with minerals tablet tablet Take 1 tablet by mouth Daily.   Past Week    albuterol sulfate  (90 Base) MCG/ACT inhaler Inhale 2 puffs Every 4 (Four) Hours As Needed for Wheezing. (Patient not taking: Reported on 4/5/2023) 18 g 0     aspirin 81 MG EC tablet Take 1 tablet by mouth Daily. May restart in 1 week   More than a month    cephalexin (KEFLEX) 500 MG capsule Take 1 capsule by mouth 4 (Four) Times a Day for 7 days. 27 capsule 0     diphenoxylate-atropine (LOMOTIL) 2.5-0.025 MG per tablet Take 1 tablet by mouth 4 (Four) Times a Day As Needed for Diarrhea. 15 tablet 0     donepezil (ARICEPT) 10 MG tablet Take 1 tablet by mouth Every Night.       furosemide (LASIX) 40 MG tablet Take 1 tablet by mouth Daily.       Insulin Aspart (novoLOG) 100 UNIT/ML injection Inject 0-14 Units under the skin into the appropriate area as directed 3 (Three) Times a Day Before Meals.  12     ipratropium-albuterol (DUO-NEB) 0.5-2.5 mg/3 ml nebulizer Nebulize q 4 h prn wheezing / shortness of breath (Patient not taking: Reported on 4/5/2023) 360 mL 0     iron polysaccharides (NIFEREX) 150 MG capsule Take 1 capsule by mouth Daily.       ondansetron ODT (ZOFRAN-ODT) 4 MG disintegrating tablet Place 1 tablet on the tongue Every 8 (Eight) Hours As Needed for Nausea or Vomiting. 12 tablet 0     pantoprazole (PROTONIX) 40 MG EC tablet Take 1 tablet by mouth Every Morning.        SITagliptin (JANUVIA) 50 MG tablet Take 1 tablet by mouth Daily.       spironolactone (ALDACTONE) 25 MG tablet Take 1 tablet by mouth Daily.      , and Scheduled Meds:  albumin human, 50 g, Intravenous, Daily  atorvastatin, 20 mg, Oral, Daily  bumetanide, 2 mg, Intravenous, Once  buPROPion XL, 150 mg, Oral, Daily  chlorhexidine, 15 mL, Mouth/Throat, Q12H  donepezil, 10 mg, Oral, Nightly  insulin lispro, 3-14 Units, Subcutaneous, TID With Meals  levothyroxine, 300 mcg, Oral, Daily  metoclopramide, 5 mg, Intravenous, Q6H  pantoprazole, 40 mg, Intravenous, BID AC  sodium chloride, 10 mL, Intravenous, Q12H       Objective     Vital Signs  Temp:  [97 °F (36.1 °C)-98.5 °F (36.9 °C)] 97.8 °F (36.6 °C)  Heart Rate:  [57-84] 64  Resp:  [16-30] 20  BP: ()/(46-74) 121/65  FiO2 (%):  [40 %] 40 %    I/O this shift:  In: 1226 [I.V.:500; Blood:726]  Out: -   I/O last 3 completed shifts:  In: 1750 [P.O.:1500; IV Piggyback:250]  Out: 600 [Urine:600]    Physical Exam  Constitutional:       Appearance: Normal appearance.   HENT:      Head: Normocephalic and atraumatic.      Nose: Nose normal.      Mouth/Throat:      Mouth: Mucous membranes are moist.   Eyes:      Pupils: Pupils are equal, round, and reactive to light.   Cardiovascular:      Rate and Rhythm: Normal rate and regular rhythm.   Pulmonary:      Effort: Pulmonary effort is normal.      Breath sounds: Normal breath sounds.   Abdominal:      General: There is distension.   Musculoskeletal:      Right lower leg: Edema present.      Left lower leg: Edema present.   Skin:     General: Skin is warm.      Findings: Lesion present.   Neurological:      Comments: Sedated       Results Review:    I reviewed the patient's new clinical results.    WBC WBC   Date Value Ref Range Status   07/04/2023 5.45 3.40 - 10.80 10*3/mm3 Final   07/03/2023 5.39 3.40 - 10.80 10*3/mm3 Final      HGB Hemoglobin   Date Value Ref Range Status   07/05/2023 6.9 (C) 12.0 - 15.9 g/dL Final   07/04/2023  8.0 (L) 12.0 - 15.9 g/dL Final   07/03/2023 9.3 (L) 12.0 - 15.9 g/dL Final      HCT Hematocrit   Date Value Ref Range Status   07/05/2023 19.7 (C) 34.0 - 46.6 % Final   07/04/2023 23.2 (L) 34.0 - 46.6 % Final   07/03/2023 29.2 (L) 34.0 - 46.6 % Final      Platlets No results found for: LABPLAT   MCV MCV   Date Value Ref Range Status   07/04/2023 99.1 (H) 79.0 - 97.0 fL Final   07/03/2023 97.0 79.0 - 97.0 fL Final          Sodium Sodium   Date Value Ref Range Status   07/04/2023 142 136 - 145 mmol/L Final   07/03/2023 139 136 - 145 mmol/L Final      Potassium Potassium   Date Value Ref Range Status   07/04/2023 4.2 3.5 - 5.2 mmol/L Final   07/03/2023 4.0 3.5 - 5.2 mmol/L Final      Chloride Chloride   Date Value Ref Range Status   07/04/2023 110 (H) 98 - 107 mmol/L Final   07/03/2023 106 98 - 107 mmol/L Final      CO2 CO2   Date Value Ref Range Status   07/04/2023 22.0 22.0 - 29.0 mmol/L Final   07/03/2023 23.9 22.0 - 29.0 mmol/L Final      BUN BUN   Date Value Ref Range Status   07/04/2023 21 8 - 23 mg/dL Final   07/03/2023 19 8 - 23 mg/dL Final      Creatinine Creatinine   Date Value Ref Range Status   07/04/2023 1.40 (H) 0.57 - 1.00 mg/dL Final   07/03/2023 1.37 (H) 0.57 - 1.00 mg/dL Final      Calcium Calcium   Date Value Ref Range Status   07/04/2023 8.7 8.6 - 10.5 mg/dL Final   07/03/2023 9.0 8.6 - 10.5 mg/dL Final      PO4 No results found for: CAPO4   Albumin Albumin   Date Value Ref Range Status   07/04/2023 1.4 (L) 3.5 - 5.2 g/dL Final   07/03/2023 1.8 (L) 3.5 - 5.2 g/dL Final      Magnesium Magnesium   Date Value Ref Range Status   07/04/2023 1.9 1.6 - 2.4 mg/dL Final   07/03/2023 1.9 1.6 - 2.4 mg/dL Final      Uric Acid No results found for: URICACID         Assessment & Plan       Hematemesis    GI bleed    Liver cirrhosis secondary to PAREDES    SILVINA (acute kidney injury)    Type 2 diabetes mellitus    Essential hypertension    Dyslipidemia    Hypothyroidism    Anemia      Assessment & Plan    Acute kidney  disease vs chronic kidney disease: Last known stable cr btw 1.5-1.6mg/dl. Seen for abnormal renal function 2 months ago as well in the setting of GI bleed.     Volume status: Significant LE edema and possible asictes in the setting of liver cirrhosis    Anemia: s/p 2 unit pRBC. Hx of GI ulcer. S/p EGD on this visit. No active bleeding site noted    Intubated on MV: Post EGD    Liver cirrhosis: Complications includes ascites and gastropathy.     Hypoalbuminemia: in the setting of liver cirrhosis    Recs  Renal function is close to her recent baseline when compared to labs from recent admission. She has significant third spacing edema due to low bp. Will start albumin 25 gm BID with bumex 2 mg BID. Will also add spironolactone at some point after paracentesis. She will need more albumin supp with paracentesis to prevent worsening of renal function. Avoid large volume paracentesis to prevent HRS. Screen for proteinuria. Strict I/O. Avoid nephrotoxic agents. Transfuse at hb<7        I discussed the patients findings and my recommendations with patient    Francisco Anaya MD  07/05/23  16:27 EDT

## 2023-07-06 ENCOUNTER — APPOINTMENT (OUTPATIENT)
Dept: GENERAL RADIOLOGY | Facility: HOSPITAL | Age: 63
DRG: 377 | End: 2023-07-06
Payer: MEDICARE

## 2023-07-06 ENCOUNTER — APPOINTMENT (OUTPATIENT)
Dept: ULTRASOUND IMAGING | Facility: HOSPITAL | Age: 63
DRG: 377 | End: 2023-07-06
Payer: MEDICARE

## 2023-07-06 PROBLEM — E43 SEVERE MALNUTRITION: Status: ACTIVE | Noted: 2023-07-06

## 2023-07-06 LAB
ALBUMIN SERPL-MCNC: 2.6 G/DL (ref 3.5–5.2)
ALBUMIN/GLOB SERPL: 0.7 G/DL
ALP SERPL-CCNC: 65 U/L (ref 39–117)
ALPHA-FETOPROTEIN: <2 NG/ML (ref 0–8.3)
ALT SERPL W P-5'-P-CCNC: 7 U/L (ref 1–33)
ANION GAP SERPL CALCULATED.3IONS-SCNC: 13 MMOL/L (ref 5–15)
APPEARANCE FLD: ABNORMAL
ARTERIAL PATENCY WRIST A: ABNORMAL
AST SERPL-CCNC: 21 U/L (ref 1–32)
ATMOSPHERIC PRESS: ABNORMAL MM[HG]
BASE EXCESS BLDA CALC-SCNC: 2.4 MMOL/L (ref 0–2)
BDY SITE: ABNORMAL
BH BB BLOOD EXPIRATION DATE: NORMAL
BH BB BLOOD EXPIRATION DATE: NORMAL
BH BB BLOOD TYPE BARCODE: 6200
BH BB BLOOD TYPE BARCODE: 6200
BH BB DISPENSE STATUS: NORMAL
BH BB DISPENSE STATUS: NORMAL
BH BB PRODUCT CODE: NORMAL
BH BB PRODUCT CODE: NORMAL
BH BB UNIT NUMBER: NORMAL
BH BB UNIT NUMBER: NORMAL
BILIRUB SERPL-MCNC: 1.9 MG/DL (ref 0–1.2)
BODY TEMPERATURE: 37 C
BUN SERPL-MCNC: 23 MG/DL (ref 8–23)
BUN/CREAT SERPL: 14.5 (ref 7–25)
CALCIUM SPEC-SCNC: 8.8 MG/DL (ref 8.6–10.5)
CHLORIDE SERPL-SCNC: 105 MMOL/L (ref 98–107)
CO2 BLDA-SCNC: 26.8 MMOL/L (ref 22–33)
CO2 SERPL-SCNC: 24 MMOL/L (ref 22–29)
COHGB MFR BLD: 1.5 % (ref 0–2)
COLOR FLD: YELLOW
CREAT SERPL-MCNC: 1.59 MG/DL (ref 0.57–1)
CROSSMATCH INTERPRETATION: NORMAL
CROSSMATCH INTERPRETATION: NORMAL
EGFRCR SERPLBLD CKD-EPI 2021: 36.4 ML/MIN/1.73
EPAP: 0
GLOBULIN UR ELPH-MCNC: 3.6 GM/DL
GLUCOSE BLDC GLUCOMTR-MCNC: 107 MG/DL (ref 70–130)
GLUCOSE BLDC GLUCOMTR-MCNC: 112 MG/DL (ref 70–130)
GLUCOSE BLDC GLUCOMTR-MCNC: 138 MG/DL (ref 70–130)
GLUCOSE BLDC GLUCOMTR-MCNC: 170 MG/DL (ref 70–130)
GLUCOSE BLDC GLUCOMTR-MCNC: 172 MG/DL (ref 70–130)
GLUCOSE SERPL-MCNC: 156 MG/DL (ref 65–99)
HCO3 BLDA-SCNC: 25.7 MMOL/L (ref 20–26)
HCT VFR BLD AUTO: 22.7 % (ref 34–46.6)
HCT VFR BLD AUTO: 23.4 % (ref 34–46.6)
HCT VFR BLD AUTO: 26.4 % (ref 34–46.6)
HCT VFR BLD CALC: 26.4 % (ref 38–51)
HGB BLD-MCNC: 8 G/DL (ref 12–15.9)
HGB BLD-MCNC: 8.2 G/DL (ref 12–15.9)
HGB BLD-MCNC: 9.5 G/DL (ref 12–15.9)
HGB BLDA-MCNC: 8.6 G/DL (ref 14–18)
INHALED O2 CONCENTRATION: 40 %
INR PPP: 1.68 (ref 0.89–1.12)
IPAP: 0
LYMPHOCYTES NFR FLD MANUAL: 50 %
MACROPHAGE FLUID: 40 %
MAGNESIUM SERPL-MCNC: 1.8 MG/DL (ref 1.6–2.4)
MESOTHL CELL NFR FLD MANUAL: 10 %
METHGB BLD QL: 0.4 % (ref 0–1.5)
MODALITY: ABNORMAL
NOTE: ABNORMAL
OXYHGB MFR BLDV: 93.9 % (ref 94–99)
PAW @ PEAK INSP FLOW SETTING VENT: 0 CMH2O
PCO2 BLDA: 33.7 MM HG (ref 35–45)
PCO2 TEMP ADJ BLD: 33.7 MM HG (ref 35–45)
PH BLDA: 7.49 PH UNITS (ref 7.35–7.45)
PH, TEMP CORRECTED: 7.49 PH UNITS
PO2 BLDA: 70.9 MM HG (ref 83–108)
PO2 TEMP ADJ BLD: 70.9 MM HG (ref 83–108)
POTASSIUM SERPL-SCNC: 3.6 MMOL/L (ref 3.5–5.2)
PROT FLD-MCNC: 1.2 G/DL
PROT SERPL-MCNC: 6.2 G/DL (ref 6–8.5)
PROTHROMBIN TIME: 19.9 SECONDS (ref 12.2–14.5)
RBC # FLD AUTO: <2000 /MM3
SODIUM SERPL-SCNC: 142 MMOL/L (ref 136–145)
TOTAL RATE: 0 BREATHS/MINUTE
UNIT  ABO: NORMAL
UNIT  ABO: NORMAL
UNIT  RH: NORMAL
UNIT  RH: NORMAL
WBC # FLD AUTO: 33 /MM3

## 2023-07-06 PROCEDURE — 99291 CRITICAL CARE FIRST HOUR: CPT | Performed by: INTERNAL MEDICINE

## 2023-07-06 PROCEDURE — 82105 ALPHA-FETOPROTEIN SERUM: CPT | Performed by: NURSE PRACTITIONER

## 2023-07-06 PROCEDURE — 82247 BILIRUBIN TOTAL: CPT | Performed by: INTERNAL MEDICINE

## 2023-07-06 PROCEDURE — 25010000002 METOCLOPRAMIDE PER 10 MG: Performed by: INTERNAL MEDICINE

## 2023-07-06 PROCEDURE — 25010000002 ALBUMIN HUMAN 25% PER 50 ML: Performed by: INTERNAL MEDICINE

## 2023-07-06 PROCEDURE — 71045 X-RAY EXAM CHEST 1 VIEW: CPT

## 2023-07-06 PROCEDURE — P9047 ALBUMIN (HUMAN), 25%, 50ML: HCPCS | Performed by: INTERNAL MEDICINE

## 2023-07-06 PROCEDURE — 99232 SBSQ HOSP IP/OBS MODERATE 35: CPT | Performed by: INTERNAL MEDICINE

## 2023-07-06 PROCEDURE — 84165 PROTEIN E-PHORESIS SERUM: CPT | Performed by: NURSE PRACTITIONER

## 2023-07-06 PROCEDURE — 85018 HEMOGLOBIN: CPT | Performed by: INTERNAL MEDICINE

## 2023-07-06 PROCEDURE — 25010000002 PROPOFOL 10 MG/ML EMULSION: Performed by: INTERNAL MEDICINE

## 2023-07-06 PROCEDURE — 36600 WITHDRAWAL OF ARTERIAL BLOOD: CPT

## 2023-07-06 PROCEDURE — 85014 HEMATOCRIT: CPT | Performed by: INTERNAL MEDICINE

## 2023-07-06 PROCEDURE — 80053 COMPREHEN METABOLIC PANEL: CPT | Performed by: NURSE PRACTITIONER

## 2023-07-06 PROCEDURE — 0W9G3ZX DRAINAGE OF PERITONEAL CAVITY, PERCUTANEOUS APPROACH, DIAGNOSTIC: ICD-10-PCS | Performed by: RADIOLOGY

## 2023-07-06 PROCEDURE — 94799 UNLISTED PULMONARY SVC/PX: CPT

## 2023-07-06 PROCEDURE — 85610 PROTHROMBIN TIME: CPT | Performed by: NURSE PRACTITIONER

## 2023-07-06 PROCEDURE — 97597 DBRDMT OPN WND 1ST 20 CM/<: CPT

## 2023-07-06 PROCEDURE — 84157 ASSAY OF PROTEIN OTHER: CPT | Performed by: INTERNAL MEDICINE

## 2023-07-06 PROCEDURE — 83050 HGB METHEMOGLOBIN QUAN: CPT

## 2023-07-06 PROCEDURE — 83735 ASSAY OF MAGNESIUM: CPT | Performed by: NURSE PRACTITIONER

## 2023-07-06 PROCEDURE — 89051 BODY FLUID CELL COUNT: CPT | Performed by: INTERNAL MEDICINE

## 2023-07-06 PROCEDURE — 97162 PT EVAL MOD COMPLEX 30 MIN: CPT

## 2023-07-06 PROCEDURE — 87070 CULTURE OTHR SPECIMN AEROBIC: CPT | Performed by: INTERNAL MEDICINE

## 2023-07-06 PROCEDURE — 87205 SMEAR GRAM STAIN: CPT

## 2023-07-06 PROCEDURE — 87205 SMEAR GRAM STAIN: CPT | Performed by: INTERNAL MEDICINE

## 2023-07-06 PROCEDURE — 86334 IMMUNOFIX E-PHORESIS SERUM: CPT | Performed by: NURSE PRACTITIONER

## 2023-07-06 PROCEDURE — 82375 ASSAY CARBOXYHB QUANT: CPT

## 2023-07-06 PROCEDURE — 82805 BLOOD GASES W/O2 SATURATION: CPT

## 2023-07-06 PROCEDURE — 87015 SPECIMEN INFECT AGNT CONCNTJ: CPT | Performed by: INTERNAL MEDICINE

## 2023-07-06 PROCEDURE — 82948 REAGENT STRIP/BLOOD GLUCOSE: CPT

## 2023-07-06 PROCEDURE — 82784 ASSAY IGA/IGD/IGG/IGM EACH: CPT | Performed by: NURSE PRACTITIONER

## 2023-07-06 PROCEDURE — 76942 ECHO GUIDE FOR BIOPSY: CPT

## 2023-07-06 PROCEDURE — 87070 CULTURE OTHR SPECIMN AEROBIC: CPT

## 2023-07-06 PROCEDURE — 94003 VENT MGMT INPAT SUBQ DAY: CPT

## 2023-07-06 RX ORDER — LIDOCAINE HYDROCHLORIDE 10 MG/ML
5 INJECTION, SOLUTION EPIDURAL; INFILTRATION; INTRACAUDAL; PERINEURAL ONCE
Status: COMPLETED | OUTPATIENT
Start: 2023-07-06 | End: 2023-07-06

## 2023-07-06 RX ORDER — ALBUMIN (HUMAN) 12.5 G/50ML
50 SOLUTION INTRAVENOUS ONCE
Status: COMPLETED | OUTPATIENT
Start: 2023-07-06 | End: 2023-07-06

## 2023-07-06 RX ORDER — BUMETANIDE 0.25 MG/ML
1 INJECTION INTRAMUSCULAR; INTRAVENOUS EVERY 12 HOURS
Status: DISCONTINUED | OUTPATIENT
Start: 2023-07-07 | End: 2023-07-08

## 2023-07-06 RX ORDER — SPIRONOLACTONE 25 MG/1
25 TABLET ORAL DAILY
Status: DISCONTINUED | OUTPATIENT
Start: 2023-07-06 | End: 2023-07-09

## 2023-07-06 RX ADMIN — PROPOFOL 20 MCG/KG/MIN: 10 INJECTION, EMULSION INTRAVENOUS at 23:14

## 2023-07-06 RX ADMIN — Medication 10 ML: at 20:38

## 2023-07-06 RX ADMIN — BUMETANIDE 1 MG: 0.25 INJECTION, SOLUTION INTRAMUSCULAR; INTRAVENOUS at 23:14

## 2023-07-06 RX ADMIN — LIDOCAINE HYDROCHLORIDE 5 ML: 10 INJECTION, SOLUTION EPIDURAL; INFILTRATION; INTRACAUDAL; PERINEURAL at 17:33

## 2023-07-06 RX ADMIN — PROPOFOL 35 MCG/KG/MIN: 10 INJECTION, EMULSION INTRAVENOUS at 02:56

## 2023-07-06 RX ADMIN — BUMETANIDE 2 MG: 0.25 INJECTION, SOLUTION INTRAMUSCULAR; INTRAVENOUS at 09:32

## 2023-07-06 RX ADMIN — METOCLOPRAMIDE HYDROCHLORIDE 5 MG: 5 INJECTION INTRAMUSCULAR; INTRAVENOUS at 04:36

## 2023-07-06 RX ADMIN — ALBUMIN (HUMAN) 25 G: 0.25 INJECTION, SOLUTION INTRAVENOUS at 08:34

## 2023-07-06 RX ADMIN — Medication 10 ML: at 08:35

## 2023-07-06 RX ADMIN — PANTOPRAZOLE SODIUM 40 MG: 40 INJECTION, POWDER, FOR SOLUTION INTRAVENOUS at 08:33

## 2023-07-06 RX ADMIN — CHLORHEXIDINE GLUCONATE 15 ML: 1.2 SOLUTION ORAL at 20:38

## 2023-07-06 RX ADMIN — ALBUMIN (HUMAN) 50 G: 0.25 INJECTION, SOLUTION INTRAVENOUS at 18:38

## 2023-07-06 RX ADMIN — PROPOFOL 10 MCG/KG/MIN: 10 INJECTION, EMULSION INTRAVENOUS at 15:43

## 2023-07-06 RX ADMIN — ALBUMIN (HUMAN) 25 G: 0.25 INJECTION, SOLUTION INTRAVENOUS at 20:38

## 2023-07-06 RX ADMIN — PROPOFOL 30 MCG/KG/MIN: 10 INJECTION, EMULSION INTRAVENOUS at 06:23

## 2023-07-06 RX ADMIN — PANTOPRAZOLE SODIUM 40 MG: 40 INJECTION, POWDER, FOR SOLUTION INTRAVENOUS at 18:19

## 2023-07-06 RX ADMIN — CHLORHEXIDINE GLUCONATE 15 ML: 1.2 SOLUTION ORAL at 08:33

## 2023-07-06 NOTE — NURSING NOTE
US guided paracentesis performed by Dr Adames.  15.5 Liters of fluid removed from peritoneum. Patient tolerated well.

## 2023-07-06 NOTE — CASE MANAGEMENT/SOCIAL WORK
Continued Stay Note   [place]     Patient Name: Ivania Fields  MRN: [id number]  Today's Date: 7/6/2023    Admit Date: 7/4/2023    Plan: Home   Discharge Plan       Row Name 07/06/23 1404       Plan    Plan Home    Patient/Family in Agreement with Plan yes    Plan Comments Ms. Fields is sedated and on the ventilator. Her discharge plan was home, will see what therapy says at evaluation post intubation. CM will continue to follow.    Final Discharge Disposition Code 01 - home or self-care                   Discharge Codes    No documentation.                 Expected Discharge Date and Time       Expected Discharge Date Expected Discharge Time    Jul 12, 2023               Kamlesh Isaacs RN

## 2023-07-06 NOTE — THERAPY EVALUATION
Acute Care - Wound/Debridement Initial Evaluation  [facility]     Patient Name: Ivania Fields  : 1960  MRN: [id number]  Today's Date: 2023                Admit Date: 2023    Visit Dx:    ICD-10-CM ICD-9-CM   1. Hematemesis with nausea  K92.0 578.0     787.02       Patient Active Problem List   Diagnosis    Pneumonia of left lung due to infectious organism, unspecified part of lung    GI bleed    Liver cirrhosis secondary to PAREDES    Sleep apnea    Iron deficiency anemia    SILVINA (acute kidney injury)    CKD (chronic kidney disease)    Stasis dermatitis of both legs    Debility    Type 2 diabetes mellitus    Essential hypertension    Dyslipidemia    Hypothyroidism    Hypoalbuminemia    Anemia    Hematemesis        Past Medical History:   Diagnosis Date    Anemia     Diabetes mellitus     Hyperlipidemia     Hypertension     Hypothyroidism     Impaired mobility     Short-term memory loss         Past Surgical History:   Procedure Laterality Date    CHOLECYSTECTOMY      ENDOSCOPY N/A 2023    Procedure: ESOPHAGOGASTRODUODENOSCOPY;  Surgeon: Brunner, Mark I, MD;  Location: [facility] ENDOSCOPY;  Service: Gastroenterology;  Laterality: N/A;    ENDOSCOPY W/ BANDING N/A 2023    Procedure: ESOPHAGOGASTRODUODENOSCOPY WITH BIOPSY;  Surgeon: Jens Mandujano MD;  Location: [facility] ENDOSCOPY;  Service: Gastroenterology;  Laterality: N/A;           Wound 23 0318 Left lower leg Blisters (Active)   Wound Image   23 1300   Dressing Appearance intact;moist drainage 23 1300   Closure PABLO 23 1200   Base moist;pink 23 1300   Periwound intact;dry 23 1300   Periwound Temperature warm 23 1300   Periwound Skin Turgor soft 23 1300   Edges irregular 23 1300   Wound Length (cm) 3 cm 23 1300   Wound Width (cm) 5 cm 23 1300   Wound Depth (cm) 0.1 cm 23 1300   Wound Surface Area (cm^2) 15 cm^2 23 1300   Wound Volume (cm^3) 1.5 cm^3  07/06/23 1300   Drainage Characteristics/Odor serosanguineous 07/06/23 1300   Drainage Amount small 07/06/23 1300   Care, Wound cleansed with;soap and water;debrided 07/06/23 1300   Dressing Care foam;low-adherent 07/06/23 1300   Periwound Care dry periwound area maintained 07/06/23 1300       Wound 07/04/23 0317 Bilateral groin MASD (Moisture associated skin damage) (Active)   Dressing Appearance dry;intact 07/06/23 1200   Closure PABLO 07/06/23 1200   Base blanchable;dressing in place, unable to visualize 07/06/23 1200   Periwound blanchable;intact;dry 07/06/23 0800   Periwound Temperature warm 07/06/23 0800   Care, Wound cleansed with 07/06/23 0800   Dressing Care low-adherent;foam 07/06/23 1200   Periwound Care dry periwound area maintained 07/06/23 1200       Wound 07/04/23 0319 Left posterior fifth toe Other (comment) (Active)   Wound Image   07/06/23 1300   Dressing Appearance intact;moist drainage 07/06/23 1300   Closure PABLO 07/06/23 1200   Base moist;pink;red;slough;yellow 07/06/23 1300   Periwound intact;dry 07/06/23 1300   Periwound Temperature warm 07/06/23 1300   Periwound Skin Turgor soft 07/06/23 1300   Edges irregular 07/06/23 1300   Wound Length (cm) 3.5 cm 07/06/23 1300   Wound Width (cm) 5.5 cm 07/06/23 1300   Wound Depth (cm) 0.1 cm 07/06/23 1300   Wound Surface Area (cm^2) 19.25 cm^2 07/06/23 1300   Wound Volume (cm^3) 1.925 cm^3 07/06/23 1300   Drainage Characteristics/Odor serosanguineous 07/06/23 1300   Drainage Amount small 07/06/23 1300   Care, Wound irrigated with;sterile normal saline;debrided 07/06/23 1300   Dressing Care foam;low-adherent 07/06/23 1300   Periwound Care dry periwound area maintained 07/06/23 1300       Wound 07/04/23 0439 Right lower leg Other (comment) (Active)   Dressing Appearance open to air 07/06/23 1200   Closure None 07/06/23 1200   Base pink 07/06/23 1200   Periwound intact;dry;pink 07/06/23 1200   Periwound Temperature warm 07/06/23 1200   Drainage Amount none  07/06/23 1200   Care, Wound cleansed with;sterile water 07/05/23 2200   Dressing Care open to air 07/06/23 1200   Periwound Care dry periwound area maintained 07/06/23 1200       Wound 07/04/23 0800 Right medial perineum Skin Tear (Active)   Dressing Appearance open to air 07/06/23 1200   Closure None 07/06/23 1200   Base pink;moist 07/06/23 1200   Drainage Characteristics/Odor sanguineous 07/06/23 0600   Drainage Amount none 07/06/23 0800   Care, Wound cleansed with 07/06/23 0800   Dressing Care open to air 07/06/23 1200         WOUND DEBRIDEMENT  Total area of Debridement: ~10cm2  Debridement Site 1  Location- Site 1: LLE and L foot wound  Selective Debridement- Site 1: Wound Surface <20cmsq  Instruments- Site 1: tweezers  Excised Tissue Description- Site 1: minimum, slough  Bleeding- Site 1: seeping, held pressure, 1 minute               PT Assessment (last 12 hours)       PT Evaluation and Treatment       Row Name 07/06/23 1300          Physical Therapy Time and Intention    Subjective Information --  pt intubated  -     Document Type evaluation;therapy note (daily note);wound care  -     Mode of Treatment individual therapy;physical therapy  -       Row Name 07/06/23 1300          General Information    Patient Profile Reviewed yes  -     Patient Observations unable to respond  -     Pertinent History of Current Functional Problem LLE blister and plantar foot wound.  -     Risks Reviewed patient:;increased discomfort  -     Benefits Reviewed patient:;improve skin integrity  -     Barriers to Rehab medically complex;previous functional deficit;physical barrier  -       Row Name 07/06/23 1300          Pain    Pretreatment Pain Rating 0/10 - no pain  -     Posttreatment Pain Rating 0/10 - no pain  -       Row Name 07/06/23 1300          Cognition    Affect/Mental Status (Cognition) unable/difficult to assess  -       Row Name 07/06/23 1300          Wound 07/04/23 0318 Left lower leg  Blisters    Wound - Properties Group Placement Date: 07/04/23  -SB Placement Time: 0318 -SB Present on Hospital Admission: Y  -SB Side: Left  -SB Orientation: lower  -SB Location: leg  -SB Primary Wound Type: Blisters  -SB    Wound Image Images linked: 1  -MF     Dressing Appearance intact;moist drainage  -MF     Base moist;pink  -MF     Periwound intact;dry  -MF     Periwound Temperature warm  -MF     Periwound Skin Turgor soft  -MF     Edges irregular  -MF     Wound Length (cm) 3 cm  -MF     Wound Width (cm) 5 cm  -MF     Wound Depth (cm) 0.1 cm  -MF     Wound Surface Area (cm^2) 15 cm^2  -MF     Wound Volume (cm^3) 1.5 cm^3  -MF     Drainage Characteristics/Odor serosanguineous  -MF     Drainage Amount small  -MF     Care, Wound cleansed with;soap and water;debrided  -MF     Dressing Care foam;low-adherent  mepilex Ag foam with optifoam to cover  -MF     Periwound Care dry periwound area maintained  -MF     Retired Wound - Properties Group Placement Date: 07/04/23  -SB Placement Time: 0318  -SB Present on Hospital Admission: Y  -SB Side: Left  -SB Orientation: lower  -SB Location: leg  -SB Primary Wound Type: Blisters  -SB    Retired Wound - Properties Group Date first assessed: 07/04/23 -SB Time first assessed: 0318  -SB Present on Hospital Admission: Y  -SB Side: Left  -SB Location: leg  -SB Primary Wound Type: Blisters  -SB      Row Name             Wound 07/04/23 0317 Bilateral groin MASD (Moisture associated skin damage)    Wound - Properties Group Placement Date: 07/04/23 -SB Placement Time: 0317 -SB Side: Bilateral  -SB Location: groin  -SB Primary Wound Type: MASD  -SB    Retired Wound - Properties Group Placement Date: 07/04/23  -SB Placement Time: 0317 -SB Side: Bilateral  -SB Location: groin  -SB Primary Wound Type: MASD  -SB    Retired Wound - Properties Group Date first assessed: 07/04/23 -SB Time first assessed: 0317 -SB Side: Bilateral  -SB Location: groin  -SB Primary Wound Type: MASD  -SB       Row Name 07/06/23 1300          Wound 07/04/23 0319 Left posterior fifth toe Other (comment)    Wound - Properties Group Placement Date: 07/04/23 -SB Placement Time: 0319 -SB Side: Left  -SB Orientation: posterior  -SB Location: fifth toe  -SB Primary Wound Type: Other  -SB    Wound Image Images linked: 1  -MF     Dressing Appearance intact;moist drainage  -MF     Base moist;pink;red;slough;yellow  -MF     Periwound intact;dry  -MF     Periwound Temperature warm  -MF     Periwound Skin Turgor soft  -MF     Edges irregular  -MF     Wound Length (cm) 3.5 cm  -MF     Wound Width (cm) 5.5 cm  -MF     Wound Depth (cm) 0.1 cm  -MF     Wound Surface Area (cm^2) 19.25 cm^2  -MF     Wound Volume (cm^3) 1.925 cm^3  -MF     Drainage Characteristics/Odor serosanguineous  -MF     Drainage Amount small  -MF     Care, Wound irrigated with;sterile normal saline;debrided  -MF     Dressing Care foam;low-adherent  HFBc to cover wound with optifoam to cover  -MF     Periwound Care dry periwound area maintained  -MF     Retired Wound - Properties Group Placement Date: 07/04/23  -SB Placement Time: 0319 -SB Side: Left  -SB Orientation: posterior  -SB Location: fifth toe  -SB Primary Wound Type: Other  -SB    Retired Wound - Properties Group Date first assessed: 07/04/23 -SB Time first assessed: 0319 -SB Side: Left  -SB Location: fifth toe  -SB Primary Wound Type: Other  -SB      Row Name             Wound 07/04/23 0439 Right lower leg Other (comment)    Wound - Properties Group Placement Date: 07/04/23  -SB Placement Time: 0439 -SB Side: Right  -SB Orientation: lower  -SB Location: leg  -SB Primary Wound Type: Other  -SB    Retired Wound - Properties Group Placement Date: 07/04/23  -SB Placement Time: 0439 -SB Side: Right  -SB Orientation: lower  -SB Location: leg  -SB Primary Wound Type: Other  -SB    Retired Wound - Properties Group Date first assessed: 07/04/23 -SB Time first assessed: 0439 -SB Side: Right  -SB Location:  leg  -SB Primary Wound Type: Other  -SB      Row Name             Wound 07/04/23 0800 Right medial perineum Skin Tear    Wound - Properties Group Placement Date: 07/04/23  - Placement Time: 0800  -JH Present on Hospital Admission: Y  -JH Side: Right  -JH Orientation: medial  -JH Location: perineum  -JH Primary Wound Type: Skin tear  -JH    Retired Wound - Properties Group Placement Date: 07/04/23  - Placement Time: 0800  -JH Present on Hospital Admission: Y  -JH Side: Right  -JH Orientation: medial  -JH Location: perineum  -JH Primary Wound Type: Skin tear  -JH    Retired Wound - Properties Group Date first assessed: 07/04/23  - Time first assessed: 0800  -JH Present on Hospital Admission: Y  -JH Side: Right  -JH Location: perineum  -JH Primary Wound Type: Skin tear  -JH      Row Name 07/06/23 1300          Coping    Observed Emotional State other (see comments)  unable to assess  -     Verbalized Emotional State other (see comments)  pt intubated  -       Row Name 07/06/23 1300          Plan of Care Review    Plan of Care Reviewed With patient  -     Outcome Evaluation PT able to lighlty debride LLE and L foot wounds to help decrease bioburden.  Order for compression wrapping noted, but pt with mild decrease in edema with LE elevation. PT will hold compression wrapping at this time and recheck in 1-2 days.  -       Row Name 07/06/23 1300          Positioning and Restraints    Pre-Treatment Position in bed  -     Post Treatment Position bed  -     In Bed supine;call light within reach  -       Row Name 07/06/23 1300          Therapy Assessment/Plan (PT)    Patient/Family Therapy Goals Statement (PT) pt unable to verb  -     PT Diagnosis (PT) L LE and L foot wound  -     Rehab Potential (PT) fair, will monitor progress closely  -     Criteria for Skilled Interventions Met (PT) yes;meets criteria;skilled treatment is necessary  -       Row Name 07/06/23 1300          PT Evaluation  Complexity    History, PT Evaluation Complexity 3 or more personal factors and/or comorbidities  -     Examination of Body Systems (PT Eval Complexity) total of 3 or more elements  -     Clinical Presentation (PT Evaluation Complexity) evolving  -     Clinical Decision Making (PT Evaluation Complexity) moderate complexity  -     Overall Complexity (PT Evaluation Complexity) moderate complexity  -       Row Name 07/06/23 1300          Therapy Plan Review/Discharge Plan (PT)    Therapy Plan Review (PT) evaluation/treatment results reviewed;risks/benefits reviewed;care plan/treatment goals reviewed;current/potential barriers reviewed;participants voiced agreement with care plan;participants included;patient  -       Row Name 07/06/23 1300          Physical Therapy Goals    Wound Care Goal Selection (PT) wound care, PT goal 1  -       Row Name 07/06/23 1300          Wound Care Goal 1 (PT)    Wound Care Goal 1 (PT) Decrease L plantar foot wound size by 25% as evidence of wound healing.  -     Time Frame (Wound Care Goal 1, PT) 10 days  -               User Key  (r) = Recorded By, (t) = Taken By, (c) = Cosigned By      Initials Name Provider Type    MF Abhilash Dent, PT Physical Therapist    Donna Gallego RN Registered Nurse    Jessica Burnett RN Registered Nurse                      Recommendation and Plan  Planned Therapy Interventions (PT): wound care  Plan of Care Reviewed With: patient           Outcome Evaluation: PT able to lighlty debride LLE and L foot wounds to help decrease bioburden.  Order for compression wrapping noted, but pt with mild decrease in edema with LE elevation. PT will hold compression wrapping at this time and recheck in 1-2 days.  Plan of Care Reviewed With: patient            Time Calculation   PT Charges       Row Name 07/06/23 1300             Time Calculation    Start Time 1300  -      PT Goal Re-Cert Due Date 07/16/23  -         Untimed Charges    PT  Eval/Re-eval Minutes 35  -MF      Wound Care 35456 Selective debridement  -MF      [id number] debridement 35  -MF         Total Minutes    Untimed Charges Total Minutes 70  -MF       Total Minutes 70  -MF                User Key  (r) = Recorded By, (t) = Taken By, (c) = Cosigned By      Initials Name Provider Type    Abhilash Daniels, PT Physical Therapist                                    Abhilash Dent, PT  7/6/2023

## 2023-07-06 NOTE — PLAN OF CARE
Goal Outcome Evaluation:  Plan of Care Reviewed With: sibling        Progress: no change  Outcome Evaluation: remains on vent with PEEP of 5, fio2 of 40%. Propofol currently infusing at 15. patient arouses to voice and/or touch and follows commands. patient restless and sometimes agitated when awake. SBP 90's - 130's. frequently sinus anette with HR in 40's. Paracentesis done today with 15.5L removed, patient tolerated well. 1070mL in UOP.

## 2023-07-06 NOTE — PROGRESS NOTES
"GI Daily Progress Note  Subjective     Ivania Fields is a 62 y.o. female who was admitted with Hematemesis.     Remains on the vent.  No further bleeding.  Had paracentesis today with 15.5 L removed.    Chief Complaint: Hematemesis  Objective     /43   Pulse (!) 46   Temp 97 °F (36.1 °C) (Bladder)   Resp 22   Ht 165.1 cm (65\")   Wt 129 kg (285 lb)   SpO2 100%   BMI 47.43 kg/m²     Intake/Output last 3 shifts:  I/O last 3 completed shifts:  In: 2282.9 [I.V.:1306.9; Blood:726; IV Piggyback:250]  Out: 3000 [Urine:3000]  Intake/Output this shift:  I/O this shift:  In: 257.6 [I.V.:257.6]  Out: 1020 [Urine:1020]      Physical Exam  Wt Readings from Last 3 Encounters:   07/04/23 129 kg (285 lb)   07/03/23 121 kg (266 lb)   07/02/23 121 kg (266 lb)   ,body mass index is 47.43 kg/m².,@FLOWAMB(6)@,@FLOWAMB(5)@,@FLOWAMB(8)@   CONSTITUTIONAL: Lying in bed on ventilator.  Resp CTA; no rhonchi, rales, or wheezes.  Respiration effort normal  CV RRR; no M/R/G. No lower extremity edema  GI Abd soft, NT, ND, normal active bowel sounds.    Psych: She is awake but unable to talk secondary to ET tube.    DATA:   Latest Reference Range & Units 07/06/23 06:46   Sodium 136 - 145 mmol/L 142   Potassium 3.5 - 5.2 mmol/L 3.6   Chloride 98 - 107 mmol/L 105   CO2 22.0 - 29.0 mmol/L 24.0   Anion Gap 5.0 - 15.0 mmol/L 13.0   BUN 8 - 23 mg/dL 23   Creatinine 0.57 - 1.00 mg/dL 1.59 (H)   BUN/Creatinine Ratio 7.0 - 25.0  14.5   eGFR >60.0 mL/min/1.73 36.4 (L)   Glucose 65 - 99 mg/dL 156 (H)   Calcium 8.6 - 10.5 mg/dL 8.8   Magnesium 1.6 - 2.4 mg/dL 1.8   Alkaline Phosphatase 39 - 117 U/L 65   Total Protein 6.0 - 8.5 g/dL 6.2   Albumin 3.5 - 5.2 g/dL 2.6 (L)   Globulin gm/dL 3.6   A/G Ratio g/dL 0.7   AST (SGOT) 1 - 32 U/L 21   ALT (SGPT) 1 - 33 U/L 7   Total Bilirubin 0.0 - 1.2 mg/dL 1.9 (H)   (H): Data is abnormally high  (L): Data is abnormally low     Latest Reference Range & Units 07/05/23 00:58 07/05/23 16:26 07/06/23 01:09 " 07/06/23 06:46 07/06/23 17:51   Hemoglobin 12.0 - 15.9 g/dL 6.9 (C) 8.7 (L) 9.5 (L) 8.2 (L) 8.0 (L)   Hematocrit 34.0 - 46.6 % 19.7 (C) 27.0 (L) 26.4 (L) 22.7 (L) 23.4 (L)   (C): Data is critically low  (L): Data is abnormally low       Latest Reference Range & Units 04/05/23 15:49   Color, Fluid  Light Yellow   Appearance, Fluid Clear  Cloudy !   WBC, Fluid 0 - 1,000 /mm3 58   RBC, Fluid 0 - 200,000 /mm3 9,000   Neutrophils, Fluid % 16   Lymphocytes, Fluid % 84   Albumin, Fluid g/dL <0.20   Protein, Total, Fluid g/dL <1.0   !: Data is abnormal              Assessment & Plan     1.  Nausea and vomiting.  2.  Gastroparesis.  3.    Prepyloric ulcer with acute blood loss anemia  4.  PAREDES cirrhosis with ascites.  No signs of portal hypertension on recent EGD.  MELD-Na = 15. Child Class B.  Has immunity to hepatitis A.  Needs repeat hepatitis B vaccine series.  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  5.  Acute kidney injury on diuretics for ascites.  6.  Significant proteinuria.  7.  Coagulopathy of liver disease.  8.  Severe hypoalbuminemia.  9.  Severe protein calorie malnutrition, as evidenced by profound hypoalbuminemia, anasarca, and diffuse sarcopenia.  10.  Morbid obesity.  11.  Anemia of chronic disease and iron deficiency      Bleeding appears stable.  She had a 15.5 L paracentesis today.  No sign of SBP.  She has received 50 g of albumin today.  She needs  50 g more.  Hopefully she can be extubated tomorrow after she had 15.5 L of ascites removed.    Speech eval after that.  If she does not pass or not able to eat she will need Keofeed.      Hematemesis    GI bleed    Liver cirrhosis secondary to PAREDES    SILVINA (acute kidney injury)    Type 2 diabetes mellitus    Essential hypertension    Dyslipidemia    Hypothyroidism    Anemia    Severe Malnutrition (HCC)       LOS: 2 days     Herrera Solitario MD  07/06/23  18:23 EDT

## 2023-07-06 NOTE — PROCEDURES
The following procedure was performed: Para  Please see corresponding Radiology report for in detail procedural information. The Radiology report will be dictated shortly, if not done so already. Please see the IR RN note for the information regarding medicines administered if any, aminta-procedural vitals and I/O information.

## 2023-07-06 NOTE — PLAN OF CARE
Goal Outcome Evaluation:  Plan of Care Reviewed With: patient           Outcome Evaluation: PT able to lighlty debride LLE and L foot wounds to help decrease bioburden.  Order for compression wrapping noted, but pt with mild decrease in edema with LE elevation. PT will hold compression wrapping at this time and recheck in 1-2 days.

## 2023-07-06 NOTE — PRE-SEDATION DOCUMENTATION
[facility]   Vascular Interventional Radiology  History & Physicial    Patient Name:Ivania Fields    : 1960    MRN: [id number]    Primary Care Physician: Kala Beaver MD    Referring Physician: Cecile Foreman MD     Date of admission: 2023    Subjective     Reason for Consult: Para    History of Present Illness     Ivania Fields is a 62 y.o. female referred to IR as noted above.      Active Hospital Problems:  Active Hospital Problems    Diagnosis    • **Hematemesis    • Anemia    • Type 2 diabetes mellitus    • Essential hypertension    • Dyslipidemia    • Hypothyroidism    • SILVINA (acute kidney injury)    • Liver cirrhosis secondary to PAREDES    • GI bleed        Personal History     Past Medical History:   Diagnosis Date   • Anemia    • Diabetes mellitus    • Hyperlipidemia    • Hypertension    • Hypothyroidism    • Impaired mobility    • Short-term memory loss        Past Surgical History:   Procedure Laterality Date   • CHOLECYSTECTOMY     • ENDOSCOPY N/A 2023    Procedure: ESOPHAGOGASTRODUODENOSCOPY;  Surgeon: Brunner, Mark I, MD;  Location: [facility] ENDOSCOPY;  Service: Gastroenterology;  Laterality: N/A;   • ENDOSCOPY W/ BANDING N/A 2023    Procedure: ESOPHAGOGASTRODUODENOSCOPY WITH BIOPSY;  Surgeon: Jens Mandujano MD;  Location: [facility] ENDOSCOPY;  Service: Gastroenterology;  Laterality: N/A;       Family History: Her family history includes No Known Problems in her father and mother.     Social History: She  reports that she has never smoked. She has never used smokeless tobacco. She reports current alcohol use. She reports that she does not use drugs.    Home Medications:  Insulin Aspart, SITagliptin, albuterol sulfate HFA, aspirin, atorvastatin, buPROPion XL, cephalexin, diphenoxylate-atropine, donepezil, furosemide, insulin aspart, ipratropium-albuterol, iron polysaccharides, levothyroxine, multivitamin with minerals, ondansetron ODT, pantoprazole, and  "spironolactone    Current Medications:  •  acetaminophen  •  albumin human  •  atorvastatin  •  [START ON 7/7/2023] bumetanide  •  buPROPion XL  •  chlorhexidine  •  dextrose  •  dextrose  •  donepezil  •  glucagon (human recombinant)  •  insulin lispro  •  ipratropium-albuterol  •  levothyroxine  •  Magnesium Standard Dose Replacement - Follow Nurse / BPA Driven Protocol  •  melatonin  •  ondansetron **OR** ondansetron  •  pantoprazole  •  polyvinyl alcohol  •  propofol  •  sodium chloride  •  sodium chloride  •  sodium chloride  •  spironolactone  •  dexamethasone  •  ePHEDrine Sulfate (Pressors)  •  glycopyrrolate  •  lidocaine  •  neostigmine  •  phenylephrine  •  propofol  •  Propofol  •  rocuronium  •  sodium chloride  •  Succinylcholine Chloride  •  sugammadex     Allergies:  She has No Known Allergies.    Review of Systems    IR Procedure pertinent significant findings are mentioned in the PMH and PSH above.    Objective     Visit Vitals  /40   Pulse 55   Temp 97 °F (36.1 °C) (Bladder)   Resp 14   Ht 165.1 cm (65\")   Wt 129 kg (285 lb)   SpO2 100%   BMI 47.43 kg/m²        Physical Exam    Not able to communicate  Intubated/sedated ICU patient.    Result Review      I have personally reviewed the results from the time of this admission to 7/6/2023 17:23 EDT and agree with these findings.  [x]  Laboratory  []  Microbiology  [x]  Radiology  []  EKG/Telemetry   []  Cardiology/Vascular   []  Pathology  []  Old records  []  Other:    Most notable findings include: As noted:    Results from last 7 days   Lab Units 07/06/23  0646 07/06/23  0109 07/05/23  1626 07/05/23  0058 07/04/23  1602 07/04/23  1034 07/04/23  0901 07/03/23  2101 07/03/23  2006 07/02/23  1118   INR  1.68*  --   --   --  1.79*  --  1.75* 1.57*  --   --    HEMOGLOBIN g/dL 8.2* 9.5* 8.7* 6.9*  --  8.0*  --   --  9.3* 9.3*   HEMATOCRIT % 22.7* 26.4* 27.0* 19.7*  --  23.2*  --   --  29.2* 28.7*   PLATELETS 10*3/mm3  --   --  97*  --   --  136*  " --   --  155 136*       Estimated Creatinine Clearance: 49.1 mL/min (A) (by C-G formula based on SCr of 1.59 mg/dL (H)).   Creatinine   Date Value Ref Range Status   07/06/2023 1.59 (H) 0.57 - 1.00 mg/dL Final   07/04/2023 1.40 (H) 0.57 - 1.00 mg/dL Final   07/03/2023 1.37 (H) 0.57 - 1.00 mg/dL Final       COVID19   Date Value Ref Range Status   07/03/2023 Not Detected Not Detected - Ref. Range Final        No results found for: PREGTESTUR, PREGSERUM, HCG, HCGQUANT     ASA SCALE ASSESSMENT (applicable ONLY if sedation planned):        MALLAMPATI CLASSIFICATION (applicable ONLY if sedation planned):       Assessment / Plan     Ivania Fields is a 62 y.o. female referred to the IR service with above problem.    Plan:   As above.    Notice: The note was created before the performance of the procedure. It might have been left in the pending status and signed off after the procedure. The time stamp on the note may be misleading.    Chris Adames MD   Vascular Interventional Radiology  07/06/23   5:23 PM EDT

## 2023-07-06 NOTE — PLAN OF CARE
Goal Outcome Evaluation:  Plan of Care Reviewed With: patient           Outcome Evaluation:   -Pt remains intubated/sedated. Propofol @30mcg/kg/min. PEEP:5, FI02:40%. Tolerating well.Small in-line secretions Pt able to follow commands when sedation turned down.   -UOP:2800. Albumin and bumex given.   - Wound care done.   -VSS. Sinus anette with sedation.   -Midnight hgb:9.5  -Plan for paracentesis today. Consent in chart.   -Awaiting phlebotomy to collect labs

## 2023-07-06 NOTE — PROGRESS NOTES
"Critical Care Note     LOS: 2 days   Patient Care Team:  Kala Beaver MD as PCP - General (Internal Medicine)    Chief Complaint/Reason for visit:     Hematemesis  GI bleed  Liver cirrhosis secondary to Leo  Ascites  Acute kidney injury  Diabetes mellitus type 2  Hypertension  Dyslipidemia  Hypothyroid  Morbid obesity      Subjective     Interval History:     Remains afebrile.  Heart rate 44-70.  Current ventilator settings rate 14 tidal volume 420 PEEP 535%.  She is on propofol 30 mics per kilogram per minute.  Yesterday she failed a spontaneous breathing trial.  She also received 1 unit of packed red cells.  Endotracheal tube was in the right mainstem and withdrawn yesterday.  Today it remains right at the ariana nursing is noted increased secretions.  Urine output yesterday 3 L.      Review of Systems:    All systems were reviewed and negative except as noted in subjective.    Medical history, surgical history, social history, family history reviewed    Objective     Intake/Output:    Intake/Output Summary (Last 24 hours) at 7/6/2023 1231  Last data filed at 7/6/2023 1200  Gross per 24 hour   Intake 1680.55 ml   Output 3650 ml   Net -1969.45 ml       Nutrition:  NPO Diet NPO Type: Strict NPO    Infusions:  propofol, 5-50 mcg/kg/min, Last Rate: Stopped (07/06/23 1130)        Mechanical Ventilator Settings:            Resp Rate (Set): 14  Pressure Support (cm H2O): 10 cm H20  FiO2 (%): 35 %  PEEP/CPAP (cm H2O): 5 cm H20    Minute Ventilation (L/min) (Obs): 5.49 L/min  Resp Rate (Observed) Vent: 12  I:E Ratio (Set): 1:2.58  I:E Ratio (Obs): 1:3.2    PIP Observed (cm H2O): 16 cm H2O  Plateau Pressure (cm H2O): (S) 17 cm H2O    Telemetry: Sinus rhythm, sinus bradycardia             Vital Signs  Blood pressure 116/42, pulse 58, temperature 96.6 °F (35.9 °C), temperature source Bladder, resp. rate 16, height 165.1 cm (65\"), weight 129 kg (285 lb), SpO2 100 %.    Physical Exam:  General Appearance:   Overweight " middle-aged woman   Head:   Atraumatic   Eyes:          Conjunctiva pale, no jaundice   Ears:     Throat:  Orally intubated   Neck:  Trachea midline, no palpable thyroid   Back:      Lungs:    Diminished breath sounds bases laterally regular, S1, S2 auscultated    Heart:     Abdomen:    Distended with a fluid wave   Rectal:   Deferred   Extremities:  Chronic edema and stasis changes   Pulses:    Skin:  Warm and dry   Lymph nodes:  No cervical adenopathy    Neurologic:  Sedated but will arouse and follow command      Results Review:     I reviewed the patient's new clinical results.   Results from last 7 days   Lab Units 07/06/23  0646 07/04/23  0901 07/03/23 2006   SODIUM mmol/L 142 142 139   POTASSIUM mmol/L 3.6 4.2 4.0   CHLORIDE mmol/L 105 110* 106   CO2 mmol/L 24.0 22.0 23.9   BUN mg/dL 23 21 19   CREATININE mg/dL 1.59* 1.40* 1.37*   CALCIUM mg/dL 8.8 8.7 9.0   BILIRUBIN mg/dL 1.9* 0.8 1.1   ALK PHOS U/L 65 85 113   ALT (SGPT) U/L 7 10 13   AST (SGOT) U/L 21 24 29   GLUCOSE mg/dL 156* 101* 105*     Results from last 7 days   Lab Units 07/06/23  0646 07/06/23  0109 07/05/23  1626 07/05/23  0058 07/04/23  1034 07/03/23 2006   WBC 10*3/mm3  --   --  5.09  --  5.45 5.39   HEMOGLOBIN g/dL 8.2* 9.5* 8.7*   < > 8.0* 9.3*   HEMATOCRIT % 22.7* 26.4* 27.0*   < > 23.2* 29.2*   PLATELETS 10*3/mm3  --   --  97*  --  136* 155    < > = values in this interval not displayed.     Results from last 7 days   Lab Units 07/06/23  0407   PH, ARTERIAL pH units 7.492*   PO2 ART mm Hg 70.9*   PCO2, ARTERIAL mm Hg 33.7*   HCO3 ART mmol/L 25.7     Lab Results   Component Value Date    BLOODCX No growth at 5 days 04/04/2023     Lab Results   Component Value Date    URINECX No growth 07/02/2023       I reviewed the patient's new imaging including images and reports.    Persistent basilar infiltrates, ET tube sitting right at the ariana directed toward the right mainstem    All medications reviewed.   albumin human, 25 g, Intravenous,  BID  atorvastatin, 20 mg, Oral, Daily  [START ON 7/7/2023] bumetanide, 1 mg, Intravenous, Q12H  buPROPion XL, 150 mg, Oral, Daily  chlorhexidine, 15 mL, Mouth/Throat, Q12H  donepezil, 10 mg, Oral, Nightly  insulin lispro, 3-14 Units, Subcutaneous, TID With Meals  levothyroxine, 300 mcg, Oral, Daily  pantoprazole, 40 mg, Intravenous, BID AC  sodium chloride, 10 mL, Intravenous, Q12H  spironolactone, 25 mg, Oral, Daily          Assessment & Plan       Hematemesis    GI bleed    Liver cirrhosis secondary to PAREDES    SILVINA (acute kidney injury)    Type 2 diabetes mellitus    Essential hypertension    Dyslipidemia    Hypothyroidism    Anemia    62-year-old woman with diabetes, hypertension, hypothyroid, Paredes cirrhosis transferred from [place] with GI bleeding on July 4.  EGD revealed an antral ulcer and esophagitis but no varices.  Octreotide was stopped and she was placed on twice daily Protonix.  Postprocedure she required reintubation for somnolence.  Yesterday she failed a spontaneous breathing trial.  Today her hemoglobin has drifted down to 8.2.  She has not had any melena or hematemesis.  Creatinine is 1.59, increased compared to 1.40 on July 4.    GI bleed  Gastric ulcer  -EGD July 5, antral ulcer without active bleeding, reflux esophagitis, mild portal gastropathy  -Hemoglobin 8.2 today  -2 units of packed red cells July for  -Octreotide discontinued  -Proton pump inhibitor  -Serial H&H's  -Serum iron 65     Liver cirrhosis secondary to Paredes  Ascites  Pleural effusions  -Paracentesis today  -INR 1.79 after vitamin K  -Bilirubin 0.8, AST 24, ALT 10, alk phos 85, albumin 1.4        Acute on chronic kidney disease  -Creatinine 1.59 today compared to 1.4  -Incontinent of urine, Petersen anchored, 3 L of urine yesterday  -Bilateral lymphedema     Respiratory failure postprocedure  -AC 14 tidal volume 420 PEEP 4.5, FiO2 50%  -Bilateral pleural effusions secondary to liver cirrhosis  -No history of underlying lung  disease  -Morbid obesity, BMI 47     Hypothyroid  -Replacement therapy, Synthroid 300 mcg daily  -TSH 0.478, free T41.22     Diabetes mellitus type 2  -A1c 5.2  -Januvia, insulin as an outpatient  -Left foot ulcer  -Gastroparesis         PLAN:    -Monitor H&H and transfuse if drops to 7  -Large-volume paracentesis today  -Repeat spontaneous breathing trial  -Continue twice daily proton pump inhibitor  -Leave Petersen catheter in place for strict urine output monitoring  -Recheck creatinine, BUN tomorrow  -Thyroid replacement  -Twice daily Bumex      Abbi Amin MD  07/06/23  12:31 EDT      Time: Critical care 35 min  I personally provided care to this critically ill patient as documented above.  Critical care time does not include time spent on separately billed procedures.  None of my critical care time was concurrent with other critical care providers.

## 2023-07-06 NOTE — PROGRESS NOTES
" LOS: 2 days   Patient Care Team:  Provider, No Known as PCP - General    Chief Complaint: Generalize weakness.    Subjective         Subjective:  Symptoms:  Stable.  No shortness of breath or chest pain.      History taken from: patient    Objective     Vital Sign Min/Max for last 24 hours  Temp  Min: 97.4 °F (36.3 °C)  Max: 97.8 °F (36.6 °C)   BP  Min: 82/50  Max: 168/99   Pulse  Min: 44  Max: 70   Resp  Min: 14  Max: 30   SpO2  Min: 98 %  Max: 100 %   No data recorded   No data recorded     Flowsheet Rows      Flowsheet Row First Filed Value   Admission Height 165.1 cm (65\") Documented at 07/04/2023 0201   Admission Weight 130 kg (285 lb 12.8 oz) Documented at 07/04/2023 0201            No intake/output data recorded.  I/O last 3 completed shifts:  In: 2282.9 [I.V.:1306.9; Blood:726; IV Piggyback:250]  Out: 3000 [Urine:3000]    Objective:  General Appearance:  Ill-appearing.    Vital signs: (most recent): Blood pressure 124/55, pulse 62, temperature 97.4 °F (36.3 °C), temperature source Axillary, resp. rate 15, height 165.1 cm (65\"), weight 129 kg (285 lb), SpO2 99 %.  Vital signs are normal.    Output: Producing urine (Petersen cath+).    HEENT: Normal HEENT exam.    Lungs:  Normal effort and normal respiratory rate.  (Intubated on MV)  Heart: Normal rate.  Regular rhythm.  S1 normal and S2 normal.  No murmur or gallop.   Abdomen: Abdomen is distended.  There are signs of ascites.   Extremities: Normal range of motion.  There is dependent edema and local swelling.    Pulses: Distal pulses are intact.    Neurological: Patient is alert.    Skin:  Warm.  There is ecchymosis.              Results Review:     I reviewed the patient's new clinical results.    WBC WBC   Date Value Ref Range Status   07/05/2023 5.09 3.40 - 10.80 10*3/mm3 Final   07/04/2023 5.45 3.40 - 10.80 10*3/mm3 Final   07/03/2023 5.39 3.40 - 10.80 10*3/mm3 Final      HGB Hemoglobin   Date Value Ref Range Status   07/06/2023 8.2 (L) 12.0 - 15.9 g/dL " Final   07/06/2023 9.5 (L) 12.0 - 15.9 g/dL Final   07/05/2023 8.7 (L) 12.0 - 15.9 g/dL Final   07/05/2023 6.9 (C) 12.0 - 15.9 g/dL Final   07/04/2023 8.0 (L) 12.0 - 15.9 g/dL Final   07/03/2023 9.3 (L) 12.0 - 15.9 g/dL Final      HCT Hematocrit   Date Value Ref Range Status   07/06/2023 22.7 (L) 34.0 - 46.6 % Final   07/06/2023 26.4 (L) 34.0 - 46.6 % Final   07/05/2023 27.0 (L) 34.0 - 46.6 % Final   07/05/2023 19.7 (C) 34.0 - 46.6 % Final   07/04/2023 23.2 (L) 34.0 - 46.6 % Final   07/03/2023 29.2 (L) 34.0 - 46.6 % Final      Platlets No results found for: LABPLAT   MCV MCV   Date Value Ref Range Status   07/05/2023 94.7 79.0 - 97.0 fL Final   07/04/2023 99.1 (H) 79.0 - 97.0 fL Final   07/03/2023 97.0 79.0 - 97.0 fL Final          Sodium Sodium   Date Value Ref Range Status   07/06/2023 142 136 - 145 mmol/L Final   07/04/2023 142 136 - 145 mmol/L Final   07/03/2023 139 136 - 145 mmol/L Final      Potassium Potassium   Date Value Ref Range Status   07/06/2023 3.6 3.5 - 5.2 mmol/L Final   07/04/2023 4.2 3.5 - 5.2 mmol/L Final   07/03/2023 4.0 3.5 - 5.2 mmol/L Final      Chloride Chloride   Date Value Ref Range Status   07/06/2023 105 98 - 107 mmol/L Final   07/04/2023 110 (H) 98 - 107 mmol/L Final   07/03/2023 106 98 - 107 mmol/L Final      CO2 CO2   Date Value Ref Range Status   07/06/2023 24.0 22.0 - 29.0 mmol/L Final   07/04/2023 22.0 22.0 - 29.0 mmol/L Final   07/03/2023 23.9 22.0 - 29.0 mmol/L Final      BUN BUN   Date Value Ref Range Status   07/06/2023 23 8 - 23 mg/dL Final   07/04/2023 21 8 - 23 mg/dL Final   07/03/2023 19 8 - 23 mg/dL Final      Creatinine Creatinine   Date Value Ref Range Status   07/06/2023 1.59 (H) 0.57 - 1.00 mg/dL Final   07/04/2023 1.40 (H) 0.57 - 1.00 mg/dL Final   07/03/2023 1.37 (H) 0.57 - 1.00 mg/dL Final      Calcium Calcium   Date Value Ref Range Status   07/06/2023 8.8 8.6 - 10.5 mg/dL Final   07/04/2023 8.7 8.6 - 10.5 mg/dL Final   07/03/2023 9.0 8.6 - 10.5 mg/dL Final      PO4  No results found for: CAPO4   Albumin Albumin   Date Value Ref Range Status   07/06/2023 2.6 (L) 3.5 - 5.2 g/dL Final   07/04/2023 1.4 (L) 3.5 - 5.2 g/dL Final   07/03/2023 1.8 (L) 3.5 - 5.2 g/dL Final      Magnesium Magnesium   Date Value Ref Range Status   07/06/2023 1.8 1.6 - 2.4 mg/dL Final   07/04/2023 1.9 1.6 - 2.4 mg/dL Final   07/03/2023 1.9 1.6 - 2.4 mg/dL Final      Uric Acid No results found for: URICACID     Medication Review: yes    Assessment & Plan       Hematemesis    GI bleed    Liver cirrhosis secondary to PAREDES    SILVINA (acute kidney injury)    Type 2 diabetes mellitus    Essential hypertension    Dyslipidemia    Hypothyroidism    Anemia      Assessment & Plan    Acute kidney disease vs chronic kidney disease: Last known stable cr btw 1.5-1.6mg/dl. Seen for abnormal renal function 2 months ago as well in the setting of GI bleed.      Volume status: Significant LE edema and possible asictes in the setting of liver cirrhosis     Anemia: s/p 2 unit pRBC. Hx of GI ulcer. S/p EGD on this visit. No active bleeding site noted     Intubated on MV: Post EGD     Liver cirrhosis: Complications includes ascites and gastropathy.      Hypoalbuminemia: In the setting of liver cirrhosis     Recs  Renal function is close to her recent baseline when compared to labs from recent admission. She has significant third spacing edema due to low bp. Continue albumin 25 gm BID with bumex 1 mg BID. Add spironolactone 25 mg daily. She will need more albumin supp with paracentesis to prevent worsening of renal function. Avoid large volume paracentesis to prevent HRS. Screen for proteinuria. Strict I/O. Avoid nephrotoxic agents. Transfuse at hb<7    Francisco Anaya MD  07/06/23  10:57 EDT

## 2023-07-07 ENCOUNTER — APPOINTMENT (OUTPATIENT)
Dept: GENERAL RADIOLOGY | Facility: HOSPITAL | Age: 63
DRG: 377 | End: 2023-07-07
Payer: MEDICARE

## 2023-07-07 LAB
ALBUMIN SERPL-MCNC: 2.9 G/DL (ref 3.5–5.2)
ALBUMIN/GLOB SERPL: 1.2 G/DL
ALP SERPL-CCNC: 50 U/L (ref 39–117)
ALT SERPL W P-5'-P-CCNC: 7 U/L (ref 1–33)
ANION GAP SERPL CALCULATED.3IONS-SCNC: 13 MMOL/L (ref 5–15)
ARTERIAL PATENCY WRIST A: ABNORMAL
AST SERPL-CCNC: 26 U/L (ref 1–32)
ATMOSPHERIC PRESS: ABNORMAL MM[HG]
BASE EXCESS BLDA CALC-SCNC: 3.2 MMOL/L (ref 0–2)
BDY SITE: ABNORMAL
BILIRUB SERPL-MCNC: 1.6 MG/DL (ref 0–1.2)
BODY TEMPERATURE: 37 C
BUN SERPL-MCNC: 24 MG/DL (ref 8–23)
BUN/CREAT SERPL: 14.6 (ref 7–25)
CALCIUM SPEC-SCNC: 8.7 MG/DL (ref 8.6–10.5)
CHLORIDE SERPL-SCNC: 106 MMOL/L (ref 98–107)
CO2 BLDA-SCNC: 27.6 MMOL/L (ref 22–33)
CO2 SERPL-SCNC: 23 MMOL/L (ref 22–29)
COHGB MFR BLD: 1.1 % (ref 0–2)
CREAT SERPL-MCNC: 1.64 MG/DL (ref 0.57–1)
CYTO UR: NORMAL
EGFRCR SERPLBLD CKD-EPI 2021: 35 ML/MIN/1.73
EPAP: 0
GLOBULIN UR ELPH-MCNC: 2.5 GM/DL
GLUCOSE BLDC GLUCOMTR-MCNC: 104 MG/DL (ref 70–130)
GLUCOSE BLDC GLUCOMTR-MCNC: 112 MG/DL (ref 70–130)
GLUCOSE BLDC GLUCOMTR-MCNC: 90 MG/DL (ref 70–130)
GLUCOSE BLDC GLUCOMTR-MCNC: 93 MG/DL (ref 70–130)
GLUCOSE SERPL-MCNC: 104 MG/DL (ref 65–99)
HCO3 BLDA-SCNC: 26.6 MMOL/L (ref 20–26)
HCT VFR BLD AUTO: 22.5 % (ref 34–46.6)
HCT VFR BLD CALC: 24.8 % (ref 38–51)
HGB BLD-MCNC: 8.1 G/DL (ref 12–15.9)
HGB BLDA-MCNC: 8.1 G/DL (ref 14–18)
INHALED O2 CONCENTRATION: 35 %
IPAP: 0
LAB AP CASE REPORT: NORMAL
LAB AP CLINICAL INFORMATION: NORMAL
MAGNESIUM SERPL-MCNC: 1.7 MG/DL (ref 1.6–2.4)
METHGB BLD QL: 0.6 % (ref 0–1.5)
MODALITY: ABNORMAL
NOTE: ABNORMAL
OXYHGB MFR BLDV: 94.3 % (ref 94–99)
PATH REPORT.FINAL DX SPEC: NORMAL
PATH REPORT.GROSS SPEC: NORMAL
PAW @ PEAK INSP FLOW SETTING VENT: 16 CMH2O
PCO2 BLDA: 34.5 MM HG (ref 35–45)
PCO2 TEMP ADJ BLD: 34.5 MM HG (ref 35–45)
PEEP RESPIRATORY: 5 CM[H2O]
PH BLDA: 7.5 PH UNITS (ref 7.35–7.45)
PH, TEMP CORRECTED: 7.5 PH UNITS
PHOSPHATE SERPL-MCNC: 3.1 MG/DL (ref 2.5–4.5)
PO2 BLDA: 73.6 MM HG (ref 83–108)
PO2 TEMP ADJ BLD: 73.6 MM HG (ref 83–108)
POTASSIUM SERPL-SCNC: 3.4 MMOL/L (ref 3.5–5.2)
PROT SERPL-MCNC: 5.4 G/DL (ref 6–8.5)
SODIUM SERPL-SCNC: 142 MMOL/L (ref 136–145)
TOTAL RATE: 14 BREATHS/MINUTE
VENTILATOR MODE: ABNORMAL
VT ON VENT VENT: 0.42 ML

## 2023-07-07 PROCEDURE — 82375 ASSAY CARBOXYHB QUANT: CPT

## 2023-07-07 PROCEDURE — 85014 HEMATOCRIT: CPT | Performed by: INTERNAL MEDICINE

## 2023-07-07 PROCEDURE — P9047 ALBUMIN (HUMAN), 25%, 50ML: HCPCS | Performed by: INTERNAL MEDICINE

## 2023-07-07 PROCEDURE — 25010000002 ALBUMIN HUMAN 25% PER 50 ML: Performed by: INTERNAL MEDICINE

## 2023-07-07 PROCEDURE — 80053 COMPREHEN METABOLIC PANEL: CPT | Performed by: INTERNAL MEDICINE

## 2023-07-07 PROCEDURE — 84100 ASSAY OF PHOSPHORUS: CPT | Performed by: INTERNAL MEDICINE

## 2023-07-07 PROCEDURE — 94660 CPAP INITIATION&MGMT: CPT

## 2023-07-07 PROCEDURE — 82805 BLOOD GASES W/O2 SATURATION: CPT

## 2023-07-07 PROCEDURE — 36600 WITHDRAWAL OF ARTERIAL BLOOD: CPT

## 2023-07-07 PROCEDURE — 99291 CRITICAL CARE FIRST HOUR: CPT | Performed by: INTERNAL MEDICINE

## 2023-07-07 PROCEDURE — 94799 UNLISTED PULMONARY SVC/PX: CPT

## 2023-07-07 PROCEDURE — 85018 HEMOGLOBIN: CPT | Performed by: INTERNAL MEDICINE

## 2023-07-07 PROCEDURE — 83050 HGB METHEMOGLOBIN QUAN: CPT

## 2023-07-07 PROCEDURE — 25010000002 ONDANSETRON PER 1 MG: Performed by: INTERNAL MEDICINE

## 2023-07-07 PROCEDURE — 71045 X-RAY EXAM CHEST 1 VIEW: CPT

## 2023-07-07 PROCEDURE — 82948 REAGENT STRIP/BLOOD GLUCOSE: CPT

## 2023-07-07 PROCEDURE — 25010000002 PROPOFOL 10 MG/ML EMULSION: Performed by: INTERNAL MEDICINE

## 2023-07-07 PROCEDURE — 92610 EVALUATE SWALLOWING FUNCTION: CPT

## 2023-07-07 PROCEDURE — 94003 VENT MGMT INPAT SUBQ DAY: CPT

## 2023-07-07 PROCEDURE — 0 POTASSIUM CHLORIDE 10 MEQ/100ML SOLUTION: Performed by: NURSE PRACTITIONER

## 2023-07-07 PROCEDURE — 83735 ASSAY OF MAGNESIUM: CPT | Performed by: INTERNAL MEDICINE

## 2023-07-07 RX ORDER — POTASSIUM CHLORIDE 7.45 MG/ML
10 INJECTION INTRAVENOUS
Status: COMPLETED | OUTPATIENT
Start: 2023-07-07 | End: 2023-07-07

## 2023-07-07 RX ADMIN — ONDANSETRON 4 MG: 2 INJECTION INTRAMUSCULAR; INTRAVENOUS at 12:16

## 2023-07-07 RX ADMIN — PANTOPRAZOLE SODIUM 40 MG: 40 INJECTION, POWDER, FOR SOLUTION INTRAVENOUS at 08:07

## 2023-07-07 RX ADMIN — Medication 10 ML: at 08:07

## 2023-07-07 RX ADMIN — ALBUMIN (HUMAN) 25 G: 0.25 INJECTION, SOLUTION INTRAVENOUS at 10:14

## 2023-07-07 RX ADMIN — POTASSIUM CHLORIDE 10 MEQ: 7.46 INJECTION, SOLUTION INTRAVENOUS at 08:07

## 2023-07-07 RX ADMIN — PROPOFOL 20 MCG/KG/MIN: 10 INJECTION, EMULSION INTRAVENOUS at 04:37

## 2023-07-07 RX ADMIN — POTASSIUM CHLORIDE 10 MEQ: 7.46 INJECTION, SOLUTION INTRAVENOUS at 09:12

## 2023-07-07 RX ADMIN — BUMETANIDE 1 MG: 0.25 INJECTION, SOLUTION INTRAMUSCULAR; INTRAVENOUS at 23:43

## 2023-07-07 RX ADMIN — PANTOPRAZOLE SODIUM 40 MG: 40 INJECTION, POWDER, FOR SOLUTION INTRAVENOUS at 16:30

## 2023-07-07 RX ADMIN — POTASSIUM CHLORIDE 10 MEQ: 7.46 INJECTION, SOLUTION INTRAVENOUS at 06:29

## 2023-07-07 RX ADMIN — CHLORHEXIDINE GLUCONATE 15 ML: 1.2 SOLUTION ORAL at 08:07

## 2023-07-07 RX ADMIN — BUMETANIDE 1 MG: 0.25 INJECTION, SOLUTION INTRAMUSCULAR; INTRAVENOUS at 12:05

## 2023-07-07 RX ADMIN — ALBUMIN (HUMAN) 25 G: 0.25 INJECTION, SOLUTION INTRAVENOUS at 20:00

## 2023-07-07 RX ADMIN — Medication 10 ML: at 20:01

## 2023-07-07 NOTE — SIGNIFICANT NOTE
Mechanical trip and fall, denies hitting head, denies LOC.  C/o back pain.  Chronic neuropathy to b/l legs. Spoke with Dr. Amin at 1328 to notify of intermittent apnea alarm, low tidal volumes, RR >30 on SBT and that patient was placed back on AC/VC+. Order received to extubate patient to nasal cannula and have a Bipap at bedside if needed.    Patient extubated to 6L/NC at 1355.    Patient is unable to speak at this time, but can mouth words and is oriented x4. Bedside nursing swallow evaluation failed and SLP consult placed.

## 2023-07-07 NOTE — PLAN OF CARE
Goal Outcome Evaluation:  Plan of Care Reviewed With: patient           Outcome Evaluation:   - Pt remains on vent PEEP:5 Fi02:35%. Tolerating well. Pt still having small yellow/tan inline secretions.   -Sedation @20. Pt able to follow commands.   -BP a little soft overnight d/t sedation. Scheduled albumin given.   -UOP:1000mL.  -AM labs drawn. Potassium:3.4. Replacement ordered.

## 2023-07-07 NOTE — THERAPY EVALUATION
Acute Care - Speech Language Pathology   Swallow Initial Evaluation [facility]  Clinical Swallow Evaluation      Patient Name: Ivania Fields  : 1960  MRN: [id number]  Today's Date: 2023               Admit Date: 2023    Visit Dx:     ICD-10-CM ICD-9-CM   1. Hematemesis with nausea  K92.0 578.0     787.02   2. 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    Sleep apnea    Iron deficiency anemia    SILVINA (acute kidney injury)    CKD (chronic kidney disease)    Stasis dermatitis of both legs    Debility    Type 2 diabetes mellitus    Essential hypertension    Dyslipidemia    Hypothyroidism    Hypoalbuminemia    Anemia    Hematemesis    Severe Malnutrition (HCC)     Past Medical History:   Diagnosis Date    Anemia     Diabetes mellitus     Hyperlipidemia     Hypertension     Hypothyroidism     Impaired mobility     Short-term memory loss      Past Surgical History:   Procedure Laterality Date    CHOLECYSTECTOMY      ENDOSCOPY N/A 2023    Procedure: ESOPHAGOGASTRODUODENOSCOPY;  Surgeon: Brunner, Mark I, MD;  Location: [facility] ENDOSCOPY;  Service: Gastroenterology;  Laterality: N/A;    ENDOSCOPY W/ BANDING N/A 2023    Procedure: ESOPHAGOGASTRODUODENOSCOPY WITH BIOPSY;  Surgeon: Jens Mandujano MD;  Location: [facility] ENDOSCOPY;  Service: Gastroenterology;  Laterality: N/A;       SLP Recommendation and Plan  SLP Swallowing Diagnosis: suspected pharyngeal dysphagia (23)  SLP Diet Recommendation: NPO (23)     SLP Rec. for Method of Medication Administration: meds via alternate route (23)     Monitor for Signs of Aspiration: notify SLP if any concerns (23)  Recommended Diagnostics: reassess via clinical swallow evaluation (23)  Swallow Criteria for Skilled Therapeutic  Interventions Met: demonstrates skilled criteria (07/07/23 1510)  Anticipated Discharge Disposition (SLP): inpatient rehabilitation facility, anticipate therapy at next level of care (07/07/23 1510)  Rehab Potential/Prognosis, Swallowing: good, to achieve stated therapy goals (07/07/23 1510)     Predicted Duration Therapy Intervention (Days): until discharge (07/07/23 1510)                                        Plan of Care Reviewed With: patient, family      SWALLOW EVALUATION (last 72 hours)       SLP Adult Swallow Evaluation       Row Name 07/07/23 1510                   Rehab Evaluation    Document Type evaluation  -MP        Subjective Information no complaints  -MP        Patient Observations alert;cooperative  -MP        Patient/Family/Caregiver Comments/Observations Family present  -MP        Patient Effort good  -MP           General Information    Patient Profile Reviewed yes  -MP        Pertinent History Of Current Problem Adm 7/4 w/ hematemesis, concern for GI bleed. s/p EGD 7/5 w/ reflux esophagitis, portal gastropathy, lrg prepyloric ulcer w/o bleeding. Post-EGD u/a to ventilate sufficiently & subsequently re-intubated and transferred to ICU. Extubated today. Hx HTN, HLD, DM2, hypothyroid, liver cirrhosis, debility.  -MP        Current Method of Nutrition NPO  -MP        Precautions/Limitations, Vision WFL  -MP        Precautions/Limitations, Hearing WFL  -MP        Prior Level of Function-Communication WFL  -MP        Prior Level of Function-Swallowing no diet consistency restrictions  -MP        Plans/Goals Discussed with patient;family  -MP        Barriers to Rehab medically complex  -MP        Patient's Goals for Discharge patient did not state  -MP           Pain    Additional Documentation Pain Scale: FACES Pre/Post-Treatment (Group)  -MP           Pain Scale: FACES Pre/Post-Treatment    Pain: FACES Scale, Pretreatment 0-->no hurt  -MP        Posttreatment Pain Rating 0-->no hurt  -MP            Oral Motor Structure and Function    Dentition Assessment natural, present and adequate  -MP        Secretion Management WNL/WFL  -MP        Mucosal Quality dry  -MP           Oral Musculature and Cranial Nerve Assessment    Oral Motor General Assessment generalized oral motor weakness;vocal impairment  -        Vocal Impairment, Detail. Cranial Nerve X (Vagus) vocal quality abnormality (see comments)  -MP        Oral Motor, Comment Aphonic  -MP           General Eating/Swallowing Observations    Respiratory Support Currently in Use nasal cannula  -MP        Eating/Swallowing Skills fed by SLP  -MP        Positioning During Eating upright in bed  -MP        Utensils Used spoon  -MP        Consistencies Trialed ice chips;thin liquids  -           Clinical Swallow Eval    Pharyngeal Phase suspected pharyngeal impairment  -        Clinical Swallow Evaluation Summary Pt given one ice chip and one tsp water, both w/ immediate wet cough requiring attempts @ suction. Cont'd to cough following min PO trials. DNA further given high aspiration risk. Rec continue NPO & SLP will re-eval tomorrow to determine if appropriate to proceed w/ instrumental eval.  -           Pharyngeal Phase Concerns    Pharyngeal Phase Concerns cough  -MP        Cough thin  -           SLP Evaluation Clinical Impression    SLP Swallowing Diagnosis suspected pharyngeal dysphagia  -        Functional Impact risk of aspiration/pneumonia  -        Rehab Potential/Prognosis, Swallowing good, to achieve stated therapy goals  -        Swallow Criteria for Skilled Therapeutic Interventions Met demonstrates skilled criteria  -           Recommendations    Predicted Duration Therapy Intervention (Days) until discharge  -        SLP Diet Recommendation NPO  -MP        Recommended Diagnostics reassess via clinical swallow evaluation  -        Oral Care Recommendations Oral Care BID/PRN;Suction toothbrush  -        SLP Rec. for Method of  Medication Administration meds via alternate route  -MP        Monitor for Signs of Aspiration notify SLP if any concerns  -MP        Anticipated Discharge Disposition (SLP) inpatient rehabilitation facility;anticipate therapy at next level of care  -MP                  User Key  (r) = Recorded By, (t) = Taken By, (c) = Cosigned By      Initials Name Effective Dates    Carlos Alberto Garvin MS CCC-SLP 12/28/21 -                     EDUCATION  The patient has been educated in the following areas:   Dysphagia (Swallowing Impairment) NPO rationale.              Time Calculation:    Time Calculation- SLP       Row Name 07/07/23 1607             Time Calculation- SLP    SLP Start Time 1515  -MP      SLP Received On 07/07/23  -MP         Untimed Charges    45495-BG Eval Oral Pharyng Swallow Minutes 25  -MP         Total Minutes    Untimed Charges Total Minutes 25  -MP       Total Minutes 25  -MP                User Key  (r) = Recorded By, (t) = Taken By, (c) = Cosigned By      Initials Name Provider Type    Carlos Alberto Garvin MS CCC-SLP Speech and Language Pathologist                    Therapy Charges for Today       Code Description Service Date Service Provider Modifiers Qty    [id number] HC ST EVAL ORAL PHARYNG SWALLOW 2 7/7/2023 Carlos Alberto Hackett MS CCC-SLP GN 1                 MS YULISSA Saldivar  7/7/2023

## 2023-07-07 NOTE — PLAN OF CARE
Goal Outcome Evaluation:  Plan of Care Reviewed With: patient, sibling        Progress: improving  Outcome Evaluation: Vital signs stable; afebrile. Propofol drip turned off this AM and SBT performed. Periodic apnea, low tidal volumes and tachypnea noted during trial; Dr. Amin notified and ordered extubation with Bipap on standby if needed. Patient extubated to 6L/NC at 1355 and weaned to room air; Bipap ordered for tonight. SLP consulted for failed bedside nursing swallow evaluation. SLP Swallow evaluation also failed and patient remains NPO; reevaluation tomorrow. Patient unable to speak except for a whisper, but can mouth words and is oriented x4. Petersen catheter remains in place with adequate UOP; scheduled Albumin and Bumex given.

## 2023-07-07 NOTE — PLAN OF CARE
Goal Outcome Evaluation:  Plan of Care Reviewed With: patient, family            SLP evaluation completed. Will address dysphagia. Please see note for further details and recommendations.

## 2023-07-07 NOTE — PROGRESS NOTES
"Critical Care Note     LOS: 3 days   Patient Care Team:  Kala Beaver MD as PCP - General (Internal Medicine)    Chief Complaint/Reason for visit:     Hematemesis  GI bleed  Liver cirrhosis secondary to Leo  Ascites  Acute kidney injury  Diabetes mellitus type 2  Hypertension  Dyslipidemia  Hypothyroid  Morbid obesity      Subjective     Interval History:     15 L paracentesis performed yesterday.  Urine output 2 L.  Creatinine 1.64, 1.59 yesterday.  Hemoglobin has remained 8 for 2 days.  Current ventilator settings rate 14 tidal volume 420 PEEP of 5 and 35%.    Review of Systems:    All systems were reviewed and negative except as noted in subjective.    Medical history, surgical history, social history, family history reviewed    Objective     Intake/Output:    Intake/Output Summary (Last 24 hours) at 7/7/2023 1056  Last data filed at 7/7/2023 1013  Gross per 24 hour   Intake 889.54 ml   Output 41607 ml   Net -92802.46 ml       Nutrition:  NPO Diet NPO Type: Strict NPO    Infusions:  propofol, 5-50 mcg/kg/min, Last Rate: Stopped (07/07/23 1013)        Mechanical Ventilator Settings:            Resp Rate (Set): 14  Pressure Support (cm H2O): 10 cm H20  FiO2 (%): 35 %  PEEP/CPAP (cm H2O): 5 cm H20    Minute Ventilation (L/min) (Obs): 7.11 L/min  Resp Rate (Observed) Vent: 17  I:E Ratio (Set): 1:2.58  I:E Ratio (Obs): 1:2.1    PIP Observed (cm H2O): 16 cm H2O  Plateau Pressure (cm H2O): (S) 23 cm H2O    Telemetry: Sinus rhythm, sinus bradycardia             Vital Signs  Blood pressure 146/66, pulse 85, temperature 98.6 °F (37 °C), temperature source Bladder, resp. rate 17, height 165.1 cm (65\"), weight 129 kg (285 lb), SpO2 100 %.    Physical Exam:  General Appearance:   Overweight middle-aged woman   Head:   Atraumatic   Eyes:          Conjunctiva pale, no jaundice   Ears:     Throat:  Orally intubated   Neck:  Trachea midline, no palpable thyroid   Back:      Lungs:   Improved breath sounds bases " bilaterally    Heart:  Regular rhythm, diminished S1, systolic murmur   Abdomen:   Marked improved abdominal distention, hypoactive bowel sounds   Rectal:   Deferred   Extremities:  Chronic edema and stasis changes   Pulses:    Skin:  Warm and dry   Lymph nodes:  No cervical adenopathy    Neurologic:  Sedated but will arouse and follow command      Results Review:     I reviewed the patient's new clinical results.   Results from last 7 days   Lab Units 07/07/23  0300 07/06/23  0646 07/04/23  0901   SODIUM mmol/L 142 142 142   POTASSIUM mmol/L 3.4* 3.6 4.2   CHLORIDE mmol/L 106 105 110*   CO2 mmol/L 23.0 24.0 22.0   BUN mg/dL 24* 23 21   CREATININE mg/dL 1.64* 1.59* 1.40*   CALCIUM mg/dL 8.7 8.8 8.7   BILIRUBIN mg/dL 1.6* 1.9* 0.8   ALK PHOS U/L 50 65 85   ALT (SGPT) U/L 7 7 10   AST (SGOT) U/L 26 21 24   GLUCOSE mg/dL 104* 156* 101*     Results from last 7 days   Lab Units 07/07/23  0032 07/06/23  1751 07/06/23  0646 07/06/23  0109 07/05/23  1626 07/05/23  0058 07/04/23  1034 07/03/23 2006   WBC 10*3/mm3  --   --   --   --  5.09  --  5.45 5.39   HEMOGLOBIN g/dL 8.1* 8.0* 8.2*   < > 8.7*   < > 8.0* 9.3*   HEMATOCRIT % 22.5* 23.4* 22.7*   < > 27.0*   < > 23.2* 29.2*   PLATELETS 10*3/mm3  --   --   --   --  97*  --  136* 155    < > = values in this interval not displayed.     Results from last 7 days   Lab Units 07/07/23  0340   PH, ARTERIAL pH units 7.495*   PO2 ART mm Hg 73.6*   PCO2, ARTERIAL mm Hg 34.5*   HCO3 ART mmol/L 26.6*     Lab Results   Component Value Date    BLOODCX No growth at 5 days 04/04/2023     Lab Results   Component Value Date    URINECX No growth 07/02/2023     Interpretation Summary echocardiogram July 4, 2023         Left ventricular systolic function is hyperdynamic (EF > 70%). Calculated left ventricular EF = 73.7% Left ventricular ejection fraction appears to be greater than 70%.    Left ventricular diastolic function was indeterminate.    The right ventricular cavity is mildly dilated.     The left atrial cavity is moderately dilated.    The right atrial cavity is mild to moderately  dilated.    Mild aortic valve stenosis is present.    Moderate tricuspid valve regurgitation is present.    Estimated right ventricular systolic pressure from tricuspid regurgitation is moderately elevated (45-55 mmHg).       I reviewed the patient's new imaging including images and reports.    Persistent cardiomegaly and vascular congestion with small effusions and basilar atelectasis    All medications reviewed.   albumin human, 25 g, Intravenous, BID  atorvastatin, 20 mg, Oral, Daily  bumetanide, 1 mg, Intravenous, Q12H  buPROPion XL, 150 mg, Oral, Daily  chlorhexidine, 15 mL, Mouth/Throat, Q12H  donepezil, 10 mg, Oral, Nightly  insulin lispro, 3-14 Units, Subcutaneous, TID With Meals  levothyroxine, 300 mcg, Oral, Daily  pantoprazole, 40 mg, Intravenous, BID AC  sodium chloride, 10 mL, Intravenous, Q12H  spironolactone, 25 mg, Oral, Daily          Assessment & Plan       Hematemesis    GI bleed    Liver cirrhosis secondary to PAREDES    SILVINA (acute kidney injury)    Type 2 diabetes mellitus    Essential hypertension    Dyslipidemia    Hypothyroidism    Anemia    Severe Malnutrition (HCC)    62-year-old woman with diabetes, hypertension, hypothyroid, Paredes cirrhosis transferred from [place] with GI bleeding on July 4.  EGD revealed an antral ulcer and esophagitis but no varices.  Octreotide was stopped and she was placed on twice daily Protonix.  Postprocedure she required reintubation for somnolence.  She did fail her spontaneous breathing trial.  She was noted to have a right mainstem intubation and ET tube has been adjusted.  Yesterday she underwent a large-volume paracentesis with 15 L removed.  Abdomen is much softer today.  She remains only on 35% FiO2.  Chest x-ray still reveals some edema but it may just be an underpenetrated film.  She does have significant cardiomegaly as well.  CT of the abdomen on July 2 does  show some small pleural effusions but I do not see edema in the lower lobes on CT scan    Hemoglobin has remained 8 for the last 2 days.  She is not having any melena.  Her last transfusion was on July 5.  Serum creatinine is 1.64, slightly increased compared to 1.59 yesterday.  Baseline creatinine is 1.20.  Glucoses are running 104-156 but she remains n.p.o.    GI bleed  Gastric ulcer  -EGD July 5, antral ulcer without active bleeding, reflux esophagitis, mild portal gastropathy  -Hemoglobin 8.1 today  -2 units of packed red cells July 4  -Octreotide discontinued  -Proton pump inhibitor  -Serum iron 65     Liver cirrhosis secondary to Leo  Ascites  Pleural effusions  -Paracentesis 7/6 16 L removed  -Ascites with no organisms seen, no growth, total protein 1.2, less than 2000 RBCs, 33 WBCs with 50% lymphocytes  -INR 1.79 after vitamin K  -Bilirubin 0.8, AST 24, ALT 10, alk phos 85, albumin 1.4        Acute on chronic kidney disease  -Creatinine 1.64 today, baseline 1.2  -Incontinent of urine, Petersen anchored, 2 L of urine yesterday  -Bilateral lymphedema  -Small nonobstructing kidney stone, right UPJ     Respiratory failure postprocedure  -AC 14 tidal volume 420 PEEP 4.5, FiO2 35%  -Bilateral pleural effusions secondary to liver cirrhosis  -No history of underlying lung disease  -Morbid obesity, BMI 47     Hypothyroid  -Replacement therapy, Synthroid 300 mcg daily  -TSH 0.478, free T41.22     Diabetes mellitus type 2  -A1c 5.2  -Januvia, insulin as an outpatient  -Left foot ulcer  -Gastroparesis         PLAN:    -Decrease H&H checks  -Hold sedation and weaning trial  -Have BiPAP available  -Continue twice daily proton pump inhibitor  -Leave Petersen catheter in place for strict urine output monitoring  -Recheck creatinine, BUN tomorrow  -Thyroid replacement  -Additional 50 g of albumin ordered      Abbi Amin MD  07/07/23  10:56 EDT      Time: Critical care 35 min  I personally provided care to this  critically ill patient as documented above.  Critical care time does not include time spent on separately billed procedures.  None of my critical care time was concurrent with other critical care providers.

## 2023-07-07 NOTE — CASE MANAGEMENT/SOCIAL WORK
Continued Stay Note  [facility]     Patient Name: Ivania Fields  MRN: [id number]  Today's Date: 7/7/2023    Admit Date: 7/4/2023    Plan: Home   Discharge Plan       Row Name 07/07/23 1406       Plan    Plan Home    Patient/Family in Agreement with Plan yes    Plan Comments Ms. Fields is now of the ventilator. Home was the initial discharge plan but will need therapy recommendations to assist with discharge planning. CM will continue to follow.    Final Discharge Disposition Code 01 - home or self-care                   Discharge Codes    No documentation.                 Expected Discharge Date and Time       Expected Discharge Date Expected Discharge Time    Jul 14, 2023               Kamlesh Isaacs RN

## 2023-07-07 NOTE — PROGRESS NOTES
" LOS: 3 days   Patient Care Team:  Kala Beaver MD as PCP - General (Internal Medicine)    Chief Complaint: Generalize weakness.    Subjective     Stable renal function. Underwent paracentesis yesterday 15.5 liter fluid removed. Off sedation. Awake and following commands.       Subjective:  Symptoms:  Stable.      History taken from: patient    Objective     Vital Sign Min/Max for last 24 hours  Temp  Min: 96.8 °F (36 °C)  Max: 99 °F (37.2 °C)   BP  Min: 92/34  Max: 146/66   Pulse  Min: 46  Max: 86   Resp  Min: 14  Max: 22   SpO2  Min: 99 %  Max: 100 %   No data recorded   No data recorded     Flowsheet Rows      Flowsheet Row First Filed Value   Admission Height 165.1 cm (65\") Documented at 07/04/2023 0201   Admission Weight 130 kg (285 lb 12.8 oz) Documented at 07/04/2023 0201            I/O this shift:  In: 383 [I.V.:83; IV Piggyback:300]  Out: 295 [Urine:295]  I/O last 3 completed shifts:  In: 1386 [I.V.:1286; IV Piggyback:100]  Out: 09451 [Urine:4870; Other:40587]    Objective:  General Appearance:  Ill-appearing.    Vital signs: (most recent): Blood pressure 118/46, pulse 70, temperature 98.6 °F (37 °C), temperature source Bladder, resp. rate 17, height 165.1 cm (65\"), weight 129 kg (285 lb), SpO2 100 %.  Vital signs are normal.    Output: Producing urine (Petersen cath+).    HEENT: Normal HEENT exam.    Lungs:  Normal effort and normal respiratory rate.  (Intubated on MV)  Heart: Normal rate.  Regular rhythm.  S1 normal and S2 normal.  No murmur or gallop.   Abdomen: Abdomen is soft and non-distended.  There are no signs of ascites.    Extremities: Normal range of motion.  There is dependent edema and local swelling.    Pulses: Distal pulses are intact.    Neurological: Patient is alert.    Skin:  Warm.  No ecchymosis. (Hyperpigmentation.)            Results Review:     I reviewed the patient's new clinical results.    WBC WBC   Date Value Ref Range Status   07/05/2023 5.09 3.40 - 10.80 10*3/mm3 Final    "   HGB Hemoglobin   Date Value Ref Range Status   07/07/2023 8.1 (L) 12.0 - 15.9 g/dL Final   07/06/2023 8.0 (L) 12.0 - 15.9 g/dL Final   07/06/2023 8.2 (L) 12.0 - 15.9 g/dL Final   07/06/2023 9.5 (L) 12.0 - 15.9 g/dL Final   07/05/2023 8.7 (L) 12.0 - 15.9 g/dL Final   07/05/2023 6.9 (C) 12.0 - 15.9 g/dL Final      HCT Hematocrit   Date Value Ref Range Status   07/07/2023 22.5 (L) 34.0 - 46.6 % Final   07/06/2023 23.4 (L) 34.0 - 46.6 % Final   07/06/2023 22.7 (L) 34.0 - 46.6 % Final   07/06/2023 26.4 (L) 34.0 - 46.6 % Final   07/05/2023 27.0 (L) 34.0 - 46.6 % Final   07/05/2023 19.7 (C) 34.0 - 46.6 % Final      Platlets No results found for: LABPLAT   MCV MCV   Date Value Ref Range Status   07/05/2023 94.7 79.0 - 97.0 fL Final          Sodium Sodium   Date Value Ref Range Status   07/07/2023 142 136 - 145 mmol/L Final   07/06/2023 142 136 - 145 mmol/L Final      Potassium Potassium   Date Value Ref Range Status   07/07/2023 3.4 (L) 3.5 - 5.2 mmol/L Final     Comment:     Slight hemolysis detected by analyzer. Results may be affected.   07/06/2023 3.6 3.5 - 5.2 mmol/L Final      Chloride Chloride   Date Value Ref Range Status   07/07/2023 106 98 - 107 mmol/L Final   07/06/2023 105 98 - 107 mmol/L Final      CO2 CO2   Date Value Ref Range Status   07/07/2023 23.0 22.0 - 29.0 mmol/L Final   07/06/2023 24.0 22.0 - 29.0 mmol/L Final      BUN BUN   Date Value Ref Range Status   07/07/2023 24 (H) 8 - 23 mg/dL Final   07/06/2023 23 8 - 23 mg/dL Final      Creatinine Creatinine   Date Value Ref Range Status   07/07/2023 1.64 (H) 0.57 - 1.00 mg/dL Final   07/06/2023 1.59 (H) 0.57 - 1.00 mg/dL Final      Calcium Calcium   Date Value Ref Range Status   07/07/2023 8.7 8.6 - 10.5 mg/dL Final   07/06/2023 8.8 8.6 - 10.5 mg/dL Final      PO4 No results found for: CAPO4   Albumin Albumin   Date Value Ref Range Status   07/07/2023 2.9 (L) 3.5 - 5.2 g/dL Final   07/06/2023 2.6 (L) 3.5 - 5.2 g/dL Final      Magnesium Magnesium   Date  Value Ref Range Status   07/07/2023 1.7 1.6 - 2.4 mg/dL Final   07/06/2023 1.8 1.6 - 2.4 mg/dL Final      Uric Acid No results found for: URICACID     Medication Review: yes    Assessment & Plan       Hematemesis    GI bleed    Liver cirrhosis secondary to PAREDES    SILVINA (acute kidney injury)    Type 2 diabetes mellitus    Essential hypertension    Dyslipidemia    Hypothyroidism    Anemia    Severe Malnutrition (HCC)      Assessment & Plan    Acute kidney disease vs chronic kidney disease: Last known stable cr btw 1.5-1.6mg/dl. Seen for abnormal renal function 2 months ago as well in the setting of GI bleed.     Volume status: Significant LE edema and possible asictes in the setting of liver cirrhosis. Underwent large volume paracentesis 15.5 liter removed on 7/6/23      Anemia: s/p 2 unit pRBC. Hx of GI ulcer. S/p EGD on this visit. No active bleeding site noted     Intubated on MV: Post EGD     Liver cirrhosis: Complications includes ascites and gastropathy.      Hypoalbuminemia: In the setting of liver cirrhosis     Recs  Renal function is close to her recent baseline when compared to labs from recent admission. She has significant third spacing edema due to low alb. Continue albumin 25 gm BID with bumex 1 mg BID. Add spironolactone 25 mg daily ( Once allowed to have oral meds). Avoid large volume paracentesis to prevent HRS. Screen for proteinuria. Strict I/O. Avoid nephrotoxic agents. Transfuse at hb<7    Francisco Anaya MD  07/07/23  12:07 EDT

## 2023-07-07 NOTE — CONSULTS
"                  Clinical Nutrition   Nutrition Assessment  Reason for Visit: MDR, Follow-up protocol    Patient Name: Ivania Fields  YOB: 1960  MRN: [id number]  Date of Encounter: 07/07/23 13:00 EDT  Admission date: 7/4/2023    Pt NPO > 48 hours, if remains intubated, suggest place post pyloric feeding tube, and start EN    TF recs: if needed: start Peptamen AF at 25ml/hr, advance gradually as tolerated to goal rate @75ml/hr, free water @10ml/hr    Nutrition Assessment     Problem List:    Hematemesis    GI bleed    Liver cirrhosis secondary to PAREDES    SILVINA (acute kidney injury)    Type 2 diabetes mellitus    Essential hypertension    Dyslipidemia    Hypothyroidism    Anemia    Severe Malnutrition (HCC)        s/p EGD 7-5-23:  - LA Grade C reflux esophagitis with no bleeding.  - Portal hypertensive gastropathy.  - Non-bleeding gastric ulcer with pigmented material. Biopsied.  - Normal examined duodenum.  - Biopsies were taken with a cold forceps for Helicobacter pylori testing.      Extubated then re-intubated 7-5-23      s/p Paracentesis 7-6-23: 15.5L taken off    PMH:   She  has a past medical history of Anemia, Diabetes mellitus, Hyperlipidemia, Hypertension, Hypothyroidism, Impaired mobility, and Short-term memory loss.    PSH:  She  has a past surgical history that includes Cholecystectomy; Esophagogastroduodenoscopy w/ banding (N/A, 4/6/2023); and Esophagogastroduodenoscopy (N/A, 7/5/2023).    Applicable Nutrition Concerns:   Skin:L leg blisters, B groin MASD, L 5th toe ulcer, R leg ulcer, R peineum tear    GI:abdomen non distended    Reported/Observed/Food/Nutrition Related History:     Pt very alert off sedation, remains intubated, indicates she is hungry  Anthropometrics     Height: Height: 165.1 cm (65\")  Last Filed Weight: Weight: 129 kg (285 lb) (07/04/23 1419)  Method: Weight Method: Bed scale  BMI: BMI (Calculated): 47.4  BMI classification: Obese Class III extreme obesity: > or " "equal to 40kg/m2  IBW:   125 lb    UBW:  ? dry weight ~250 lb, pt indicates this is her UBW    Need bedscale weight now  that pt has been tapped     Weight      Weight (kg) Weight (lbs) Weight Method   12/3/2021 122.471 kg  270 lb  Stated    7/27/2022 113.399 kg  250 lb  Stated    4/4/2023 113.399 kg  250 lb  Stated     140.3 kg (H)  309 lb 4.9 oz (H)  Bed scale    4/5/2023 141 kg (H)  310 lb 13.6 oz (H)  Bed scale    4/6/2023 142.3 kg (H)  313 lb 11.4 oz (H)  Bed scale     142 kg (H)  313 lb 0.9 oz (H)  Bed scale    4/7/2023 144.7 kg (H)  319 lb 0.1 oz (H)  Bed scale    4/8/2023 147.2 kg (H)  324 lb 8.3 oz (H)  Bed scale    4/11/2023 62.625 kg  138 lb 1 oz  Bed scale    6/9/2023 68.04 kg  150 lb  Estimated    6/20/2023 127.007 kg  280 lb     7/2/2023 120.657 kg  266 lb  Stated    7/3/2023 120.657 kg  266 lb  Stated    7/4/2023 129.638 kg  285 lb 12.8 oz  Bed scale     129.275 kg  285 lb        Weight change:  ? Difficult to determine due to ascites, discussed with RN need for current  Weight, now that pt s/p paracentesis    Needs Assessment   Date: 7-7-23    Height used:Height: 165.1 cm (65\")  Weights used: 250lb presumed dry weight/ ABW  IBW: 125lb      Estimated Calorie needs: ~1700kcal  Method:  Kcals/KG MSJ ABW: 1591kcal  Method:  MSJ ABW: 1692kcal  Method : PSU ABW: 1844kcal    Estimated Protein needs: ~114g protein  Method: 0.8-1.2g protein per kg ABW: 91-136g  Method: 2-2.5g protein per kg IBW: 114-142g protein      Nutrition Focused Physical Exam     Date:  7/4     Patient meets criteria for malnutrition diagnosis, see MSA note.  *of note anasarca BLE lymphedema and anasarca likely masking further findings, severe muscle wasting and moderate fat loss to upper body evident    Current Nutrition Prescription   PO: NPO Diet NPO Type: Strict NPO  Oral Nutrition Supplement:   Intake: N/A    Nutrition Diagnosis   Date:  7/4            Updated:  7-7  Problem Malnutrition  severe/chronic   Etiology Energy " needs>energy intake 2/2 decompensated PAREDES cirrhosis   Signs/Symptoms PO intakes <75% EEN at least past 2 months, severe muscle wasting, moderate subcutaneous fat loss.    Status: NPO    Goal:   General: Nutrition to support treatment  PO: Advance diet if able to extubate  EN/PN: Initiate EN if unable to extubate    Nutrition Intervention      Follow treatment progress     Pt NPO > 48 hours, if remains intubated, suggest place post pyloric feeding tube, and start EN    TF recs: if needed: start Peptamen AF at 25ml/hr, advance gradually as tolerated to goal rate @75ml/hr, free water @10ml/hr    TF at goal volume will provide 1500ml, 1800kcal,106% kcal needs, 114g protein, 100% protein needs, 9g fiber, 1415ml free water      Monitoring/Evaluation:   Per protocol, I&O, PO intake, Supplement intake, Pertinent labs, Weight, Skin status, GI status, Symptoms, POC/GOC      Caren Valdez RD  Time Spent: 60m

## 2023-07-08 ENCOUNTER — ANCILLARY PROCEDURE (OUTPATIENT)
Dept: SPEECH THERAPY | Facility: HOSPITAL | Age: 63
DRG: 377 | End: 2023-07-08
Payer: MEDICARE

## 2023-07-08 ENCOUNTER — APPOINTMENT (OUTPATIENT)
Dept: GENERAL RADIOLOGY | Facility: HOSPITAL | Age: 63
DRG: 377 | End: 2023-07-08
Payer: MEDICARE

## 2023-07-08 LAB
ALBUMIN SERPL-MCNC: 2.5 G/DL (ref 3.5–5.2)
ALBUMIN/GLOB SERPL: 1 G/DL
ALP SERPL-CCNC: 50 U/L (ref 39–117)
ALT SERPL W P-5'-P-CCNC: 5 U/L (ref 1–33)
ANION GAP SERPL CALCULATED.3IONS-SCNC: 13 MMOL/L (ref 5–15)
AST SERPL-CCNC: 28 U/L (ref 1–32)
BACTERIA SPEC RESP CULT: NORMAL
BILIRUB SERPL-MCNC: 1.4 MG/DL (ref 0–1.2)
BUN SERPL-MCNC: 23 MG/DL (ref 8–23)
BUN/CREAT SERPL: 12.6 (ref 7–25)
CALCIUM SPEC-SCNC: 8.2 MG/DL (ref 8.6–10.5)
CHLORIDE SERPL-SCNC: 107 MMOL/L (ref 98–107)
CO2 SERPL-SCNC: 23 MMOL/L (ref 22–29)
CREAT SERPL-MCNC: 1.83 MG/DL (ref 0.57–1)
EGFRCR SERPLBLD CKD-EPI 2021: 30.7 ML/MIN/1.73
GLOBULIN UR ELPH-MCNC: 2.5 GM/DL
GLUCOSE BLDC GLUCOMTR-MCNC: 117 MG/DL (ref 70–130)
GLUCOSE BLDC GLUCOMTR-MCNC: 163 MG/DL (ref 70–130)
GLUCOSE BLDC GLUCOMTR-MCNC: 78 MG/DL (ref 70–130)
GLUCOSE BLDC GLUCOMTR-MCNC: 82 MG/DL (ref 70–130)
GLUCOSE SERPL-MCNC: 77 MG/DL (ref 65–99)
GRAM STN SPEC: NORMAL
MAGNESIUM SERPL-MCNC: 1.7 MG/DL (ref 1.6–2.4)
PHOSPHATE SERPL-MCNC: 3.9 MG/DL (ref 2.5–4.5)
POTASSIUM SERPL-SCNC: 3.8 MMOL/L (ref 3.5–5.2)
PROT SERPL-MCNC: 5 G/DL (ref 6–8.5)
SODIUM SERPL-SCNC: 143 MMOL/L (ref 136–145)

## 2023-07-08 PROCEDURE — 71045 X-RAY EXAM CHEST 1 VIEW: CPT

## 2023-07-08 PROCEDURE — 25010000002 ALBUMIN HUMAN 25% PER 50 ML: Performed by: INTERNAL MEDICINE

## 2023-07-08 PROCEDURE — 94660 CPAP INITIATION&MGMT: CPT

## 2023-07-08 PROCEDURE — P9047 ALBUMIN (HUMAN), 25%, 50ML: HCPCS | Performed by: INTERNAL MEDICINE

## 2023-07-08 PROCEDURE — 84100 ASSAY OF PHOSPHORUS: CPT | Performed by: INTERNAL MEDICINE

## 2023-07-08 PROCEDURE — 92610 EVALUATE SWALLOWING FUNCTION: CPT

## 2023-07-08 PROCEDURE — 82948 REAGENT STRIP/BLOOD GLUCOSE: CPT

## 2023-07-08 PROCEDURE — 99232 SBSQ HOSP IP/OBS MODERATE 35: CPT | Performed by: PHYSICIAN ASSISTANT

## 2023-07-08 PROCEDURE — 99232 SBSQ HOSP IP/OBS MODERATE 35: CPT | Performed by: INTERNAL MEDICINE

## 2023-07-08 PROCEDURE — 80053 COMPREHEN METABOLIC PANEL: CPT | Performed by: INTERNAL MEDICINE

## 2023-07-08 PROCEDURE — 92612 ENDOSCOPY SWALLOW (FEES) VID: CPT

## 2023-07-08 PROCEDURE — 94799 UNLISTED PULMONARY SVC/PX: CPT

## 2023-07-08 PROCEDURE — 63710000001 INSULIN LISPRO (HUMAN) PER 5 UNITS

## 2023-07-08 PROCEDURE — 83735 ASSAY OF MAGNESIUM: CPT | Performed by: INTERNAL MEDICINE

## 2023-07-08 RX ORDER — ALBUMIN (HUMAN) 12.5 G/50ML
12.5 SOLUTION INTRAVENOUS ONCE
Status: DISCONTINUED | OUTPATIENT
Start: 2023-07-08 | End: 2023-07-08

## 2023-07-08 RX ORDER — ALBUMIN (HUMAN) 12.5 G/50ML
25 SOLUTION INTRAVENOUS ONCE
Status: COMPLETED | OUTPATIENT
Start: 2023-07-08 | End: 2023-07-08

## 2023-07-08 RX ORDER — DEXTROSE MONOHYDRATE 25 G/50ML
25 INJECTION, SOLUTION INTRAVENOUS
Status: DISCONTINUED | OUTPATIENT
Start: 2023-07-08 | End: 2023-07-15 | Stop reason: HOSPADM

## 2023-07-08 RX ORDER — NICOTINE POLACRILEX 4 MG
15 LOZENGE BUCCAL
Status: DISCONTINUED | OUTPATIENT
Start: 2023-07-08 | End: 2023-07-15 | Stop reason: HOSPADM

## 2023-07-08 RX ORDER — INSULIN LISPRO 100 [IU]/ML
3-14 INJECTION, SOLUTION INTRAVENOUS; SUBCUTANEOUS
Status: DISCONTINUED | OUTPATIENT
Start: 2023-07-08 | End: 2023-07-15 | Stop reason: HOSPADM

## 2023-07-08 RX ORDER — IBUPROFEN 600 MG/1
1 TABLET ORAL
Status: DISCONTINUED | OUTPATIENT
Start: 2023-07-08 | End: 2023-07-15 | Stop reason: HOSPADM

## 2023-07-08 RX ADMIN — ATORVASTATIN CALCIUM 20 MG: 20 TABLET, FILM COATED ORAL at 13:48

## 2023-07-08 RX ADMIN — ALBUMIN (HUMAN) 25 G: 0.25 INJECTION, SOLUTION INTRAVENOUS at 08:06

## 2023-07-08 RX ADMIN — DONEPEZIL HYDROCHLORIDE 10 MG: 10 TABLET, FILM COATED ORAL at 20:57

## 2023-07-08 RX ADMIN — BUPROPION HYDROCHLORIDE 150 MG: 150 TABLET, FILM COATED, EXTENDED RELEASE ORAL at 13:49

## 2023-07-08 RX ADMIN — PANTOPRAZOLE SODIUM 40 MG: 40 INJECTION, POWDER, FOR SOLUTION INTRAVENOUS at 07:56

## 2023-07-08 RX ADMIN — ALBUMIN (HUMAN) 25 G: 0.25 INJECTION, SOLUTION INTRAVENOUS at 13:53

## 2023-07-08 RX ADMIN — INSULIN LISPRO 3 UNITS: 100 INJECTION, SOLUTION INTRAVENOUS; SUBCUTANEOUS at 20:57

## 2023-07-08 RX ADMIN — Medication 10 ML: at 20:57

## 2023-07-08 RX ADMIN — ALBUMIN (HUMAN) 25 G: 0.25 INJECTION, SOLUTION INTRAVENOUS at 20:57

## 2023-07-08 RX ADMIN — SPIRONOLACTONE 25 MG: 25 TABLET ORAL at 13:49

## 2023-07-08 RX ADMIN — PANTOPRAZOLE SODIUM 40 MG: 40 INJECTION, POWDER, FOR SOLUTION INTRAVENOUS at 18:06

## 2023-07-08 NOTE — PROGRESS NOTES
"GI Daily Progress Note  Subjective:    Chief Complaint:  Follow up hematemesis     Ivania was extubated.   She failed her speech evaluation but is awaiting repeat eval today.  No recurrence in bleeding.       Objective:    /44   Pulse 66   Temp 98.2 °F (36.8 °C) (Bladder)   Resp 18   Ht 165.1 cm (65\")   Wt 129 kg (285 lb)   SpO2 98%   BMI 47.43 kg/m²     Physical Exam  Constitutional:       General: She is not in acute distress.     Appearance: She is obese. She is ill-appearing.   Cardiovascular:      Rate and Rhythm: Normal rate and regular rhythm.   Pulmonary:      Effort: Pulmonary effort is normal. No respiratory distress.   Abdominal:      General: Bowel sounds are normal.      Palpations: Abdomen is soft.      Tenderness: There is no abdominal tenderness.   Musculoskeletal:      Comments: Sarcopenia   Skin:     Findings: Bruising present.      Comments: Severe venous stasis changes of the lower extremity edema   Neurological:      Mental Status: She is alert and oriented to person, place, and time.       Lab  Lab Results   Component Value Date    WBC 5.09 07/05/2023    HGB 8.1 (L) 07/07/2023    HGB 8.0 (L) 07/06/2023    HGB 8.2 (L) 07/06/2023    MCV 94.7 07/05/2023    PLT 97 (L) 07/05/2023    INR 1.68 (H) 07/06/2023    INR 1.79 (H) 07/04/2023    INR 1.75 (H) 07/04/2023    INR 1.57 (H) 07/03/2023    INR 1.48 (H) 04/05/2023       Lab Results   Component Value Date    GLUCOSE 77 07/08/2023    BUN 23 07/08/2023    CREATININE 1.83 (H) 07/08/2023    BCR 12.6 07/08/2023     07/08/2023    K 3.8 07/08/2023    CO2 23.0 07/08/2023    CALCIUM 8.2 (L) 07/08/2023    ALBUMIN 2.5 (L) 07/08/2023    ALKPHOS 50 07/08/2023    BILITOT 1.4 (H) 07/08/2023    ALT 5 07/08/2023    AST 28 07/08/2023       Assessment:    Hematemesis of coffee grounds secondary to large prepyloric ulcer s/p diagnostic EGD on 7/5/23   LA Grade C reflux esophagitis   PAREDES Cirrhosis with ascites s/p paracentesis on 7/6 with 15 liters " removed   Normocytic anemia, stable.      Coagulopathy of liver disease.     Severe hypoalbuminemia   SILVINA on CKD, on diuretics for ascites  Morbid obesity     Plan:    H&H is stable.       Pathology was negative for H. Pylori.       >> Repeat EGD with Dr. Mandujano in 8 weeks  >> Avoid all NSAIDs  >> BID PPI for 1 month   >> Nephrology following.  Creatinine slightly worsening.   Baseline ~ 1.5-1.6.     >> Continue diuretics, repeat paracentesis as needed. but avoid large volume removal.   Would max at 6 L.     If fails speech therapy evaluation this afternoon please place bedside Keofeed tube for nutrition.        DIANA Crockett  07/08/23  07:35 EDT

## 2023-07-08 NOTE — PLAN OF CARE
Goal Outcome Evaluation:  Plan of Care Reviewed With: patient           Outcome Evaluation:   -Pt alert and oriented x4. Voice is weak and only able to whisper words. Pt on 2L NC and wore bipap overnight.   - Petersen catheter in place. UOP:1150mL.   - Wound dressings changed.   -Awaiting phlebotomy to draw labs.  - VSS.

## 2023-07-08 NOTE — PROGRESS NOTES
Nutrition Services    Patient Name:  Ivania Fields  YOB: 1960  MRN: [id number]  Admit Date:  7/4/2023    EMR Reviewed    Pt identified NPO/Clear Liquid Diet >72 hrs. SLP eval on 7/7 rec continued  NPO. Per pulmonology note, plan for SLP re-eval due to improved vocal quality. Advance diet as medically appropriate. RD to monitor for diet advance. Please consult RD if nutrition support indicated.        Electronically signed by:  [place] MS,RD,LD  07/08/23 12:16 EDT

## 2023-07-08 NOTE — PLAN OF CARE
Goal Outcome Evaluation:  Plan of Care Reviewed With: patient, family                   SLP re-evaluation completed. Will continue to address mild dysphagia. Please see note for further details and recommendations.

## 2023-07-08 NOTE — PLAN OF CARE
Problem: Adult Inpatient Plan of Care  Goal: Plan of Care Review  Outcome: Ongoing, Progressing  Flowsheets (Taken 7/8/2023 3189)  Plan of Care Reviewed With:   patient   sibling  Outcome Evaluation: Pt passed speech eval.  VSS. UOP >1500.  No BM. AxO x 4, very pleasant.  Continue to monitor.   Goal Outcome Evaluation:  Plan of Care Reviewed With: patient, sibling           Outcome Evaluation: Pt passed speech eval.  VSS. UOP >1500.  No BM. AxO x 4. Continue to monitor.

## 2023-07-08 NOTE — PROGRESS NOTES
" LOS: 4 days   Patient Care Team:  Kala Beaver MD as PCP - General (Internal Medicine)    Chief Complaint: Generalize weakness.    Subjective     Extubated. Doing well. Slight uptrend in cr noted. Significant improvement in generalize edema.    Subjective:  Symptoms:  Stable.      History taken from: patient    Objective     Vital Sign Min/Max for last 24 hours  Temp  Min: 97.5 °F (36.4 °C)  Max: 98.6 °F (37 °C)   BP  Min: 91/38  Max: 132/75   Pulse  Min: 55  Max: 76   Resp  Min: 18  Max: 30   SpO2  Min: 90 %  Max: 100 %   Flow (L/min)  Min: 2  Max: 6   No data recorded     Flowsheet Rows      Flowsheet Row First Filed Value   Admission Height 165.1 cm (65\") Documented at 07/04/2023 0201   Admission Weight 130 kg (285 lb 12.8 oz) Documented at 07/04/2023 0201            I/O this shift:  In: 100 [IV Piggyback:100]  Out: 1400 [Urine:1400]  I/O last 3 completed shifts:  In: 995 [I.V.:543; IV Piggyback:452]  Out: 3185 [Urine:3185]    Objective:  General Appearance:  Ill-appearing.    Vital signs: (most recent): Blood pressure (!) 113/39, pulse 67, temperature 98.6 °F (37 °C), temperature source Bladder, resp. rate 20, height 165.1 cm (65\"), weight 129 kg (285 lb), SpO2 100 %.  Vital signs are normal.    Output: Producing urine (Petersen cath+).    HEENT: Normal HEENT exam.    Lungs:  Normal effort and normal respiratory rate.  (Intubated on MV)  Heart: Normal rate.  Regular rhythm.  S1 normal and S2 normal.  No murmur or gallop.   Abdomen: Abdomen is soft and non-distended.  There are no signs of ascites.    Extremities: Normal range of motion.  There is dependent edema and local swelling.    Pulses: Distal pulses are intact.    Neurological: Patient is alert.    Skin:  Warm.  No ecchymosis. (Hyperpigmentation.)            Results Review:     I reviewed the patient's new clinical results.    WBC WBC   Date Value Ref Range Status   07/05/2023 5.09 3.40 - 10.80 10*3/mm3 Final      HGB Hemoglobin   Date Value Ref " Range Status   07/07/2023 8.1 (L) 12.0 - 15.9 g/dL Final   07/06/2023 8.0 (L) 12.0 - 15.9 g/dL Final   07/06/2023 8.2 (L) 12.0 - 15.9 g/dL Final   07/06/2023 9.5 (L) 12.0 - 15.9 g/dL Final   07/05/2023 8.7 (L) 12.0 - 15.9 g/dL Final      HCT Hematocrit   Date Value Ref Range Status   07/07/2023 22.5 (L) 34.0 - 46.6 % Final   07/06/2023 23.4 (L) 34.0 - 46.6 % Final   07/06/2023 22.7 (L) 34.0 - 46.6 % Final   07/06/2023 26.4 (L) 34.0 - 46.6 % Final   07/05/2023 27.0 (L) 34.0 - 46.6 % Final      Platlets No results found for: LABPLAT   MCV MCV   Date Value Ref Range Status   07/05/2023 94.7 79.0 - 97.0 fL Final          Sodium Sodium   Date Value Ref Range Status   07/08/2023 143 136 - 145 mmol/L Final   07/07/2023 142 136 - 145 mmol/L Final   07/06/2023 142 136 - 145 mmol/L Final      Potassium Potassium   Date Value Ref Range Status   07/08/2023 3.8 3.5 - 5.2 mmol/L Final     Comment:     Slight hemolysis detected by analyzer. Results may be affected.   07/07/2023 3.4 (L) 3.5 - 5.2 mmol/L Final     Comment:     Slight hemolysis detected by analyzer. Results may be affected.   07/06/2023 3.6 3.5 - 5.2 mmol/L Final      Chloride Chloride   Date Value Ref Range Status   07/08/2023 107 98 - 107 mmol/L Final   07/07/2023 106 98 - 107 mmol/L Final   07/06/2023 105 98 - 107 mmol/L Final      CO2 CO2   Date Value Ref Range Status   07/08/2023 23.0 22.0 - 29.0 mmol/L Final   07/07/2023 23.0 22.0 - 29.0 mmol/L Final   07/06/2023 24.0 22.0 - 29.0 mmol/L Final      BUN BUN   Date Value Ref Range Status   07/08/2023 23 8 - 23 mg/dL Final   07/07/2023 24 (H) 8 - 23 mg/dL Final   07/06/2023 23 8 - 23 mg/dL Final      Creatinine Creatinine   Date Value Ref Range Status   07/08/2023 1.83 (H) 0.57 - 1.00 mg/dL Final   07/07/2023 1.64 (H) 0.57 - 1.00 mg/dL Final   07/06/2023 1.59 (H) 0.57 - 1.00 mg/dL Final      Calcium Calcium   Date Value Ref Range Status   07/08/2023 8.2 (L) 8.6 - 10.5 mg/dL Final   07/07/2023 8.7 8.6 - 10.5 mg/dL  Final   07/06/2023 8.8 8.6 - 10.5 mg/dL Final      PO4 No results found for: CAPO4   Albumin Albumin   Date Value Ref Range Status   07/08/2023 2.5 (L) 3.5 - 5.2 g/dL Final   07/07/2023 2.9 (L) 3.5 - 5.2 g/dL Final   07/06/2023 2.6 (L) 3.5 - 5.2 g/dL Final      Magnesium Magnesium   Date Value Ref Range Status   07/08/2023 1.7 1.6 - 2.4 mg/dL Final   07/07/2023 1.7 1.6 - 2.4 mg/dL Final   07/06/2023 1.8 1.6 - 2.4 mg/dL Final      Uric Acid No results found for: URICACID     Medication Review: yes    Assessment & Plan       Hematemesis    GI bleed    Liver cirrhosis secondary to PAREDES    SILVINA (acute kidney injury)    Type 2 diabetes mellitus    Essential hypertension    Dyslipidemia    Hypothyroidism    Anemia    Severe Malnutrition (HCC)      Assessment & Plan    Acute kidney disease vs chronic kidney disease: Last known stable cr btw 1.5-1.6mg/dl. Seen for abnormal renal function 2 months ago as well in the setting of GI bleed.     Volume status: Significant LE edema and possible asictes in the setting of liver cirrhosis. Underwent large volume paracentesis 15.5 liter removed on 7/6/23      Anemia: s/p 2 unit pRBC. Hx of GI ulcer. S/p EGD on this visit. No active bleeding site noted     Intubated on MV: Post EGD     Liver cirrhosis: Complications includes ascites and gastropathy.      Hypoalbuminemia: In the setting of liver cirrhosis     Recs  Slight uptrend in cr. Start albumin 25gm 3x today. Hold Bumex. Continue w spironolactone 25 mg daily ( Once allowed to have oral meds). Strict I/O. Avoid nephrotoxic agents. Transfuse at hb<7    Francisco Anaya MD  07/08/23  12:17 EDT

## 2023-07-08 NOTE — MBS/VFSS/FEES
Acute Care - Speech Language Pathology   Swallow Re-Evaluation [facility]  Clinical Swallow Evaluation  Fiberoptic Endoscopic Evaluation of Swallowing (FEES)       Patient Name: Ivania Fields  : 1960  MRN: [id number]  Today's Date: 2023               Admit Date: 2023    Visit Dx:     ICD-10-CM ICD-9-CM   1. Hematemesis with nausea  K92.0 578.0     787.02   2. 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    Sleep apnea    Iron deficiency anemia    SILVINA (acute kidney injury)    CKD (chronic kidney disease)    Stasis dermatitis of both legs    Debility    Type 2 diabetes mellitus    Essential hypertension    Dyslipidemia    Hypothyroidism    Hypoalbuminemia    Anemia    Hematemesis    Severe Malnutrition (HCC)     Past Medical History:   Diagnosis Date    Anemia     Diabetes mellitus     Hyperlipidemia     Hypertension     Hypothyroidism     Impaired mobility     Short-term memory loss      Past Surgical History:   Procedure Laterality Date    CHOLECYSTECTOMY      ENDOSCOPY N/A 2023    Procedure: ESOPHAGOGASTRODUODENOSCOPY;  Surgeon: Brunner, Mark I, MD;  Location: [facility] ENDOSCOPY;  Service: Gastroenterology;  Laterality: N/A;    ENDOSCOPY W/ BANDING N/A 2023    Procedure: ESOPHAGOGASTRODUODENOSCOPY WITH BIOPSY;  Surgeon: Jens Mandujano MD;  Location: [facility] ENDOSCOPY;  Service: Gastroenterology;  Laterality: N/A;       SLP Recommendation and Plan  SLP Swallowing Diagnosis: mild, oral dysphagia, pharyngeal dysphagia (23 1330)  SLP Diet Recommendation: soft to chew textures, whole, thin liquids (23 1330)  Recommended Precautions and Strategies: small bites of food and sips of liquid, general aspiration precautions (small, single sips only) (23 1330)  SLP Rec. for Method of Medication  Administration: meds whole, with thin liquids, with puree, as tolerated (07/08/23 1330)     Monitor for Signs of Aspiration: notify SLP if any concerns (07/08/23 1330)  Recommended Diagnostics: reassess via FEES (07/08/23 1010)  Swallow Criteria for Skilled Therapeutic Interventions Met: demonstrates skilled criteria (07/08/23 1330)  Anticipated Discharge Disposition (SLP): home with home health, home with OP services, anticipate therapy at next level of care (07/08/23 1330)  Rehab Potential/Prognosis, Swallowing: good, to achieve stated therapy goals (07/08/23 1330)  Therapy Frequency (Swallow): 3 days per week (07/08/23 1330)  Predicted Duration Therapy Intervention (Days): until discharge (07/08/23 1330)                                        Plan of Care Reviewed With: patient, family      SWALLOW EVALUATION (last 72 hours)       SLP Adult Swallow Evaluation       Row Name 07/08/23 1330 07/08/23 1010 07/07/23 1510             Rehab Evaluation    Document Type re-evaluation  -VO re-evaluation  -VO evaluation  -MP      Subjective Information no complaints  -VO no complaints  -VO no complaints  -MP      Patient Observations alert;cooperative  -VO alert;cooperative  -VO alert;cooperative  -MP      Patient/Family/Caregiver Comments/Observations -- -- Family present  -MP      Patient Effort good  -VO good  -VO good  -MP         General Information    Patient Profile Reviewed -- yes  -VO yes  -MP      Pertinent History Of Current Problem -- see initial  -VO Adm 7/4 w/ hematemesis, concern for GI bleed. s/p EGD 7/5 w/ reflux esophagitis, portal gastropathy, lrg prepyloric ulcer w/o bleeding. Post-EGD u/a to ventilate sufficiently & subsequently re-intubated and transferred to ICU. Extubated today. Hx HTN, HLD, DM2, hypothyroid, liver cirrhosis, debility.  -MP      Current Method of Nutrition -- NPO  -VO NPO  -MP      Precautions/Limitations, Vision -- WFL  -VO WFL  -MP      Precautions/Limitations, Hearing -- WFL  -VO  WFL  -MP      Prior Level of Function-Communication -- WFL  -VO WFL  -MP      Prior Level of Function-Swallowing -- no diet consistency restrictions  -VO no diet consistency restrictions  -MP      Plans/Goals Discussed with -- patient;family  -VO patient;family  -MP      Barriers to Rehab -- medically complex  -VO medically complex  -MP      Patient's Goals for Discharge -- return to PO diet  -VO patient did not state  -MP      Family Goals for Discharge -- patient able to return to PO diet  -VO --         Pain    Additional Documentation -- -- Pain Scale: FACES Pre/Post-Treatment (Group)  -MP         Pain Scale: Numbers Pre/Post-Treatment    Pretreatment Pain Rating 0/10 - no pain  -VO 0/10 - no pain  -VO --      Posttreatment Pain Rating 0/10 - no pain  -VO 0/10 - no pain  -VO --         Pain Scale: FACES Pre/Post-Treatment    Pain: FACES Scale, Pretreatment -- -- 0-->no hurt  -MP      Posttreatment Pain Rating -- -- 0-->no hurt  -MP         Oral Motor Structure and Function    Dentition Assessment -- natural, present and adequate  -VO natural, present and adequate  -MP      Secretion Management -- WNL/WFL  -VO WNL/WFL  -MP      Mucosal Quality -- -- dry  -MP         Oral Musculature and Cranial Nerve Assessment    Oral Motor General Assessment -- generalized oral motor weakness;vocal impairment  -VO generalized oral motor weakness;vocal impairment  -MP      Vocal Impairment, Detail. Cranial Nerve X (Vagus) -- vocal quality abnormality (see comments)  -VO vocal quality abnormality (see comments)  -MP      Oral Motor, Comment -- improved VQ though hoarse and slight breathiness  -VO Aphonic  -MP         General Eating/Swallowing Observations    Respiratory Support Currently in Use -- nasal cannula  -VO nasal cannula  -MP      Eating/Swallowing Skills -- self-fed;fed by SLP;needed assist  -VO fed by SLP  -MP      Positioning During Eating -- upright in bed  -VO upright in bed  -MP      Utensils Used --  spoon;cup;straw  -VO spoon  -MP      Consistencies Trialed -- thin liquids;nectar/syrup-thick liquids;pureed  -VO ice chips;thin liquids  -MP         Clinical Swallow Eval    Pharyngeal Phase -- suspected pharyngeal impairment  -VO suspected pharyngeal impairment  -MP      Clinical Swallow Evaluation Summary -- Hard coughing w/ thin via cup. No overt s/sxs aspiration w/ nectar thick liquids or puree. Much improved vocal quality, will proceed with FEES to further assess pharyngeal function and provide diet recommendations. NPO until then.  -VO Pt given one ice chip and one tsp water, both w/ immediate wet cough requiring attempts @ suction. Cont'd to cough following min PO trials. DNA further given high aspiration risk. Rec continue NPO & SLP will re-eval tomorrow to determine if appropriate to proceed w/ instrumental eval.  -MP         Pharyngeal Phase Concerns    Pharyngeal Phase Concerns -- cough  -VO cough  -MP      Cough -- thin  -VO thin  -MP         Fiberoptic Endoscopic Evaluation of Swallowing (FEES)    Risks/Benefits Reviewed risks/benefits explained;patient;family;agreed to eval  -VO -- --      Nasal Entry right:  -VO -- --      Scope serial number/identification [id number]  -VO -- --         Anatomy and Physiology    Anatomic Considerations edema;erythema;vocal folds  irregular tissue on anterior TVFs, c/w intubation  -VO -- --      Velopharyngeal WFL  -VO -- --      Base of Tongue symmetrical;range adequate  -VO -- --      Epiglottis WFL  -VO -- --      Laryngeal Function Breathing symmetrical  -VO -- --      Laryngeal Function Phonation symmetrical  -VO -- --      Laryngeal Function to Breath Hold TVF/FVF/Arytenoid  -VO -- --      Secretion Rating Scale (Beau et al. 1996) 0- normal, no visible secretions  -VO -- --      Spontaneous Swallow frequency reduced  -VO -- --      Sensory sensed scope  -VO -- --      Utensils Used Spoon;Cup;Straw  -VO -- --      Consistencies Trialed thin  liquids;pudding/puree;regular textures  -VO -- --         FEES Interpretation    Oral Phase prespill of liquids into pharynx  -VO -- --         Initiation of Pharyngeal Swallow    Initiation of Pharyngeal Swallow bolus in pyriform sinuses  -VO -- --      Pharyngeal Phase impaired pharyngeal phase of swallowing  -VO -- --      Penetration Before the Swallow thin liquids;secondary to delayed swallow initiation or mistiming  -VO -- --      Aspiration During the Swallow thin liquids;secondary to delayed swallow initiation or mistiming;secondary to reduced vestibular closure  with consecutive drinksonly  -VO -- --      Aspiration After the Swallow thin liquids;secondary to residue;in laryngeal vestibule  consecutive drinks only  -VO -- --      Response to Aspiration No  -VO -- --      No spontaneous response to aspiration with effective subglottic clearance with cue (see comments)  -VO -- --      Rosenbek's Scale thin:;8-->Level 8;pudding/puree:;regular textures:;1-->Level 1  consec drinks thin only  -VO -- --      Residue --  minimal  -VO -- --      Response to Residue cleared residue  -VO -- --      Attempted Compensatory Maneuvers bolus size;bolus presentation style  -VO -- --      Response to Attempted Compensatory Maneuvers prevented aspiration  -VO -- --      Successful Compensatory Maneuver Competency patient able to;demonstrate compensations  -VO -- --      FEES Summary Pt w/ mild oropharyngeal dysphagia. There was aspiration during/after the swallow w/ large consecutive drinks of thin liquids via cup/straw 2' mistiming of swallow and decreased laryngeal closure that was SILENT. Able to clear subglottic material w/ cued cough. Minimal pharyngeal residue that cleared spontaneously. Mild increased mastication time 2' general weakness w/ regular solids. Suspect with time and edema resolving, dysphagia will improve. At this time, safest is soft whole foods, thin liquids small single sips only (w/ cup or str). Meds w/  one sip at a time or puree/pudding.  -VO -- --         SLP Evaluation Clinical Impression    SLP Swallowing Diagnosis mild;oral dysphagia;pharyngeal dysphagia  -VO suspected pharyngeal dysphagia  -VO suspected pharyngeal dysphagia  -MP      Functional Impact risk of aspiration/pneumonia;risk of malnutrition;risk of dehydration  -VO risk of aspiration/pneumonia  -VO risk of aspiration/pneumonia  -MP      Rehab Potential/Prognosis, Swallowing good, to achieve stated therapy goals  -VO good, to achieve stated therapy goals  -VO good, to achieve stated therapy goals  -MP      Swallow Criteria for Skilled Therapeutic Interventions Met demonstrates skilled criteria  -VO demonstrates skilled criteria  -VO demonstrates skilled criteria  -MP         Recommendations    Therapy Frequency (Swallow) 3 days per week  -VO -- --      Predicted Duration Therapy Intervention (Days) until discharge  -VO until discharge  -VO until discharge  -MP      SLP Diet Recommendation soft to chew textures;whole;thin liquids  -VO NPO  -VO NPO  -MP      Recommended Diagnostics -- reassess via FEES  -VO reassess via clinical swallow evaluation  -MP      Recommended Precautions and Strategies small bites of food and sips of liquid;general aspiration precautions  small, single sips only  -VO -- --      Oral Care Recommendations Oral Care BID/PRN;Toothbrush  -VO Oral Care BID/PRN;Suction toothbrush  -VO Oral Care BID/PRN;Suction toothbrush  -MP      SLP Rec. for Method of Medication Administration meds whole;with thin liquids;with puree;as tolerated  -VO meds via alternate route  -VO meds via alternate route  -MP      Monitor for Signs of Aspiration notify SLP if any concerns  -VO notify SLP if any concerns  -VO notify SLP if any concerns  -MP      Anticipated Discharge Disposition (SLP) home with home health;home with OP services;anticipate therapy at next level of care  -VO inpatient rehabilitation facility;anticipate therapy at next level of care   -VO inpatient rehabilitation facility;anticipate therapy at next level of care  -MP                User Key  (r) = Recorded By, (t) = Taken By, (c) = Cosigned By      Initials Name Effective Dates    VO Adenike Regan MA,CCC-SLP 06/16/21 -     MP Carlos Alberto Hackett, MS CCC-SLP 12/28/21 -                     EDUCATION  The patient has been educated in the following areas:   Dysphagia (Swallowing Impairment) Oral Care/Hydration Modified Diet Instruction.        SLP GOALS       Row Name 07/08/23 1330             (LTG) Patient will demonstrate functional swallow for    Diet Texture (Demonstrate functional swallow) regular textures  -VO      Liquid viscosity (Demonstrate functional swallow) thin liquids  -VO      Winkler (Demonstrate functional swallow) independently (over 90% accuracy)  -VO      Time Frame (Demonstrate functional swallow) by discharge  -VO      Progress/Outcomes (Demonstrate functional swallow) new goal  -VO         (STG) Patient will tolerate therapeutic trials of    Consistencies Trialed (Tolerate therapeutic trials) regular textures;soft to chew (whole) textures;thin liquids  -VO      Desired Outcome (Tolerate therapeutic trials) without signs/symptoms of aspiration;with adequate oral prep/transit/clearance  -VO      Winkler (Tolerate therapeutic trials) independently (over 90% accuracy)  -VO      Progress/Outcomes (Tolerate therapeutic trials) new goal  -VO         (STG) Pharyngeal Strengthening Exercise Goal 1 (SLP)    Activity (Pharyngeal Strengthening Goal 1, SLP) increase timing;increase closure at entrance to airway/closure of airway at glottis  -VO      Increase Timing prepping - 3 second prep or suck swallow or 3-step swallow  -VO      Increase Closure at Entrance to Airway/Closure of Airway at Glottis super-supraglottic swallow;hard effortful swallow  -VO      Winkler/Accuracy (Pharyngeal Strengthening Goal 1, SLP) independently (over 90% accuracy)  -VO      Time  Frame (Pharyngeal Strengthening Goal 1, SLP) short term goal (STG)  -VO      Progress/Outcomes (Pharyngeal Strengthening Goal 1, SLP) new goal  -VO                User Key  (r) = Recorded By, (t) = Taken By, (c) = Cosigned By      Initials Name Provider Type    Adenike Rai MA,CCC-SLP Speech and Language Pathologist                       Time Calculation:    Time Calculation- SLP       Row Name 07/08/23 1408 07/08/23 1215          Time Calculation- SLP    SLP Start Time 1330  -VO 1010  -VO     SLP Received On 07/08/23  -VO 07/08/23  -VO        Untimed Charges    06201-LJ Eval Oral Pharyng Swallow Minutes -- 28  -VO     05463-KZ Fiberoptic Endo Eval Swallow Minutes 80  -VO --        Total Minutes    Untimed Charges Total Minutes 80  -VO 28  -VO      Total Minutes 80  -VO 28  -VO               User Key  (r) = Recorded By, (t) = Taken By, (c) = Cosigned By      Initials Name Provider Type    Adenike Rai MA,CCC-SLP Speech and Language Pathologist                    Therapy Charges for Today       Code Description Service Date Service Provider Modifiers Qty    [id number] HC ST EVAL ORAL PHARYNG SWALLOW 2 7/8/2023 Adenike Regan MA,CCC-SLP GN 1    [id number] HC ST FIBEROPTIC ENDO EVAL SWALL 5 7/8/2023 Adenike Regan MA,CCC-SLP GN 1                 Adenike Regan MA,CCC-SLP  7/8/2023

## 2023-07-08 NOTE — PROGRESS NOTES
Intensive Care Follow-up     Hospital:  LOS: 4 days   Ms. Ivania Fields, 62 y.o. female is followed for:   Hematemesis            History of present illness:   62-year-old female with a history of diabetes, hypertension, hypothyroidism, PAREDES cirrhosis transferred from [facility] with GI bleed on July 4.  EGD revealed antral ulcer and esophagitis.  No evidence of portal hypertension or varices noted.  Postprocedure patient was somnolent and could not be weaned from mechanical ventilation.  She was transferred to ICU.  Underwent large-volume paracentesis with 15 L on 7/6.  Patient's hemoglobin remained stable.  Patient also had acute kidney injury.  Nephrology team is following.  Patient was extubated on July 7.      Subjective   Interval History:  Failed swallow evaluation yesterday.  Today apparently looking better.  Voice quality is improved.  Was on BiPAP overnight.  Today morning on room air.  Denies any abdominal pain.  Denies any further episodes of nausea or vomiting.  We will have speech reevaluate.               The patient's past medical, surgical and social history were reviewed and updated in Epic as appropriate.       Objective     Infusions:  propofol, 5-50 mcg/kg/min, Last Rate: Stopped (07/07/23 1013)      Medications:  albumin human, 25 g, Intravenous, BID  atorvastatin, 20 mg, Oral, Daily  bumetanide, 1 mg, Intravenous, Q12H  buPROPion XL, 150 mg, Oral, Daily  donepezil, 10 mg, Oral, Nightly  insulin regular, 3-14 Units, Subcutaneous, Q6H  levothyroxine, 300 mcg, Oral, Daily  pantoprazole, 40 mg, Intravenous, BID AC  sodium chloride, 10 mL, Intravenous, Q12H  spironolactone, 25 mg, Oral, Daily        Vital Sign Min/Max for last 24 hours  Temp  Min: 97.5 °F (36.4 °C)  Max: 98.6 °F (37 °C)   BP  Min: 91/38  Max: 146/66   Pulse  Min: 55  Max: 85   Resp  Min: 17  Max: 30   SpO2  Min: 90 %  Max: 100 %   Flow (L/min)  Min: 2  Max: 6       Input/Output for last 24 hour shift  07/07  "0701 - 07/08 0700  In: 524 [I.V.:122]  Out: 2185 [Urine:2185]   FiO2 (%):  [35 %] 35 %  S RR:  [10] 10  PEEP/CPAP (cm H2O):  [5 cm H20] 5 cm H20  CA SUP:  [10 cm H20] 10 cm H20  MAP (cm H2O):  [8.8-13] 13  Objective:  Vital signs: (most recent): Blood pressure 129/51, pulse 70, temperature 98.4 °F (36.9 °C), temperature source Bladder, resp. rate 19, height 165.1 cm (65\"), weight 129 kg (285 lb), SpO2 98 %.          General Appearance: Awake, alert, in no acute distress  Head:    Atraumatic, Normocephalic, without obvious abnormality, Pupils reactive & symmetrical B/L.  Lungs:   B/L Breath sounds present with decreased breath sounds on bases, no wheezing heard, no crackles.   Heart: S1 and S2 present, no murmur  Abdomen: Soft, nontender, no guarding or rigidity, bowel sounds positive  Extremities:  no cyanosis or clubbing,  chronic statis changes, warm to touch.  Pulses: Positive and symmetric.  Neurologic:  Moving all four extremities. Good strength bilaterally.  Psychological: Normal affect, Cooperative      Results from last 7 days   Lab Units 07/07/23  0032 07/06/23  1751 07/06/23  0646 07/06/23  0109 07/05/23  1626 07/05/23  0058 07/04/23  1034 07/03/23 2006   WBC 10*3/mm3  --   --   --   --  5.09  --  5.45 5.39   HEMOGLOBIN g/dL 8.1* 8.0* 8.2*   < > 8.7*   < > 8.0* 9.3*   PLATELETS 10*3/mm3  --   --   --   --  97*  --  136* 155    < > = values in this interval not displayed.     Results from last 7 days   Lab Units 07/08/23  0607 07/07/23  0300 07/06/23  0646   SODIUM mmol/L 143 142 142   POTASSIUM mmol/L 3.8 3.4* 3.6   CO2 mmol/L 23.0 23.0 24.0   BUN mg/dL 23 24* 23   CREATININE mg/dL 1.83* 1.64* 1.59*   MAGNESIUM mg/dL 1.7 1.7 1.8   PHOSPHORUS mg/dL 3.9 3.1  --    GLUCOSE mg/dL 77 104* 156*     Estimated Creatinine Clearance: 42.6 mL/min (A) (by C-G formula based on SCr of 1.83 mg/dL (H)).    Results from last 7 days   Lab Units 07/07/23  0340   PH, ARTERIAL pH units 7.495*   PCO2, ARTERIAL mm Hg 34.5* "   PO2 ART mm Hg 73.6*       Images:   Chest x-ray reviewed and showed interval extubation.  Right pleural effusion along with mild pulmonary vascular congestion.  I reviewed the patient's results and images.     Assessment & Plan   Impression        Hematemesis    GI bleed    Liver cirrhosis secondary to PAREDES    SILVINA (acute kidney injury)    Type 2 diabetes mellitus    Essential hypertension    Dyslipidemia    Hypothyroidism    Anemia    Severe Malnutrition (HCC)       Plan        1.  Patient is s/p mechanical ventilation after failing to extubate post endoscopy.  Patient was extubated yesterday and doing well clinically.  Currently on room air.  Chest x-ray shows pulmonary edema pattern and pleural effusion.  Nephrology team is managing diuretics.  Continue albumin and Bumex to keep in negative fluid balance as tolerated.  Creatinine is slightly up further.  Could be fluid shifts after paracentesis.  Will monitor closely.  2.  No further GI bleed. PPI BID to continue.   3.  NIV support as needed.   4.  Levothyroxine for hypothyroidism.   5. Atorvastatin for dyslipidemia.   6. Sliding scale insulin for hyperglycemia management.   7. GI prophylaxis.   8. Speech to reevaluate and oral diet as tolerated.     Continue ICU care.     Plan of care and goals reviewed with multidisciplinary/antibiotic stwardship team during rounds.   I discussed the patient's findings and my recommendations with patient and nursing staff     Bora Bahena MD, [facility]  Pulmonary, Critical care and Sleep Medicine

## 2023-07-09 LAB
ALBUMIN SERPL-MCNC: 2.8 G/DL (ref 3.5–5.2)
ALBUMIN/GLOB SERPL: 1.2 G/DL
ALP SERPL-CCNC: 55 U/L (ref 39–117)
ALT SERPL W P-5'-P-CCNC: 8 U/L (ref 1–33)
ANION GAP SERPL CALCULATED.3IONS-SCNC: 11 MMOL/L (ref 5–15)
AST SERPL-CCNC: 17 U/L (ref 1–32)
BACTERIA FLD CULT: NORMAL
BASOPHILS # BLD AUTO: 0.03 10*3/MM3 (ref 0–0.2)
BASOPHILS NFR BLD AUTO: 0.6 % (ref 0–1.5)
BILIRUB SERPL-MCNC: 1.2 MG/DL (ref 0–1.2)
BUN SERPL-MCNC: 21 MG/DL (ref 8–23)
BUN/CREAT SERPL: 12 (ref 7–25)
CALCIUM SPEC-SCNC: 8.2 MG/DL (ref 8.6–10.5)
CHLORIDE SERPL-SCNC: 108 MMOL/L (ref 98–107)
CO2 SERPL-SCNC: 26 MMOL/L (ref 22–29)
CREAT SERPL-MCNC: 1.75 MG/DL (ref 0.57–1)
DEPRECATED RDW RBC AUTO: 52.1 FL (ref 37–54)
EGFRCR SERPLBLD CKD-EPI 2021: 32.4 ML/MIN/1.73
EOSINOPHIL # BLD AUTO: 0.26 10*3/MM3 (ref 0–0.4)
EOSINOPHIL NFR BLD AUTO: 5.5 % (ref 0.3–6.2)
ERYTHROCYTE [DISTWIDTH] IN BLOOD BY AUTOMATED COUNT: 16.2 % (ref 12.3–15.4)
GLOBULIN UR ELPH-MCNC: 2.4 GM/DL
GLUCOSE BLDC GLUCOMTR-MCNC: 125 MG/DL (ref 70–130)
GLUCOSE BLDC GLUCOMTR-MCNC: 125 MG/DL (ref 70–130)
GLUCOSE BLDC GLUCOMTR-MCNC: 149 MG/DL (ref 70–130)
GLUCOSE BLDC GLUCOMTR-MCNC: 155 MG/DL (ref 70–130)
GLUCOSE SERPL-MCNC: 131 MG/DL (ref 65–99)
GRAM STN SPEC: NORMAL
HCT VFR BLD AUTO: 25.2 % (ref 34–46.6)
HGB BLD-MCNC: 8.2 G/DL (ref 12–15.9)
IMM GRANULOCYTES # BLD AUTO: 0.03 10*3/MM3 (ref 0–0.05)
IMM GRANULOCYTES NFR BLD AUTO: 0.6 % (ref 0–0.5)
LYMPHOCYTES # BLD AUTO: 0.49 10*3/MM3 (ref 0.7–3.1)
LYMPHOCYTES NFR BLD AUTO: 10.3 % (ref 19.6–45.3)
MAGNESIUM SERPL-MCNC: 1.6 MG/DL (ref 1.6–2.4)
MCH RBC QN AUTO: 31.1 PG (ref 26.6–33)
MCHC RBC AUTO-ENTMCNC: 32.5 G/DL (ref 31.5–35.7)
MCV RBC AUTO: 95.5 FL (ref 79–97)
MONOCYTES # BLD AUTO: 0.64 10*3/MM3 (ref 0.1–0.9)
MONOCYTES NFR BLD AUTO: 13.5 % (ref 5–12)
NEUTROPHILS NFR BLD AUTO: 3.3 10*3/MM3 (ref 1.7–7)
NEUTROPHILS NFR BLD AUTO: 69.5 % (ref 42.7–76)
NRBC BLD AUTO-RTO: 0 /100 WBC (ref 0–0.2)
PHOSPHATE SERPL-MCNC: 3.6 MG/DL (ref 2.5–4.5)
PLATELET # BLD AUTO: 99 10*3/MM3 (ref 140–450)
PMV BLD AUTO: 10.7 FL (ref 6–12)
POTASSIUM SERPL-SCNC: 3.4 MMOL/L (ref 3.5–5.2)
POTASSIUM SERPL-SCNC: 3.5 MMOL/L (ref 3.5–5.2)
PROT SERPL-MCNC: 5.2 G/DL (ref 6–8.5)
RBC # BLD AUTO: 2.64 10*6/MM3 (ref 3.77–5.28)
SODIUM SERPL-SCNC: 145 MMOL/L (ref 136–145)
WBC NRBC COR # BLD: 4.75 10*3/MM3 (ref 3.4–10.8)

## 2023-07-09 PROCEDURE — 25010000002 ALBUMIN HUMAN 25% PER 50 ML: Performed by: INTERNAL MEDICINE

## 2023-07-09 PROCEDURE — 85025 COMPLETE CBC W/AUTO DIFF WBC: CPT | Performed by: INTERNAL MEDICINE

## 2023-07-09 PROCEDURE — 63710000001 INSULIN LISPRO (HUMAN) PER 5 UNITS

## 2023-07-09 PROCEDURE — 82948 REAGENT STRIP/BLOOD GLUCOSE: CPT

## 2023-07-09 PROCEDURE — 83735 ASSAY OF MAGNESIUM: CPT | Performed by: INTERNAL MEDICINE

## 2023-07-09 PROCEDURE — P9047 ALBUMIN (HUMAN), 25%, 50ML: HCPCS | Performed by: INTERNAL MEDICINE

## 2023-07-09 PROCEDURE — 99232 SBSQ HOSP IP/OBS MODERATE 35: CPT | Performed by: INTERNAL MEDICINE

## 2023-07-09 PROCEDURE — 97597 DBRDMT OPN WND 1ST 20 CM/<: CPT

## 2023-07-09 PROCEDURE — 84100 ASSAY OF PHOSPHORUS: CPT | Performed by: INTERNAL MEDICINE

## 2023-07-09 PROCEDURE — 29580 STRAPPING UNNA BOOT: CPT

## 2023-07-09 PROCEDURE — 97530 THERAPEUTIC ACTIVITIES: CPT

## 2023-07-09 PROCEDURE — 84132 ASSAY OF SERUM POTASSIUM: CPT | Performed by: INTERNAL MEDICINE

## 2023-07-09 PROCEDURE — 80053 COMPREHEN METABOLIC PANEL: CPT | Performed by: INTERNAL MEDICINE

## 2023-07-09 PROCEDURE — 97164 PT RE-EVAL EST PLAN CARE: CPT

## 2023-07-09 RX ORDER — SPIRONOLACTONE 25 MG/1
50 TABLET ORAL DAILY
Status: DISCONTINUED | OUTPATIENT
Start: 2023-07-10 | End: 2023-07-15 | Stop reason: HOSPADM

## 2023-07-09 RX ORDER — POTASSIUM CHLORIDE 750 MG/1
40 CAPSULE, EXTENDED RELEASE ORAL ONCE
Status: DISCONTINUED | OUTPATIENT
Start: 2023-07-09 | End: 2023-07-09

## 2023-07-09 RX ORDER — POTASSIUM CHLORIDE 1.5 G/1.58G
40 POWDER, FOR SOLUTION ORAL ONCE
Status: COMPLETED | OUTPATIENT
Start: 2023-07-09 | End: 2023-07-09

## 2023-07-09 RX ADMIN — PANTOPRAZOLE SODIUM 40 MG: 40 INJECTION, POWDER, FOR SOLUTION INTRAVENOUS at 08:10

## 2023-07-09 RX ADMIN — PANTOPRAZOLE SODIUM 40 MG: 40 INJECTION, POWDER, FOR SOLUTION INTRAVENOUS at 17:18

## 2023-07-09 RX ADMIN — POTASSIUM CHLORIDE 40 MEQ: 1.5 POWDER, FOR SOLUTION ORAL at 12:06

## 2023-07-09 RX ADMIN — DONEPEZIL HYDROCHLORIDE 10 MG: 10 TABLET, FILM COATED ORAL at 22:09

## 2023-07-09 RX ADMIN — ATORVASTATIN CALCIUM 20 MG: 20 TABLET, FILM COATED ORAL at 08:10

## 2023-07-09 RX ADMIN — Medication 10 ML: at 08:10

## 2023-07-09 RX ADMIN — Medication 10 ML: at 22:10

## 2023-07-09 RX ADMIN — INSULIN LISPRO 3 UNITS: 100 INJECTION, SOLUTION INTRAVENOUS; SUBCUTANEOUS at 22:09

## 2023-07-09 RX ADMIN — ALBUMIN (HUMAN) 25 G: 0.25 INJECTION, SOLUTION INTRAVENOUS at 08:11

## 2023-07-09 RX ADMIN — LEVOTHYROXINE SODIUM 300 MCG: 0.15 TABLET ORAL at 06:06

## 2023-07-09 RX ADMIN — BUPROPION HYDROCHLORIDE 150 MG: 150 TABLET, FILM COATED, EXTENDED RELEASE ORAL at 08:10

## 2023-07-09 RX ADMIN — ALBUMIN (HUMAN) 25 G: 0.25 INJECTION, SOLUTION INTRAVENOUS at 22:09

## 2023-07-09 RX ADMIN — SPIRONOLACTONE 25 MG: 25 TABLET ORAL at 08:10

## 2023-07-09 NOTE — PLAN OF CARE
Goal Outcome Evaluation:              Outcome Evaluation: VSS. Good UOP. No BM. Rested well overnight.

## 2023-07-09 NOTE — PLAN OF CARE
Goal Outcome Evaluation:  Plan of Care Reviewed With: patient        Progress: no change  Outcome Evaluation: PT re-evaluation completed (initial evaluation for PT mobility). Pt demonstrates generalized weakness and decreased indep re: functional mobility, warranting further skilled PT services to promote PLOF. Limited today by fatigue and being fearful of falling, but able to perform STS (mod x2 A) and pivot to recliner (max x2 A). Recommend SNF at d/c. Pt may benefit from order for offloading shoe for L foot for protection of plantar wound during future mobility.      Anticipated Discharge Disposition (PT): skilled nursing facility

## 2023-07-09 NOTE — THERAPY RE-EVALUATION
Patient Name: Ivania Fields  : 1960    MRN: [id number]                              Today's Date: 2023       Admit Date: 2023    Visit Dx:     ICD-10-CM ICD-9-CM   1. Hematemesis with nausea  K92.0 578.0     787.02   2. 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    Sleep apnea    Iron deficiency anemia    SILVINA (acute kidney injury)    CKD (chronic kidney disease)    Stasis dermatitis of both legs    Debility    Type 2 diabetes mellitus    Essential hypertension    Dyslipidemia    Hypothyroidism    Hypoalbuminemia    Anemia    Hematemesis    Severe Malnutrition (HCC)     Past Medical History:   Diagnosis Date    Anemia     Diabetes mellitus     Hyperlipidemia     Hypertension     Hypothyroidism     Impaired mobility     Short-term memory loss      Past Surgical History:   Procedure Laterality Date    CHOLECYSTECTOMY      ENDOSCOPY N/A 2023    Procedure: ESOPHAGOGASTRODUODENOSCOPY;  Surgeon: Brunner, Mark I, MD;  Location: [facility] ENDOSCOPY;  Service: Gastroenterology;  Laterality: N/A;    ENDOSCOPY W/ BANDING N/A 2023    Procedure: ESOPHAGOGASTRODUODENOSCOPY WITH BIOPSY;  Surgeon: Jens Mandujano MD;  Location: [facility] ENDOSCOPY;  Service: Gastroenterology;  Laterality: N/A;      General Information       Row Name 2358          Physical Therapy Time and Intention    Document Type re-evaluation  -LS     Mode of Treatment individual therapy;physical therapy  -LS       Row Name 23          General Information    Patient Profile Reviewed yes  -LS     Prior Level of Function independent:;all household mobility;ADL's  pt reports living along independently; but per RN, pt's brother reports she was requiring assist.  -LS     Existing Precautions/Restrictions fall;other (see comments)  wound to plantar  aspect L foot  -     Barriers to Rehab medically complex;previous functional deficit  -       Row Name 07/09/23 0958          Living Environment    People in Home alone  -       Row Name 07/09/23 0958          Home Main Entrance    Number of Stairs, Main Entrance one  -       Row Name 07/09/23 0958          Stairs Within Home, Primary    Number of Stairs, Within Home, Primary none  -       Row Name 07/09/23 0958          Cognition    Orientation Status (Cognition) oriented x 4  -       Row Name 07/09/23 0958          Safety Issues, Functional Mobility    Safety Issues Affecting Function (Mobility) insight into deficits/self-awareness;safety precaution awareness  -     Impairments Affecting Function (Mobility) balance;coordination;endurance/activity tolerance;strength  -               User Key  (r) = Recorded By, (t) = Taken By, (c) = Cosigned By      Initials Name Provider Type    Gloria Nunn, PT Physical Therapist                   Mobility       Row Name 07/09/23 1000          Bed Mobility    Bed Mobility supine-sit  -     Supine-Sit [place] (Bed Mobility) maximum assist (25% patient effort);2 person assist;verbal cues  -       Row Name 07/09/23 1000          Transfers    Comment, (Transfers) PT/NSG on either side of pt for BUE support and blocking feet. Pt very fearful of falling, requiring constant cues for standing upright and appropriate advancement of LEs during pivot. Noted bloody leakage from foot dressing upon completion of transfer; RN present and aware. PT wound care to come re-dress.  -       Row Name 07/09/23 1000          Bed-Chair Transfer    Bed-Chair [place] (Transfers) maximum assist (25% patient effort);2 person assist;verbal cues  -       Row Name 07/09/23 1000          Sit-Stand Transfer    Sit-Stand [place] (Transfers) moderate assist (50% patient effort);2 person assist;verbal cues  -       Row Name 07/09/23 1000          Gait/Stairs (Locomotion)     [place] Level (Gait) unable to assess  -               User Key  (r) = Recorded By, (t) = Taken By, (c) = Cosigned By      Initials Name Provider Type     Gloria Suresh, PT Physical Therapist                   Obj/Interventions       [facility] Name 07/09/23 1003          Range of Motion Comprehensive    General Range of Motion bilateral lower extremity ROM WFL  -[facility] Name 07/09/23 1003          Strength Comprehensive (MMT)    General Manual Muscle Testing (MMT) Assessment lower extremity strength deficits identified  -     Comment, General Manual Muscle Testing (MMT) Assessment BLE grossly 3+/5  -[facility] Name 07/09/23 1003          Balance    Balance Assessment sitting static balance;standing static balance  -     Static Sitting Balance minimal assist  -LS     Position, Sitting Balance supported;sitting edge of bed  -     Static Standing Balance moderate assist;2-person assist;verbal cues  -LS     Position/Device Used, Standing Balance supported;walker, rolling  -     Balance Interventions standing;dynamic;weight shifting activity  -     Comment, Balance R/L weightshifting x10s prior to small pivot steps to recliner.  -[facility] Name 07/09/23 1003          Sensory Assessment (Somatosensory)    Sensory Assessment (Somatosensory) LE sensation intact  -               User Key  (r) = Recorded By, (t) = Taken By, (c) = Cosigned By      Initials Name Provider Type     Gloria Suresh, PT Physical Therapist                   Goals/Plan       [facility] Name 07/09/23 1006          Bed Mobility Goal 1 (PT)    Activity/Assistive Device (Bed Mobility Goal 1, PT) sit to supine/supine to sit  -     [place] Level/Cues Needed (Bed Mobility Goal 1, PT) minimum assist (75% or more patient effort)  -     Time Frame (Bed Mobility Goal 1, PT) long term goal (LTG);2 weeks  -[facility] Name 07/09/23 1006          Transfer Goal 1 (PT)    Activity/Assistive Device (Transfer Goal 1, PT)  sit-to-stand/stand-to-sit  -LS     [place] Level/Cues Needed (Transfer Goal 1, PT) minimum assist (75% or more patient effort)  -LS     Time Frame (Transfer Goal 1, PT) 2 weeks;long term goal (LTG)  -LS       Row Name 07/09/23 1006          Gait Training Goal 1 (PT)    Activity/Assistive Device (Gait Training Goal 1, PT) gait (walking locomotion);walker, rolling  -LS     [place] Level (Gait Training Goal 1, PT) moderate assist (50-74% patient effort)  -LS     Distance (Gait Training Goal 1, PT) 10  -LS     Time Frame (Gait Training Goal 1, PT) 2 weeks;long term goal (LTG)  -LS       Row Name 07/09/23 1006          Therapy Assessment/Plan (PT)    Planned Therapy Interventions (PT) balance training;bed mobility training;gait training;home exercise program;transfer training;patient/family education;strengthening  -LS               User Key  (r) = Recorded By, (t) = Taken By, (c) = Cosigned By      Initials Name Provider Type    LS Gloria Suresh, PT Physical Therapist                   Clinical Impression       Row Name 07/09/23 1004          Pain    Pretreatment Pain Rating 0/10 - no pain  -LS     Posttreatment Pain Rating 0/10 - no pain  -LS       Row Name 07/09/23 1004          Plan of Care Review    Plan of Care Reviewed With patient  -LS     Progress no change  -LS     Outcome Evaluation PT re-evaluation completed (initial evaluation for PT mobility). Pt demonstrates generalized weakness and decreased indep re: functional mobility, warranting further skilled PT services to promote PLOF. Limited today by fatigue and being fearful of falling, but able to perform STS (mod x2 A) and pivot to recliner (max x2 A). Recommend SNF at d/c. Pt may benefit from order for offloading shoe for L foot for protection of plantar wound during future mobility.  -LS       Row Name 07/09/23 1004          Therapy Assessment/Plan (PT)    Patient/Family Therapy Goals Statement (PT) return to PLOF  -LS     Rehab Potential (PT)  good, to achieve stated therapy goals  -LS     Criteria for Skilled Interventions Met (PT) yes;meets criteria;skilled treatment is necessary  -LS     Therapy Frequency (PT) daily  -LS       Row Name 07/09/23 1004          Vital Signs    Pre Systolic BP Rehab 130  -LS     Pre Treatment Diastolic BP 56  -LS     Pretreatment Heart Rate (beats/min) 71  -LS     Posttreatment Heart Rate (beats/min) 64  -LS     Pre SpO2 (%) 97  -LS     O2 Delivery Pre Treatment room air  -LS     O2 Delivery Intra Treatment room air  -LS     Post SpO2 (%) 97  -LS     O2 Delivery Post Treatment room air  -LS     Pre Patient Position Supine  -LS     Intra Patient Position Standing  -LS     Post Patient Position Sitting  -LS       Row Name 07/09/23 1004          Positioning and Restraints    Pre-Treatment Position in bed  -LS     Post Treatment Position chair  -LS     In Chair notified nsg;reclined;call light within reach;encouraged to call for assist;RUE elevated;LUE elevated;legs elevated;exit alarm on;with nsg  -LS               User Key  (r) = Recorded By, (t) = Taken By, (c) = Cosigned By      Initials Name Provider Type    Gloria Nunn, PT Physical Therapist                   Outcome Measures       Row Name 07/09/23 1007          How much help from another person do you currently need...    Turning from your back to your side while in flat bed without using bedrails? 2  -LS     Moving from lying on back to sitting on the side of a flat bed without bedrails? 2  -LS     Moving to and from a bed to a chair (including a wheelchair)? 2  -LS     Standing up from a chair using your arms (e.g., wheelchair, bedside chair)? 2  -LS     Climbing 3-5 steps with a railing? 1  -LS     To walk in hospital room? 1  -LS     AM-PAC 6 Clicks Score (PT) 10  -LS     Highest level of mobility 4 --> Transferred to chair/commode  -LS       Row Name 07/09/23 1007          Functional Assessment    Outcome Measure Options AM-PAC 6 Clicks Basic Mobility (PT)   -               User Key  (r) = Recorded By, (t) = Taken By, (c) = Cosigned By      Initials Name Provider Type    LS Gloria Suresh, PT Physical Therapist                                 Physical Therapy Education       Title: PT OT SLP Therapies (In Progress)       Topic: Physical Therapy (In Progress)       Point: Mobility training (In Progress)       Learning Progress Summary             Patient Acceptance, E,D, NR by LS at 7/9/2023 1008                         Point: Home exercise program (Not Started)       Learner Progress:  Not documented in this visit.              Point: Body mechanics (In Progress)       Learning Progress Summary             Patient Acceptance, E,D, NR by LS at 7/9/2023 1008                         Point: Precautions (In Progress)       Learning Progress Summary             Patient Acceptance, E,D, NR by  at 7/9/2023 1008                                         User Key       Initials Effective Dates Name Provider Type [facility] 02/03/23 -  Gloria Suresh PT Physical Therapist PT                  PT Recommendation and Plan  Planned Therapy Interventions (PT): balance training, bed mobility training, gait training, home exercise program, transfer training, patient/family education, strengthening  Plan of Care Reviewed With: patient  Progress: no change  Outcome Evaluation: PT re-evaluation completed (initial evaluation for PT mobility). Pt demonstrates generalized weakness and decreased indep re: functional mobility, warranting further skilled PT services to promote PLOF. Limited today by fatigue and being fearful of falling, but able to perform STS (mod x2 A) and pivot to recliner (max x2 A). Recommend SNF at d/c. Pt may benefit from order for offloading shoe for L foot for protection of plantar wound during future mobility.     Time Calculation:    PT Charges       Row Name 07/09/23 1009             Time Calculation    Start Time 0921  -      PT Received On 07/09/23  -       PT Goal Re-Cert Due Date 07/19/23  -LS         Timed Charges    02339 - PT Therapeutic Activity Minutes 26  -LS         Untimed Charges    PT Eval/Re-eval Minutes 31  -LS         Total Minutes    Timed Charges Total Minutes 26  -LS      Untimed Charges Total Minutes 31  -LS       Total Minutes 57  -LS                User Key  (r) = Recorded By, (t) = Taken By, (c) = Cosigned By      Initials Name Provider Type     Gloria Suresh, PT Physical Therapist                  Therapy Charges for Today       Code Description Service Date Service Provider Modifiers Qty    82468508299 HC PT RE-EVAL ESTABLISHED PLAN 2 7/9/2023 Gloria Suresh, PT GP 1    80018843720 HC PT THERAPEUTIC ACT EA 15 MIN 7/9/2023 Gloria Suresh, PT GP 2            PT G-Codes  Outcome Measure Options: AM-PAC 6 Clicks Basic Mobility (PT)  AM-PAC 6 Clicks Score (PT): 10  PT Discharge Summary  Anticipated Discharge Disposition (PT): skilled nursing facility    Glroia Suresh, PT  7/9/2023

## 2023-07-09 NOTE — PROGRESS NOTES
" LOS: 5 days   Patient Care Team:  Kala Beaver MD as PCP - General (Internal Medicine)    Chief Complaint: Generalize weakness.    Subjective     Sitting in chair. Legs wrapped. LE edema better. Labs stable cr~ 1.7    Subjective:  Symptoms:  Stable.      History taken from: patient    Objective     Vital Sign Min/Max for last 24 hours  Temp  Min: 97.5 °F (36.4 °C)  Max: 98.8 °F (37.1 °C)   BP  Min: 98/35  Max: 171/69   Pulse  Min: 60  Max: 82   Resp  Min: 16  Max: 22   SpO2  Min: 89 %  Max: 100 %   Flow (L/min)  Min: 2  Max: 2   No data recorded     Flowsheet Rows      Flowsheet Row First Filed Value   Admission Height 165.1 cm (65\") Documented at 07/04/2023 0201   Admission Weight 130 kg (285 lb 12.8 oz) Documented at 07/04/2023 0201            I/O this shift:  In: 250 [P.O.:250]  Out: 150 [Urine:150]  I/O last 3 completed shifts:  In: 200 [IV Piggyback:200]  Out: 3850 [Urine:3850]    Objective:  General Appearance:  Ill-appearing.    Vital signs: (most recent): Blood pressure 148/58, pulse 63, temperature 98 °F (36.7 °C), temperature source Axillary, resp. rate 20, height 165.1 cm (65\"), weight 129 kg (285 lb), SpO2 99 %.  Vital signs are normal.    Output: Producing urine (Petersen cath+).    HEENT: Normal HEENT exam.    Lungs:  Normal effort and normal respiratory rate.  (extubated)  Heart: Normal rate.  Regular rhythm.  S1 normal and S2 normal.  No murmur or gallop.   Abdomen: Abdomen is soft and non-distended.  There are no signs of ascites.    Extremities: Normal range of motion.  There is dependent edema and local swelling.    Pulses: Distal pulses are intact.    Neurological: Patient is alert.    Skin:  Warm.  No ecchymosis. (Hyperpigmentation.)            Results Review:     I reviewed the patient's new clinical results.    WBC WBC   Date Value Ref Range Status   07/09/2023 4.75 3.40 - 10.80 10*3/mm3 Final      HGB Hemoglobin   Date Value Ref Range Status   07/09/2023 8.2 (L) 12.0 - 15.9 g/dL Final "   07/07/2023 8.1 (L) 12.0 - 15.9 g/dL Final   07/06/2023 8.0 (L) 12.0 - 15.9 g/dL Final      HCT Hematocrit   Date Value Ref Range Status   07/09/2023 25.2 (L) 34.0 - 46.6 % Final   07/07/2023 22.5 (L) 34.0 - 46.6 % Final   07/06/2023 23.4 (L) 34.0 - 46.6 % Final      Platlets No results found for: LABPLAT   MCV MCV   Date Value Ref Range Status   07/09/2023 95.5 79.0 - 97.0 fL Final          Sodium Sodium   Date Value Ref Range Status   07/09/2023 145 136 - 145 mmol/L Final   07/08/2023 143 136 - 145 mmol/L Final   07/07/2023 142 136 - 145 mmol/L Final      Potassium Potassium   Date Value Ref Range Status   07/09/2023 3.4 (L) 3.5 - 5.2 mmol/L Final   07/08/2023 3.8 3.5 - 5.2 mmol/L Final     Comment:     Slight hemolysis detected by analyzer. Results may be affected.   07/07/2023 3.4 (L) 3.5 - 5.2 mmol/L Final     Comment:     Slight hemolysis detected by analyzer. Results may be affected.      Chloride Chloride   Date Value Ref Range Status   07/09/2023 108 (H) 98 - 107 mmol/L Final   07/08/2023 107 98 - 107 mmol/L Final   07/07/2023 106 98 - 107 mmol/L Final      CO2 CO2   Date Value Ref Range Status   07/09/2023 26.0 22.0 - 29.0 mmol/L Final   07/08/2023 23.0 22.0 - 29.0 mmol/L Final   07/07/2023 23.0 22.0 - 29.0 mmol/L Final      BUN BUN   Date Value Ref Range Status   07/09/2023 21 8 - 23 mg/dL Final   07/08/2023 23 8 - 23 mg/dL Final   07/07/2023 24 (H) 8 - 23 mg/dL Final      Creatinine Creatinine   Date Value Ref Range Status   07/09/2023 1.75 (H) 0.57 - 1.00 mg/dL Final   07/08/2023 1.83 (H) 0.57 - 1.00 mg/dL Final   07/07/2023 1.64 (H) 0.57 - 1.00 mg/dL Final      Calcium Calcium   Date Value Ref Range Status   07/09/2023 8.2 (L) 8.6 - 10.5 mg/dL Final   07/08/2023 8.2 (L) 8.6 - 10.5 mg/dL Final   07/07/2023 8.7 8.6 - 10.5 mg/dL Final      PO4 No results found for: CAPO4   Albumin Albumin   Date Value Ref Range Status   07/09/2023 2.8 (L) 3.5 - 5.2 g/dL Final   07/08/2023 2.5 (L) 3.5 - 5.2 g/dL Final    07/07/2023 2.9 (L) 3.5 - 5.2 g/dL Final      Magnesium Magnesium   Date Value Ref Range Status   07/09/2023 1.6 1.6 - 2.4 mg/dL Final   07/08/2023 1.7 1.6 - 2.4 mg/dL Final   07/07/2023 1.7 1.6 - 2.4 mg/dL Final      Uric Acid No results found for: URICACID     Medication Review: yes    Assessment & Plan       Hematemesis    GI bleed    Liver cirrhosis secondary to PAREDES    SILVINA (acute kidney injury)    Type 2 diabetes mellitus    Essential hypertension    Dyslipidemia    Hypothyroidism    Anemia    Severe Malnutrition (HCC)      Assessment & Plan    Acute kidney disease vs chronic kidney disease: Last known stable cr btw 1.5-1.6mg/dl. Seen for abnormal renal function 2 months ago as well in the setting of GI bleed.     Volume status: Significant LE edema and possible asictes in the setting of liver cirrhosis. Underwent large volume paracentesis 15.5 liter removed on 7/6/23      Anemia: s/p 2 unit pRBC. Hx of GI ulcer. S/p EGD on this visit. No active bleeding site noted. Admitted in ICU after failed extubation post egd     Intubated on MV: Post EGD     Liver cirrhosis: Complications includes ascites and gastropathy.      Hypoalbuminemia: In the setting of liver cirrhosis     Recs  Continue to hold Bumex. Increase spironolactone to 50 mg daily.oral KCL 40 meq x 1. If cr stable by tomorrow ok to restart bumex 2mg daily.  D/C albumin  Strict I/O. Avoid nephrotoxic agents. Transfuse at hb<7.    Francisco Anaya MD  07/09/23  11:53 EDT

## 2023-07-09 NOTE — PLAN OF CARE
03/09/2017      Thank you for your kind referral. Your patient Darin Patrick, attended Session #1 at [place] [id number] where the following topics were covered today . * Describing diabetes disease process and treatment options  * Incorporating nutrition management into their lifestyle  * Monitoring blood glucose and other parameters and interpreting and using the results       for self management decision making. * Using medications safely and for the maximum therapeutic effectiveness  * Preventing detecting and treating acute complications  * Developing personal strategies to promote health and behavior changes  * Developing personal strategies to address psychosocial issues and concerns    Data from first visit:  Weight: 3/9/2017 259.9 #  HgbA1c:  3/9/2017 7.3 %    Increased risk for diabetes: 5.7-6.4%, Diabetes >6.4%  Glycemic control for adults with diabetes:  < 7%         Elderly or multiple medical conditions  <8%    Meter given: Accu-Chek [employer] Connect  Goal(s) set : Goal 1: Exercise more often - Walk 30 minutes for total of 150 minutes per week. Your patient will have two (2) additional appointments to complete the ordered education. Their next visit is scheduled for 3/28/2017. We look forward to assisting your patient in meeting their self- management goals.  If you have any questions please do not hesitate to call the [facility] at [phone]      Sincerely, Link Karla , [id number] [facility], [facility],[address]  [place]   Phone: [phone] Fax : [phone] Goal Outcome Evaluation:  Plan of Care Reviewed With: patient        Progress: no change  Outcome Evaluation: Pt's LLE wounds with improved beefy red granulation but slight increase in dimensions.  PT lightly debrided wounds and dressed with mepilex ag.  Pt with chronic venous stasis of BLE with mild pitting edema and hypertrophic crusting of BLE, so PT placed unna boots to BLE to improve venous return and skin integrity.  PT will change dressings/wraps every 2-3 days.  PT recommends offloading shoe for L plantar wound during mobility tx.

## 2023-07-09 NOTE — PROGRESS NOTES
INTENSIVIST   PROGRESS NOTE     Hospital:  LOS: 5 days     Ms. Ivania Fields, 62 y.o. female is followed for a Chief Complaint of: GI Bleed      Subjective   S     Interval History:  No acute events overnight.        The patient's relevant past medical, surgical and social history were reviewed and updated in Epic as appropriate.      ROS:   Constitutional: Negative for fever.   Respiratory: Negative for dyspnea.   Cardiovascular: Negative for chest pain.   Gastrointestinal: Negative for  nausea, vomiting and diarrhea.     Objective   O     Vitals:  Temp  Min: 97.5 °F (36.4 °C)  Max: 98.8 °F (37.1 °C)  BP  Min: 98/35  Max: 171/69  Pulse  Min: 60  Max: 82  Resp  Min: 16  Max: 22  SpO2  Min: 89 %  Max: 100 % Flow (L/min)  Min: 2  Max: 2    Intake/Ouptut 24 hrs (7:00AM - 6:59 AM)  Intake & Output (last 3 days)         07/06 0701  07/07 0700 07/07 0701  07/08 0700 07/08 0701  07/09 0700 07/09 0701  07/10 0700    P.O.    250    I.V. (mL/kg) 699 (5.4) 122 (0.9)      Blood        IV Piggyback 50 402 200     Total Intake(mL/kg) 749 (5.8) 524 (4.1) 200 (1.6) 250 (1.9)    Urine (mL/kg/hr) 2070 (0.7) 2185 (0.7) 2550 (0.8) 150 (0.3)    Other 05452       Total Output 90994 2185 2550 150    Net -93398 -1661 -2350 +100                    Medications (drips):         Physical Examination  Telemetry:  Normal sinus rhythm.    Constitutional:  No acute distress.  Sitting on the side of the bed working with PT.    Eyes: No scleral icterus.   PERRL, EOM intact.    Neck:  Supple, FROM   Cardiovascular: Normal rate, regular and rhythm. Normal heart sounds.  No murmurs, gallop or rub.   Respiratory: No respiratory distress. Normal respiratory effort.  Diminished bilaterally. No wheezing.    Abdominal:  Soft. No masses. Nontender. No distension. No HSM.   Extremities: No digital cyanosis. No clubbing.  No peripheral edema.   Skin: No rashes, lesions or ulcers   Neurological:   Alert and Oriented to person, place, and time.              Results from last 7 days   Lab Units 07/09/23  0438 07/07/23  0032 07/06/23  1751 07/06/23  0109 07/05/23  1626 07/05/23  0058 07/04/23  1034   WBC 10*3/mm3 4.75  --   --   --  5.09  --  5.45   HEMOGLOBIN g/dL 8.2* 8.1* 8.0*   < > 8.7*   < > 8.0*   MCV fL 95.5  --   --   --  94.7  --  99.1*   PLATELETS 10*3/mm3 99*  --   --   --  97*  --  136*    < > = values in this interval not displayed.     Results from last 7 days   Lab Units 07/09/23  0438 07/08/23  0607 07/07/23  0300   SODIUM mmol/L 145 143 142   POTASSIUM mmol/L 3.4* 3.8 3.4*   CO2 mmol/L 26.0 23.0 23.0   CREATININE mg/dL 1.75* 1.83* 1.64*   GLUCOSE mg/dL 131* 77 104*   MAGNESIUM mg/dL 1.6 1.7 1.7   PHOSPHORUS mg/dL 3.6 3.9 3.1     Estimated Creatinine Clearance: 44.6 mL/min (A) (by C-G formula based on SCr of 1.75 mg/dL (H)).  Results from last 7 days   Lab Units 07/09/23  0438 07/08/23  0607 07/07/23  0300   ALK PHOS U/L 55 50 50   BILIRUBIN mg/dL 1.2 1.4* 1.6*   ALT (SGPT) U/L 8 5 7   AST (SGOT) U/L 17 28 26       Results from last 7 days   Lab Units 07/07/23  0340 07/06/23  0407 07/05/23  1309   PH, ARTERIAL pH units 7.495* 7.492* 7.451*   PCO2, ARTERIAL mm Hg 34.5* 33.7* 36.0   PO2 ART mm Hg 73.6* 70.9* 81.3*   FIO2 % 35 40 50       Images:  Imaging Results (Last 24 Hours)       Procedure Component Value Units Date/Time    SLP FEES - Fiberoptic Endo Eval Swallow [[id number]] Resulted: 07/08/23 1353     Updated: 07/08/23 1353    Narrative:      This procedure was auto-finalized with no dictation required.               Results: Reviewed.  I reviewed the patient's new laboratory and imaging results.  I independently reviewed the patient's new images.    Medications: Reviewed.    Assessment & Plan   A / P     Ms. Adam Garrison is a 63yo F with a history of T2DM, HTN, Hypothyroidism, and PAREDES cirrhosis who was transferred from [facility] to [facility] on 7/4/23 with a GI bleed. EGD revealed an antral ulcer and esophagitis with no evidence of portal HTN or  varices. Postprocedure, she was somnolent and could not be liberated from mechanical ventilation. She underwent a large volume paracentesis on 7/6/23 with 15L removed. She was extubated on 7/7/23.     She did develop SILVINA and Nephrology is following to manage diuretics.       Nutrition:   Diet: Gastrointestinal Diets, Diabetic Diets; Consistent Carbohydrate; Fiber-Restricted; Texture: Soft to Chew (NDD 3); Soft to Chew: Whole Meat; Fluid Consistency: Thin (IDDSI 0)  Advance Directives:   Code Status and Medical Interventions:   Ordered at: 07/04/23 0308     Code Status (Patient has no pulse and is not breathing):    CPR (Attempt to Resuscitate)     Medical Interventions (Patient has pulse or is breathing):    Full Support       Active Hospital Problems    Diagnosis     **Hematemesis     Severe Malnutrition (HCC)     Anemia     Type 2 diabetes mellitus     Essential hypertension     Dyslipidemia     Hypothyroidism     SILVINA (acute kidney injury)     Liver cirrhosis secondary to PAREDES     GI bleed        Assessment / Plan:    Continue to monitor H/H.   Nephrology following for management of diuretics.   PPI BID  Bipap support as needed.   SSI for glucose management  Speech following  AM labs    High level of risk due to:  severe exacerbation of chronic illness and illness with threat to life or bodily function.    Plan of care and goals reviewed during interdisciplinary rounds.  I discussed the patient's findings and my recommendations with patient and nursing staff      Dedra Campbell DO    Intensive Care Medicine and Pulmonary Medicine

## 2023-07-09 NOTE — PLAN OF CARE
Problem: Adult Inpatient Plan of Care  Goal: Plan of Care Review  Outcome: Ongoing, Progressing  Flowsheets (Taken 7/9/2023 1710)  Outcome Evaluation: Pt up to chair with PT.  Back to bed with the lift.  Patient pleasant and cooperative. VSS. UOP adequate.  Potassium replaced. PT wound care changed wound dressings and placed unaboots.   Goal Outcome Evaluation:  Plan of Care Reviewed With: patient, sibling           Outcome Evaluation: Pt up to chair with PT.  Back to bed with the lift.  Patient pleasant and cooperative. VSS. UOP adequate.  Potassium replaced. PT wound care changed wound dressings and placed unaboots.

## 2023-07-09 NOTE — THERAPY WOUND CARE TREATMENT
Acute Care - Wound/Debridement Treatment Note  [facility]     Patient Name: Ivania Fields  : 1960  MRN: [id number]  Today's Date: 2023                Admit Date: 2023    Visit Dx:    ICD-10-CM ICD-9-CM   1. Venous stasis of both lower extremities  I87.8 459.81   2. Hematemesis with nausea  K92.0 578.0     787.02   3. Dysphagia, unspecified type  R13.10 787.20       Patient Active Problem List   Diagnosis    Pneumonia of left lung due to infectious organism, unspecified part of lung    GI bleed    Liver cirrhosis secondary to PAREDES    Sleep apnea    Iron deficiency anemia    SILVINA (acute kidney injury)    CKD (chronic kidney disease)    Stasis dermatitis of both legs    Debility    Type 2 diabetes mellitus    Essential hypertension    Dyslipidemia    Hypothyroidism    Hypoalbuminemia    Anemia    Hematemesis    Severe Malnutrition (HCC)        Past Medical History:   Diagnosis Date    Anemia     Diabetes mellitus     Hyperlipidemia     Hypertension     Hypothyroidism     Impaired mobility     Short-term memory loss         Past Surgical History:   Procedure Laterality Date    CHOLECYSTECTOMY      ENDOSCOPY N/A 2023    Procedure: ESOPHAGOGASTRODUODENOSCOPY;  Surgeon: Brunner, Mark I, MD;  Location: [facility] ENDOSCOPY;  Service: Gastroenterology;  Laterality: N/A;    ENDOSCOPY W/ BANDING N/A 2023    Procedure: ESOPHAGOGASTRODUODENOSCOPY WITH BIOPSY;  Surgeon: Jens Mandujano MD;  Location: [facility] ENDOSCOPY;  Service: Gastroenterology;  Laterality: N/A;           Wound 23 0318 Left lower leg Blisters (Active)   Wound Image   23 1010   Dressing Appearance intact;moist drainage 23 1010   Closure PABLO 23 1000   Base clean;moist;pink;red 23 1010   Periwound intact;dry;swelling 23 1010   Periwound Temperature warm 23 1010   Periwound Skin Turgor firm 23 1010   Edges irregular 23 1010   Wound Length (cm) 3.5 cm 23 1010   Wound Width  (cm) 4.5 cm 07/09/23 1010   Wound Depth (cm) 0.1 cm 07/09/23 1010   Wound Surface Area (cm^2) 15.75 cm^2 07/09/23 1010   Wound Volume (cm^3) 1.575 cm^3 07/09/23 1010   Drainage Characteristics/Odor serosanguineous 07/09/23 1010   Drainage Amount small 07/09/23 1010   Care, Wound cleansed with;wound cleanser;debrided;jyoti boot 07/09/23 1010   Dressing Care silver impregnated;foam 07/09/23 1010   Periwound Care cleansed with pH balanced cleanser;dry periwound area maintained 07/09/23 1010       Wound 07/04/23 0317 Bilateral groin MASD (Moisture associated skin damage) (Active)   Dressing Appearance open to air 07/09/23 0400   Closure None 07/09/23 1000   Base moist;red 07/09/23 1000   Periwound intact;edematous;moist 07/09/23 1000   Periwound Temperature warm 07/09/23 1000   Care, Wound soap and water 07/08/23 2000   Dressing Care foam;low-adherent 07/09/23 0400   Periwound Care dry periwound area maintained 07/09/23 0400       Wound 07/04/23 0319 Left posterior fifth toe Other (comment) (Active)   Wound Image   07/09/23 1010   Dressing Appearance open to air;moist drainage;other (see comments) 07/09/23 1010   Closure Approximated 07/09/23 1000   Base clean;granulating;moist;red;yellow;slough 07/09/23 1010   Periwound intact;dry 07/09/23 1010   Periwound Temperature warm 07/09/23 1010   Periwound Skin Turgor soft 07/09/23 1010   Edges irregular;open 07/09/23 1010   Wound Length (cm) 4.5 cm 07/09/23 1010   Wound Width (cm) 6 cm 07/09/23 1010   Wound Depth (cm) 0.1 cm 07/09/23 1010   Wound Surface Area (cm^2) 27 cm^2 07/09/23 1010   Wound Volume (cm^3) 2.7 cm^3 07/09/23 1010   Drainage Characteristics/Odor sanguineous;bleeding controlled 07/09/23 1010   Drainage Amount small 07/09/23 1010   Care, Wound cleansed with;wound cleanser;debrided;jyoti boot 07/09/23 1010   Dressing Care silver impregnated;foam 07/09/23 1010   Periwound Care cleansed with pH balanced cleanser;dry periwound area maintained 07/09/23 1010       Wound  07/04/23 0439 Right lower leg Other (comment) (Active)   Wound Image   07/09/23 1010   Dressing Appearance open to air 07/09/23 1010   Closure None 07/09/23 1000   Base dry;pink 07/09/23 1010   Periwound intact;dry;pink 07/09/23 1000   Periwound Temperature warm 07/09/23 1000   Drainage Amount none 07/09/23 1000   Care, Wound jyoti boot 07/09/23 1010   Dressing Care open to air 07/08/23 2000   Periwound Care dry periwound area maintained 07/09/23 0400       Wound 07/04/23 0800 Right medial perineum Skin Tear (Active)   Dressing Appearance open to air 07/09/23 0000   Closure None 07/09/23 1000   Base pink;moist 07/09/23 1000   Drainage Characteristics/Odor bleeding controlled 07/08/23 1800   Care, Wound cleansed with;soap and water 07/08/23 2000   Dressing Care open to air 07/09/23 0400      Lymphedema       Row Name 07/09/23 1010             Lymphedema Edema Assessment    Pitting Edema Mild  -         Skin Changes/Observations    Location/Assessment Lower Extremity  -      Lower Extremity Conditions bilateral:;dry;scaly;crust  -      Lower Extremity Color/Pigment bilateral:;hyperpigmented;brawny;woody  -         Lymphedema Pulses/Capillary Refill    Lymphedema Pulses/Capillary Refill lower extremity pulses;capillary refill  -      Dorsalis Pedis Pulse right:;left:;+2 normal  -      Capillary Refill lower extremity capillary refill  -      Lower Extremity Capillary Refill right:;left:;less than 3 seconds  -         Lymphedema Measurements    Measurement Type(s) Quick Girth  -      Quick Girth Areas Lower extremities  -         LLE Quick Girth (cm)    Mid foot 22.5 cm  -JM      Smallest ankle 20.6 cm  -JM      Largest calf 45 cm  -JM         RLE Quick Girth (cm)    Mid foot 22.5 cm  -JM      Smallest ankle 20.7 cm  -JM      Largest calf 48.7 cm  -JM      RLE Quick Girth Total 91.9  -JM         Compression/Skin Care    Compression/Skin Care skin care;wrapping location;bandaging  -      Skin Care  "washed/dried;lotion applied  -      Wrapping Location lower extremity  -      Wrapping Location LE bilateral:;foot to knee  -      Wrapping Comments Mepilex ag to LLE wounds, BLE unna boots applied in clamshell fashion, 4\" coban, spandage  -                User Key  (r) = Recorded By, (t) = Taken By, (c) = Cosigned By      Initials Name Provider Type     Xiomy Carcamo, PT Physical Therapist                    WOUND DEBRIDEMENT  Total area of Debridement: 6cmsq  Debridement Site 1  Location- Site 1: LLE and L foot wound  Selective Debridement- Site 1: Wound Surface <20cmsq  Instruments- Site 1: tweezers  Excised Tissue Description- Site 1: minimum, slough  Bleeding- Site 1: seeping, held pressure, 1 minute               PT Assessment (last 12 hours)       PT Evaluation and Treatment       Row Name 07/09/23 1010          Physical Therapy Time and Intention    Subjective Information no complaints  -     Document Type wound care;therapy note (daily note)  -     Patient Effort good  -       Row Name 07/09/23 1010          General Information    Patient Observations alert;cooperative;agree to therapy  -     General Observations of Patient Pt [facility] s/p mobility tx where dressing on L foot was disrupted.  -     Existing Precautions/Restrictions fall;other (see comments)  L plantar wound  -     Risks Reviewed patient:;increased drainage;increased discomfort  -     Benefits Reviewed patient:;improve skin integrity;decrease pain  -     Barriers to Rehab medically complex;previous functional deficit  -       Row Name 07/09/23 1010          Pain    Pretreatment Pain Rating 0/10 - no pain  -     Posttreatment Pain Rating 0/10 - no pain  -       Row Name 07/09/23 1010          Cognition    Orientation Status (Cognition) oriented x 4  -       Row Name 07/09/23 1010          Wound 07/04/23 0318 Left lower leg Blisters    Wound - Properties Group Placement Date: 07/04/23  -SB Placement Time: 0318  " -SB Present on Hospital Admission: Y  -SB Side: Left  -SB Orientation: lower  -SB Location: leg  -SB Primary Wound Type: Blisters  -SB    Wound Image Images linked: 1  -JM     Dressing Appearance intact;moist drainage  -JM     Base clean;moist;pink;red  -JM     Periwound intact;dry;swelling  -JM     Periwound Temperature warm  -JM     Periwound Skin Turgor firm  -JM     Edges irregular  -JM     Wound Length (cm) 3.5 cm  -JM     Wound Width (cm) 4.5 cm  -JM     Wound Depth (cm) 0.1 cm  -JM     Wound Surface Area (cm^2) 15.75 cm^2  -JM     Wound Volume (cm^3) 1.575 cm^3  -JM     Drainage Characteristics/Odor serosanguineous  -JM     Drainage Amount small  -JM     Care, Wound cleansed with;wound cleanser;debrided;jyoti boot  -JM     Dressing Care silver impregnated;foam  -JM     Periwound Care cleansed with pH balanced cleanser;dry periwound area maintained  -JM     Retired Wound - Properties Group Placement Date: 07/04/23  -SB Placement Time: 0318  -SB Present on Hospital Admission: Y  -SB Side: Left  -SB Orientation: lower  -SB Location: leg  -SB Primary Wound Type: Blisters  -SB    Retired Wound - Properties Group Date first assessed: 07/04/23 -SB Time first assessed: 0318  -SB Present on Hospital Admission: Y  -SB Side: Left  -SB Location: leg  -SB Primary Wound Type: Blisters  -SB      Row Name             Wound 07/04/23 0317 Bilateral groin MASD (Moisture associated skin damage)    Wound - Properties Group Placement Date: 07/04/23  -SB Placement Time: 0317 -SB Side: Bilateral  -SB Location: groin  -SB Primary Wound Type: MASD  -SB    Retired Wound - Properties Group Placement Date: 07/04/23  -SB Placement Time: 0317 -SB Side: Bilateral  -SB Location: groin  -SB Primary Wound Type: MASD  -SB    Retired Wound - Properties Group Date first assessed: 07/04/23  -SB Time first assessed: 0317 -SB Side: Bilateral  -SB Location: groin  -SB Primary Wound Type: MASD  -SB      Row Name 07/09/23 1010          Wound 07/04/23  0319 Left posterior fifth toe Other (comment)    Wound - Properties Group Placement Date: 07/04/23 -SB Placement Time: 0319 -SB Side: Left  -SB Orientation: posterior  -SB Location: fifth toe  -SB Primary Wound Type: Other  -SB    Wound Image Images linked: 1  -JM     Dressing Appearance open to air;moist drainage;other (see comments)  dressing disrupted during mobility tx, removed by PT just prior to tx  -JM     Base clean;granulating;moist;red;yellow;slough  -JM     Periwound intact;dry  -JM     Periwound Temperature warm  -JM     Periwound Skin Turgor soft  -JM     Edges irregular;open  -JM     Wound Length (cm) 4.5 cm  -JM     Wound Width (cm) 6 cm  -JM     Wound Depth (cm) 0.1 cm  -JM     Wound Surface Area (cm^2) 27 cm^2  -JM     Wound Volume (cm^3) 2.7 cm^3  -JM     Drainage Characteristics/Odor sanguineous;bleeding controlled  -JM     Drainage Amount small  -JM     Care, Wound cleansed with;wound cleanser;debrided;jyoti boot  -JM     Dressing Care silver impregnated;foam  -JM     Periwound Care cleansed with pH balanced cleanser;dry periwound area maintained  -JM     Retired Wound - Properties Group Placement Date: 07/04/23 -SB Placement Time: 0319 -SB Side: Left  -SB Orientation: posterior  -SB Location: fifth toe  -SB Primary Wound Type: Other  -SB    Retired Wound - Properties Group Date first assessed: 07/04/23 -SB Time first assessed: 0319 -SB Side: Left  -SB Location: fifth toe  -SB Primary Wound Type: Other  -SB      Row Name 07/09/23 1010          Wound 07/04/23 0439 Right lower leg Other (comment)    Wound - Properties Group Placement Date: 07/04/23 -SB Placement Time: 0439 -SB Side: Right  -SB Orientation: lower  -SB Location: leg  -SB Primary Wound Type: Other  -SB    Wound Image Images linked: 1  -JM     Dressing Appearance open to air  -JM     Base dry;pink  -JM     Care, Wound jyoti boot  -JM     Retired Wound - Properties Group Placement Date: 07/04/23  -SB Placement Time: 0439 -SB Side:  Right  -SB Orientation: lower  -SB Location: leg  -SB Primary Wound Type: Other  -SB    Retired Wound - Properties Group Date first assessed: 07/04/23  -SB Time first assessed: 0439  -SB Side: Right  -SB Location: leg  -SB Primary Wound Type: Other  -SB      Row Name             Wound 07/04/23 0800 Right medial perineum Skin Tear    Wound - Properties Group Placement Date: 07/04/23  - Placement Time: 0800  -JH Present on Hospital Admission: Y  -JH Side: Right  -JH Orientation: medial  -JH Location: perineum  -JH Primary Wound Type: Skin tear  -JH    Retired Wound - Properties Group Placement Date: 07/04/23  -JH Placement Time: 0800  -JH Present on Hospital Admission: Y  -JH Side: Right  -JH Orientation: medial  -JH Location: perineum  -JH Primary Wound Type: Skin tear  -JH    Retired Wound - Properties Group Date first assessed: 07/04/23  -JH Time first assessed: 0800  -JH Present on Hospital Admission: Y  -JH Side: Right  -JH Location: perineum  -JH Primary Wound Type: Skin tear  -JH      Row Name 07/09/23 1010          Plan of Care Review    Plan of Care Reviewed With patient  -JM     Progress no change  -     Outcome Evaluation Pt's LLE wounds with improved beefy red granulation but slight increase in dimensions.  PT lightly debrided wounds and dressed with mepilex ag.  Pt with chronic venous stasis of BLE with mild pitting edema and hypertrophic crusting of BLE, so PT placed unna boots to BLE to improve venous return and skin integrity.  PT will change dressings/wraps every 2-3 days.  PT recommends offloading shoe for L plantar wound during mobility tx.  -       Row Name 07/09/23 1010          Positioning and Restraints    Pre-Treatment Position sitting in chair/recliner  -     Post Treatment Position chair  -JM     In Chair notified nsg;reclined;call light within reach;encouraged to call for assist;legs elevated  -               User Key  (r) = Recorded By, (t) = Taken By, (c) = Cosigned By       Initials Name Provider Type    Donna Gallego RN Registered Nurse    Xiomy Isidro, PT Physical Therapist    Jessica Burnett, RN Registered Nurse                  Physical Therapy Education       Title: PT OT SLP Therapies (In Progress)       Topic: Physical Therapy (In Progress)       Point: Mobility training (In Progress)       Learning Progress Summary             Patient Acceptance, E,D, NR by  at 7/9/2023 1008                         Point: Home exercise program (Not Started)       Learner Progress:  Not documented in this visit.              Point: Body mechanics (In Progress)       Learning Progress Summary             Patient Acceptance, E,D, NR by LS at 7/9/2023 1008                         Point: Precautions (In Progress)       Learning Progress Summary             Patient Acceptance, E,D, NR by  at 7/9/2023 1008                                         User Key       Initials Effective Dates Name Provider Type Discipline     02/03/23 -  Gloria Suresh, PT Physical Therapist PT                    Recommendation and Plan  Anticipated Discharge Disposition (PT): skilled nursing facility  Planned Therapy Interventions (PT): balance training, bed mobility training, gait training, home exercise program, transfer training, patient/family education, strengthening  Therapy Frequency (PT): daily  Plan of Care Reviewed With: patient   Progress: no change       Progress: no change  Outcome Evaluation: Pt's LLE wounds with improved beefy red granulation but slight increase in dimensions.  PT lightly debrided wounds and dressed with mepilex ag.  Pt with chronic venous stasis of BLE with mild pitting edema and hypertrophic crusting of BLE, so PT placed unna boots to BLE to improve venous return and skin integrity.  PT will change dressings/wraps every 2-3 days.  PT recommends offloading shoe for L plantar wound during mobility tx.  Plan of Care Reviewed With: patient            Time Calculation    PT Charges       Row Name 07/09/23 1104 07/09/23 1009          Time Calculation    Start Time 1000  -JM 0921  -     PT Received On -- 07/09/23  -     PT Goal Re-Cert Due Date 07/19/23  -JM 07/19/23  -        Timed Charges    60504 - PT Therapeutic Activity Minutes -- 26  -LS        Untimed Charges    PT Eval/Re-eval Minutes -- 31  -LS     Wound Care 87483 Unna boot  -JM --     29580-Unna Boot 20  -JM --     78440-Yenpvskgl debridement 10  -JM --        Total Minutes    Timed Charges Total Minutes -- 26  -LS     Untimed Charges Total Minutes 30  -JM 31  -LS      Total Minutes 30  -JM 57  -LS               User Key  (r) = Recorded By, (t) = Taken By, (c) = Cosigned By      Initials Name Provider Type    LS Gloria Suresh, PT Physical Therapist    JM Xiomy Carcamo, PT Physical Therapist                      Therapy Charges for Today       Code Description Service Date Service Provider Modifiers Qty    45562968237 HC MARI DEBRIDE OPEN WOUND UP TO 20CM 7/9/2023 Xiomy Carcamo, PT GP 1    91713289766 HC PT STAPPING UNNA BOOT 7/9/2023 Xiomy Carcamo, PT GP 1              PT G-Codes  Outcome Measure Options: AM-PAC 6 Clicks Basic Mobility (PT)  AM-PAC 6 Clicks Score (PT): 10       Xiomy Carcamo, PT  7/9/2023

## 2023-07-10 LAB
ALBUMIN SERPL ELPH-MCNC: 2.6 G/DL (ref 2.9–4.4)
ALBUMIN SERPL-MCNC: 2.9 G/DL (ref 3.5–5.2)
ALBUMIN/GLOB SERPL: 0.9 {RATIO} (ref 0.7–1.7)
ALBUMIN/GLOB SERPL: 1.3 G/DL
ALP SERPL-CCNC: 52 U/L (ref 39–117)
ALPHA1 GLOB SERPL ELPH-MCNC: 0.2 G/DL (ref 0–0.4)
ALPHA2 GLOB SERPL ELPH-MCNC: 0.3 G/DL (ref 0.4–1)
ALT SERPL W P-5'-P-CCNC: 10 U/L (ref 1–33)
ANION GAP SERPL CALCULATED.3IONS-SCNC: 9 MMOL/L (ref 5–15)
AST SERPL-CCNC: 26 U/L (ref 1–32)
B-GLOBULIN SERPL ELPH-MCNC: 0.9 G/DL (ref 0.7–1.3)
BILIRUB SERPL-MCNC: 1.1 MG/DL (ref 0–1.2)
BUN SERPL-MCNC: 21 MG/DL (ref 8–23)
BUN/CREAT SERPL: 13 (ref 7–25)
CALCIUM SPEC-SCNC: 8.2 MG/DL (ref 8.6–10.5)
CHLORIDE SERPL-SCNC: 109 MMOL/L (ref 98–107)
CO2 SERPL-SCNC: 27 MMOL/L (ref 22–29)
CREAT SERPL-MCNC: 1.61 MG/DL (ref 0.57–1)
DEPRECATED RDW RBC AUTO: 56.4 FL (ref 37–54)
EGFRCR SERPLBLD CKD-EPI 2021: 35.8 ML/MIN/1.73
ERYTHROCYTE [DISTWIDTH] IN BLOOD BY AUTOMATED COUNT: 16.7 % (ref 12.3–15.4)
GAMMA GLOB SERPL ELPH-MCNC: 1.8 G/DL (ref 0.4–1.8)
GLOBULIN SER-MCNC: 3.2 G/DL (ref 2.2–3.9)
GLOBULIN UR ELPH-MCNC: 2.3 GM/DL
GLUCOSE BLDC GLUCOMTR-MCNC: 119 MG/DL (ref 70–130)
GLUCOSE BLDC GLUCOMTR-MCNC: 120 MG/DL (ref 70–130)
GLUCOSE BLDC GLUCOMTR-MCNC: 137 MG/DL (ref 70–130)
GLUCOSE BLDC GLUCOMTR-MCNC: 154 MG/DL (ref 70–130)
GLUCOSE SERPL-MCNC: 135 MG/DL (ref 65–99)
HCT VFR BLD AUTO: 26.7 % (ref 34–46.6)
HGB BLD-MCNC: 8.4 G/DL (ref 12–15.9)
IGA SERPL-MCNC: 1418 MG/DL (ref 87–352)
IGG SERPL-MCNC: 1242 MG/DL (ref 586–1602)
IGM SERPL-MCNC: 437 MG/DL (ref 26–217)
INTERPRETATION SERPL IEP-IMP: ABNORMAL
LABORATORY COMMENT REPORT: ABNORMAL
M PROTEIN SERPL ELPH-MCNC: ABNORMAL G/DL
MAGNESIUM SERPL-MCNC: 1.7 MG/DL (ref 1.6–2.4)
MCH RBC QN AUTO: 30.5 PG (ref 26.6–33)
MCHC RBC AUTO-ENTMCNC: 31.5 G/DL (ref 31.5–35.7)
MCV RBC AUTO: 97.1 FL (ref 79–97)
PHOSPHATE SERPL-MCNC: 3.3 MG/DL (ref 2.5–4.5)
PLATELET # BLD AUTO: 97 10*3/MM3 (ref 140–450)
PMV BLD AUTO: 11.5 FL (ref 6–12)
POTASSIUM SERPL-SCNC: 3.8 MMOL/L (ref 3.5–5.2)
PROT SERPL-MCNC: 5.2 G/DL (ref 6–8.5)
PROT SERPL-MCNC: 5.8 G/DL (ref 6–8.5)
RBC # BLD AUTO: 2.75 10*6/MM3 (ref 3.77–5.28)
SODIUM SERPL-SCNC: 145 MMOL/L (ref 136–145)
WBC NRBC COR # BLD: 4.26 10*3/MM3 (ref 3.4–10.8)

## 2023-07-10 PROCEDURE — 84100 ASSAY OF PHOSPHORUS: CPT | Performed by: INTERNAL MEDICINE

## 2023-07-10 PROCEDURE — 99232 SBSQ HOSP IP/OBS MODERATE 35: CPT | Performed by: INTERNAL MEDICINE

## 2023-07-10 PROCEDURE — 85027 COMPLETE CBC AUTOMATED: CPT | Performed by: INTERNAL MEDICINE

## 2023-07-10 PROCEDURE — 82948 REAGENT STRIP/BLOOD GLUCOSE: CPT

## 2023-07-10 PROCEDURE — 83735 ASSAY OF MAGNESIUM: CPT | Performed by: INTERNAL MEDICINE

## 2023-07-10 PROCEDURE — 80053 COMPREHEN METABOLIC PANEL: CPT | Performed by: INTERNAL MEDICINE

## 2023-07-10 PROCEDURE — 99232 SBSQ HOSP IP/OBS MODERATE 35: CPT | Performed by: NURSE PRACTITIONER

## 2023-07-10 PROCEDURE — 63710000001 INSULIN LISPRO (HUMAN) PER 5 UNITS: Performed by: INTERNAL MEDICINE

## 2023-07-10 PROCEDURE — 97110 THERAPEUTIC EXERCISES: CPT

## 2023-07-10 PROCEDURE — 97116 GAIT TRAINING THERAPY: CPT

## 2023-07-10 RX ORDER — BUMETANIDE 1 MG/1
2 TABLET ORAL
Status: DISCONTINUED | OUTPATIENT
Start: 2023-07-10 | End: 2023-07-15 | Stop reason: HOSPADM

## 2023-07-10 RX ORDER — HYDROXYZINE HYDROCHLORIDE 25 MG/1
25 TABLET, FILM COATED ORAL 3 TIMES DAILY PRN
Status: DISCONTINUED | OUTPATIENT
Start: 2023-07-10 | End: 2023-07-15 | Stop reason: HOSPADM

## 2023-07-10 RX ADMIN — INSULIN LISPRO 3 UNITS: 100 INJECTION, SOLUTION INTRAVENOUS; SUBCUTANEOUS at 20:22

## 2023-07-10 RX ADMIN — BUPROPION HYDROCHLORIDE 150 MG: 150 TABLET, FILM COATED, EXTENDED RELEASE ORAL at 09:20

## 2023-07-10 RX ADMIN — PANTOPRAZOLE SODIUM 40 MG: 40 INJECTION, POWDER, FOR SOLUTION INTRAVENOUS at 09:20

## 2023-07-10 RX ADMIN — ATORVASTATIN CALCIUM 20 MG: 20 TABLET, FILM COATED ORAL at 09:20

## 2023-07-10 RX ADMIN — Medication 10 ML: at 09:20

## 2023-07-10 RX ADMIN — HYDROXYZINE HYDROCHLORIDE 25 MG: 25 TABLET, FILM COATED ORAL at 09:20

## 2023-07-10 RX ADMIN — DONEPEZIL HYDROCHLORIDE 10 MG: 10 TABLET, FILM COATED ORAL at 20:22

## 2023-07-10 RX ADMIN — BUMETANIDE 2 MG: 1 TABLET ORAL at 12:33

## 2023-07-10 RX ADMIN — LEVOTHYROXINE SODIUM 300 MCG: 0.15 TABLET ORAL at 06:40

## 2023-07-10 RX ADMIN — SPIRONOLACTONE 50 MG: 25 TABLET ORAL at 09:20

## 2023-07-10 RX ADMIN — BUMETANIDE 2 MG: 1 TABLET ORAL at 17:54

## 2023-07-10 RX ADMIN — Medication 10 ML: at 20:22

## 2023-07-10 RX ADMIN — PANTOPRAZOLE SODIUM 40 MG: 40 INJECTION, POWDER, FOR SOLUTION INTRAVENOUS at 17:54

## 2023-07-10 NOTE — CONSULTS
Clinical Nutrition   Nutrition Assessment  Reason for Visit: MDR, Follow-up protocol    Patient Name: Ivania Fields  YOB: 1960  MRN: [id number]  Date of Encounter: 07/10/23 10:55 EDT  Admission date: 7/4/2023    Nutrition Assessment     Problem List:    Hematemesis    GI bleed    Liver cirrhosis secondary to PAREDES    SILVINA (acute kidney injury)    Type 2 diabetes mellitus    Essential hypertension    Dyslipidemia    Hypothyroidism    Anemia    Severe Malnutrition (HCC)        s/p EGD 7-5-23:  - LA Grade C reflux esophagitis with no bleeding.  - Portal hypertensive gastropathy.  - Non-bleeding gastric ulcer with pigmented material. Biopsied.  - Normal examined duodenum.  - Biopsies were taken with a cold forceps for Helicobacter pylori testing.      Extubated then re-intubated 7-5-23      s/p Paracentesis 7-6-23: 15.5L taken off      Extubated 7-7-23    PMH:   She  has a past medical history of Anemia, Diabetes mellitus, Hyperlipidemia, Hypertension, Hypothyroidism, Impaired mobility, and Short-term memory loss.    PSH:  She  has a past surgical history that includes Cholecystectomy; Esophagogastroduodenoscopy w/ banding (N/A, 4/6/2023); and Esophagogastroduodenoscopy (N/A, 7/5/2023).    Applicable Nutrition Concerns:   Skin:L leg blisters, B groin MASD, L 5th toe ulcer, R leg ulcer, R peineum tear    GI:abdomen non-distended, no bm documented      SLP Recommendation and Plan  SLP Swallowing Diagnosis: mild, oral dysphagia, pharyngeal dysphagia (07/08/23 1330)  SLP Diet Recommendation: soft to chew textures, whole, thin liquids (07/08/23 1330)  Recommended Precautions and Strategies: small bites of food and sips of liquid, general aspiration precautions (small, single sips only) (07/08/23 1330)  SLP Rec. for Method of Medication Administration: meds whole, with thin liquids, with puree, as tolerated (07/08/23 1330)    Reported/Observed/Food/Nutrition Related History:     Pt  "resting in bed, on room air, reports she is hungry, states that she prefers prosource protein, will not drink premier protein, \"it is too thick\"    Anthropometrics     Height: Height: 165.1 cm (65\")  Last Filed Weight: Weight: (!) 159 kg (350 lb 9.6 oz) (07/09/23 1600)  Method: Weight Method: Bed scale  BMI: BMI (Calculated): 58.3  BMI classification: Obese Class III extreme obesity: > or equal to 40kg/m2  IBW:   125 lb    UBW:  ? dry weight ~250 lb, pt indicates this is her UBW       Weight      Weight (kg) Weight (lbs) Weight Method   12/3/2021 122.471 kg  270 lb  Stated    7/27/2022 113.399 kg  250 lb  Stated    4/4/2023 113.399 kg  250 lb  Stated     140.3 kg (H)  309 lb 4.9 oz (H)  Bed scale    4/5/2023 141 kg (H)  310 lb 13.6 oz (H)  Bed scale    4/6/2023 142.3 kg (H)  313 lb 11.4 oz (H)  Bed scale     142 kg (H)  313 lb 0.9 oz (H)  Bed scale    4/7/2023 144.7 kg (H)  319 lb 0.1 oz (H)  Bed scale    4/8/2023 147.2 kg (H)  324 lb 8.3 oz (H)  Bed scale    4/11/2023 62.625 kg  138 lb 1 oz  Bed scale    6/9/2023 68.04 kg  150 lb  Estimated    6/20/2023 127.007 kg  280 lb     7/2/2023 120.657 kg  266 lb  Stated    7/3/2023 120.657 kg  266 lb  Stated    7/4/2023 129.638 kg  285 lb 12.8 oz  Bed scale     129.275 kg  285 lb        Weight change:  ? Difficult to determine due to ascites    Needs Assessment   Date: 7-7-23    Height used:Height: 165.1 cm (65\")  Weights used: 250lb presumed dry weight/ ABW  IBW: 125lb      Estimated Calorie needs: ~1700kcal  Method:  Kcals/KG MSJ ABW: 1591kcal  Method:  MSJ ABW: 1692kcal  Method : PSU ABW: 1844kcal    Estimated Protein needs: ~114g protein  Method: 0.8-1.2g protein per kg ABW: 91-136g  Method: 2-2.5g protein per kg IBW: 114-142g protein      Nutrition Focused Physical Exam     Date:  7/4     Patient meets criteria for malnutrition diagnosis, see MSA note.  *of note anasarca BLE lymphedema and anasarca likely masking further findings, severe muscle wasting and moderate fat " loss to upper body evident    Current Nutrition Prescription   PO: Diet: Gastrointestinal Diets, Diabetic Diets; Consistent Carbohydrate; Fiber-Restricted; Texture: Soft to Chew (NDD 3); Soft to Chew: Whole Meat; Fluid Consistency: Thin (IDDSI 0)  Oral Nutrition Supplement: Boost Breece, Prosource, Premier  Protein, Magic Cups    Intake: 75% of 4 meals    Nutrition Diagnosis   Date:  7/4            Updated:  7-10  Problem Malnutrition  severe/chronic   Etiology Energy needs>energy intake 2/2 decompensated PAREDES cirrhosis   Signs/Symptoms PO intakes <75% EEN at least past 2 months, severe muscle wasting, moderate subcutaneous fat loss.    Status: improved    Goal:   General: Nutrition to support treatment  PO: Continue positive trend   EN/PN: N/A    Nutrition Intervention      Follow treatment progress, Interview for preferences, Menu provided, Menu adjusted, Adjusted supplement, Encourage intake, Education provided     Pt had multiple supplements ordered prior to intubation, as pt likes Prosource Protein will send with each meal,  and dc other supplements    Discussed with pt that she might want to try protein powder when at home as she can mix this with drinks or food, will send sample for her to try at dinner    2g Na+ restriction added to diet as pt has cirrhosis with ascites, discussed with pt need to limit sodium intake      Monitoring/Evaluation:   Per protocol, I&O, PO intake, Supplement intake, Pertinent labs, Weight, Skin status, GI status, Symptoms, POC/GOC      Caren Valdez RD  Time Spent: 30min

## 2023-07-10 NOTE — PLAN OF CARE
Goal Outcome Evaluation:  Plan of Care Reviewed With: patient        Progress: improving  Outcome Evaluation: VSS. Placed on 2L at HS, room air when awake. BLE wraps removed due to pt complaining of extreme itching. UOP-    . H&H stable, no signs of bleeding.

## 2023-07-10 NOTE — PROGRESS NOTES
"GI Daily Progress Note  Subjective:    Chief Complaint: Follow-up hematemesis    Patient resting in bed in no acute distress.  Tolerating diet.  No new or worsening GI complaints.  Denies any abdominal pain and no recurrent gastrointestinal bleeding.    Objective:    /62 (BP Location: Right arm, Patient Position: Lying)   Pulse 71   Temp 98.2 °F (36.8 °C) (Oral)   Resp 20   Ht 165.1 cm (65\")   Wt (!) 159 kg (350 lb 9.6 oz)   SpO2 99%   BMI 58.34 kg/m²     Physical Exam  Vitals and nursing note reviewed.   Constitutional:       General: She is not in acute distress.     Appearance: Normal appearance. She is normal weight. She is not ill-appearing or toxic-appearing.   HENT:      Head: Normocephalic and atraumatic.   Eyes:      General: No scleral icterus.     Extraocular Movements: Extraocular movements intact.      Conjunctiva/sclera: Conjunctivae normal.      Pupils: Pupils are equal, round, and reactive to light.   Cardiovascular:      Rate and Rhythm: Normal rate and regular rhythm.      Pulses: Normal pulses.      Heart sounds: Normal heart sounds.   Pulmonary:      Effort: Pulmonary effort is normal. No respiratory distress.      Breath sounds: Normal breath sounds.   Abdominal:      General: Abdomen is flat. Bowel sounds are normal. There is no distension.      Palpations: Abdomen is soft. There is no mass.      Tenderness: There is no abdominal tenderness. There is no guarding or rebound.      Hernia: No hernia is present.   Skin:     General: Skin is warm and dry.      Capillary Refill: Capillary refill takes less than 2 seconds.      Coloration: Skin is not jaundiced or pale.   Neurological:      General: No focal deficit present.      Mental Status: She is alert and oriented to person, place, and time.   Psychiatric:         Mood and Affect: Mood normal.         Behavior: Behavior normal.         Thought Content: Thought content normal.         Judgment: Judgment normal.       Lab  Lab Results "   Component Value Date    WBC 4.26 07/10/2023    HGB 8.4 (L) 07/10/2023    HGB 8.2 (L) 07/09/2023    HGB 8.1 (L) 07/07/2023    MCV 97.1 (H) 07/10/2023    PLT 97 (L) 07/10/2023    INR 1.68 (H) 07/06/2023    INR 1.79 (H) 07/04/2023    INR 1.75 (H) 07/04/2023    INR 1.57 (H) 07/03/2023    INR 1.48 (H) 04/05/2023       Lab Results   Component Value Date    GLUCOSE 135 (H) 07/10/2023    BUN 21 07/10/2023    CREATININE 1.61 (H) 07/10/2023    BCR 13.0 07/10/2023     07/10/2023    K 3.8 07/10/2023    CO2 27.0 07/10/2023    CALCIUM 8.2 (L) 07/10/2023    PROTENTOTREF 5.8 (L) 07/06/2023    ALBUMIN 2.9 (L) 07/10/2023    ALKPHOS 52 07/10/2023    BILITOT 1.1 07/10/2023    ALT 10 07/10/2023    AST 26 07/10/2023       Assessment:    Hematemesis of coffee grounds secondary to large prepyloric ulcer s/p diagnostic EGD on 7/5/23   LA Grade C reflux esophagitis   PAREDES Cirrhosis with ascites s/p paracentesis on 7/6 with 15 liters removed   Normocytic anemia, stable.      Coagulopathy of liver disease.     Severe hypoalbuminemia   SILVINA on CKD, on diuretics for ascites  Morbid obesity     Plan:  Patient doing well.  Hemoglobin remained stable.  No further reports of gastrointestinal bleeding.  >>> Diet per SLP recommendations; is tolerating current diet.  >>> Pantoprazole 40 mg p.o. twice daily x30 days; daily thereafter  >>> Repeat outpatient EGD per Dr. Kareen Dang in 8 weeks  >>> Avoid NSAIDs  >>> Notify gastroenterology for any bloody stools or recurrent hematemesis  >>> Encouraged to push protein intake in setting of cirrhosis  -high-protein / low sodium (less than 2g/day) diet    As hemoglobin is stable no further reports of gastrointestinal bleeding, GI will sign off.  Patient instructed to follow-up with her primary gastroenterologist in [place] for repeat EGD in 8 weeks.  Okay for discharge to rehab from GI standpoint.      Avelino Herman, MITRA  07/10/23  17:02 EDT

## 2023-07-10 NOTE — PROGRESS NOTES
INTENSIVIST   PROGRESS NOTE     Hospital:  LOS: 6 days     Ms. Ivania Fields, 62 y.o. female is followed for a Chief Complaint of: GI Bleed      Subjective   S     Interval History:  No acute events overnight. Complaining about the GI soft diet.        The patient's relevant past medical, surgical and social history were reviewed and updated in Epic as appropriate.      ROS:   Constitutional: Negative for fever.   Respiratory: Negative for dyspnea.   Cardiovascular: Negative for chest pain.   Gastrointestinal: Negative for  nausea, vomiting and diarrhea.     Objective   O     Vitals:  Temp  Min: 96.9 °F (36.1 °C)  Max: 98.8 °F (37.1 °C)  BP  Min: 86/72  Max: 156/73  Pulse  Min: 51  Max: 80  Resp  Min: 18  Max: 22  SpO2  Min: 95 %  Max: 100 % Flow (L/min)  Min: 2  Max: 2    Intake/Ouptut 24 hrs (7:00AM - 6:59 AM)  Intake & Output (last 3 days)         07/07 0701  07/08 0700 07/08 0701  07/09 0700 07/09 0701  07/10 0700 07/10 0701  07/11 0700    P.O.   750 490    I.V. (mL/kg) 122 (0.9)       IV Piggyback 402 200 200     Total Intake(mL/kg) 524 (4.1) 200 (1.6) 950 (6) 490 (3.1)    Urine (mL/kg/hr) 2185 (0.7) 2550 (0.8) 800 (0.2)     Other        Total Output 2185 2550 800     Net -1661 -2350 +150 +490                    Medications (drips):         Physical Examination  Telemetry:  Normal sinus rhythm.    Constitutional:  No acute distress.  Sitting up in bed on room air.    Eyes: No scleral icterus.   PERRL, EOM intact.    Neck:  Supple, FROM   Cardiovascular: Normal rate, regular and rhythm. Normal heart sounds.  No murmurs, gallop or rub.   Respiratory: No respiratory distress. Normal respiratory effort.  Diminished bilaterally. No wheezing.    Abdominal:  Soft. No masses. Nontender. No distension. No HSM.   Extremities: No digital cyanosis. No clubbing.  No peripheral edema.   Skin: No rashes, lesions or ulcers   Neurological:   Alert and Oriented to person, place, and time.             Results from last 7  days   Lab Units 07/10/23  0253 07/09/23  0438 07/07/23  0032 07/06/23  0109 07/05/23  1626   WBC 10*3/mm3 4.26 4.75  --   --  5.09   HEMOGLOBIN g/dL 8.4* 8.2* 8.1*   < > 8.7*   MCV fL 97.1* 95.5  --   --  94.7   PLATELETS 10*3/mm3 97* 99*  --   --  97*    < > = values in this interval not displayed.     Results from last 7 days   Lab Units 07/10/23  0253 07/09/23  1817 07/09/23  0438 07/08/23  0607   SODIUM mmol/L 145  --  145 143   POTASSIUM mmol/L 3.8 3.5 3.4* 3.8   CO2 mmol/L 27.0  --  26.0 23.0   CREATININE mg/dL 1.61*  --  1.75* 1.83*   GLUCOSE mg/dL 135*  --  131* 77   MAGNESIUM mg/dL 1.7  --  1.6 1.7   PHOSPHORUS mg/dL 3.3  --  3.6 3.9     Estimated Creatinine Clearance: 55.2 mL/min (A) (by C-G formula based on SCr of 1.61 mg/dL (H)).  Results from last 7 days   Lab Units 07/10/23  0253 07/09/23  0438 07/08/23  0607   ALK PHOS U/L 52 55 50   BILIRUBIN mg/dL 1.1 1.2 1.4*   ALT (SGPT) U/L 10 8 5   AST (SGOT) U/L 26 17 28       Results from last 7 days   Lab Units 07/07/23  0340 07/06/23  0407 07/05/23  1309   PH, ARTERIAL pH units 7.495* 7.492* 7.451*   PCO2, ARTERIAL mm Hg 34.5* 33.7* 36.0   PO2 ART mm Hg 73.6* 70.9* 81.3*   FIO2 % 35 40 50       Images:  Imaging Results (Last 24 Hours)       ** No results found for the last 24 hours. **               Results: Reviewed.  I reviewed the patient's new laboratory and imaging results.  I independently reviewed the patient's new images.    Medications: Reviewed.    Assessment & Plan   A / P     Ms. Adam Garrison is a 61yo F with a history of T2DM, HTN, Hypothyroidism, and PAREDES cirrhosis who was transferred from [facility] to [facility] on 7/4/23 with a GI bleed. EGD revealed an antral ulcer and esophagitis with no evidence of portal HTN or varices. Postprocedure, she was somnolent and could not be liberated from mechanical ventilation. She underwent a large volume paracentesis on 7/6/23 with 15L removed. She was extubated on 7/7/23.     She did develop SILVINA and Nephrology is  following to manage diuretics.       Nutrition:   Diet: Gastrointestinal Diets, Diabetic Diets; Consistent Carbohydrate; Fiber-Restricted; Texture: Soft to Chew (NDD 3); Soft to Chew: Whole Meat; Fluid Consistency: Thin (IDDSI 0)  Advance Directives:   Code Status and Medical Interventions:   Ordered at: 07/04/23 0308     Code Status (Patient has no pulse and is not breathing):    CPR (Attempt to Resuscitate)     Medical Interventions (Patient has pulse or is breathing):    Full Support       Active Hospital Problems    Diagnosis     **Hematemesis     Severe Malnutrition (HCC)     Anemia     Type 2 diabetes mellitus     Essential hypertension     Dyslipidemia     Hypothyroidism     SILVINA (acute kidney injury)     Liver cirrhosis secondary to PAREDES     GI bleed        Assessment / Plan:    Continue to monitor H/H daily. No further transfusions needed.    Nephrology following for management of diuretics.   PPI BID  Bipap support as needed.   SSI for glucose management  Speech following. Dietary recommendations per their assessment.   PT/OT following and has recommended SNF.  Start PRN Atarax for itching. Counseled again to avoid ibuprofen (patient caught trying to take this from her purse yesterday)  AM labs  Okay to transfer to telemetry.     High level of risk due to:  severe exacerbation of chronic illness and illness with threat to life or bodily function.    Plan of care and goals reviewed during interdisciplinary rounds.  I discussed the patient's findings and my recommendations with patient and nursing staff      Dedra Campbell,     Intensive Care Medicine and Pulmonary Medicine

## 2023-07-10 NOTE — PLAN OF CARE
Goal Outcome Evaluation:  Plan of Care Reviewed With: patient        Progress: improving  Outcome Evaluation: Patient motivated w/ good effort. She demonstrates improving independence w/ mobility, but continues to be limited by weakness, impaired balance, gait instability, decreased activity tolerance, and remains below her functional baseline. Issued offloading shoe to protect plantar surface L foot. Patient completed transfers w/ mod A x 2 and ambulated 6ft w/ bariatric RW and min A x 2. PT continues to recommend SNF rehab at D/C for best functional outcome.      Anticipated Discharge Disposition (PT): skilled nursing facility

## 2023-07-10 NOTE — PROGRESS NOTES
" LOS: 6 days   Patient Care Team:  Kala Beaver MD as PCP - General (Internal Medicine)    Chief Complaint: Generalize weakness.    Subjective   Other physician notes seen.  Cirrhosis of liver.  Renal function slightly improved.  Still significant edema.  Review of system:  Complains of edema, generalized weakness, itching.  All others negative       Objective     Vital Sign Min/Max for last 24 hours  Temp  Min: 96.9 °F (36.1 °C)  Max: 98.8 °F (37.1 °C)   BP  Min: 86/72  Max: 156/73   Pulse  Min: 51  Max: 80   Resp  Min: 18  Max: 22   SpO2  Min: 95 %  Max: 100 %   Flow (L/min)  Min: 2  Max: 2   Weight  Min: 159 kg (350 lb 9.6 oz)  Max: 159 kg (350 lb 9.6 oz)     Flowsheet Rows      Flowsheet Row First Filed Value   Admission Height 165.1 cm (65\") Documented at 07/04/2023 0201   Admission Weight 130 kg (285 lb 12.8 oz) Documented at 07/04/2023 0201            I/O this shift:  In: 490 [P.O.:490]  Out: -   I/O last 3 completed shifts:  In: 1050 [P.O.:750; IV Piggyback:300]  Out: 1450 [Urine:1450]  Objective:  General Appearance: Alert, oriented, no obvious distress.   female laying comfortable in bed  Eyes: PER, EOMI.  Neck: Supple no JVD.  Lungs: Clear auscultation, no rales rhonchi's, equal chest movement, nonlabored.  Heart: No gallop, murmur, rub, RRR.  Abdomen: Distended abdomen with abdominal wall edema.  Soft, nontender, positive bowel sounds.  Extremities: 2-3+ bilateral lower extremity edema, mild abdominal wall edema, no cyanosis.  Skin changes bilateral lower extremity with stasis dermatitis.  Hyperpigmentation  Neuro: No focal deficit, moving all extremities, alert oriented X 3      Results Review:     I reviewed the patient's new clinical results.    WBC WBC   Date Value Ref Range Status   07/10/2023 4.26 3.40 - 10.80 10*3/mm3 Final   07/09/2023 4.75 3.40 - 10.80 10*3/mm3 Final      HGB Hemoglobin   Date Value Ref Range Status   07/10/2023 8.4 (L) 12.0 - 15.9 g/dL Final   07/09/2023 8.2 " (L) 12.0 - 15.9 g/dL Final      HCT Hematocrit   Date Value Ref Range Status   07/10/2023 26.7 (L) 34.0 - 46.6 % Final   07/09/2023 25.2 (L) 34.0 - 46.6 % Final      Platlets No results found for: LABPLAT   MCV MCV   Date Value Ref Range Status   07/10/2023 97.1 (H) 79.0 - 97.0 fL Final   07/09/2023 95.5 79.0 - 97.0 fL Final          Sodium Sodium   Date Value Ref Range Status   07/10/2023 145 136 - 145 mmol/L Final   07/09/2023 145 136 - 145 mmol/L Final   07/08/2023 143 136 - 145 mmol/L Final      Potassium Potassium   Date Value Ref Range Status   07/10/2023 3.8 3.5 - 5.2 mmol/L Final     Comment:     Slight hemolysis detected by analyzer. Results may be affected.   07/09/2023 3.5 3.5 - 5.2 mmol/L Final   07/09/2023 3.4 (L) 3.5 - 5.2 mmol/L Final   07/08/2023 3.8 3.5 - 5.2 mmol/L Final     Comment:     Slight hemolysis detected by analyzer. Results may be affected.      Chloride Chloride   Date Value Ref Range Status   07/10/2023 109 (H) 98 - 107 mmol/L Final   07/09/2023 108 (H) 98 - 107 mmol/L Final   07/08/2023 107 98 - 107 mmol/L Final      CO2 CO2   Date Value Ref Range Status   07/10/2023 27.0 22.0 - 29.0 mmol/L Final   07/09/2023 26.0 22.0 - 29.0 mmol/L Final   07/08/2023 23.0 22.0 - 29.0 mmol/L Final      BUN BUN   Date Value Ref Range Status   07/10/2023 21 8 - 23 mg/dL Final   07/09/2023 21 8 - 23 mg/dL Final   07/08/2023 23 8 - 23 mg/dL Final      Creatinine Creatinine   Date Value Ref Range Status   07/10/2023 1.61 (H) 0.57 - 1.00 mg/dL Final   07/09/2023 1.75 (H) 0.57 - 1.00 mg/dL Final   07/08/2023 1.83 (H) 0.57 - 1.00 mg/dL Final      Calcium Calcium   Date Value Ref Range Status   07/10/2023 8.2 (L) 8.6 - 10.5 mg/dL Final   07/09/2023 8.2 (L) 8.6 - 10.5 mg/dL Final   07/08/2023 8.2 (L) 8.6 - 10.5 mg/dL Final      PO4 No results found for: CAPO4   Albumin Albumin   Date Value Ref Range Status   07/10/2023 2.9 (L) 3.5 - 5.2 g/dL Final   07/09/2023 2.8 (L) 3.5 - 5.2 g/dL Final   07/08/2023 2.5 (L)  3.5 - 5.2 g/dL Final      Magnesium Magnesium   Date Value Ref Range Status   07/10/2023 1.7 1.6 - 2.4 mg/dL Final   07/09/2023 1.6 1.6 - 2.4 mg/dL Final   07/08/2023 1.7 1.6 - 2.4 mg/dL Final      Uric Acid No results found for: URICACID     Medication Review: yes    Assessment & Plan       Hematemesis    GI bleed    Liver cirrhosis secondary to PAREDES    SILVINA (acute kidney injury)    Type 2 diabetes mellitus    Essential hypertension    Dyslipidemia    Hypothyroidism    Anemia    Severe Malnutrition (HCC)        1.  Acute kidney disease vs chronic kidney disease: Last known stable cr btw 1.5-1.6mg/dl. Seen for abnormal renal function 2 months ago as well in the setting of GI bleed.     2.  Volume status: Significant LE edema and possible asictes in the setting of liver cirrhosis. Underwent large volume paracentesis 15.5 liter removed on 7/6/23      3.  Anemia: s/p 2 unit pRBC. Hx of GI ulcer. S/p EGD on this visit. No active bleeding site noted. Admitted in ICU after failed extubation post egd     4.  Intubated on MV: Post EGD, now extubated     5.  Liver cirrhosis: Complications includes ascites and gastropathy.      6.  Hypoalbuminemia: In the setting of liver cirrhosis     Recommendations:  Start Bumex low-dose.  Monitor renal function and electrolytes.  Continue spironolactone to 50 mg daily.  Replace potassium as needed.  Avoid nephrotoxic agents.  Transfuse for hemoglobin less than 7.  High risk complex patient with multiple medical problems.    Gabo Self MD  07/10/23  11:46 EDT

## 2023-07-10 NOTE — CASE MANAGEMENT/SOCIAL WORK
Continued Stay Note  [facility]     Patient Name: Ivania Fields  MRN: [id number]  Today's Date: 7/10/2023    Admit Date: 7/4/2023    Plan: Home with home health care   Discharge Plan       Row Name 07/10/23 1420       Plan    Plan Home with home health care    Patient/Family in Agreement with Plan yes    Plan Comments I spoke with Ms. Fields at the bedside. Therapy has recommended rehab for her at discharge and she is not in agreement with this plan. She wants to go home with the home health agency she has been current with prior to her admissions. Ms. Fields is unaware of the name of her home health agency but is able to tell me that [facility] arranged the home health agency. I have placed a referral to Annia, admissions liaison with [facility], to find the name of the home health agency. CM will continue to follow.    Final Discharge Disposition Code 06 - home with home health care                   Discharge Codes    No documentation.                 Expected Discharge Date and Time       Expected Discharge Date Expected Discharge Time    Jul 14, 2023               Kamlesh Isaacs RN

## 2023-07-10 NOTE — THERAPY TREATMENT NOTE
Patient Name: Ivania Fields  : 1960    MRN: [id number]                              Today's Date: 7/10/2023       Admit Date: 2023    Visit Dx:     ICD-10-CM ICD-9-CM   1. Venous stasis of both lower extremities  I87.8 459.81   2. Hematemesis with nausea  K92.0 578.0     787.02   3. 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    Sleep apnea    Iron deficiency anemia    SILVINA (acute kidney injury)    CKD (chronic kidney disease)    Stasis dermatitis of both legs    Debility    Type 2 diabetes mellitus    Essential hypertension    Dyslipidemia    Hypothyroidism    Hypoalbuminemia    Anemia    Hematemesis    Severe Malnutrition (HCC)     Past Medical History:   Diagnosis Date    Anemia     Diabetes mellitus     Hyperlipidemia     Hypertension     Hypothyroidism     Impaired mobility     Short-term memory loss      Past Surgical History:   Procedure Laterality Date    CHOLECYSTECTOMY      ENDOSCOPY N/A 2023    Procedure: ESOPHAGOGASTRODUODENOSCOPY;  Surgeon: Brunner, Mark I, MD;  Location: [facility] ENDOSCOPY;  Service: Gastroenterology;  Laterality: N/A;    ENDOSCOPY W/ BANDING N/A 2023    Procedure: ESOPHAGOGASTRODUODENOSCOPY WITH BIOPSY;  Surgeon: Jens Mandujano MD;  Location: [facility] ENDOSCOPY;  Service: Gastroenterology;  Laterality: N/A;      General Information       Row Name 07/10/23 1300          Physical Therapy Time and Intention    Document Type therapy note (daily note)  -MB     Mode of Treatment physical therapy  -MB       Row Name 07/10/23 1300          General Information    Patient Profile Reviewed yes  -MB     Existing Precautions/Restrictions fall;other (see comments)  L plantar wound; offloading shoe in room  -MB     Barriers to Rehab medically complex;previous functional deficit  -MB       Row Name  07/10/23 1300          Cognition    Orientation Status (Cognition) oriented x 3  -MB       Row Name 07/10/23 1300          Safety Issues, Functional Mobility    Safety Issues Affecting Function (Mobility) insight into deficits/self-awareness;safety precaution awareness;safety precautions follow-through/compliance;sequencing abilities  -MB     Impairments Affecting Function (Mobility) balance;coordination;endurance/activity tolerance;strength  -MB               User Key  (r) = Recorded By, (t) = Taken By, (c) = Cosigned By      Initials Name Provider Type    MB Elvie Elmore, PT Physical Therapist                   Mobility       Row Name 07/10/23 1548          Bed Mobility    Bed Mobility sit-supine;supine-sit  -MB     Supine-Sit [place] (Bed Mobility) minimum assist (75% patient effort)  -MB     Sit-Supine [place] (Bed Mobility) moderate assist (50% patient effort)  -MB     Assistive Device (Bed Mobility) draw sheet;head of bed elevated;bed rails  -MB     Comment, (Bed Mobility) Donned offloading shoe LLE prior to OOB mobility. Pt. required assist to manage BLEs and increased time/effort to complete.  -MB       Row Name 07/10/23 1548          Transfers    Comment, (Transfers) STS x 4 w/ consistent VCs/tactile cues for safe hand placement and upright posture in standing. Pt. demo retrograde posture in standing; able to correct w/ cueing.  -MB       Row Name 07/10/23 1548          Sit-Stand Transfer    Sit-Stand [place] (Transfers) moderate assist (50% patient effort);2 person assist;verbal cues  -MB     Assistive Device (Sit-Stand Transfers) bariatric;walker, front-wheeled  -MB       Row Name 07/10/23 1548          Gait/Stairs (Locomotion)    [place] Level (Gait) minimum assist (75% patient effort);2 person assist;verbal cues;nonverbal cues (demo/gesture)  -MB     Assistive Device (Gait) bariatric equipment;walker, front-wheeled  -MB     Distance in Feet (Gait) 6  -MB      Deviations/Abnormal Patterns (Gait) base of support, wide;noim decreased;gait speed decreased;stride length decreased  -MB     Bilateral Gait Deviations forward flexed posture;heel strike decreased  -MB     Comment, (Gait/Stairs) Pt. amb. w/ step to gait pattern w/ dec B stride length/heelstrike and increased forward flexion. Pt. required assist to shift weight laterally, VCs for step sequencing, upright posture, and safe use of RW. Gait distance limited by weakness.  -MB               User Key  (r) = Recorded By, (t) = Taken By, (c) = Cosigned By      Initials Name Provider Type    MB Elvie Elmore, PT Physical Therapist                   Obj/Interventions       Row Name 07/10/23 1554          Motor Skills    Therapeutic Exercise shoulder;hip;knee;ankle  -MB       Row Name 07/10/23 1554          Shoulder (Therapeutic Exercise)    Shoulder (Therapeutic Exercise) AROM (active range of motion)  -MB     Shoulder AROM (Therapeutic Exercise) bilateral;flexion;aBduction;5 repetitions  -MB       Row Name 07/10/23 1554          Hip (Therapeutic Exercise)    Hip (Therapeutic Exercise) isometric exercises;strengthening exercise  -MB     Hip Isometrics (Therapeutic Exercise) bilateral;gluteal sets;10 repetitions  -MB     Hip Strengthening (Therapeutic Exercise) bilateral;marching while seated;10 repetitions  -MB       Row Name 07/10/23 1554          Knee (Therapeutic Exercise)    Knee (Therapeutic Exercise) isometric exercises;strengthening exercise  -MB     Knee Isometrics (Therapeutic Exercise) bilateral;quad sets;10 repetitions  -MB     Knee Strengthening (Therapeutic Exercise) bilateral;LAQ (long arc quad);10 repetitions  -MB       Row Name 07/10/23 1554          Ankle (Therapeutic Exercise)    Ankle (Therapeutic Exercise) AROM (active range of motion)  -MB     Ankle AROM (Therapeutic Exercise) bilateral;dorsiflexion;plantarflexion;10 repetitions  -MB       Row Name 07/10/23 1554          Balance    Static  Standing Balance minimal assist;2-person assist  -MB     Position/Device Used, Standing Balance supported;walker, rolling  -MB     Balance Interventions standing;sit to stand;weight shifting activity  -MB               User Key  (r) = Recorded By, (t) = Taken By, (c) = Cosigned By      Initials Name Provider Type    Elvie Grajeda, PT Physical Therapist                   Goals/Plan    No documentation.                  Clinical Impression       Row Name 07/10/23 1556          Pain    Pretreatment Pain Rating 0/10 - no pain  -MB     Posttreatment Pain Rating 0/10 - no pain  -MB       Row Name 07/10/23 1556          Plan of Care Review    Plan of Care Reviewed With patient  -MB     Progress improving  -MB     Outcome Evaluation Patient motivated w/ good effort. She demonstrates improving independence w/ mobility, but continues to be limited by weakness, impaired balance, gait instability, decreased activity tolerance, and remains below her functional baseline. Issued offloading shoe to protect plantar surface L foot. Patient completed transfers w/ mod A x 2 and ambulated 6ft w/ bariatric RW and min A x 2. PT continues to recommend SNF rehab at D/C for best functional outcome.  -MB       Row Name 07/10/23 1556          Vital Signs    Pre Systolic BP Rehab 126  -MB     Pre Treatment Diastolic BP 62  -MB     Pretreatment Heart Rate (beats/min) 71  -MB     Pre SpO2 (%) 97  -MB     O2 Delivery Pre Treatment room air  -MB     O2 Delivery Intra Treatment room air  -MB     O2 Delivery Post Treatment room air  -MB     Pre Patient Position Supine  -MB     Intra Patient Position Standing  -MB     Post Patient Position Supine  -MB       Row Name 07/10/23 1556          Positioning and Restraints    Pre-Treatment Position in bed  -MB     Post Treatment Position bed  -MB     In Bed notified nsg;supine;call light within reach;encouraged to call for assist;heels elevated  returned to bed per RN request  -MB               User  Key  (r) = Recorded By, (t) = Taken By, (c) = Cosigned By      Initials Name Provider Type    Elvie Grajeda, PT Physical Therapist                   Outcome Measures       Row Name 07/10/23 1601          How much help from another person do you currently need...    Turning from your back to your side while in flat bed without using bedrails? 3  -MB     Moving from lying on back to sitting on the side of a flat bed without bedrails? 2  -MB     Moving to and from a bed to a chair (including a wheelchair)? 2  -MB     Standing up from a chair using your arms (e.g., wheelchair, bedside chair)? 2  -MB     Climbing 3-5 steps with a railing? 2  -MB     To walk in hospital room? 2  -MB     AM-PAC 6 Clicks Score (PT) 13  -MB     Highest level of mobility 4 --> Transferred to chair/commode  -MB       Row Name 07/10/23 1601          Functional Assessment    Outcome Measure Options AM-PAC 6 Clicks Basic Mobility (PT)  -MB               User Key  (r) = Recorded By, (t) = Taken By, (c) = Cosigned By      Initials Name Provider Type    Elvie Grajeda, PT Physical Therapist                                 Physical Therapy Education       Title: PT OT SLP Therapies (In Progress)       Topic: Physical Therapy (In Progress)       Point: Mobility training (In Progress)       Learning Progress Summary             Patient Acceptance, E,D, NR by  at 7/9/2023 1008                         Point: Home exercise program (Not Started)       Learner Progress:  Not documented in this visit.              Point: Body mechanics (In Progress)       Learning Progress Summary             Patient Acceptance, E,D, NR by  at 7/9/2023 1008                         Point: Precautions (In Progress)       Learning Progress Summary             Patient Acceptance, E,D, NR by  at 7/9/2023 1008                                         User Key       Initials Effective Dates Name Provider Type Discipline     02/03/23 -  Gloria Suresh, PT  Physical Therapist PT                  PT Recommendation and Plan     Plan of Care Reviewed With: patient  Progress: improving  Outcome Evaluation: Patient motivated w/ good effort. She demonstrates improving independence w/ mobility, but continues to be limited by weakness, impaired balance, gait instability, decreased activity tolerance, and remains below her functional baseline. Issued offloading shoe to protect plantar surface L foot. Patient completed transfers w/ mod A x 2 and ambulated 6ft w/ bariatric RW and min A x 2. PT continues to recommend SNF rehab at D/C for best functional outcome.     Time Calculation:    PT Charges       Row Name 07/10/23 1602             Time Calculation    Start Time 1300  -MB      PT Received On 07/10/23  -MB      PT Goal Re-Cert Due Date 07/19/23  -MB         Time Calculation- PT    Total Timed Code Minutes- PT 58 minute(s)  -MB         Timed Charges    34291 - PT Therapeutic Exercise Minutes 38  -MB      85823 - Gait Training Minutes  20  -MB         Total Minutes    Timed Charges Total Minutes 58  -MB       Total Minutes 58  -MB                User Key  (r) = Recorded By, (t) = Taken By, (c) = Cosigned By      Initials Name Provider Type    Elvie Grajeda, PT Physical Therapist                  Therapy Charges for Today       Code Description Service Date Service Provider Modifiers Qty    10647450151 HC PT THER PROC EA 15 MIN 7/10/2023 Elvie Elmore, PT GP 3    32867514747 HC GAIT TRAINING EA 15 MIN 7/10/2023 Elvie Elmore, PT GP 1            PT G-Codes  Outcome Measure Options: AM-PAC 6 Clicks Basic Mobility (PT)  AM-PAC 6 Clicks Score (PT): 13  PT Discharge Summary  Anticipated Discharge Disposition (PT): skilled nursing facility    Elvie Elmore PT  7/10/2023

## 2023-07-11 LAB
ALBUMIN SERPL-MCNC: 2.5 G/DL (ref 3.5–5.2)
ANION GAP SERPL CALCULATED.3IONS-SCNC: 10 MMOL/L (ref 5–15)
BUN SERPL-MCNC: 25 MG/DL (ref 8–23)
BUN/CREAT SERPL: 14.3 (ref 7–25)
CALCIUM SPEC-SCNC: 8 MG/DL (ref 8.6–10.5)
CHLORIDE SERPL-SCNC: 104 MMOL/L (ref 98–107)
CO2 SERPL-SCNC: 29 MMOL/L (ref 22–29)
CREAT SERPL-MCNC: 1.75 MG/DL (ref 0.57–1)
DEPRECATED RDW RBC AUTO: 53 FL (ref 37–54)
EGFRCR SERPLBLD CKD-EPI 2021: 32.4 ML/MIN/1.73
ERYTHROCYTE [DISTWIDTH] IN BLOOD BY AUTOMATED COUNT: 16.2 % (ref 12.3–15.4)
GLUCOSE BLDC GLUCOMTR-MCNC: 140 MG/DL (ref 70–130)
GLUCOSE BLDC GLUCOMTR-MCNC: 144 MG/DL (ref 70–130)
GLUCOSE BLDC GLUCOMTR-MCNC: 147 MG/DL (ref 70–130)
GLUCOSE BLDC GLUCOMTR-MCNC: 150 MG/DL (ref 70–130)
GLUCOSE SERPL-MCNC: 128 MG/DL (ref 65–99)
HCT VFR BLD AUTO: 24.9 % (ref 34–46.6)
HGB BLD-MCNC: 8.7 G/DL (ref 12–15.9)
MAGNESIUM SERPL-MCNC: 1.7 MG/DL (ref 1.6–2.4)
MCH RBC QN AUTO: 33.9 PG (ref 26.6–33)
MCHC RBC AUTO-ENTMCNC: 34.9 G/DL (ref 31.5–35.7)
MCV RBC AUTO: 96.9 FL (ref 79–97)
PHOSPHATE SERPL-MCNC: 3.3 MG/DL (ref 2.5–4.5)
PLATELET # BLD AUTO: 117 10*3/MM3 (ref 140–450)
PMV BLD AUTO: 11.1 FL (ref 6–12)
POTASSIUM SERPL-SCNC: 3.4 MMOL/L (ref 3.5–5.2)
POTASSIUM SERPL-SCNC: 3.6 MMOL/L (ref 3.5–5.2)
RBC # BLD AUTO: 2.57 10*6/MM3 (ref 3.77–5.28)
SODIUM SERPL-SCNC: 143 MMOL/L (ref 136–145)
WBC NRBC COR # BLD: 4.92 10*3/MM3 (ref 3.4–10.8)

## 2023-07-11 PROCEDURE — 99232 SBSQ HOSP IP/OBS MODERATE 35: CPT | Performed by: INTERNAL MEDICINE

## 2023-07-11 PROCEDURE — 84132 ASSAY OF SERUM POTASSIUM: CPT | Performed by: INTERNAL MEDICINE

## 2023-07-11 PROCEDURE — 80069 RENAL FUNCTION PANEL: CPT | Performed by: INTERNAL MEDICINE

## 2023-07-11 PROCEDURE — 63710000001 INSULIN LISPRO (HUMAN) PER 5 UNITS: Performed by: INTERNAL MEDICINE

## 2023-07-11 PROCEDURE — 83735 ASSAY OF MAGNESIUM: CPT | Performed by: INTERNAL MEDICINE

## 2023-07-11 PROCEDURE — 82948 REAGENT STRIP/BLOOD GLUCOSE: CPT

## 2023-07-11 PROCEDURE — 85027 COMPLETE CBC AUTOMATED: CPT | Performed by: INTERNAL MEDICINE

## 2023-07-11 RX ORDER — POTASSIUM CHLORIDE 20 MEQ/1
40 TABLET, EXTENDED RELEASE ORAL EVERY 4 HOURS
Status: COMPLETED | OUTPATIENT
Start: 2023-07-11 | End: 2023-07-11

## 2023-07-11 RX ORDER — PANTOPRAZOLE SODIUM 40 MG/1
40 TABLET, DELAYED RELEASE ORAL
Status: DISCONTINUED | OUTPATIENT
Start: 2023-07-11 | End: 2023-07-15 | Stop reason: HOSPADM

## 2023-07-11 RX ADMIN — BUPROPION HYDROCHLORIDE 150 MG: 150 TABLET, FILM COATED, EXTENDED RELEASE ORAL at 08:21

## 2023-07-11 RX ADMIN — POTASSIUM CHLORIDE 40 MEQ: 1500 TABLET, EXTENDED RELEASE ORAL at 21:42

## 2023-07-11 RX ADMIN — DONEPEZIL HYDROCHLORIDE 10 MG: 10 TABLET, FILM COATED ORAL at 21:42

## 2023-07-11 RX ADMIN — BUMETANIDE 2 MG: 1 TABLET ORAL at 17:15

## 2023-07-11 RX ADMIN — BUMETANIDE 2 MG: 1 TABLET ORAL at 08:22

## 2023-07-11 RX ADMIN — LEVOTHYROXINE SODIUM 300 MCG: 0.15 TABLET ORAL at 05:36

## 2023-07-11 RX ADMIN — PANTOPRAZOLE SODIUM 40 MG: 40 TABLET, DELAYED RELEASE ORAL at 17:16

## 2023-07-11 RX ADMIN — INSULIN LISPRO 3 UNITS: 100 INJECTION, SOLUTION INTRAVENOUS; SUBCUTANEOUS at 17:16

## 2023-07-11 RX ADMIN — Medication 10 ML: at 08:22

## 2023-07-11 RX ADMIN — POTASSIUM CHLORIDE 40 MEQ: 1500 TABLET, EXTENDED RELEASE ORAL at 17:15

## 2023-07-11 RX ADMIN — PANTOPRAZOLE SODIUM 40 MG: 40 INJECTION, POWDER, FOR SOLUTION INTRAVENOUS at 08:21

## 2023-07-11 RX ADMIN — Medication 10 ML: at 21:42

## 2023-07-11 RX ADMIN — SPIRONOLACTONE 50 MG: 25 TABLET ORAL at 08:22

## 2023-07-11 RX ADMIN — ATORVASTATIN CALCIUM 20 MG: 20 TABLET, FILM COATED ORAL at 08:21

## 2023-07-11 NOTE — NURSING NOTE
PT wound care following for compression therapy.    WOC will sign off.    Deon Villarreal RN, BSN, CCRN, CWOCN  Wound, Ostomy and Continence (WOC) Department  [facility]

## 2023-07-11 NOTE — DISCHARGE PLACEMENT REQUEST
"Ivania Fields (62 y.o. Female)     Jossie Rand RN   [phone]      Looking for short term rehab          Date of Birth   1960    Social Security Number       Address   [address] [place] [address] [facility] [address]    Home Phone   [phone]    MRN   [id number]       Cheondoism   Nazarene    Marital Status                               Admission Date   7/4/23    Admission Type   Urgent    Admitting Provider   Collette Manzo II, DO    Attending Provider   Collette Manzo II, DO    Department, Room/Bed   [facility] 3F, S305/1       Discharge Date       Discharge Disposition       Discharge Destination                                 Attending Provider: Collette Manzo II, DO    Allergies: No Known Allergies    Isolation: None   Infection: None   Code Status: CPR    Ht: 165.1 cm (65\")   Wt: 159 kg (350 lb 9.6 oz)    Admission Cmt: None   Principal Problem: Hematemesis [K92.0]                   Active Insurance as of 7/4/2023       Primary Coverage       Payor Plan Insurance Group Employer/Plan Group    HUMANA MEDICARE REPLACEMENT HUMANA MEDICARE REPLACEMENT [id number]       Payor Plan Address Payor Plan Phone Number Payor Plan Fax Number Effective Dates    [address] [address] [phone]  12/1/2021 - None Entered    [facility] [id number]         Subscriber Name Subscriber Birth Date Member ID       IVANIA FIELDS 1960 [id number]                     Emergency Contacts        (Rel.) Home Phone Work Phone Mobile Phone    GENARO BUCHANAN (Brother) [phone] -- --              Insurance Information                  HUMANA MEDICARE REPLACEMENT/HUMANA MEDICARE REPLACEMENT Phone: [phone]    Subscriber: Ivania Fields Subscriber#: [id number]    Group#: [id number] Precert#: [id number]             History & Physical        Day, Cecile PICHARDO MD at 07/04/23 0210              [facility]  HISTORY AND " PHYSICAL    Patient Name: Ivania Harris  : 1960  MRN: [id number]  Primary Care Physician: Provider, No Known  Date of admission: 2023    Subjective   Subjective     Chief Complaint:  GI Bleed    HPI:  Ivania Harris is a 62 y.o. female with a history of HTN, HLD, T2DM, hypothyroidism, Liver cirrhosis, and debility who has transferred here from [facility] for higher level of care. Its reported that she had presented to their ED 2 days ago for abdominal discomfort. Workup was unremarkable at that time, though she was also found to have a large open wound on her left foot. She was started on Keflex and was discharged home. She states she began having nausea/vomiting with several episodes of what she describes as coffee-ground emesis. She presented back to their ED several hours ago. Given her history of liver cirrhosis, there was concern for variceal bleed, so she was transferred here. Of note, she underwent a EGD on  which showed multiple non-bleeding gastric ulcers, but no varices. She is a non-smoker, denies alcohol abuse or illicit drug use.         Review of Systems   Constitutional: Positive for appetite change and fatigue. Negative for chills, fever and unexpected weight change.   HENT: Negative for nosebleeds, sore throat and trouble swallowing.    Eyes: Negative for photophobia and visual disturbance.   Respiratory: Negative for cough, shortness of breath and wheezing.    Cardiovascular: Positive for leg swelling. Negative for chest pain and palpitations.   Gastrointestinal: Positive for abdominal distention, nausea and vomiting. Negative for abdominal pain and diarrhea.   Genitourinary: Negative for dysuria and hematuria.   Musculoskeletal: Negative for arthralgias and myalgias.   Skin: Positive for wound (left foot).   Neurological: Positive for weakness. Negative for tremors, syncope, speech difficulty and headaches.   Psychiatric/Behavioral: Negative for confusion. The patient  is not nervous/anxious.           Personal History     Past Medical History:   Diagnosis Date    Anemia     Diabetes mellitus     Hyperlipidemia     Hypertension     Hypothyroidism     Impaired mobility     Short-term memory loss              Past Surgical History:   Procedure Laterality Date    CHOLECYSTECTOMY      ENDOSCOPY W/ BANDING N/A 4/6/2023    Procedure: ESOPHAGOGASTRODUODENOSCOPY WITH BIOPSY;  Surgeon: Jens Mandujano MD;  Location: [facility] ENDOSCOPY;  Service: Gastroenterology;  Laterality: N/A;       Family History: family history includes No Known Problems in her father and mother.     Social History:  reports that she has never smoked. She has never used smokeless tobacco. She reports current alcohol use. She reports that she does not use drugs.  Social History     Social History Narrative    Not on file       Medications:  Insulin Aspart, SITagliptin, albuterol sulfate HFA, aspirin, atorvastatin, buPROPion XL, cephalexin, diphenoxylate-atropine, donepezil, furosemide, insulin aspart, ipratropium-albuterol, iron polysaccharides, levothyroxine, multivitamin with minerals, ondansetron ODT, pantoprazole, and spironolactone    No Known Allergies    Objective   Objective     Vital Signs:   Temp:  [98.2 °F (36.8 °C)-98.4 °F (36.9 °C)] 98.2 °F (36.8 °C)  Heart Rate:  [75-83] 83  Resp:  [18] 18  BP: (130-170)/(53-80) 144/60    Physical Exam  Constitutional:       General: She is not in acute distress.     Appearance: Normal appearance. She is obese.   HENT:      Head: Atraumatic.      Right Ear: External ear normal.      Left Ear: External ear normal.      Nose: Nose normal.      Mouth/Throat:      Comments: Poor dentation  Eyes:      Extraocular Movements: Extraocular movements intact.      Conjunctiva/sclera: Conjunctivae normal.      Pupils: Pupils are equal, round, and reactive to light.   Cardiovascular:      Rate and Rhythm: Normal rate and regular rhythm.      Pulses: Normal pulses.      Heart  sounds: Murmur heard.   Pulmonary:      Effort: Pulmonary effort is normal. No respiratory distress.      Breath sounds: Normal breath sounds. No wheezing, rhonchi or rales.   Abdominal:      General: Bowel sounds are normal. There is distension.      Tenderness: There is no abdominal tenderness. There is no guarding or rebound.   Musculoskeletal:         General: Normal range of motion.      Cervical back: No rigidity.      Right lower leg: Edema present.      Left lower leg: Edema present.   Skin:     General: Skin is warm and dry.      Coloration: Skin is not jaundiced.      Findings: No lesion or rash.      Comments: Stasis dermatitis of lower extremities bilaterally. Ulceration of plantar area of left foot.     Neurological:      General: No focal deficit present.      Mental Status: She is alert and oriented to person, place, and time.   Psychiatric:         Attention and Perception: Attention normal.         Mood and Affect: Mood normal.         Behavior: Behavior normal.         Thought Content: Thought content normal.        Result Review:  I have personally reviewed the results from the time of this admission to 7/4/2023 02:53 EDT and agree with these findings:  [x]  Laboratory list / accordion  []  Microbiology  [x]  Radiology  [x]  EKG/Telemetry   []  Cardiology/Vascular   []  Pathology  [x]  Old records  []  Other:      LAB RESULTS:      Lab 07/03/23  2101 07/03/23 2006 07/02/23  1118   WBC  --  5.39 6.36   HEMOGLOBIN  --  9.3* 9.3*   HEMATOCRIT  --  29.2* 28.7*   PLATELETS  --  155 136*   NEUTROS ABS  --  4.05 4.49   IMMATURE GRANS (ABS)  --  0.02 0.03   LYMPHS ABS  --  0.66* 0.73   MONOS ABS  --  0.40 0.73   EOS ABS  --  0.19 0.30   MCV  --  97.0 91.7   CRP  --  3.21*  --    PROCALCITONIN  --  0.14  --    LACTATE  --  2.0  --    PROTIME 19.3*  --   --          Lab 07/03/23 2006 07/02/23  1029   SODIUM 139 142   POTASSIUM 4.0 3.9   CHLORIDE 106 107   CO2 23.9 21.6*   ANION GAP 9.1 13.4   BUN 19 15    CREATININE 1.37* 1.23*   EGFR 43.5* 49.5*   GLUCOSE 105* 133*   CALCIUM 9.0 9.2   MAGNESIUM 1.9  --          Lab 07/03/23 2006 07/02/23  1029   TOTAL PROTEIN 7.2 7.7   ALBUMIN 1.8* 1.9*   GLOBULIN 5.4 5.8   ALT (SGPT) 13 14   AST (SGOT) 29 35*   BILIRUBIN 1.1 0.9   ALK PHOS 113 134*   LIPASE 16 17         Lab 07/03/23  2217 07/03/23  2101 07/03/23 2006   HSTROP T 169*  --  198*   PROTIME  --  19.3*  --    INR  --  1.57*  --                  Brief Urine Lab Results  (Last result in the past 365 days)        Color   Clarity   Blood   Leuk Est   Nitrite   Protein   CREAT   Urine HCG        07/02/23 1611 Dark Yellow   Clear   Moderate (2+)   Small (1+)   Negative   100 mg/dL (2+)                 Microbiology Results (last 10 days)       Procedure Component Value - Date/Time    COVID-19 and FLU A/B PCR - Swab, Nasopharynx [[id number]]  (Normal) Collected: 07/03/23 2033    Lab Status: Final result Specimen: Swab from Nasopharynx Updated: 07/03/23 2056     COVID19 Not Detected     Influenza A PCR Not Detected     Influenza B PCR Not Detected    Narrative:      Fact sheet for providers: https://www.fda.gov/media/623580/download    Fact sheet for patients: https://www.fda.gov/media/518304/download    Test performed by PCR.            CT Abdomen Pelvis Without Contrast    Result Date: 7/2/2023  PROCEDURE: CT ABDOMEN PELVIS WO CONTRAST-  HISTORY: n/v and lower abdominal discomfort X 3-4 days  COMPARISON: June 9, 2023.  PROCEDURE: Axial images were obtained from the lung bases to the pubic symphysis by computed tomography without intravenous contrast.  FINDINGS: Lack of intravenous contrast limits assessment of the solid organs, the mediastinum, and the vasculature.  ABDOMEN:Anasarca is noted with a large volume of ascites. There are small to moderate-sized bilateral pleural effusions with compressive atelectasis. The heart is enlarged. There is evidence calcified granulomatous disease. Moderate-sized hiatal hernia is  present. Atherosclerosis without aortic aneurysm. Cirrhotic appearing liver again noted. The gallbladder is absent. Spleen is upper limits of normal in size. No obvious pancreatic mass. Adrenal glands and left kidney is atrophic. Nonobstructing stone present in the right collecting system. There is a 5 mm right UPJ stone without significant hydronephrosis. No distal ureteral stones were identified. There are no abnormally dilated loops of bowel. No free air was identified.  PELVIS: The appendix is not identified, assessment is limited secondary to significant ascites. The bladder is decompressed. The uterus is present. Significant free fluid again noted in the pelvis. No acute osseous changes.      Impression: Large volume ascites and anasarca with cirrhosis noted.  Small to moderate-sized bilateral pleural effusions with compressive atelectasis.  5 mm right UPJ stone without significant hydronephrosis.     This study was performed with techniques to keep radiation doses as low as reasonably achievable (ALARA). Individualized dose reduction techniques using automated exposure control or adjustment of mA and/or kV according to the patient size were employed.  This report was signed and finalized on 7/2/2023 11:09 AM by Mamadou Domínguez DO.    XR Foot 3+ View Left    Result Date: 7/2/2023  PROCEDURE: XR FOOT 3+ VW LEFT-  History: large ulcer, patient reports unsure what caused it  COMPARISON: None.  FINDINGS:  A 3 view exam demonstrates no displaced fracture or dislocation. Degenerative changes are present. Significant soft tissue swelling about the mid and forefoot. Consider MRI if symptoms persist.      Impression: No displaced fracture.     This report was signed and finalized on 7/2/2023 12:32 PM by Mamadou Domínguez DO.          Assessment & Plan   Assessment & Plan       GI bleed    Cirrhosis of liver with ascites    Anemia    SILVINA (acute kidney injury)    CKD (chronic kidney disease)    Type 2 diabetes mellitus     Essential hypertension    Dyslipidemia    Sleep apnea    Debility    Hypothyroidism (acquired)    Hypoalbuminemia      Ivania Harris is a 62 y.o. female with a history of HTN, HLD, T2DM, hypothyroidism, Liver cirrhosis, and debility who has transferred here from [facility] for higher level of care for possible upper GI bleed.     Hematemesis  Upper GI Bleed?  Anemia  Liver Cirrhosis w/ ascites  -Hgb 9.3, Hct 29.2. Slightly decreased from prior.  -She underwent a EGD on 4/6 which did show multiple non-bleeding gastric ulcers. No varices at that time.   -Was started on Octreotide at [facility] prior to coming here.  -GI consult for the am  -IV Protonix 40mg IV BID  -NPO tonight.   -Zofram for nausea    Wound on Left Foot  -Wound care to see in the am    HTN  HLD  -Takes spironolactone and lasix. Hold for now given Marcela  -Continue statin    T2DM  -Blood glucose 105 tonight.   -Check A1C  -Fingerstick achs. SSI    MARCELA on CKD  -Cr 1.37. Slightly increased from recent results  -eGFR 43.5  -Holding home lasix and spironolactone for now.   -Repeat bmp in the am    Hypothyroidism  -Check TSH, T4  -Continue home synthroid     Hypoalbuminemia  -Alb 1.8  -Nutrition consult         DVT prophylaxis:  scds    CODE STATUS:  Full code       Expected DischargeTBD    This note has been completed as part of a split-shared workflow.     Signature: Electronically signed by Hugo Martinez PA-C, 07/04/23, 3:03 AM EDT      Attending   Admission Attestation       I have performed an independent face-to-face diagnostic evaluation including performing an independent physical examination as documented here.  The documented plan of care above was reviewed and developed with the advanced practice clinician (APC).      Brief Summary Statement:   Ivania Fields is a 62 y.o. female  with hx of PAREDES and reportedly PUD w/ EGD 3/2023 which revealed nonbleeding ulcers who presents now w/ what the [facility] ER physician tells me is  multiple episodes of hematemesis today however pt herself tells me she only had one episode of coffee ground emesis and no abd pain. . No blood in stool or dark tarry stool.  No f/c.      Remainder of detailed HPI is as noted by APC and has been reviewed and/or edited by me for completeness.    Attending Physical Exam:  Temp:  [98.2 °F (36.8 °C)-98.4 °F (36.9 °C)] 98.2 °F (36.8 °C)  Heart Rate:  [75-83] 83  Resp:  [18] 18  BP: (130-170)/(53-80) 144/60     Separate exam performed by me w/ no changes to the above    Brief Assessment/Plan :  See detailed assessment and plan developed with APC which I have reviewed and/or edited for completeness.    61 y/o female w/ hx of PAREDES/cirrhosis, PUD, DM, HTN here now w/  GI BLeed  -pt has only had one episode of self-limiting hematemesis w/ stable h/h but will monitor w/ repeat h/h and continue iv protonix w/ GI consult this a.m.  2. Left foot wound  -wound care to see; pt not aware of extent of wound  -obtain xray now but may need further imaging    Total time spent: 20 min additional time   Time spent includes time reviewing chart, face-to-face time, counseling patient/family/caregiver, ordering medications/tests/procedures, communicating with other health care professionals, documenting clinical information in the electronic health record, and coordination of care.      Electronically signed by Cecile Foreman MD, 07/04/23, 5:24 AM EDT.        Cecile Foreman MD  07/04/23                      Electronically signed by Cecile Foreman MD at 07/04/23 0524       Current Facility-Administered Medications   Medication Dose Route Frequency Provider Last Rate Last Admin    acetaminophen (TYLENOL) tablet 650 mg  650 mg Oral Q4H PRN Case, Dedra V., DO        atorvastatin (LIPITOR) tablet 20 mg  20 mg Oral Daily Case, Dedra V., DO   20 mg at 07/11/23 0821    bumetanide (BUMEX) tablet 2 mg  2 mg Oral BID Gabo Self MD   2 mg at 07/11/23 0822    buPROPion XL (WELLBUTRIN XL) 24  hr tablet 150 mg  150 mg Oral Daily Case, Dedra V., DO   150 mg at 23 0821    dextrose (D50W) (25 g/50 mL) IV injection 25 g  25 g Intravenous Q15 Min PRN Case, Dedra V., DO        dextrose (GLUTOSE) oral gel 15 g  15 g Oral Q15 Min PRN Case, Dedra V., DO        donepezil (ARICEPT) tablet 10 mg  10 mg Oral Nightly Case, Dedra V., DO   10 mg at 07/10/23 2022    glucagon (GLUCAGEN) injection 1 mg  1 mg Intramuscular Q15 Min PRN Case, Dedra V., DO        hydrOXYzine (ATARAX) tablet 25 mg  25 mg Oral TID PRN Case, Dedra V., DO   25 mg at 07/10/23 0920    Insulin Lispro (humaLOG) injection 3-14 Units  3-14 Units Subcutaneous 4x Daily AC & at Bedtime Case, Dedra V., DO   3 Units at 07/10/23 2022    levothyroxine (SYNTHROID, LEVOTHROID) tablet 300 mcg  300 mcg Oral Daily Case, Dedra V., DO   300 mcg at 23 0536    melatonin tablet 5 mg  5 mg Oral Nightly PRN Case, Dedra V., DO   5 mg at 23    ondansetron (ZOFRAN) tablet 4 mg  4 mg Oral Q6H PRN Case, Dedra V., DO        Or    ondansetron (ZOFRAN) injection 4 mg  4 mg Intravenous Q6H PRN Case, Dedra V., DO   4 mg at 23 1216    pantoprazole (PROTONIX) EC tablet 40 mg  40 mg Oral BID AC Collette Manzo II, DO        polyvinyl alcohol (LIQUIFILM) 1.4 % ophthalmic solution 2 drop  2 drop Both Eyes Q1H PRN Case, Dedra V., DO        sodium chloride 0.9 % flush 10 mL  10 mL Intravenous Q12H Case, Dedra V., DO   10 mL at 23 0822    sodium chloride 0.9 % flush 10 mL  10 mL Intravenous PRN Case, Dedra V., DO        sodium chloride 0.9 % infusion 40 mL  40 mL Intravenous PRN Case, Dedra V., DO        spironolactone (ALDACTONE) tablet 50 mg  50 mg Oral Daily Case, Dedra V., DO   50 mg at 23 0822        Physician Progress Notes (last 24 hours)        Collette Manzo II, DO at 23 0809              [facility]  PROGRESS NOTE    Patient Name: Ivania STEPHEN Fields  :  1960  MRN: [id number]    Date of Admission: 7/4/2023  Primary Care Physician: Kala Beaver MD    Subjective   Subjective     CC: f/u GIB    HPI: Up in bed eating. Says she is doing much better. Asking to upgrade from soft diet.     ROS:  Gen- No fevers, chills  CV- No chest pain, palpitations  Resp- No cough, dyspnea  GI- No N/V/D, abd pain     Objective   Objective     Vital Signs:   Temp:  [98 °F (36.7 °C)-98.3 °F (36.8 °C)] 98.1 °F (36.7 °C)  Heart Rate:  [57-80] 68  Resp:  [16-20] 16  BP: (104-140)/(51-86) 108/53  Flow (L/min):  [2] 2     Physical Exam:  Constitutional: No acute distress, awake, alert, chronically ill appearing  HENT: NCAT, mucous membranes moist  Respiratory: Clear to auscultation bilaterally, respiratory effort normal   Cardiovascular: RRR, no murmurs, rubs, or gallops  Gastrointestinal: Positive bowel sounds, soft, nontender, nondistended, obese abd  Musculoskeletal: No bilateral ankle edema  Psychiatric: Appropriate affect, cooperative  Neurologic: Oriented x 3, strength symmetric in all extremities, Cranial Nerves grossly intact to confrontation, speech clear  Skin: No rashes     Results Reviewed:  LAB RESULTS:      Lab 07/11/23  0726 07/10/23  0253 07/09/23  0438 07/07/23  0032 07/06/23  1751 07/06/23  0646 07/06/23  0109 07/05/23  1626 07/05/23  0058 07/04/23  1602 07/04/23  1034   WBC 4.92 4.26 4.75  --   --   --   --  5.09  --   --  5.45   HEMOGLOBIN 8.7* 8.4* 8.2* 8.1* 8.0* 8.2*   < > 8.7*   < >  --  8.0*   HEMATOCRIT 24.9* 26.7* 25.2* 22.5* 23.4* 22.7*   < > 27.0*   < >  --  23.2*   PLATELETS 117* 97* 99*  --   --   --   --  97*  --   --  136*   NEUTROS ABS  --   --  3.30  --   --   --   --   --   --   --  4.00   IMMATURE GRANS (ABS)  --   --  0.03  --   --   --   --   --   --   --  0.02   LYMPHS ABS  --   --  0.49*  --   --   --   --   --   --   --  0.64*   MONOS ABS  --   --  0.64  --   --   --   --   --   --   --  0.51   EOS ABS  --   --  0.26  --   --   --   --   --    --   --  0.22   MCV 96.9 97.1* 95.5  --   --   --   --  94.7  --   --  99.1*   PROTIME  --   --   --   --   --  19.9*  --   --   --  21.0*  --     < > = values in this interval not displayed.         Lab 07/11/23  0726 07/10/23  0253 07/09/23  1817 07/09/23 0438 07/08/23 0607 07/07/23 0300 07/06/23  0646 07/04/23  1056   SODIUM 143 145  --  145 143 142   < >  --    POTASSIUM 3.4* 3.8 3.5 3.4* 3.8 3.4*   < >  --    CHLORIDE 104 109*  --  108* 107 106   < >  --    CO2 29.0 27.0  --  26.0 23.0 23.0   < >  --    ANION GAP 10.0 9.0  --  11.0 13.0 13.0   < >  --    BUN 25* 21  --  21 23 24*   < >  --    CREATININE 1.75* 1.61*  --  1.75* 1.83* 1.64*   < >  --    EGFR 32.4* 35.8*  --  32.4* 30.7* 35.0*   < >  --    GLUCOSE 128* 135*  --  131* 77 104*   < >  --    CALCIUM 8.0* 8.2*  --  8.2* 8.2* 8.7   < >  --    MAGNESIUM 1.7 1.7  --  1.6 1.7 1.7   < >  --    PHOSPHORUS 3.3 3.3  --  3.6 3.9 3.1  --   --    HEMOGLOBIN A1C  --   --   --   --   --   --   --  5.20    < > = values in this interval not displayed.         Lab 07/11/23  0726 07/10/23  0253 07/09/23  0438 07/08/23  0607 07/07/23  0300 07/06/23  0646   TOTAL PROTEIN  --  5.2* 5.2* 5.0* 5.4* 6.2   ALBUMIN 2.5* 2.9* 2.8* 2.5* 2.9* 2.6*  2.6*   GLOBULIN  --  2.3 2.4 2.5 2.5 3.6   ALT (SGPT)  --  10 8 5 7 7   AST (SGOT)  --  26 17 28 26 21   BILIRUBIN  --  1.1 1.2 1.4* 1.6* 1.9*   ALK PHOS  --  52 55 50 50 65         Lab 07/06/23  0646 07/04/23  1602   PROTIME 19.9* 21.0*   INR 1.68* 1.79*             Lab 07/05/23  0229   ABO TYPING A   RH TYPING Positive   ANTIBODY SCREEN Positive         Lab 07/07/23  0340 07/06/23  0407 07/05/23  1309   PH, ARTERIAL 7.495* 7.492* 7.451*   PCO2, ARTERIAL 34.5* 33.7* 36.0   PO2 ART 73.6* 70.9* 81.3*   FIO2 35 40 50   HCO3 ART 26.6* 25.7 25.1   BASE EXCESS ART 3.2* 2.4* 1.1   CARBOXYHEMOGLOBIN 1.1 1.5 1.3     Brief Urine Lab Results  (Last result in the past 365 days)        Color   Clarity   Blood   Leuk Est   Nitrite   Protein    CREAT   Urine HCG        07/05/23 1447 Dark Yellow   Clear   Trace   Small (1+)   Negative   30 mg/dL (1+)                   Microbiology Results Abnormal       Procedure Component Value - Date/Time    Body Fluid Culture - Body Fluid, Peritoneum [[id number]] Collected: 07/06/23 1735    Lab Status: Final result Specimen: Body Fluid from Peritoneum Updated: 07/09/23 0948     Body Fluid Culture No growth at 3 days     Gram Stain Moderate (3+) Red blood cells      Few (2+) WBCs seen      No organisms seen    Respiratory Culture - Sputum, ET Suction [[id number]] Collected: 07/06/23 0839    Lab Status: Final result Specimen: Sputum from ET Suction Updated: 07/08/23 0858     Respiratory Culture Light growth (2+) Normal respiratory deanne. No S. aureus or Pseudomonas aeruginosa detected. Final report.     Gram Stain Many (4+) WBCs per low power field      Rare (1+) Epithelial cells per low power field      Few (2+) Gram negative bacilli      Rare (1+) Gram positive cocci in pairs            No radiology results from the last 24 hrs    Results for orders placed during the hospital encounter of 07/04/23    Adult Transthoracic Echo Complete W/ Cont if Necessary Per Protocol    Interpretation Summary    Left ventricular systolic function is hyperdynamic (EF > 70%). Calculated left ventricular EF = 73.7% Left ventricular ejection fraction appears to be greater than 70%.    Left ventricular diastolic function was indeterminate.    The right ventricular cavity is mildly dilated.    The left atrial cavity is moderately dilated.    The right atrial cavity is mild to moderately  dilated.    Mild aortic valve stenosis is present.    Moderate tricuspid valve regurgitation is present.    Estimated right ventricular systolic pressure from tricuspid regurgitation is moderately elevated (45-55 mmHg).      Current medications:  Scheduled Meds:atorvastatin, 20 mg, Oral, Daily  bumetanide, 2 mg, Oral, BID  buPROPion XL, 150 mg, Oral,  Daily  donepezil, 10 mg, Oral, Nightly  insulin lispro, 3-14 Units, Subcutaneous, 4x Daily AC & at Bedtime  levothyroxine, 300 mcg, Oral, Daily  pantoprazole, 40 mg, Intravenous, BID AC  sodium chloride, 10 mL, Intravenous, Q12H  spironolactone, 50 mg, Oral, Daily      Continuous Infusions:   PRN Meds:.  acetaminophen    dextrose    dextrose    glucagon (human recombinant)    hydrOXYzine    melatonin    ondansetron **OR** ondansetron    polyvinyl alcohol    sodium chloride    sodium chloride    Assessment & Plan   Assessment & Plan     Active Hospital Problems    Diagnosis  POA    **Hematemesis [K92.0]  Yes    Severe Malnutrition (HCC) [E43]  Yes    Anemia [D64.9]  Yes    Type 2 diabetes mellitus [E11.9]  Yes    Essential hypertension [I10]  Yes    Dyslipidemia [E78.5]  Yes    Hypothyroidism [E03.9]  Yes    SILVINA (acute kidney injury) [N17.9]  Yes    Liver cirrhosis secondary to PAREDES [K75.81, K74.60]  Yes    GI bleed [K92.2]  Yes      Resolved Hospital Problems   No resolved problems to display.        Brief Hospital Course to date:  Ms. Adam Garrison is a 61yo F with a history of T2DM, HTN, Hypothyroidism, and PAREDES cirrhosis who was transferred from [facility] to [facility] on 7/4/23 with a GI bleed. EGD revealed an antral ulcer and esophagitis with no evidence of portal HTN or varices. Postprocedure, she was somnolent and could not be liberated from mechanical ventilation. She underwent a large volume paracentesis on 7/6/23 with 15L removed. She was extubated on 7/7/23. Transferred to floor 7/11.     GIB  Prepyloric ulcer  LA Grade C reflux esphagitis  --GI has seen and s/o. Rec'd BID PPI x 1 months then daily thereafter. F/U Primary GI in [place] for repeat scope in 8 weeks.  --H/H stable  --PT/OT recs SNF. Patient inially refusing however when I discussed with her that she is medically ready for d/c she said she wasn't strong enough to leave and is now agreeable to short term rehab.    Decompensated PAREDES cirrhosis  --S/P 15L  "LVP. Now tolerating diuretics.     SILVINA  --NAL follows. Now on bumex/aldactone. SCr stable. CTM.    DM2  HTN  HLD  Morbid obesity     (Consider using .LEXTIMEPROG or .LEXMDM then remove this message)    Expected Discharge Location and Transportation:   Expected Discharge   Expected Discharge Date: 7/14/2023; Expected Discharge Time:      DVT prophylaxis:  Mechanical DVT prophylaxis orders are present.     AM-PAC 6 Clicks Score (PT): 13 (07/11/23 0736)    CODE STATUS:   Code Status and Medical Interventions:   Ordered at: 07/04/23 0308     Code Status (Patient has no pulse and is not breathing):    CPR (Attempt to Resuscitate)     Medical Interventions (Patient has pulse or is breathing):    Full Support       Collette Manzo II, DO  07/11/23       Electronically signed by Collette Manzo II, DO at 07/11/23 1006       Avelino Herman APRN at 07/10/23 1702       Attestation signed by Herrera Solitario MD at 07/10/23 2382    I have reviewed this documentation and agree.                  GI Daily Progress Note  Subjective:    Chief Complaint: Follow-up hematemesis    Patient resting in bed in no acute distress.  Tolerating diet.  No new or worsening GI complaints.  Denies any abdominal pain and no recurrent gastrointestinal bleeding.    Objective:    /62 (BP Location: Right arm, Patient Position: Lying)   Pulse 71   Temp 98.2 °F (36.8 °C) (Oral)   Resp 20   Ht 165.1 cm (65\")   Wt (!) 159 kg (350 lb 9.6 oz)   SpO2 99%   BMI 58.34 kg/m²     Physical Exam  Vitals and nursing note reviewed.   Constitutional:       General: She is not in acute distress.     Appearance: Normal appearance. She is normal weight. She is not ill-appearing or toxic-appearing.   HENT:      Head: Normocephalic and atraumatic.   Eyes:      General: No scleral icterus.     Extraocular Movements: Extraocular movements intact.      Conjunctiva/sclera: Conjunctivae normal.      Pupils: Pupils are equal, round, and reactive to light. "   Cardiovascular:      Rate and Rhythm: Normal rate and regular rhythm.      Pulses: Normal pulses.      Heart sounds: Normal heart sounds.   Pulmonary:      Effort: Pulmonary effort is normal. No respiratory distress.      Breath sounds: Normal breath sounds.   Abdominal:      General: Abdomen is flat. Bowel sounds are normal. There is no distension.      Palpations: Abdomen is soft. There is no mass.      Tenderness: There is no abdominal tenderness. There is no guarding or rebound.      Hernia: No hernia is present.   Skin:     General: Skin is warm and dry.      Capillary Refill: Capillary refill takes less than 2 seconds.      Coloration: Skin is not jaundiced or pale.   Neurological:      General: No focal deficit present.      Mental Status: She is alert and oriented to person, place, and time.   Psychiatric:         Mood and Affect: Mood normal.         Behavior: Behavior normal.         Thought Content: Thought content normal.         Judgment: Judgment normal.       Lab  Lab Results   Component Value Date    WBC 4.26 07/10/2023    HGB 8.4 (L) 07/10/2023    HGB 8.2 (L) 07/09/2023    HGB 8.1 (L) 07/07/2023    MCV 97.1 (H) 07/10/2023    PLT 97 (L) 07/10/2023    INR 1.68 (H) 07/06/2023    INR 1.79 (H) 07/04/2023    INR 1.75 (H) 07/04/2023    INR 1.57 (H) 07/03/2023    INR 1.48 (H) 04/05/2023       Lab Results   Component Value Date    GLUCOSE 135 (H) 07/10/2023    BUN 21 07/10/2023    CREATININE 1.61 (H) 07/10/2023    BCR 13.0 07/10/2023     07/10/2023    K 3.8 07/10/2023    CO2 27.0 07/10/2023    CALCIUM 8.2 (L) 07/10/2023    PROTENTOTREF 5.8 (L) 07/06/2023    ALBUMIN 2.9 (L) 07/10/2023    ALKPHOS 52 07/10/2023    BILITOT 1.1 07/10/2023    ALT 10 07/10/2023    AST 26 07/10/2023       Assessment:    Hematemesis of coffee grounds secondary to large prepyloric ulcer s/p diagnostic EGD on 7/5/23   LA Grade C reflux esophagitis   PAREDES Cirrhosis with ascites s/p paracentesis on 7/6 with 15 liters removed  "  Normocytic anemia, stable.      Coagulopathy of liver disease.     Severe hypoalbuminemia   SILVINA on CKD, on diuretics for ascites  Morbid obesity     Plan:  Patient doing well.  Hemoglobin remained stable.  No further reports of gastrointestinal bleeding.  >>> Diet per SLP recommendations; is tolerating current diet.  >>> Pantoprazole 40 mg p.o. twice daily x30 days; daily thereafter  >>> Repeat outpatient EGD per Dr. Kareen Dang in 8 weeks  >>> Avoid NSAIDs  >>> Notify gastroenterology for any bloody stools or recurrent hematemesis  >>> Encouraged to push protein intake in setting of cirrhosis  -high-protein / low sodium (less than 2g/day) diet    As hemoglobin is stable no further reports of gastrointestinal bleeding, GI will sign off.  Patient instructed to follow-up with her primary gastroenterologist in [place] for repeat EGD in 8 weeks.  Okay for discharge to rehab from GI standpoint.      MITRA Burch  07/10/23  17:02 EDT      Electronically signed by Herrera Solitario MD at 07/10/23 6599       Gabo Self MD at 07/10/23 1146           LOS: 6 days   Patient Care Team:  Kala Beaver MD as PCP - General (Internal Medicine)    Chief Complaint: Generalize weakness.    Subjective   Other physician notes seen.  Cirrhosis of liver.  Renal function slightly improved.  Still significant edema.  Review of system:  Complains of edema, generalized weakness, itching.  All others negative       Objective     Vital Sign Min/Max for last 24 hours  Temp  Min: 96.9 °F (36.1 °C)  Max: 98.8 °F (37.1 °C)   BP  Min: 86/72  Max: 156/73   Pulse  Min: 51  Max: 80   Resp  Min: 18  Max: 22   SpO2  Min: 95 %  Max: 100 %   Flow (L/min)  Min: 2  Max: 2   Weight  Min: 159 kg (350 lb 9.6 oz)  Max: 159 kg (350 lb 9.6 oz)     Flowsheet Rows      Flowsheet Row First Filed Value   Admission Height 165.1 cm (65\") Documented at 07/04/2023 0201   Admission Weight 130 kg (285 lb 12.8 oz) Documented at 07/04/2023 0201      "       I/O this shift:  In: 490 [P.O.:490]  Out: -   I/O last 3 completed shifts:  In: 1050 [P.O.:750; IV Piggyback:300]  Out: 1450 [Urine:1450]  Objective:  General Appearance: Alert, oriented, no obvious distress.   female laying comfortable in bed  Eyes: PER, EOMI.  Neck: Supple no JVD.  Lungs: Clear auscultation, no rales rhonchi's, equal chest movement, nonlabored.  Heart: No gallop, murmur, rub, RRR.  Abdomen: Distended abdomen with abdominal wall edema.  Soft, nontender, positive bowel sounds.  Extremities: 2-3+ bilateral lower extremity edema, mild abdominal wall edema, no cyanosis.  Skin changes bilateral lower extremity with stasis dermatitis.  Hyperpigmentation  Neuro: No focal deficit, moving all extremities, alert oriented X 3      Results Review:     I reviewed the patient's new clinical results.    WBC WBC   Date Value Ref Range Status   07/10/2023 4.26 3.40 - 10.80 10*3/mm3 Final   07/09/2023 4.75 3.40 - 10.80 10*3/mm3 Final      HGB Hemoglobin   Date Value Ref Range Status   07/10/2023 8.4 (L) 12.0 - 15.9 g/dL Final   07/09/2023 8.2 (L) 12.0 - 15.9 g/dL Final      HCT Hematocrit   Date Value Ref Range Status   07/10/2023 26.7 (L) 34.0 - 46.6 % Final   07/09/2023 25.2 (L) 34.0 - 46.6 % Final      Platlets No results found for: LABPLAT   MCV MCV   Date Value Ref Range Status   07/10/2023 97.1 (H) 79.0 - 97.0 fL Final   07/09/2023 95.5 79.0 - 97.0 fL Final          Sodium Sodium   Date Value Ref Range Status   07/10/2023 145 136 - 145 mmol/L Final   07/09/2023 145 136 - 145 mmol/L Final   07/08/2023 143 136 - 145 mmol/L Final      Potassium Potassium   Date Value Ref Range Status   07/10/2023 3.8 3.5 - 5.2 mmol/L Final     Comment:     Slight hemolysis detected by analyzer. Results may be affected.   07/09/2023 3.5 3.5 - 5.2 mmol/L Final   07/09/2023 3.4 (L) 3.5 - 5.2 mmol/L Final   07/08/2023 3.8 3.5 - 5.2 mmol/L Final     Comment:     Slight hemolysis detected by analyzer. Results may be  affected.      Chloride Chloride   Date Value Ref Range Status   07/10/2023 109 (H) 98 - 107 mmol/L Final   07/09/2023 108 (H) 98 - 107 mmol/L Final   07/08/2023 107 98 - 107 mmol/L Final      CO2 CO2   Date Value Ref Range Status   07/10/2023 27.0 22.0 - 29.0 mmol/L Final   07/09/2023 26.0 22.0 - 29.0 mmol/L Final   07/08/2023 23.0 22.0 - 29.0 mmol/L Final      BUN BUN   Date Value Ref Range Status   07/10/2023 21 8 - 23 mg/dL Final   07/09/2023 21 8 - 23 mg/dL Final   07/08/2023 23 8 - 23 mg/dL Final      Creatinine Creatinine   Date Value Ref Range Status   07/10/2023 1.61 (H) 0.57 - 1.00 mg/dL Final   07/09/2023 1.75 (H) 0.57 - 1.00 mg/dL Final   07/08/2023 1.83 (H) 0.57 - 1.00 mg/dL Final      Calcium Calcium   Date Value Ref Range Status   07/10/2023 8.2 (L) 8.6 - 10.5 mg/dL Final   07/09/2023 8.2 (L) 8.6 - 10.5 mg/dL Final   07/08/2023 8.2 (L) 8.6 - 10.5 mg/dL Final      PO4 No results found for: CAPO4   Albumin Albumin   Date Value Ref Range Status   07/10/2023 2.9 (L) 3.5 - 5.2 g/dL Final   07/09/2023 2.8 (L) 3.5 - 5.2 g/dL Final   07/08/2023 2.5 (L) 3.5 - 5.2 g/dL Final      Magnesium Magnesium   Date Value Ref Range Status   07/10/2023 1.7 1.6 - 2.4 mg/dL Final   07/09/2023 1.6 1.6 - 2.4 mg/dL Final   07/08/2023 1.7 1.6 - 2.4 mg/dL Final      Uric Acid No results found for: URICACID     Medication Review: yes    Assessment & Plan       Hematemesis    GI bleed    Liver cirrhosis secondary to PAREDES    SILVINA (acute kidney injury)    Type 2 diabetes mellitus    Essential hypertension    Dyslipidemia    Hypothyroidism    Anemia    Severe Malnutrition (HCC)        1.  Acute kidney disease vs chronic kidney disease: Last known stable cr btw 1.5-1.6mg/dl. Seen for abnormal renal function 2 months ago as well in the setting of GI bleed.     2.  Volume status: Significant LE edema and possible asictes in the setting of liver cirrhosis. Underwent large volume paracentesis 15.5 liter removed on 7/6/23      3.  Anemia:  s/p 2 unit pRBC. Hx of GI ulcer. S/p EGD on this visit. No active bleeding site noted. Admitted in ICU after failed extubation post egd     4.  Intubated on MV: Post EGD, now extubated     5.  Liver cirrhosis: Complications includes ascites and gastropathy.      6.  Hypoalbuminemia: In the setting of liver cirrhosis     Recommendations:  Start Bumex low-dose.  Monitor renal function and electrolytes.  Continue spironolactone to 50 mg daily.  Replace potassium as needed.  Avoid nephrotoxic agents.  Transfuse for hemoglobin less than 7.  High risk complex patient with multiple medical problems.    Gabo Self MD  07/10/23  11:46 EDT              Electronically signed by Gabo Self MD at 07/10/23 1158          Physical Therapy Notes (last 48 hours)        Xiomy Carcamo, PT at 23 1104  Version 1 of 1         Goal Outcome Evaluation:  Plan of Care Reviewed With: patient        Progress: no change  Outcome Evaluation: Pt's LLE wounds with improved beefy red granulation but slight increase in dimensions.  PT lightly debrided wounds and dressed with mepilex ag.  Pt with chronic venous stasis of BLE with mild pitting edema and hypertrophic crusting of BLE, so PT placed unna boots to BLE to improve venous return and skin integrity.  PT will change dressings/wraps every 2-3 days.  PT recommends offloading shoe for L plantar wound during mobility tx.           Electronically signed by Xiomy Carcamo PT at 23 1105       Xiomy Carcamo PT at 23 1109  Version 1 of 1         Acute Care - Wound/Debridement Treatment Note  [facility]     Patient Name: Ivania Fields  : 1960  MRN: [id number]  Today's Date: 2023                Admit Date: 2023    Visit Dx:    ICD-10-CM ICD-9-CM   1. Venous stasis of both lower extremities  I87.8 459.81   2. Hematemesis with nausea  K92.0 578.0     787.02   3. Dysphagia, unspecified type  R13.10 787.20       Patient Active Problem  List   Diagnosis    Pneumonia of left lung due to infectious organism, unspecified part of lung    GI bleed    Liver cirrhosis secondary to PAREDES    Sleep apnea    Iron deficiency anemia    SILVINA (acute kidney injury)    CKD (chronic kidney disease)    Stasis dermatitis of both legs    Debility    Type 2 diabetes mellitus    Essential hypertension    Dyslipidemia    Hypothyroidism    Hypoalbuminemia    Anemia    Hematemesis    Severe Malnutrition (HCC)        Past Medical History:   Diagnosis Date    Anemia     Diabetes mellitus     Hyperlipidemia     Hypertension     Hypothyroidism     Impaired mobility     Short-term memory loss         Past Surgical History:   Procedure Laterality Date    CHOLECYSTECTOMY      ENDOSCOPY N/A 7/5/2023    Procedure: ESOPHAGOGASTRODUODENOSCOPY;  Surgeon: Brunner, Mark I, MD;  Location: [facility] ENDOSCOPY;  Service: Gastroenterology;  Laterality: N/A;    ENDOSCOPY W/ BANDING N/A 4/6/2023    Procedure: ESOPHAGOGASTRODUODENOSCOPY WITH BIOPSY;  Surgeon: Jens Mandujano MD;  Location: [facility] ENDOSCOPY;  Service: Gastroenterology;  Laterality: N/A;           Wound 07/04/23 0318 Left lower leg Blisters (Active)   Wound Image   07/09/23 1010   Dressing Appearance intact;moist drainage 07/09/23 1010   Closure PABLO 07/09/23 1000   Base clean;moist;pink;red 07/09/23 1010   Periwound intact;dry;swelling 07/09/23 1010   Periwound Temperature warm 07/09/23 1010   Periwound Skin Turgor firm 07/09/23 1010   Edges irregular 07/09/23 1010   Wound Length (cm) 3.5 cm 07/09/23 1010   Wound Width (cm) 4.5 cm 07/09/23 1010   Wound Depth (cm) 0.1 cm 07/09/23 1010   Wound Surface Area (cm^2) 15.75 cm^2 07/09/23 1010   Wound Volume (cm^3) 1.575 cm^3 07/09/23 1010   Drainage Characteristics/Odor serosanguineous 07/09/23 1010   Drainage Amount small 07/09/23 1010   Care, Wound cleansed with;wound cleanser;debrided;jyoti boot 07/09/23 1010   Dressing Care silver impregnated;foam 07/09/23 1010   Periwound Care  cleansed with pH balanced cleanser;dry periwound area maintained 07/09/23 1010       Wound 07/04/23 0317 Bilateral groin MASD (Moisture associated skin damage) (Active)   Dressing Appearance open to air 07/09/23 0400   Closure None 07/09/23 1000   Base moist;red 07/09/23 1000   Periwound intact;edematous;moist 07/09/23 1000   Periwound Temperature warm 07/09/23 1000   Care, Wound soap and water 07/08/23 2000   Dressing Care foam;low-adherent 07/09/23 0400   Periwound Care dry periwound area maintained 07/09/23 0400       Wound 07/04/23 0319 Left posterior fifth toe Other (comment) (Active)   Wound Image   07/09/23 1010   Dressing Appearance open to air;moist drainage;other (see comments) 07/09/23 1010   Closure Approximated 07/09/23 1000   Base clean;granulating;moist;red;yellow;slough 07/09/23 1010   Periwound intact;dry 07/09/23 1010   Periwound Temperature warm 07/09/23 1010   Periwound Skin Turgor soft 07/09/23 1010   Edges irregular;open 07/09/23 1010   Wound Length (cm) 4.5 cm 07/09/23 1010   Wound Width (cm) 6 cm 07/09/23 1010   Wound Depth (cm) 0.1 cm 07/09/23 1010   Wound Surface Area (cm^2) 27 cm^2 07/09/23 1010   Wound Volume (cm^3) 2.7 cm^3 07/09/23 1010   Drainage Characteristics/Odor sanguineous;bleeding controlled 07/09/23 1010   Drainage Amount small 07/09/23 1010   Care, Wound cleansed with;wound cleanser;debrided;jyoti boot 07/09/23 1010   Dressing Care silver impregnated;foam 07/09/23 1010   Periwound Care cleansed with pH balanced cleanser;dry periwound area maintained 07/09/23 1010       Wound 07/04/23 0439 Right lower leg Other (comment) (Active)   Wound Image   07/09/23 1010   Dressing Appearance open to air 07/09/23 1010   Closure None 07/09/23 1000   Base dry;pink 07/09/23 1010   Periwound intact;dry;pink 07/09/23 1000   Periwound Temperature warm 07/09/23 1000   Drainage Amount none 07/09/23 1000   Care, Wound jyoti boot 07/09/23 1010   Dressing Care open to air 07/08/23 2000   Periwound Care  "dry periwound area maintained 07/09/23 0400       Wound 07/04/23 0800 Right medial perineum Skin Tear (Active)   Dressing Appearance open to air 07/09/23 0000   Closure None 07/09/23 1000   Base pink;moist 07/09/23 1000   Drainage Characteristics/Odor bleeding controlled 07/08/23 1800   Care, Wound cleansed with;soap and water 07/08/23 2000   Dressing Care open to air 07/09/23 0400      Lymphedema       Row Name 07/09/23 1010             Lymphedema Edema Assessment    Pitting Edema Mild  -         Skin Changes/Observations    Location/Assessment Lower Extremity  -      Lower Extremity Conditions bilateral:;dry;scaly;crust  -      Lower Extremity Color/Pigment bilateral:;hyperpigmented;brawny;woody  -         Lymphedema Pulses/Capillary Refill    Lymphedema Pulses/Capillary Refill lower extremity pulses;capillary refill  -      Dorsalis Pedis Pulse right:;left:;+2 normal  -      Capillary Refill lower extremity capillary refill  -      Lower Extremity Capillary Refill right:;left:;less than 3 seconds  -         Lymphedema Measurements    Measurement Type(s) Quick Girth  -      Quick Girth Areas Lower extremities  -         LLE Quick Girth (cm)    Mid foot 22.5 cm  -      Smallest ankle 20.6 cm  -      Largest calf 45 cm  -JM         RLE Quick Girth (cm)    Mid foot 22.5 cm  -JM      Smallest ankle 20.7 cm  -JM      Largest calf 48.7 cm  -      RLE Quick Girth Total 91.9  -         Compression/Skin Care    Compression/Skin Care skin care;wrapping location;bandaging  -      Skin Care washed/dried;lotion applied  -      Wrapping Location lower extremity  -      Wrapping Location LE bilateral:;foot to knee  -      Wrapping Comments Mepilex ag to LLE wounds, BLE unna boots applied in clamshell fashion, 4\" coban, spandage  -                User Key  (r) = Recorded By, (t) = Taken By, (c) = Cosigned By      Initials Name Provider Type    Xiomy Isidro, PT Physical Therapist      "               WOUND DEBRIDEMENT  Total area of Debridement: 6cmsq  Debridement Site 1  Location- Site 1: LLE and L foot wound  Selective Debridement- Site 1: Wound Surface <20cmsq  Instruments- Site 1: tweezers  Excised Tissue Description- Site 1: minimum, slough  Bleeding- Site 1: seeping, held pressure, 1 minute               PT Assessment (last 12 hours)       PT Evaluation and Treatment       Row Name 07/09/23 1010          Physical Therapy Time and Intention    Subjective Information no complaints  -     Document Type wound care;therapy note (daily note)  -     Patient Effort good  -       Row Name 07/09/23 1010          General Information    Patient Observations alert;cooperative;agree to therapy  -     General Observations of Patient Pt [facility] s/p mobility tx where dressing on L foot was disrupted.  -     Existing Precautions/Restrictions fall;other (see comments)  L plantar wound  -     Risks Reviewed patient:;increased drainage;increased discomfort  -     Benefits Reviewed patient:;improve skin integrity;decrease pain  -     Barriers to Rehab medically complex;previous functional deficit  -       Row Name 07/09/23 1010          Pain    Pretreatment Pain Rating 0/10 - no pain  -     Posttreatment Pain Rating 0/10 - no pain  -       Row Name 07/09/23 1010          Cognition    Orientation Status (Cognition) oriented x 4  -       Row Name 07/09/23 1010          Wound 07/04/23 0318 Left lower leg Blisters    Wound - Properties Group Placement Date: 07/04/23  -SB Placement Time: 0318  -SB Present on Hospital Admission: Y  -SB Side: Left  -SB Orientation: lower  -SB Location: leg  -SB Primary Wound Type: Blisters  -SB    Wound Image Images linked: 1  -     Dressing Appearance intact;moist drainage  -     Base clean;moist;pink;red  -     Periwound intact;dry;swelling  -     Periwound Temperature warm  -     Periwound Skin Turgor firm  -     Edges irregular  -     Wound Length  (cm) 3.5 cm  -JM     Wound Width (cm) 4.5 cm  -JM     Wound Depth (cm) 0.1 cm  -JM     Wound Surface Area (cm^2) 15.75 cm^2  -JM     Wound Volume (cm^3) 1.575 cm^3  -JM     Drainage Characteristics/Odor serosanguineous  -JM     Drainage Amount small  -JM     Care, Wound cleansed with;wound cleanser;debrided;jyoti boot  -JM     Dressing Care silver impregnated;foam  -JM     Periwound Care cleansed with pH balanced cleanser;dry periwound area maintained  -JM     Retired Wound - Properties Group Placement Date: 07/04/23  -SB Placement Time: 0318  -SB Present on Hospital Admission: Y  -SB Side: Left  -SB Orientation: lower  -SB Location: leg  -SB Primary Wound Type: Blisters  -SB    Retired Wound - Properties Group Date first assessed: 07/04/23 -SB Time first assessed: 0318  -SB Present on Hospital Admission: Y  -SB Side: Left  -SB Location: leg  -SB Primary Wound Type: Blisters  -SB      Row Name             Wound 07/04/23 0317 Bilateral groin MASD (Moisture associated skin damage)    Wound - Properties Group Placement Date: 07/04/23 -SB Placement Time: 0317 -SB Side: Bilateral  -SB Location: groin  -SB Primary Wound Type: MASD  -SB    Retired Wound - Properties Group Placement Date: 07/04/23  -SB Placement Time: 0317 -SB Side: Bilateral  -SB Location: groin  -SB Primary Wound Type: MASD  -SB    Retired Wound - Properties Group Date first assessed: 07/04/23 -SB Time first assessed: 0317  -SB Side: Bilateral  -SB Location: groin  -SB Primary Wound Type: MASD  -SB      Row Name 07/09/23 1010          Wound 07/04/23 0319 Left posterior fifth toe Other (comment)    Wound - Properties Group Placement Date: 07/04/23  -SB Placement Time: 0319 -SB Side: Left  -SB Orientation: posterior  -SB Location: fifth toe  -SB Primary Wound Type: Other  -SB    Wound Image Images linked: 1  -JM     Dressing Appearance open to air;moist drainage;other (see comments)  dressing disrupted during mobility tx, removed by PT just prior to tx   -JM     Base clean;granulating;moist;red;yellow;slough  -     Periwound intact;dry  -JM     Periwound Temperature warm  -JM     Periwound Skin Turgor soft  -JM     Edges irregular;open  -JM     Wound Length (cm) 4.5 cm  -JM     Wound Width (cm) 6 cm  -JM     Wound Depth (cm) 0.1 cm  -JM     Wound Surface Area (cm^2) 27 cm^2  -JM     Wound Volume (cm^3) 2.7 cm^3  -JM     Drainage Characteristics/Odor sanguineous;bleeding controlled  -JM     Drainage Amount small  -JM     Care, Wound cleansed with;wound cleanser;debrided;jyoti boot  -JM     Dressing Care silver impregnated;foam  -JM     Periwound Care cleansed with pH balanced cleanser;dry periwound area maintained  -JM     Retired Wound - Properties Group Placement Date: 07/04/23 -SB Placement Time: 0319 -SB Side: Left  -SB Orientation: posterior  -SB Location: fifth toe  -SB Primary Wound Type: Other  -SB    Retired Wound - Properties Group Date first assessed: 07/04/23 -SB Time first assessed: 0319 -SB Side: Left  -SB Location: fifth toe  -SB Primary Wound Type: Other  -SB      Row Name 07/09/23 1010          Wound 07/04/23 0439 Right lower leg Other (comment)    Wound - Properties Group Placement Date: 07/04/23 -SB Placement Time: 0439 -SB Side: Right  -SB Orientation: lower  -SB Location: leg  -SB Primary Wound Type: Other  -SB    Wound Image Images linked: 1  -JM     Dressing Appearance open to air  -JM     Base dry;pink  -JM     Care, Wound jyoti boot  -JM     Retired Wound - Properties Group Placement Date: 07/04/23  -SB Placement Time: 0439 -SB Side: Right  -SB Orientation: lower  -SB Location: leg  -SB Primary Wound Type: Other  -SB    Retired Wound - Properties Group Date first assessed: 07/04/23 -SB Time first assessed: 0439 -SB Side: Right  -SB Location: leg  -SB Primary Wound Type: Other  -SB      Row Name             Wound 07/04/23 0800 Right medial perineum Skin Tear    Wound - Properties Group Placement Date: 07/04/23  -JH Placement Time: 0800   - Present on Hospital Admission: Y  - Side: Right  - Orientation: medial  - Location: perineum  - Primary Wound Type: Skin tear  -JH    Retired Wound - Properties Group Placement Date: 07/04/23  - Placement Time: 0800  - Present on Hospital Admission: Y  -JH Side: Right  - Orientation: medial  - Location: perineum  - Primary Wound Type: Skin tear  -JH    Retired Wound - Properties Group Date first assessed: 07/04/23  - Time first assessed: 0800  - Present on Hospital Admission: Y  - Side: Right  - Location: perineum  - Primary Wound Type: Skin tear  -JH      Row Name 07/09/23 1010          Plan of Care Review    Plan of Care Reviewed With patient  -LESLI     Progress no change  -LESLI     Outcome Evaluation Pt's LLE wounds with improved beefy red granulation but slight increase in dimensions.  PT lightly debrided wounds and dressed with mepilex ag.  Pt with chronic venous stasis of BLE with mild pitting edema and hypertrophic crusting of BLE, so PT placed unna boots to BLE to improve venous return and skin integrity.  PT will change dressings/wraps every 2-3 days.  PT recommends offloading shoe for L plantar wound during mobility tx.  -       Row Name 07/09/23 1010          Positioning and Restraints    Pre-Treatment Position sitting in chair/recliner  -     Post Treatment Position chair  -     In Chair notified nsg;reclined;call light within reach;encouraged to call for assist;legs elevated  -               User Key  (r) = Recorded By, (t) = Taken By, (c) = Cosigned By      Initials Name Provider Type    Donna Gallego RN Registered Nurse    Xiomy Isidro, PT Physical Therapist    Jessica Burnett, RN Registered Nurse                  Physical Therapy Education       Title: PT OT SLP Therapies (In Progress)       Topic: Physical Therapy (In Progress)       Point: Mobility training (In Progress)       Learning Progress Summary             Patient Acceptance, E,D, NR  by  at 7/9/2023 1008                         Point: Home exercise program (Not Started)       Learner Progress:  Not documented in this visit.              Point: Body mechanics (In Progress)       Learning Progress Summary             Patient Acceptance, E,D, NR by  at 7/9/2023 1008                         Point: Precautions (In Progress)       Learning Progress Summary             Patient Acceptance, E,D, NR by  at 7/9/2023 1008                                         User Key       Initials Effective Dates Name Provider Type Discipline     02/03/23 -  Gloria Suresh, PT Physical Therapist PT                    Recommendation and Plan  Anticipated Discharge Disposition (PT): skilled nursing facility  Planned Therapy Interventions (PT): balance training, bed mobility training, gait training, home exercise program, transfer training, patient/family education, strengthening  Therapy Frequency (PT): daily  Plan of Care Reviewed With: patient   Progress: no change       Progress: no change  Outcome Evaluation: Pt's LLE wounds with improved beefy red granulation but slight increase in dimensions.  PT lightly debrided wounds and dressed with mepilex ag.  Pt with chronic venous stasis of BLE with mild pitting edema and hypertrophic crusting of BLE, so PT placed unna boots to BLE to improve venous return and skin integrity.  PT will change dressings/wraps every 2-3 days.  PT recommends offloading shoe for L plantar wound during mobility tx.  Plan of Care Reviewed With: patient            Time Calculation   PT Charges       Row Name 07/09/23 1104 07/09/23 1009          Time Calculation    Start Time 1000  - 0921  -     PT Received On -- 07/09/23  -     PT Goal Re-Cert Due Date 07/19/23  - 07/19/23  -        Timed Charges    94583 - PT Therapeutic Activity Minutes -- 26  -        Untimed Charges    PT Eval/Re-eval Minutes -- 31  -     Wound Care 57321 Unna boot  - --     29580-Unna Boot 20  -JM --      50980-[place] debridement 10  -JM --        Total Minutes    Timed Charges Total Minutes -- 26  -LS     Untimed Charges Total Minutes 30  -JM 31  -LS      Total Minutes 30  -JM 57  -LS               User Key  (r) = Recorded By, (t) = Taken By, (c) = Cosigned By      Initials Name Provider Type    Gloria Nunn, PT Physical Therapist    Xiomy Isidro, PT Physical Therapist                      Therapy Charges for Today       Code Description Service Date Service Provider Modifiers Qty    20031950787 HC MARI DEBRIDE OPEN WOUND UP TO 20CM 2023 Xiomy Carcamo, PT GP 1    24710201271 HC PT STAPPING UNNA BOOT 2023 Xiomy Carcamo, PT GP 1              PT G-Codes  Outcome Measure Options: AM-PAC 6 Clicks Basic Mobility (PT)  AM-PAC 6 Clicks Score (PT): 10       Xiomy Carcamo PT  2023      Electronically signed by Xiomy Carcamo PT at 23 1105       Elvie Elmore PT at 07/10/23 1300  Version 1 of 1         Goal Outcome Evaluation:  Plan of Care Reviewed With: patient        Progress: improving  Outcome Evaluation: Patient motivated w/ good effort. She demonstrates improving independence w/ mobility, but continues to be limited by weakness, impaired balance, gait instability, decreased activity tolerance, and remains below her functional baseline. Issued offloading shoe to protect plantar surface L foot. Patient completed transfers w/ mod A x 2 and ambulated 6ft w/ bariatric RW and min A x 2. PT continues to recommend SNF rehab at D/C for best functional outcome.      Anticipated Discharge Disposition (PT): skilled nursing facility    Electronically signed by Elvie Elmore PT at 07/10/23 1601       Elvie Elmore PT at 07/10/23 1300  Version 1 of 1         Patient Name: Ivania Fields  : 1960    MRN: [id number]                              Today's Date: 7/10/2023       Admit Date: 2023    Visit Dx:     ICD-10-CM ICD-9-CM   1. Venous stasis  of both lower extremities  I87.8 459.81   2. Hematemesis with nausea  K92.0 578.0     787.02   3. 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    Sleep apnea    Iron deficiency anemia    SILVINA (acute kidney injury)    CKD (chronic kidney disease)    Stasis dermatitis of both legs    Debility    Type 2 diabetes mellitus    Essential hypertension    Dyslipidemia    Hypothyroidism    Hypoalbuminemia    Anemia    Hematemesis    Severe Malnutrition (HCC)     Past Medical History:   Diagnosis Date    Anemia     Diabetes mellitus     Hyperlipidemia     Hypertension     Hypothyroidism     Impaired mobility     Short-term memory loss      Past Surgical History:   Procedure Laterality Date    CHOLECYSTECTOMY      ENDOSCOPY N/A 7/5/2023    Procedure: ESOPHAGOGASTRODUODENOSCOPY;  Surgeon: Brunner, Mark I, MD;  Location: [facility] ENDOSCOPY;  Service: Gastroenterology;  Laterality: N/A;    ENDOSCOPY W/ BANDING N/A 4/6/2023    Procedure: ESOPHAGOGASTRODUODENOSCOPY WITH BIOPSY;  Surgeon: Jens Mandujano MD;  Location: [facility] ENDOSCOPY;  Service: Gastroenterology;  Laterality: N/A;      General Information       Row Name 07/10/23 1300          Physical Therapy Time and Intention    Document Type therapy note (daily note)  -MB     Mode of Treatment physical therapy  -MB       Row Name 07/10/23 1300          General Information    Patient Profile Reviewed yes  -MB     Existing Precautions/Restrictions fall;other (see comments)  L plantar wound; offloading shoe in room  -MB     Barriers to Rehab medically complex;previous functional deficit  -MB       Row Name 07/10/23 1300          Cognition    Orientation Status (Cognition) oriented x 3  -MB       Row Name 07/10/23 1300          Safety Issues, Functional Mobility    Safety Issues Affecting Function (Mobility)  insight into deficits/self-awareness;safety precaution awareness;safety precautions follow-through/compliance;sequencing abilities  -MB     Impairments Affecting Function (Mobility) balance;coordination;endurance/activity tolerance;strength  -MB               User Key  (r) = Recorded By, (t) = Taken By, (c) = Cosigned By      Initials Name Provider Type    MB Elvie Elmore, PT Physical Therapist                   Mobility       Row Name 07/10/23 1548          Bed Mobility    Bed Mobility sit-supine;supine-sit  -MB     Supine-Sit [place] (Bed Mobility) minimum assist (75% patient effort)  -MB     Sit-Supine [place] (Bed Mobility) moderate assist (50% patient effort)  -MB     Assistive Device (Bed Mobility) draw sheet;head of bed elevated;bed rails  -MB     Comment, (Bed Mobility) Donned offloading shoe LLE prior to OOB mobility. Pt. required assist to manage BLEs and increased time/effort to complete.  -MB       Row Name 07/10/23 1548          Transfers    Comment, (Transfers) STS x 4 w/ consistent VCs/tactile cues for safe hand placement and upright posture in standing. Pt. demo retrograde posture in standing; able to correct w/ cueing.  -MB       Row Name 07/10/23 1548          Sit-Stand Transfer    Sit-Stand [place] (Transfers) moderate assist (50% patient effort);2 person assist;verbal cues  -MB     Assistive Device (Sit-Stand Transfers) bariatric;walker, front-wheeled  -MB       Row Name 07/10/23 1548          Gait/Stairs (Locomotion)    [place] Level (Gait) minimum assist (75% patient effort);2 person assist;verbal cues;nonverbal cues (demo/gesture)  -MB     Assistive Device (Gait) bariatric equipment;walker, front-wheeled  -MB     Distance in Feet (Gait) 6  -MB     Deviations/Abnormal Patterns (Gait) base of support, wide;nomi decreased;gait speed decreased;stride length decreased  -MB     Bilateral Gait Deviations forward flexed posture;heel strike decreased  -MB     Comment,  (Gait/Stairs) Pt. amb. w/ step to gait pattern w/ dec B stride length/heelstrike and increased forward flexion. Pt. required assist to shift weight laterally, VCs for step sequencing, upright posture, and safe use of RW. Gait distance limited by weakness.  -MB               User Key  (r) = Recorded By, (t) = Taken By, (c) = Cosigned By      Initials Name Provider Type    MB Elvie Elmore, PT Physical Therapist                   Obj/Interventions       Row Name 07/10/23 1554          Motor Skills    Therapeutic Exercise shoulder;hip;knee;ankle  -[facility] Name 07/10/23 1554          Shoulder (Therapeutic Exercise)    Shoulder (Therapeutic Exercise) AROM (active range of motion)  -MB     Shoulder AROM (Therapeutic Exercise) bilateral;flexion;aBduction;5 repetitions  -[facility] Name 07/10/23 1554          Hip (Therapeutic Exercise)    Hip (Therapeutic Exercise) isometric exercises;strengthening exercise  -MB     Hip Isometrics (Therapeutic Exercise) bilateral;gluteal sets;10 repetitions  -MB     Hip Strengthening (Therapeutic Exercise) bilateral;marching while seated;10 repetitions  -MB       Row Name 07/10/23 1554          Knee (Therapeutic Exercise)    Knee (Therapeutic Exercise) isometric exercises;strengthening exercise  -MB     Knee Isometrics (Therapeutic Exercise) bilateral;quad sets;10 repetitions  -MB     Knee Strengthening (Therapeutic Exercise) bilateral;LAQ (long arc quad);10 repetitions  -[facility] Name 07/10/23 1554          Ankle (Therapeutic Exercise)    Ankle (Therapeutic Exercise) AROM (active range of motion)  -MB     Ankle AROM (Therapeutic Exercise) bilateral;dorsiflexion;plantarflexion;10 repetitions  -[facility] Name 07/10/23 1554          Balance    Static Standing Balance minimal assist;2-person assist  -MB     Position/Device Used, Standing Balance supported;walker, rolling  -MB     Balance Interventions standing;sit to stand;weight shifting activity  -MB               User  Key  (r) = Recorded By, (t) = Taken By, (c) = Cosigned By      Initials Name Provider Type    Elvie Grajeda, PT Physical Therapist                   Goals/Plan    No documentation.                  Clinical Impression       Row Name 07/10/23 1556          Pain    Pretreatment Pain Rating 0/10 - no pain  -MB     Posttreatment Pain Rating 0/10 - no pain  -MB       Row Name 07/10/23 1552          Plan of Care Review    Plan of Care Reviewed With patient  -MB     Progress improving  -MB     Outcome Evaluation Patient motivated w/ good effort. She demonstrates improving independence w/ mobility, but continues to be limited by weakness, impaired balance, gait instability, decreased activity tolerance, and remains below her functional baseline. Issued offloading shoe to protect plantar surface L foot. Patient completed transfers w/ mod A x 2 and ambulated 6ft w/ bariatric RW and min A x 2. PT continues to recommend SNF rehab at D/C for best functional outcome.  -MB       Row Name 07/10/23 1557          Vital Signs    Pre Systolic BP Rehab 126  -MB     Pre Treatment Diastolic BP 62  -MB     Pretreatment Heart Rate (beats/min) 71  -MB     Pre SpO2 (%) 97  -MB     O2 Delivery Pre Treatment room air  -MB     O2 Delivery Intra Treatment room air  -MB     O2 Delivery Post Treatment room air  -MB     Pre Patient Position Supine  -MB     Intra Patient Position Standing  -MB     Post Patient Position Supine  -MB       Row Name 07/10/23 0199          Positioning and Restraints    Pre-Treatment Position in bed  -MB     Post Treatment Position bed  -MB     In Bed notified nsg;supine;call light within reach;encouraged to call for assist;heels elevated  returned to bed per RN request  -MB               User Key  (r) = Recorded By, (t) = Taken By, (c) = Cosigned By      Initials Name Provider Type    Elvie Grajeda, PT Physical Therapist                   Outcome Measures       Row Name 07/10/23 0410          How much  help from another person do you currently need...    Turning from your back to your side while in flat bed without using bedrails? 3  -MB     Moving from lying on back to sitting on the side of a flat bed without bedrails? 2  -MB     Moving to and from a bed to a chair (including a wheelchair)? 2  -MB     Standing up from a chair using your arms (e.g., wheelchair, bedside chair)? 2  -MB     Climbing 3-5 steps with a railing? 2  -MB     To walk in hospital room? 2  -MB     AM-PAC 6 Clicks Score (PT) 13  -MB     Highest level of mobility 4 --> Transferred to chair/commode  -MB       Row Name 07/10/23 1601          Functional Assessment    Outcome Measure Options AM-PAC 6 Clicks Basic Mobility (PT)  -MB               User Key  (r) = Recorded By, (t) = Taken By, (c) = Cosigned By      Initials Name Provider Type    Elvie Grajeda, PT Physical Therapist                                 Physical Therapy Education       Title: PT OT SLP Therapies (In Progress)       Topic: Physical Therapy (In Progress)       Point: Mobility training (In Progress)       Learning Progress Summary             Patient Acceptance, E,D, NR by  at 7/9/2023 1008                         Point: Home exercise program (Not Started)       Learner Progress:  Not documented in this visit.              Point: Body mechanics (In Progress)       Learning Progress Summary             Patient Acceptance, E,D, NR by  at 7/9/2023 1008                         Point: Precautions (In Progress)       Learning Progress Summary             Patient Acceptance, E,D, NR by  at 7/9/2023 1008                                         User Key       Initials Effective Dates Name Provider Type [facility] 02/03/23 -  Gloria Suresh, PT Physical Therapist PT                  PT Recommendation and Plan     Plan of Care Reviewed With: patient  Progress: improving  Outcome Evaluation: Patient motivated w/ good effort. She demonstrates improving independence w/  mobility, but continues to be limited by weakness, impaired balance, gait instability, decreased activity tolerance, and remains below her functional baseline. Issued offloading shoe to protect plantar surface L foot. Patient completed transfers w/ mod A x 2 and ambulated 6ft w/ bariatric RW and min A x 2. PT continues to recommend SNF rehab at D/C for best functional outcome.     Time Calculation:    PT Charges       Row Name 07/10/23 1602             Time Calculation    Start Time 1300  -MB      PT Received On 07/10/23  -MB      PT Goal Re-Cert Due Date 07/19/23  -MB         Time Calculation- PT    Total Timed Code Minutes- PT 58 minute(s)  -MB         Timed Charges    88771 - PT Therapeutic Exercise Minutes 38  -MB      28439 - Gait Training Minutes  20  -MB         Total Minutes    Timed Charges Total Minutes 58  -MB       Total Minutes 58  -MB                User Key  (r) = Recorded By, (t) = Taken By, (c) = Cosigned By      Initials Name Provider Type    Elvie Grajeda, PT Physical Therapist                  Therapy Charges for Today       Code Description Service Date Service Provider Modifiers Qty    12896962070 HC PT THER PROC EA 15 MIN 7/10/2023 Elvie Elmore, PT GP 3    12386228613 HC GAIT TRAINING EA 15 MIN 7/10/2023 Elvie Elmore, PT GP 1            PT G-Codes  Outcome Measure Options: AM-PAC 6 Clicks Basic Mobility (PT)  AM-PAC 6 Clicks Score (PT): 13  PT Discharge Summary  Anticipated Discharge Disposition (PT): skilled nursing facility    Elvie Elmore PT  7/10/2023      Electronically signed by Elvie Elmore, PT at 07/10/23 1602       Occupational Therapy Notes (last 48 hours)  Notes from 07/09/23 1058 through 07/11/23 1058   No notes exist for this encounter.

## 2023-07-11 NOTE — CASE MANAGEMENT/SOCIAL WORK
Continued Stay Note  [facility]     Patient Name: Ivania Fields  MRN: [id number]  Today's Date: 7/11/2023    Admit Date: 7/4/2023    Plan: Home with home health care   Discharge Plan       Row Name 07/11/23 1101       Plan    Plan Comments Per Annia with [facility] pt is being followed by [facility] for . At this time pt is now agreeable to a referral to short term rehab prior to returning home. Referrals have been made to [facility] and [facility]. If accepted she will need insurance precert                   Discharge Codes    No documentation.                 Expected Discharge Date and Time       Expected Discharge Date Expected Discharge Time    Jul 14, 2023               Jossie Rand RN

## 2023-07-11 NOTE — PROGRESS NOTES
"   LOS: 7 days    Patient Care Team:  Kala Beaver MD as PCP - General (Internal Medicine)    Subjective     No new events    Objective     Vital Signs:  Blood pressure 108/53, pulse 76, temperature 98.1 °F (36.7 °C), temperature source Oral, resp. rate 16, height 165.1 cm (65\"), weight (!) 159 kg (350 lb 9.6 oz), SpO2 100 %.      Intake/Output Summary (Last 24 hours) at 7/11/2023 1329  Last data filed at 7/11/2023 0600  Gross per 24 hour   Intake 240 ml   Output 2650 ml   Net -2410 ml        07/10 0701 - 07/11 0700  In: 970 [P.O.:970]  Out: 2650 [Urine:2650]    Physical Exam:        GENERAL: WD WF NAD  NEURO: Awake and alert, oriented. No focal deficit  PSYCHIATRIC: NMA. Cooperative with PE  EYE: PE, no icterus, no conjunctivitis  ENT: ommm, dentition intact,  Hearing intact  NECK: Supple , No JVD discernable,  Trachea midline  CV: No edema, RRR  LUNGS:  Quiet,  Nonlabored resp.  Symmetrical expansion  ABDOMEN: Nondistended, soft nontender.  : No Petersen, no palp bladder  SKIN: Warm and dry without rash      Labs:  Results from last 7 days   Lab Units 07/11/23  0726 07/10/23  0253 07/09/23  0438   WBC 10*3/mm3 4.92 4.26 4.75   HEMOGLOBIN g/dL 8.7* 8.4* 8.2*   PLATELETS 10*3/mm3 117* 97* 99*     Results from last 7 days   Lab Units 07/11/23  0726 07/10/23  0253 07/09/23  1817 07/09/23  0438 07/08/23  0607   SODIUM mmol/L 143 145  --  145 143   POTASSIUM mmol/L 3.4* 3.8 3.5 3.4* 3.8   CHLORIDE mmol/L 104 109*  --  108* 107   CO2 mmol/L 29.0 27.0  --  26.0 23.0   BUN mg/dL 25* 21  --  21 23   CREATININE mg/dL 1.75* 1.61*  --  1.75* 1.83*   CALCIUM mg/dL 8.0* 8.2*  --  8.2* 8.2*   PHOSPHORUS mg/dL 3.3 3.3  --  3.6 3.9   MAGNESIUM mg/dL 1.7 1.7  --  1.6 1.7   ALBUMIN g/dL 2.5* 2.9*  --  2.8* 2.5*     Results from last 7 days   Lab Units 07/10/23  0253   ALK PHOS U/L 52   BILIRUBIN mg/dL 1.1   ALT (SGPT) U/L 10   AST (SGOT) U/L 26     Results from last 7 days   Lab Units 07/07/23  0340   PH, ARTERIAL pH units " 7.495*   PO2 ART mm Hg 73.6*   PCO2, ARTERIAL mm Hg 34.5*   HCO3 ART mmol/L 26.6*               Estimated Creatinine Clearance: 50.8 mL/min (A) (by C-G formula based on SCr of 1.75 mg/dL (H)).         A/P:    ARF: Creatinine may be at a new baseline baseline1.6-1.8 range with continued negative volume.  Urine output nonoliguric..  Previously 1.5-1.6.  For now continue supportive care.  Monitor renal function closely.  Strict I's and O's.      Hypotension: Blood pressure borderline.  Watch with diuresis.  Support with albumin as needed.    Hypokalemia: Potassium is on the low side today.  Replace as needed.  Replace magnesium to >2.0 to assist with renal potassium recovery.    Anemia: Hemoglobin below goal.  Transfuse as needed.    Volume: -2.4 L overnight.  Patient on low-dose Bumex along with spironolactone.  Continues with edema likely has component of third spacing due to low oncotic pressure with albumin at 2.5.  Continue strict I's and O's.    Cirrhosis: Secondary to PAREDES.  15 L paracentesis 7/6.  Likely contributing to low oncotic pressure.    Hematemesis with large prepyloric ulceration post diagnostic EGD 7/5/2023    High risk and complexity patient.    Raymond Shelby MD  07/11/23  13:29 EDT

## 2023-07-11 NOTE — PROGRESS NOTES
[facility] Medicine Services  PROGRESS NOTE    Patient Name: Ivania Fields  : 1960  MRN: [id number]    Date of Admission: 2023  Primary Care Physician: Kala Beaver MD    Subjective   Subjective     CC: f/u GIB    HPI: Up in bed eating. Says she is doing much better. Asking to upgrade from soft diet.     ROS:  Gen- No fevers, chills  CV- No chest pain, palpitations  Resp- No cough, dyspnea  GI- No N/V/D, abd pain     Objective   Objective     Vital Signs:   Temp:  [98 °F (36.7 °C)-98.3 °F (36.8 °C)] 98.1 °F (36.7 °C)  Heart Rate:  [57-80] 68  Resp:  [16-20] 16  BP: (104-140)/(51-86) 108/53  Flow (L/min):  [2] 2     Physical Exam:  Constitutional: No acute distress, awake, alert, chronically ill appearing  HENT: NCAT, mucous membranes moist  Respiratory: Clear to auscultation bilaterally, respiratory effort normal   Cardiovascular: RRR, no murmurs, rubs, or gallops  Gastrointestinal: Positive bowel sounds, soft, nontender, nondistended, obese abd  Musculoskeletal: No bilateral ankle edema  Psychiatric: Appropriate affect, cooperative  Neurologic: Oriented x 3, strength symmetric in all extremities, Cranial Nerves grossly intact to confrontation, speech clear  Skin: No rashes     Results Reviewed:  LAB RESULTS:      Lab 23  0726 07/10/23  0253 23  0438 23  0032 23  1751 23  0646 23  0109 23  1626 23  0058 23  1602 23  1034   WBC 4.92 4.26 4.75  --   --   --   --  5.09  --   --  5.45   HEMOGLOBIN 8.7* 8.4* 8.2* 8.1* 8.0* 8.2*   < > 8.7*   < >  --  8.0*   HEMATOCRIT 24.9* 26.7* 25.2* 22.5* 23.4* 22.7*   < > 27.0*   < >  --  23.2*   PLATELETS 117* 97* 99*  --   --   --   --  97*  --   --  136*   NEUTROS ABS  --   --  3.30  --   --   --   --   --   --   --  4.00   IMMATURE GRANS (ABS)  --   --  0.03  --   --   --   --   --   --   --  0.02   LYMPHS ABS  --   --  0.49*  --   --   --   --   --   --   --  0.64*    MONOS ABS  --   --  0.64  --   --   --   --   --   --   --  0.51   EOS ABS  --   --  0.26  --   --   --   --   --   --   --  0.22   MCV 96.9 97.1* 95.5  --   --   --   --  94.7  --   --  99.1*   PROTIME  --   --   --   --   --  19.9*  --   --   --  21.0*  --     < > = values in this interval not displayed.         Lab 07/11/23  0726 07/10/23  0253 07/09/23  1817 07/09/23 0438 07/08/23  0607 07/07/23  0300 07/06/23  0646 07/04/23  1056   SODIUM 143 145  --  145 143 142   < >  --    POTASSIUM 3.4* 3.8 3.5 3.4* 3.8 3.4*   < >  --    CHLORIDE 104 109*  --  108* 107 106   < >  --    CO2 29.0 27.0  --  26.0 23.0 23.0   < >  --    ANION GAP 10.0 9.0  --  11.0 13.0 13.0   < >  --    BUN 25* 21  --  21 23 24*   < >  --    CREATININE 1.75* 1.61*  --  1.75* 1.83* 1.64*   < >  --    EGFR 32.4* 35.8*  --  32.4* 30.7* 35.0*   < >  --    GLUCOSE 128* 135*  --  131* 77 104*   < >  --    CALCIUM 8.0* 8.2*  --  8.2* 8.2* 8.7   < >  --    MAGNESIUM 1.7 1.7  --  1.6 1.7 1.7   < >  --    PHOSPHORUS 3.3 3.3  --  3.6 3.9 3.1  --   --    HEMOGLOBIN A1C  --   --   --   --   --   --   --  5.20    < > = values in this interval not displayed.         Lab 07/11/23  0726 07/10/23  0253 07/09/23  0438 07/08/23  0607 07/07/23 0300 07/06/23  0646   TOTAL PROTEIN  --  5.2* 5.2* 5.0* 5.4* 6.2   ALBUMIN 2.5* 2.9* 2.8* 2.5* 2.9* 2.6*  2.6*   GLOBULIN  --  2.3 2.4 2.5 2.5 3.6   ALT (SGPT)  --  10 8 5 7 7   AST (SGOT)  --  26 17 28 26 21   BILIRUBIN  --  1.1 1.2 1.4* 1.6* 1.9*   ALK PHOS  --  52 55 50 50 65         Lab 07/06/23  0646 07/04/23  1602   PROTIME 19.9* 21.0*   INR 1.68* 1.79*             Lab 07/05/23  0229   ABO TYPING A   RH TYPING Positive   ANTIBODY SCREEN Positive         Lab 07/07/23  0340 07/06/23  0407 07/05/23  1309   PH, ARTERIAL 7.495* 7.492* 7.451*   PCO2, ARTERIAL 34.5* 33.7* 36.0   PO2 ART 73.6* 70.9* 81.3*   FIO2 35 40 50   HCO3 ART 26.6* 25.7 25.1   BASE EXCESS ART 3.2* 2.4* 1.1   CARBOXYHEMOGLOBIN 1.1 1.5 1.3     Brief  Urine Lab Results  (Last result in the past 365 days)        Color   Clarity   Blood   Leuk Est   Nitrite   Protein   CREAT   Urine HCG        07/05/23 1447 Dark Yellow   Clear   Trace   Small (1+)   Negative   30 mg/dL (1+)                   Microbiology Results Abnormal       Procedure Component Value - Date/Time    Body Fluid Culture - Body Fluid, Peritoneum [[id number]] Collected: 07/06/23 1735    Lab Status: Final result Specimen: Body Fluid from Peritoneum Updated: 07/09/23 0948     Body Fluid Culture No growth at 3 days     Gram Stain Moderate (3+) Red blood cells      Few (2+) WBCs seen      No organisms seen    Respiratory Culture - Sputum, ET Suction [[id number]] Collected: 07/06/23 0839    Lab Status: Final result Specimen: Sputum from ET Suction Updated: 07/08/23 0858     Respiratory Culture Light growth (2+) Normal respiratory deanne. No S. aureus or Pseudomonas aeruginosa detected. Final report.     Gram Stain Many (4+) WBCs per low power field      Rare (1+) Epithelial cells per low power field      Few (2+) Gram negative bacilli      Rare (1+) Gram positive cocci in pairs            No radiology results from the last 24 hrs    Results for orders placed during the hospital encounter of 07/04/23    Adult Transthoracic Echo Complete W/ Cont if Necessary Per Protocol    Interpretation Summary    Left ventricular systolic function is hyperdynamic (EF > 70%). Calculated left ventricular EF = 73.7% Left ventricular ejection fraction appears to be greater than 70%.    Left ventricular diastolic function was indeterminate.    The right ventricular cavity is mildly dilated.    The left atrial cavity is moderately dilated.    The right atrial cavity is mild to moderately  dilated.    Mild aortic valve stenosis is present.    Moderate tricuspid valve regurgitation is present.    Estimated right ventricular systolic pressure from tricuspid regurgitation is moderately elevated (45-55 mmHg).      Current  medications:  Scheduled Meds:atorvastatin, 20 mg, Oral, Daily  bumetanide, 2 mg, Oral, BID  buPROPion XL, 150 mg, Oral, Daily  donepezil, 10 mg, Oral, Nightly  insulin lispro, 3-14 Units, Subcutaneous, 4x Daily AC & at Bedtime  levothyroxine, 300 mcg, Oral, Daily  pantoprazole, 40 mg, Intravenous, BID AC  sodium chloride, 10 mL, Intravenous, Q12H  spironolactone, 50 mg, Oral, Daily      Continuous Infusions:   PRN Meds:.  acetaminophen    dextrose    dextrose    glucagon (human recombinant)    hydrOXYzine    melatonin    ondansetron **OR** ondansetron    polyvinyl alcohol    sodium chloride    sodium chloride    Assessment & Plan   Assessment & Plan     Active Hospital Problems    Diagnosis  POA    **Hematemesis [K92.0]  Yes    Severe Malnutrition (HCC) [E43]  Yes    Anemia [D64.9]  Yes    Type 2 diabetes mellitus [E11.9]  Yes    Essential hypertension [I10]  Yes    Dyslipidemia [E78.5]  Yes    Hypothyroidism [E03.9]  Yes    SILVINA (acute kidney injury) [N17.9]  Yes    Liver cirrhosis secondary to PAREDES [K75.81, K74.60]  Yes    GI bleed [K92.2]  Yes      Resolved Hospital Problems   No resolved problems to display.        Brief Hospital Course to date:  Ms. Adam Garrison is a 61yo F with a history of T2DM, HTN, Hypothyroidism, and PAREDES cirrhosis who [facility] with a GI bleed. EGD revealed an antral ulcer and esophagitis with no evidence of portal HTN or varices. Postprocedure, she was somnolent and could not be liberated from mechanical ventilation. She underwent a large volume paracentesis on 7/6/23 with 15L removed. She was extubated on 7/7/23. Transferred to floor 7/11.     GIB  Prepyloric ulcer  LA Grade C reflux esphagitis  --GI has seen and s/o. Rec'd BID PPI x 1 months then daily thereafter. F/U Primary GI in [place] for repeat scope in 8 weeks.  --H/H stable  --PT/OT recs SNF. Patient inially refusing however when I discussed with her that she is medically ready for d/c she said  she wasn't strong enough to leave and is now agreeable to short term rehab.    Decompensated PAREDES cirrhosis  --S/P 15L LVP. Now tolerating diuretics.     SILVINA  --NAL follows. Now on bumex/aldactone. SCr stable. CTM.    DM2  HTN  HLD  Morbid obesity     (Consider using .DepartingTIMEPROG or .LEXMDM then remove this message)    Expected Discharge Location and Transportation:   Expected Discharge   Expected Discharge Date: 7/14/2023; Expected Discharge Time:      DVT prophylaxis:  Mechanical DVT prophylaxis orders are present.     AM-PAC 6 Clicks Score (PT): 13 (07/11/23 0716)    CODE STATUS:   Code Status and Medical Interventions:   Ordered at: 07/04/23 0308     Code Status (Patient has no pulse and is not breathing):    CPR (Attempt to Resuscitate)     Medical Interventions (Patient has pulse or is breathing):    Full Support       Collette Manzo II, DO  07/11/23

## 2023-07-11 NOTE — PLAN OF CARE
Goal Outcome Evaluation:   Patient Aox4 upon assessment. Patient denied any pain or discomfort throughout the shift. Purewick in use. Patient assisted with repositioning per staff. Call light remains in reach.        Progress: no change

## 2023-07-12 LAB
ALBUMIN SERPL-MCNC: 2.8 G/DL (ref 3.5–5.2)
ANION GAP SERPL CALCULATED.3IONS-SCNC: 10 MMOL/L (ref 5–15)
BILIRUB FLD-MCNC: 0.2 MG/DL
BUN SERPL-MCNC: 27 MG/DL (ref 8–23)
BUN/CREAT SERPL: 16.1 (ref 7–25)
CALCIUM SPEC-SCNC: 8.3 MG/DL (ref 8.6–10.5)
CHLORIDE SERPL-SCNC: 103 MMOL/L (ref 98–107)
CO2 SERPL-SCNC: 30 MMOL/L (ref 22–29)
CREAT SERPL-MCNC: 1.68 MG/DL (ref 0.57–1)
DEPRECATED RDW RBC AUTO: 54 FL (ref 37–54)
EGFRCR SERPLBLD CKD-EPI 2021: 34 ML/MIN/1.73
ERYTHROCYTE [DISTWIDTH] IN BLOOD BY AUTOMATED COUNT: 16.6 % (ref 12.3–15.4)
GLUCOSE BLDC GLUCOMTR-MCNC: 126 MG/DL (ref 70–130)
GLUCOSE BLDC GLUCOMTR-MCNC: 137 MG/DL (ref 70–130)
GLUCOSE BLDC GLUCOMTR-MCNC: 170 MG/DL (ref 70–130)
GLUCOSE BLDC GLUCOMTR-MCNC: 180 MG/DL (ref 70–130)
GLUCOSE SERPL-MCNC: 136 MG/DL (ref 65–99)
HCT VFR BLD AUTO: 29.1 % (ref 34–46.6)
HGB BLD-MCNC: 9.4 G/DL (ref 12–15.9)
MAGNESIUM SERPL-MCNC: 1.5 MG/DL (ref 1.6–2.4)
MCH RBC QN AUTO: 30.2 PG (ref 26.6–33)
MCHC RBC AUTO-ENTMCNC: 32.3 G/DL (ref 31.5–35.7)
MCV RBC AUTO: 93.6 FL (ref 79–97)
PHOSPHATE SERPL-MCNC: 3.2 MG/DL (ref 2.5–4.5)
PLATELET # BLD AUTO: 127 10*3/MM3 (ref 140–450)
PMV BLD AUTO: 10.9 FL (ref 6–12)
POTASSIUM SERPL-SCNC: 3.9 MMOL/L (ref 3.5–5.2)
POTASSIUM SERPL-SCNC: 4.1 MMOL/L (ref 3.5–5.2)
RBC # BLD AUTO: 3.11 10*6/MM3 (ref 3.77–5.28)
SODIUM SERPL-SCNC: 143 MMOL/L (ref 136–145)
WBC NRBC COR # BLD: 4.95 10*3/MM3 (ref 3.4–10.8)

## 2023-07-12 PROCEDURE — 85027 COMPLETE CBC AUTOMATED: CPT | Performed by: INTERNAL MEDICINE

## 2023-07-12 PROCEDURE — 83735 ASSAY OF MAGNESIUM: CPT | Performed by: INTERNAL MEDICINE

## 2023-07-12 PROCEDURE — 25010000002 MAGNESIUM SULFATE 2 GM/50ML SOLUTION: Performed by: INTERNAL MEDICINE

## 2023-07-12 PROCEDURE — 97597 DBRDMT OPN WND 1ST 20 CM/<: CPT

## 2023-07-12 PROCEDURE — 29581 APPL MULTLAYER CMPRN SYS LEG: CPT

## 2023-07-12 PROCEDURE — 63710000001 INSULIN LISPRO (HUMAN) PER 5 UNITS: Performed by: INTERNAL MEDICINE

## 2023-07-12 PROCEDURE — 80069 RENAL FUNCTION PANEL: CPT | Performed by: INTERNAL MEDICINE

## 2023-07-12 PROCEDURE — 82948 REAGENT STRIP/BLOOD GLUCOSE: CPT

## 2023-07-12 PROCEDURE — 84132 ASSAY OF SERUM POTASSIUM: CPT | Performed by: INTERNAL MEDICINE

## 2023-07-12 PROCEDURE — 99232 SBSQ HOSP IP/OBS MODERATE 35: CPT | Performed by: INTERNAL MEDICINE

## 2023-07-12 RX ORDER — MAGNESIUM SULFATE HEPTAHYDRATE 40 MG/ML
2 INJECTION, SOLUTION INTRAVENOUS
Status: COMPLETED | OUTPATIENT
Start: 2023-07-12 | End: 2023-07-12

## 2023-07-12 RX ORDER — POTASSIUM CHLORIDE 20 MEQ/1
40 TABLET, EXTENDED RELEASE ORAL EVERY 4 HOURS
Status: COMPLETED | OUTPATIENT
Start: 2023-07-12 | End: 2023-07-12

## 2023-07-12 RX ADMIN — DONEPEZIL HYDROCHLORIDE 10 MG: 10 TABLET, FILM COATED ORAL at 20:45

## 2023-07-12 RX ADMIN — BUPROPION HYDROCHLORIDE 150 MG: 150 TABLET, FILM COATED, EXTENDED RELEASE ORAL at 08:22

## 2023-07-12 RX ADMIN — Medication 10 ML: at 08:23

## 2023-07-12 RX ADMIN — MAGNESIUM SULFATE HEPTAHYDRATE 2 G: 2 INJECTION, SOLUTION INTRAVENOUS at 15:14

## 2023-07-12 RX ADMIN — POTASSIUM CHLORIDE 40 MEQ: 1500 TABLET, EXTENDED RELEASE ORAL at 08:22

## 2023-07-12 RX ADMIN — MAGNESIUM SULFATE HEPTAHYDRATE 2 G: 2 INJECTION, SOLUTION INTRAVENOUS at 13:20

## 2023-07-12 RX ADMIN — BUMETANIDE 2 MG: 1 TABLET ORAL at 08:22

## 2023-07-12 RX ADMIN — LEVOTHYROXINE SODIUM 300 MCG: 0.15 TABLET ORAL at 04:05

## 2023-07-12 RX ADMIN — Medication 10 ML: at 20:45

## 2023-07-12 RX ADMIN — INSULIN LISPRO 3 UNITS: 100 INJECTION, SOLUTION INTRAVENOUS; SUBCUTANEOUS at 20:45

## 2023-07-12 RX ADMIN — PANTOPRAZOLE SODIUM 40 MG: 40 TABLET, DELAYED RELEASE ORAL at 16:57

## 2023-07-12 RX ADMIN — BUMETANIDE 2 MG: 1 TABLET ORAL at 17:00

## 2023-07-12 RX ADMIN — PANTOPRAZOLE SODIUM 40 MG: 40 TABLET, DELAYED RELEASE ORAL at 08:23

## 2023-07-12 RX ADMIN — SPIRONOLACTONE 50 MG: 25 TABLET ORAL at 08:22

## 2023-07-12 RX ADMIN — MAGNESIUM SULFATE HEPTAHYDRATE 2 G: 2 INJECTION, SOLUTION INTRAVENOUS at 10:29

## 2023-07-12 RX ADMIN — ATORVASTATIN CALCIUM 20 MG: 20 TABLET, FILM COATED ORAL at 08:22

## 2023-07-12 RX ADMIN — INSULIN LISPRO 3 UNITS: 100 INJECTION, SOLUTION INTRAVENOUS; SUBCUTANEOUS at 11:54

## 2023-07-12 RX ADMIN — POTASSIUM CHLORIDE 40 MEQ: 1500 TABLET, EXTENDED RELEASE ORAL at 04:04

## 2023-07-12 NOTE — THERAPY WOUND CARE TREATMENT
Acute Care - Wound/Debridement Treatment Note  [facility]     Patient Name: Ivania Fields  : 1960  MRN: [id number]  Today's Date: 2023                Admit Date: 2023    Visit Dx:    ICD-10-CM ICD-9-CM   1. Venous stasis of both lower extremities  I87.8 459.81   2. Hematemesis with nausea  K92.0 578.0     787.02   3. Dysphagia, unspecified type  R13.10 787.20     L plantar foot      Anterior LLE        Patient Active Problem List   Diagnosis    Pneumonia of left lung due to infectious organism, unspecified part of lung    GI bleed    Liver cirrhosis secondary to PAREDES    Sleep apnea    Iron deficiency anemia    SILVINA (acute kidney injury)    CKD (chronic kidney disease)    Stasis dermatitis of both legs    Debility    Type 2 diabetes mellitus    Essential hypertension    Dyslipidemia    Hypothyroidism    Hypoalbuminemia    Anemia    Hematemesis    Severe Malnutrition (HCC)        Past Medical History:   Diagnosis Date    Anemia     Diabetes mellitus     Hyperlipidemia     Hypertension     Hypothyroidism     Impaired mobility     Short-term memory loss         Past Surgical History:   Procedure Laterality Date    CHOLECYSTECTOMY      ENDOSCOPY N/A 2023    Procedure: ESOPHAGOGASTRODUODENOSCOPY;  Surgeon: Brunner, Mark I, MD;  Location: [facility] ENDOSCOPY;  Service: Gastroenterology;  Laterality: N/A;    ENDOSCOPY W/ BANDING N/A 2023    Procedure: ESOPHAGOGASTRODUODENOSCOPY WITH BIOPSY;  Surgeon: Jens Mandujano MD;  Location: [facility] ENDOSCOPY;  Service: Gastroenterology;  Laterality: N/A;           Wound 23 0318 Left lower leg Blisters (Active)   Wound Image   23   Dressing Appearance intact;moist drainage 23   Closure PABLO 23 0500   Base moist;pink;red;granulating 23   Periwound intact;dry;swelling;warm 23   Periwound Temperature warm 23   Periwound Skin Turgor firm 23   Edges open;irregular 23    Wound Length (cm) 3.3 cm 07/12/23 0831   Wound Width (cm) 3.8 cm 07/12/23 0831   Wound Depth (cm) 0.1 cm 07/12/23 0831   Wound Surface Area (cm^2) 12.54 cm^2 07/12/23 0831   Wound Volume (cm^3) 1.254 cm^3 07/12/23 0831   Drainage Characteristics/Odor serosanguineous;sanguineous 07/12/23 0831   Drainage Amount small 07/12/23 0831   Care, Wound cleansed with;soap and water;debrided 07/12/23 0831   Dressing Care dressing applied;silver impregnated;collagen;low-adherent;foam;border dressing;multi-layer wrap 07/12/23 0831   Periwound Care cleansed with pH balanced cleanser;dry periwound area maintained 07/12/23 0831       Wound 07/04/23 0317 Bilateral groin MASD (Moisture associated skin damage) (Active)   Dressing Appearance dry;intact 07/12/23 0400   Closure None 07/12/23 0500   Base moist;red 07/12/23 0500   Periwound intact;edematous;moist 07/11/23 2000   Periwound Temperature warm 07/11/23 2000   Drainage Characteristics/Odor bleeding controlled;sanguineous 07/11/23 2000   Drainage Amount small 07/11/23 2000   Care, Wound cleansed with;soap and water 07/12/23 0400   Dressing Care open to air 07/12/23 0400   Periwound Care dry periwound area maintained;barrier ointment applied 07/12/23 0400       Wound 07/04/23 0319 Left posterior fifth toe Other (comment) (Active)   Wound Image   07/12/23 0831   Dressing Appearance intact;moist drainage 07/12/23 0831   Base moist;pink;red;granulating 07/12/23 0831   Periwound intact;dry;pink;warm 07/12/23 0831   Periwound Temperature warm 07/12/23 0831   Periwound Skin Turgor soft 07/12/23 0831   Edges open;irregular 07/12/23 0831   Wound Length (cm) 3.3 cm 07/12/23 0831   Wound Width (cm) 5.5 cm 07/12/23 0831   Wound Depth (cm) 0.2 cm 07/12/23 0831   Wound Surface Area (cm^2) 18.15 cm^2 07/12/23 0831   Wound Volume (cm^3) 3.63 cm^3 07/12/23 0831   Drainage Characteristics/Odor sanguineous;serosanguineous 07/12/23 0831   Drainage Amount small 07/12/23 0831   Care, Wound cleansed  with;soap and water;debrided 07/12/23 0831   Dressing Care dressing applied;silver impregnated;collagen;low-adherent;foam;border dressing;multi-layer wrap 07/12/23 0831   Periwound Care cleansed with pH balanced cleanser;dry periwound area maintained 07/12/23 0831       Wound 07/04/23 0439 Right lower leg Other (comment) (Active)   Dressing Appearance dry;intact 07/11/23 1215   Closure Open to air 07/12/23 0500   Periwound Care dry periwound area maintained 07/12/23 0400       Wound 07/04/23 0800 Right medial perineum Skin Tear (Active)   Dressing Appearance open to air 07/12/23 0400   Closure None 07/12/23 0500   Base pink;moist 07/12/23 0500   Dressing Care open to air 07/12/23 0400   Periwound Care dry periwound area maintained 07/12/23 0400       Wound 07/11/23 0700 Bilateral medial coccyx Pressure Injury (Active)   Dressing Appearance open to air 07/12/23 0400   Closure None 07/12/23 0500   Base non-blanchable;red 07/12/23 0500   Periwound pink;blanchable 07/11/23 2000   Periwound Temperature warm 07/11/23 2000   Periwound Skin Turgor soft 07/11/23 2000   Drainage Amount none 07/11/23 2000   Periwound Care dry periwound area maintained 07/12/23 0400      Lymphedema       Row Name 07/12/23 0900             Lymphedema Edema Assessment    Ptting Edema Category By severity  -      Pitting Edema Mild  proximal calf > foot and ankle  -         Skin Changes/Observations    Location/Assessment Lower Extremity  -      Lower Extremity Conditions bilateral:;dry;scaly;crust  -      Lower Extremity Color/Pigment bilateral:;hyperpigmented;brawny;woody  -         Lymphedema Pulses/Capillary Refill    Capillary Refill lower extremity capillary refill  -      Lower Extremity Capillary Refill right:;left:;less than 3 seconds  -         Compression/Skin Care    Compression/Skin Care skin care;wrapping location;bandaging  -      Skin Care washed/dried;lotion applied  -      Wrapping Location lower extremity  -       Wrapping Location LE bilateral:;foot to knee  -      Wrapping Comments LLE wound dressings, BLE size 4/5 compressogrips applied doubled and overlapping for gradient compression.  -                User Key  (r) = Recorded By, (t) = Taken By, (c) = Cosigned By      Initials Name Provider Type     Aman Pierre, PT Physical Therapist                    WOUND DEBRIDEMENT  Total area of Debridement: 6cm2  Debridement Site 1  Location- Site 1: LLE and L foot wound  Selective Debridement- Site 1: Wound Surface <20cmsq  Instruments- Site 1: tweezers  Excised Tissue Description- Site 1: scant, slough, minimum, other (comment) (nonviable peeling periwound tissue and crusts)  Bleeding- Site 1: scant, held pressure, 1 minute               PT Assessment (last 12 hours)       PT Evaluation and Treatment       Row Name 07/12/23 0831          Physical Therapy Time and Intention    Subjective Information complains of;weakness;fatigue  -     Document Type therapy note (daily note);wound care  -     Mode of Treatment physical therapy;individual therapy  -       Row Name 07/12/23 0831          General Information    Patient Observations alert;cooperative;agree to therapy  -       Row Name 07/12/23 0831          Pain    Pretreatment Pain Rating 0/10 - no pain  -     Posttreatment Pain Rating 0/10 - no pain  -       Row Name 07/12/23 0831          Cognition    Affect/Mental Status (Cognition) WFL  -     Orientation Status (Cognition) oriented x 3  -       Row Name 07/12/23 0831          Wound 07/04/23 0318 Left lower leg Blisters    Wound - Properties Group Placement Date: 07/04/23  -SB Placement Time: 0318  -SB Present on Hospital Admission: Y  -SB Side: Left  -SB Orientation: lower  -SB Location: leg  -SB Primary Wound Type: Blisters  -SB    Wound Image Images linked: 1  -     Dressing Appearance intact;moist drainage  xeroform, optifoam  -     Base moist;pink;red;granulating  -     Periwound  "intact;dry;swelling;warm  -     Periwound Temperature warm  -     Periwound Skin Turgor firm  -     Edges open;irregular  -     Wound Length (cm) 3.3 cm  -     Wound Width (cm) 3.8 cm  -     Wound Depth (cm) 0.1 cm  -     Wound Surface Area (cm^2) 12.54 cm^2  -LH     Wound Volume (cm^3) 1.254 cm^3  -LH     Drainage Characteristics/Odor serosanguineous;sanguineous  -LH     Drainage Amount small  -LH     Care, Wound cleansed with;soap and water;debrided  -LH     Dressing Care dressing applied;silver impregnated;collagen;low-adherent;foam;border dressing;multi-layer wrap  Jovanna Ag, Mepilex Ag, 6\" optifoam, MLW  -LH     Periwound Care cleansed with pH balanced cleanser;dry periwound area maintained  -     Retired Wound - Properties Group Placement Date: 07/04/23  -SB Placement Time: 0318  -SB Present on Hospital Admission: Y  -SB Side: Left  -SB Orientation: lower  -SB Location: leg  -SB Primary Wound Type: Blisters  -SB    Retired Wound - Properties Group Date first assessed: 07/04/23  -SB Time first assessed: 0318  -SB Present on Hospital Admission: Y  -SB Side: Left  -SB Location: leg  -SB Primary Wound Type: Blisters  -SB      Row Name             Wound 07/04/23 0317 Bilateral groin MASD (Moisture associated skin damage)    Wound - Properties Group Placement Date: 07/04/23  -SB Placement Time: 0317 -SB Side: Bilateral  -SB Location: groin  -SB Primary Wound Type: MASD  -SB    Retired Wound - Properties Group Placement Date: 07/04/23  -SB Placement Time: 0317  -SB Side: Bilateral  -SB Location: groin  -SB Primary Wound Type: MASD  -SB    Retired Wound - Properties Group Date first assessed: 07/04/23  -SB Time first assessed: 0317  -SB Side: Bilateral  -SB Location: groin  -SB Primary Wound Type: MASD  -SB      Row Name 07/12/23 0831          Wound 07/04/23 0319 Left posterior fifth toe Other (comment)    Wound - Properties Group Placement Date: 07/04/23  -SB Placement Time: 0319  -SB Side: Left  -SB " "Orientation: posterior  -SB Location: fifth toe  -SB Primary Wound Type: Other  -SB    Wound Image Images linked: 1  -LH     Dressing Appearance intact;moist drainage  xeroform, optifoam  -LH     Base moist;pink;red;granulating  -LH     Periwound intact;dry;pink;warm  -LH     Periwound Temperature warm  -LH     Periwound Skin Turgor soft  -LH     Edges open;irregular  -LH     Wound Length (cm) 3.3 cm  -LH     Wound Width (cm) 5.5 cm  -LH     Wound Depth (cm) 0.2 cm  -LH     Wound Surface Area (cm^2) 18.15 cm^2  -LH     Wound Volume (cm^3) 3.63 cm^3  -LH     Drainage Characteristics/Odor sanguineous;serosanguineous  -LH     Drainage Amount small  -LH     Care, Wound cleansed with;soap and water;debrided  -LH     Dressing Care dressing applied;silver impregnated;collagen;low-adherent;foam;border dressing;multi-layer wrap  Jovanna Ag, Mepilex Ag, 6\" optifoam  -LH     Periwound Care cleansed with pH balanced cleanser;dry periwound area maintained  -     Retired Wound - Properties Group Placement Date: 07/04/23  -SB Placement Time: 0319 -SB Side: Left  -SB Orientation: posterior  -SB Location: fifth toe  -SB Primary Wound Type: Other  -SB    Retired Wound - Properties Group Date first assessed: 07/04/23 -SB Time first assessed: 0319 -SB Side: Left  -SB Location: fifth toe  -SB Primary Wound Type: Other  -SB      Row Name             Wound 07/04/23 0439 Right lower leg Other (comment)    Wound - Properties Group Placement Date: 07/04/23  -SB Placement Time: 0439 -SB Side: Right  -SB Orientation: lower  -SB Location: leg  -SB Primary Wound Type: Other  -SB    Retired Wound - Properties Group Placement Date: 07/04/23  -SB Placement Time: 0439 -SB Side: Right  -SB Orientation: lower  -SB Location: leg  -SB Primary Wound Type: Other  -SB    Retired Wound - Properties Group Date first assessed: 07/04/23  -SB Time first assessed: 0439 -SB Side: Right  -SB Location: leg  -SB Primary Wound Type: Other  -SB      Row Name    "          Wound 07/04/23 0800 Right medial perineum Skin Tear    Wound - Properties Group Placement Date: 07/04/23  -JH Placement Time: 0800  -JH Present on Hospital Admission: Y  -JH Side: Right  -JH Orientation: medial  -JH Location: perineum  -JH Primary Wound Type: Skin tear  -JH    Retired Wound - Properties Group Placement Date: 07/04/23  -JH Placement Time: 0800  -JH Present on Hospital Admission: Y  -JH Side: Right  -JH Orientation: medial  -JH Location: perineum  -JH Primary Wound Type: Skin tear  -JH    Retired Wound - Properties Group Date first assessed: 07/04/23  -JH Time first assessed: 0800  -JH Present on Hospital Admission: Y  -JH Side: Right  -JH Location: perineum  -JH Primary Wound Type: Skin tear  -JH      Row Name             Wound 07/11/23 0700 Bilateral medial coccyx Pressure Injury    Wound - Properties Group Placement Date: 07/11/23  -HW Placement Time: 0700  -HW Side: Bilateral  -HW Orientation: medial  -HW Location: coccyx  -HW Primary Wound Type: Pressure inj  -HW    Retired Wound - Properties Group Placement Date: 07/11/23  -HW Placement Time: 0700  -HW Side: Bilateral  -HW Orientation: medial  -HW Location: coccyx  -HW Primary Wound Type: Pressure inj  -HW    Retired Wound - Properties Group Date first assessed: 07/11/23  -HW Time first assessed: 0700  -HW Side: Bilateral  -HW Location: coccyx  -HW Primary Wound Type: Pressure inj  -HW      Row Name 07/12/23 0831          Coping    Observed Emotional State calm;cooperative  -     Verbalized Emotional State acceptance  -     Trust Relationship/Rapport care explained;questions answered  -       Row Name 07/12/23 0831          Plan of Care Review    Plan of Care Reviewed With patient  -     Progress improving  -     Outcome Evaluation Pt's BLEs continuing with good improvements in reduction of edema and erythema. Pt reports she had to have her Unna boots removed d/t severe itchiness and requests to change to an alternative form  of compression, so PT transitioned pt to BLE compressogrips to help further promote venous return and improve skin integrity. Both of pt's L anterior leg and L plantar foot wounds demonstrating improvements in wound dimensions with increasing re-epithelialization of wound edges. Wound bases are fully granulated with minimal necrotic tissue. PT added jacquie Ag to wound base to help promote proliferation. Pt would continue to benefit from debridement, MLW, and advanced wound dressing changes every 2-3 days to promote wound healing.  -       Row Name 07/12/23 0831          Positioning and Restraints    Pre-Treatment Position in bed  -     Post Treatment Position bed  -     In Bed supine;call light within reach;encouraged to call for assist  -               User Key  (r) = Recorded By, (t) = Taken By, (c) = Cosigned By      Initials Name Provider Type    Donna Gallego, RN Registered Nurse    Aman Ortiz, PT Physical Therapist    Jessica Burnett, RN Registered Nurse    Angeline William RN Registered Nurse                  Physical Therapy Education       Title: PT OT SLP Therapies (In Progress)       Topic: Physical Therapy (In Progress)       Point: Mobility training (In Progress)       Learning Progress Summary             Patient Acceptance, E,D, NR by  at 7/9/2023 1008                         Point: Home exercise program (Not Started)       Learner Progress:  Not documented in this visit.              Point: Body mechanics (In Progress)       Learning Progress Summary             Patient Acceptance, E,D, NR by  at 7/9/2023 1008                         Point: Precautions (In Progress)       Learning Progress Summary             Patient Acceptance, E,D, NR by  at 7/9/2023 1008                                         User Key       Initials Effective Dates Name Provider Type [facility] 02/03/23 -  Gloria Suresh, PT Physical Therapist PT                    Recommendation and  Plan  Anticipated Discharge Disposition (PT): skilled nursing facility  Planned Therapy Interventions (PT): balance training, bed mobility training, gait training, home exercise program, transfer training, patient/family education, strengthening  Therapy Frequency (PT): daily  Plan of Care Reviewed With: patient   Progress: improving       Progress: improving  Outcome Evaluation: Pt's BLEs continuing with good improvements in reduction of edema and erythema. Pt reports she had to have her Unna boots removed d/t severe itchiness and requests to change to an alternative form of compression, so PT transitioned pt to BLE compressogrips to help further promote venous return and improve skin integrity. Both of pt's L anterior leg and L plantar foot wounds demonstrating improvements in wound dimensions with increasing re-epithelialization of wound edges. Wound bases are fully granulated with minimal necrotic tissue. PT added jacquie Ag to wound base to help promote proliferation. Pt would continue to benefit from debridement, MLW, and advanced wound dressing changes every 2-3 days to promote wound healing.  Plan of Care Reviewed With: patient            Time Calculation   PT Charges       Row Name 07/12/23 0916             Time Calculation    Start Time 0831  -      PT Goal Re-Cert Due Date 07/19/23  -         Untimed Charges    Wound Care 49003 Selective debridement;95089 Multilayer comp below knee  -LH      78818-Lisikebtiw comp below knee 15  -LH      98610-Gwmputiio debridement 15  -LH         Total Minutes    Untimed Charges Total Minutes 30  -LH       Total Minutes 30  -LH                User Key  (r) = Recorded By, (t) = Taken By, (c) = Cosigned By      Initials Name Provider Type     Aman Pierre D, PT Physical Therapist                      Therapy Charges for Today       Code Description Service Date Service Provider Modifiers Qty    [id number]  MARI DEBRIDE OPEN WOUND UP TO 20CM 7/12/2023 Aman Pierre  SILVIA, PT GP 1    [id number] HC PT MULTI LAYER COMP SYS BELOW KNEE 7/12/2023 Aman Pierre, PT GP 1              PT G-Codes  Outcome Measure Options: AM-PAC 6 Clicks Basic Mobility (PT)  AM-PAC 6 Clicks Score (PT): 13       Aman Pierre, BALJEET  7/12/2023

## 2023-07-12 NOTE — PLAN OF CARE
Problem: Fall Injury Risk  Goal: Absence of Fall and Fall-Related Injury  Outcome: Ongoing, Progressing  Intervention: Identify and Manage Contributors  Recent Flowsheet Documentation  Taken 7/12/2023 1000 by Gilbert De La Garza RN  Medication Review/Management: medications reviewed  Taken 7/12/2023 0900 by Gilbert De La Garza RN  Medication Review/Management: medications reviewed  Intervention: Promote Injury-Free Environment  Recent Flowsheet Documentation  Taken 7/12/2023 1600 by Gilbert De La Garza RN  Safety Promotion/Fall Prevention:   activity supervised   assistive device/personal items within reach   clutter free environment maintained   fall prevention program maintained   safety round/check completed  Taken 7/12/2023 1400 by Gilbert De La Garza RN  Safety Promotion/Fall Prevention: safety round/check completed  Taken 7/12/2023 1200 by Gilbert De La Garza RN  Safety Promotion/Fall Prevention:   activity supervised   assistive device/personal items within reach   clutter free environment maintained   fall prevention program maintained   safety round/check completed  Taken 7/12/2023 1000 by Gilbert De La Garza RN  Safety Promotion/Fall Prevention:   activity supervised   assistive device/personal items within reach   clutter free environment maintained   fall prevention program maintained   safety round/check completed     Problem: Skin Injury Risk Increased  Goal: Skin Health and Integrity  Outcome: Ongoing, Progressing  Intervention: Optimize Skin Protection  Recent Flowsheet Documentation  Taken 7/12/2023 1600 by Gilbert De La Garza RN  Pressure Reduction Techniques:   frequent weight shift encouraged   heels elevated off bed  Head of Bed (HOB) Positioning: HOB elevated  Pressure Reduction Devices:   specialty bed utilized   pressure-redistributing mattress utilized  Skin Protection: adhesive use limited  Taken 7/12/2023 1400 by Gilbert De La Garza RN  Pressure Reduction Techniques: frequent weight shift encouraged  Head of Bed (HOB)  Positioning: HOB elevated  Pressure Reduction Devices: specialty bed utilized  Skin Protection: adhesive use limited  Taken 7/12/2023 1200 by Gilbert De La Garza RN  Pressure Reduction Techniques: frequent weight shift encouraged  Head of Bed ([facility]) Positioning: [facility] elevated  Pressure Reduction Devices: pressure-redistributing mattress utilized  Skin Protection: adhesive use limited  Taken 7/12/2023 1000 by Gilbert De La Garza RN  Pressure Reduction Techniques:   frequent weight shift encouraged   positioned off wounds   pressure points protected  Head of Bed ([facility]) Positioning: [facility] elevated  Pressure Reduction Devices: heel offloading device utilized  Skin Protection:   adhesive use limited   tubing/devices free from skin contact  Taken 7/12/2023 0900 by Gilbert De La Garza RN  Pressure Reduction Techniques:   positioned off wounds   heels elevated off bed   frequent weight shift encouraged  Pressure Reduction Devices:   heel offloading device utilized   pressure-redistributing mattress utilized   specialty bed utilized  Skin Protection:   adhesive use limited   incontinence pads utilized     Problem: Adult Inpatient Plan of Care  Goal: Absence of Hospital-Acquired Illness or Injury  Intervention: Identify and Manage Fall Risk  Recent Flowsheet Documentation  Taken 7/12/2023 1600 by Gilbert De La Garza RN  Safety Promotion/Fall Prevention:   activity supervised   assistive device/personal items within reach   clutter free environment maintained   fall prevention program maintained   safety round/check completed  Taken 7/12/2023 1400 by Gilbert De La Garza RN  Safety Promotion/Fall Prevention: safety round/check completed  Taken 7/12/2023 1200 by Gilbert De La Garza RN  Safety Promotion/Fall Prevention:   activity supervised   assistive device/personal items within reach   clutter free environment maintained   fall prevention program maintained   safety round/check completed  Taken 7/12/2023 1000 by Gilbert De La Garza RN  Safety Promotion/Fall  Prevention:   activity supervised   assistive device/personal items within reach   clutter free environment maintained   fall prevention program maintained   safety round/check completed  Intervention: Prevent Skin Injury  Recent Flowsheet Documentation  Taken 7/12/2023 1600 by Gilbert De La Garza RN  Body Position: weight shifting  Skin Protection: adhesive use limited  Taken 7/12/2023 1400 by Gilbert De La Garza RN  Body Position: weight shifting  Skin Protection: adhesive use limited  Taken 7/12/2023 1200 by Gilbert De La Garza RN  Body Position: supine, legs elevated  Skin Protection: adhesive use limited  Taken 7/12/2023 1000 by Gilbert De La Garza RN  Body Position: weight shifting  Skin Protection:   adhesive use limited   tubing/devices free from skin contact  Taken 7/12/2023 0900 by Gilbert De La Garza RN  Skin Protection:   adhesive use limited   incontinence pads utilized  Intervention: Prevent and Manage VTE (Venous Thromboembolism) Risk  Recent Flowsheet Documentation  Taken 7/12/2023 1600 by Gilbert De La Garza RN  Activity Management: activity encouraged  VTE Prevention/Management:   patient refused intervention   sequential compression devices off  Taken 7/12/2023 1400 by Gilbert De La Garza RN  Activity Management: activity encouraged  VTE Prevention/Management:   sequential compression devices off   patient refused intervention  Taken 7/12/2023 1200 by Gilbert De La Garza RN  Activity Management: activity encouraged  VTE Prevention/Management:   sequential compression devices off   patient refused intervention  Taken 7/12/2023 1000 by Gilbert De La Garza RN  Activity Management: activity encouraged  Taken 7/12/2023 0800 by Gilbert De La Garza RN  VTE Prevention/Management:   sequential compression devices off   patient refused intervention  Intervention: Prevent Infection  Recent Flowsheet Documentation  Taken 7/12/2023 0900 by Gilbert De La Garza RN  Infection Prevention: environmental surveillance performed  Goal: Optimal Comfort and  Wellbeing  Intervention: Provide Person-Centered Care  Recent Flowsheet Documentation  Taken 7/12/2023 0800 by Gilbert De La Garza RN  Trust Relationship/Rapport: care explained     Problem: Asthma Comorbidity  Goal: Maintenance of Asthma Control  Intervention: Maintain Asthma Symptom Control  Recent Flowsheet Documentation  Taken 7/12/2023 1000 by Gilbert De La Garza RN  Medication Review/Management: medications reviewed  Taken 7/12/2023 0900 by Gilbert De La Garza RN  Medication Review/Management: medications reviewed     Problem: Behavioral Health Comorbidity  Goal: Maintenance of Behavioral Health Symptom Control  Intervention: Maintain Behavioral Health Symptom Control  Recent Flowsheet Documentation  Taken 7/12/2023 1000 by Gilbert DeL a Garza RN  Medication Review/Management: medications reviewed  Taken 7/12/2023 0900 by Gilbert De La Garza RN  Medication Review/Management: medications reviewed     Problem: COPD (Chronic Obstructive Pulmonary Disease) Comorbidity  Goal: Maintenance of COPD Symptom Control  Intervention: Maintain COPD-Symptom Control  Recent Flowsheet Documentation  Taken 7/12/2023 1000 by Gilbert De La Garza RN  Medication Review/Management: medications reviewed  Taken 7/12/2023 0900 by Gilbert De La Garza RN  Medication Review/Management: medications reviewed     Problem: Heart Failure Comorbidity  Goal: Maintenance of Heart Failure Symptom Control  Intervention: Maintain Heart Failure-Management  Recent Flowsheet Documentation  Taken 7/12/2023 1000 by Gilbert De La Garza RN  Medication Review/Management: medications reviewed  Taken 7/12/2023 0900 by Gilbert De La Garza RN  Medication Review/Management: medications reviewed     Problem: Hypertension Comorbidity  Goal: Blood Pressure in Desired Range  Intervention: Maintain Blood Pressure Management  Recent Flowsheet Documentation  Taken 7/12/2023 1000 by Gilbert De La Garza RN  Medication Review/Management: medications reviewed  Taken 7/12/2023 0900 by Gilbert De La Garza RN  Medication  Review/Management: medications reviewed     Problem: Osteoarthritis Comorbidity  Goal: Maintenance of Osteoarthritis Symptom Control  Intervention: Maintain Osteoarthritis Symptom Control  Recent Flowsheet Documentation  Taken 7/12/2023 1600 by Gilbert De La Gazra RN  Activity Management: activity encouraged  Taken 7/12/2023 1400 by Gilbert De La Garza RN  Activity Management: activity encouraged  Assistive Device Utilized: lift device  Taken 7/12/2023 1200 by Gilbert De La Garza RN  Activity Management: activity encouraged  Taken 7/12/2023 1000 by Gilbert De La Garza RN  Activity Management: activity encouraged  Assistive Device Utilized: lift device  Medication Review/Management: medications reviewed  Taken 7/12/2023 0900 by Gilbert De La Garza RN  Medication Review/Management: medications reviewed     Problem: Pain Chronic (Persistent) (Comorbidity Management)  Goal: Acceptable Pain Control and Functional Ability  Intervention: Manage Persistent Pain  Recent Flowsheet Documentation  Taken 7/12/2023 1000 by Gilbert De La Garza RN  Medication Review/Management: medications reviewed  Taken 7/12/2023 0900 by Gilbert De La Garza RN  Medication Review/Management: medications reviewed  Intervention: Optimize Psychosocial Wellbeing  Recent Flowsheet Documentation  Taken 7/12/2023 0800 by Gilbert De La Garza RN  Family/Support System Care: support provided     Problem: Restraint, Nonviolent  Goal: Absence of Harm or Injury  Intervention: Implement Least Restrictive Safety Strategies  Recent Flowsheet Documentation  Taken 7/12/2023 1000 by Gilbert De La Garza RN  Medical Device Protection:   IV pole/bag removed from visual field   tubing secured  Taken 7/12/2023 0900 by Gilbert De La Garza RN  Medical Device Protection:   IV pole/bag removed from visual field   tubing secured  Intervention: Protect Dignity, Rights, and Personal Wellbeing  Recent Flowsheet Documentation  Taken 7/12/2023 0800 by Gilbert De La Garza RN  Trust Relationship/Rapport: care explained  Intervention:  Protect Skin and Joint Integrity  Recent Flowsheet Documentation  Taken 7/12/2023 1600 by Gilbert De La Garza RN  Body Position: weight shifting  Taken 7/12/2023 1400 by Gilbert De La Garza RN  Body Position: weight shifting  Taken 7/12/2023 1200 by Gilbert De La Garza RN  Body Position: supine, legs elevated  Taken 7/12/2023 1000 by Gilbert De La Garza RN  Body Position: weight shifting   Goal Outcome Evaluation: goal not met

## 2023-07-12 NOTE — PROGRESS NOTES
[facility] Medicine Services  PROGRESS NOTE    Patient Name: Ivania Fields  : 1960  MRN: [id number]    Date of Admission: 2023  Primary Care Physician: Kala Beaver MD    Subjective   Subjective     CC: f/u GIB    HPI: Up in bed with wound care in room. Says she is doing okay. Hopes she can go to rehab soon.    ROS:  Gen- No fevers, chills  CV- No chest pain, palpitations  Resp- No cough, dyspnea  GI- No N/V/D, abd pain     Objective   Objective     Vital Signs:   Temp:  [97.9 °F (36.6 °C)-98.4 °F (36.9 °C)] 98 °F (36.7 °C)  Heart Rate:  [59-78] 77  Resp:  [16-18] 18  BP: (106-134)/(51-60) 112/51  Flow (L/min):  [2] 2     Physical Exam:  Constitutional: No acute distress, awake, alert, up eating  HENT: NCAT, mucous membranes moist  Respiratory: Clear to auscultation bilaterally, respiratory effort normal   Cardiovascular: RRR, no murmurs, rubs, or gallops  Gastrointestinal: Positive bowel sounds, soft, nontender, nondistended, morbidly obese  Musculoskeletal: Bilateral chronic venous changes  Psychiatric: Appropriate affect, cooperative  Neurologic: Oriented x 3, strength symmetric in all extremities, Cranial Nerves grossly intact to confrontation, speech clear  Skin: No rashes     Results Reviewed:  LAB RESULTS:      Lab 23  0609 23  0726 07/10/23  0253 23  0438 23  0032 23  1751 23  0646 23  0109 23  1626   WBC 4.95 4.92 4.26 4.75  --   --   --   --  5.09   HEMOGLOBIN 9.4* 8.7* 8.4* 8.2* 8.1*   < > 8.2*   < > 8.7*   HEMATOCRIT 29.1* 24.9* 26.7* 25.2* 22.5*   < > 22.7*   < > 27.0*   PLATELETS 127* 117* 97* 99*  --   --   --   --  97*   NEUTROS ABS  --   --   --  3.30  --   --   --   --   --    IMMATURE GRANS (ABS)  --   --   --  0.03  --   --   --   --   --    LYMPHS ABS  --   --   --  0.49*  --   --   --   --   --    MONOS ABS  --   --   --  0.64  --   --   --   --   --    EOS ABS  --   --   --  0.26  --   --    --   --   --    MCV 93.6 96.9 97.1* 95.5  --   --   --   --  94.7   PROTIME  --   --   --   --   --   --  19.9*  --   --     < > = values in this interval not displayed.         Lab 07/12/23  0609 07/11/23 2207 07/11/23  0726 07/10/23  0253 07/09/23  1817 07/09/23  0438 07/08/23  0607   SODIUM 143  --  143 145  --  145 143   POTASSIUM 3.9 3.6 3.4* 3.8 3.5 3.4* 3.8   CHLORIDE 103  --  104 109*  --  108* 107   CO2 30.0*  --  29.0 27.0  --  26.0 23.0   ANION GAP 10.0  --  10.0 9.0  --  11.0 13.0   BUN 27*  --  25* 21  --  21 23   CREATININE 1.68*  --  1.75* 1.61*  --  1.75* 1.83*   EGFR 34.0*  --  32.4* 35.8*  --  32.4* 30.7*   GLUCOSE 136*  --  128* 135*  --  131* 77   CALCIUM 8.3*  --  8.0* 8.2*  --  8.2* 8.2*   MAGNESIUM 1.5*  --  1.7 1.7  --  1.6 1.7   PHOSPHORUS 3.2  --  3.3 3.3  --  3.6 3.9         Lab 07/12/23  0609 07/11/23  0726 07/10/23  0253 07/09/23  0438 07/08/23  0607 07/07/23  0300 07/06/23  0646   TOTAL PROTEIN  --   --  5.2* 5.2* 5.0* 5.4* 6.2   ALBUMIN 2.8* 2.5* 2.9* 2.8* 2.5* 2.9* 2.6*  2.6*   GLOBULIN  --   --  2.3 2.4 2.5 2.5 3.6   ALT (SGPT)  --   --  10 8 5 7 7   AST (SGOT)  --   --  26 17 28 26 21   BILIRUBIN  --   --  1.1 1.2 1.4* 1.6* 1.9*   ALK PHOS  --   --  52 55 50 50 65         Lab 07/06/23  0646   PROTIME 19.9*   INR 1.68*                 Lab 07/07/23  0340 07/06/23  0407 07/05/23  1309   PH, ARTERIAL 7.495* 7.492* 7.451*   PCO2, ARTERIAL 34.5* 33.7* 36.0   PO2 ART 73.6* 70.9* 81.3*   FIO2 35 40 50   HCO3 ART 26.6* 25.7 25.1   BASE EXCESS ART 3.2* 2.4* 1.1   CARBOXYHEMOGLOBIN 1.1 1.5 1.3     Brief Urine Lab Results  (Last result in the past 365 days)        Color   Clarity   Blood   Leuk Est   Nitrite   Protein   CREAT   Urine HCG        07/05/23 1447 Dark Yellow   Clear   Trace   Small (1+)   Negative   30 mg/dL (1+)                   Microbiology Results Abnormal       Procedure Component Value - Date/Time    Body Fluid Culture - Body Fluid, Peritoneum [[id number]] Collected: 07/06/23  1735    Lab Status: Final result Specimen: Body Fluid from Peritoneum Updated: 07/09/23 0948     Body Fluid Culture No growth at 3 days     Gram Stain Moderate (3+) Red blood cells      Few (2+) WBCs seen      No organisms seen    Respiratory Culture - Sputum, ET Suction [[id number]] Collected: 07/06/23 0839    Lab Status: Final result Specimen: Sputum from ET Suction Updated: 07/08/23 0858     Respiratory Culture Light growth (2+) Normal respiratory deanne. No S. aureus or Pseudomonas aeruginosa detected. Final report.     Gram Stain Many (4+) WBCs per low power field      Rare (1+) Epithelial cells per low power field      Few (2+) Gram negative bacilli      Rare (1+) Gram positive cocci in pairs            No radiology results from the last 24 hrs    Results for orders placed during the hospital encounter of 07/04/23    Adult Transthoracic Echo Complete W/ Cont if Necessary Per Protocol    Interpretation Summary    Left ventricular systolic function is hyperdynamic (EF > 70%). Calculated left ventricular EF = 73.7% Left ventricular ejection fraction appears to be greater than 70%.    Left ventricular diastolic function was indeterminate.    The right ventricular cavity is mildly dilated.    The left atrial cavity is moderately dilated.    The right atrial cavity is mild to moderately  dilated.    Mild aortic valve stenosis is present.    Moderate tricuspid valve regurgitation is present.    Estimated right ventricular systolic pressure from tricuspid regurgitation is moderately elevated (45-55 mmHg).      Current medications:  Scheduled Meds:atorvastatin, 20 mg, Oral, Daily  bumetanide, 2 mg, Oral, BID  buPROPion XL, 150 mg, Oral, Daily  donepezil, 10 mg, Oral, Nightly  insulin lispro, 3-14 Units, Subcutaneous, 4x Daily AC & at Bedtime  levothyroxine, 300 mcg, Oral, Daily  magnesium sulfate, 2 g, Intravenous, Q2H  pantoprazole, 40 mg, Oral, BID AC  sodium chloride, 10 mL, Intravenous, Q12H  spironolactone, 50 mg,  Oral, Daily      Continuous Infusions:   PRN Meds:.  acetaminophen    dextrose    dextrose    glucagon (human recombinant)    hydrOXYzine    Magnesium Standard Dose Replacement - Follow Nurse / BPA Driven Protocol    melatonin    ondansetron **OR** ondansetron    polyvinyl alcohol    Potassium Replacement - Follow Nurse / BPA Driven Protocol    sodium chloride    sodium chloride    Assessment & Plan   Assessment & Plan     Active Hospital Problems    Diagnosis  POA    **Hematemesis [K92.0]  Yes    Severe Malnutrition (HCC) [E43]  Yes    Anemia [D64.9]  Yes    Type 2 diabetes mellitus [E11.9]  Yes    Essential hypertension [I10]  Yes    Dyslipidemia [E78.5]  Yes    Hypothyroidism [E03.9]  Yes    SILVINA (acute kidney injury) [N17.9]  Yes    Liver cirrhosis secondary to PAREDES [K75.81, K74.60]  Yes    GI bleed [K92.2]  Yes      Resolved Hospital Problems   No resolved problems to display.        Brief Hospital Course to date:  Ms. Adam Garrison is a 63yo F with a history of T2DM, HTN, Hypothyroidism, and PAREDES cirrhosis who was transferred from [facility] to [facility] on 7/4/23 with a GI bleed. EGD revealed an antral ulcer and esophagitis with no evidence of portal HTN or varices. Postprocedure, she was somnolent and could not be liberated from mechanical ventilation. She underwent a large volume paracentesis on 7/6/23 with 15L removed. She was extubated on 7/7/23. Transferred to floor 7/11.     GIB  Prepyloric ulcer  LA Grade C reflux esphagitis  --GI has seen and s/o. Rec'd BID PPI x 1 months then daily thereafter. F/U Primary GI in [place] for repeat scope in 8 weeks.  --H/H stable  --PT/OT recs SNF. Patient agreeable, referrals pending. D/W CM.     Decompensated PAREDES cirrhosis  --S/P 15L LVP. Now tolerating diuretics.      SILVINA  --NAL follows. Now on bumex/aldactone. SCr again stable. CTM.     DM2  HTN  HLD  Morbid obesity    Expected Discharge Location and Transportation:   Expected Discharge   Expected Discharge Date: 7/14/2023;  Expected Discharge Time:      DVT prophylaxis:  Mechanical DVT prophylaxis orders are present.     AM-PAC 6 Clicks Score (PT): 13 (07/12/23 0900)    CODE STATUS:   Code Status and Medical Interventions:   Ordered at: 07/04/23 0308     Code Status (Patient has no pulse and is not breathing):    CPR (Attempt to Resuscitate)     Medical Interventions (Patient has pulse or is breathing):    Full Support       Collette Manzo II, DO  07/12/23

## 2023-07-12 NOTE — PROGRESS NOTES
"   LOS: 8 days    Patient Care Team:  Kala Beaver MD as PCP - General (Internal Medicine)    Subjective     Feeling better today.  Continues with significant urine output.  Denies any chest pain or shortness of breath.  Edema improving.    Objective     Vital Signs:  Blood pressure 112/51, pulse 77, temperature 98 °F (36.7 °C), temperature source Oral, resp. rate 18, height 165.1 cm (65\"), weight (!) 159 kg (350 lb 9.6 oz), SpO2 100 %.      Intake/Output Summary (Last 24 hours) at 7/12/2023 1302  Last data filed at 7/12/2023 1029  Gross per 24 hour   Intake --   Output 4500 ml   Net -4500 ml          07/11 0701 - 07/12 0700  In: -   Out: 4400 [Urine:4400]    Physical Exam:        GENERAL: WD WF NAD  NEURO: Awake and alert, oriented. No focal deficit  PSYCHIATRIC: NMA. Cooperative with PE  EYE: PE, no icterus, no conjunctivitis  ENT: ommm, dentition intact,  Hearing intact  NECK: Supple , No JVD discernable,  Trachea midline  CV: + Edema, RRR  LUNGS:  Quiet,  Nonlabored resp.  Symmetrical expansion  ABDOMEN: Nondistended, soft nontender.  : No Petersen, no palp bladder  SKIN: Warm and dry without rash      Labs:  Results from last 7 days   Lab Units 07/12/23  0609 07/11/23  0726 07/10/23  0253   WBC 10*3/mm3 4.95 4.92 4.26   HEMOGLOBIN g/dL 9.4* 8.7* 8.4*   PLATELETS 10*3/mm3 127* 117* 97*       Results from last 7 days   Lab Units 07/12/23  0609 07/11/23  2207 07/11/23  0726 07/10/23  0253 07/09/23  1817 07/09/23  0438   SODIUM mmol/L 143  --  143 145  --  145   POTASSIUM mmol/L 3.9 3.6 3.4* 3.8   < > 3.4*   CHLORIDE mmol/L 103  --  104 109*  --  108*   CO2 mmol/L 30.0*  --  29.0 27.0  --  26.0   BUN mg/dL 27*  --  25* 21  --  21   CREATININE mg/dL 1.68*  --  1.75* 1.61*  --  1.75*   CALCIUM mg/dL 8.3*  --  8.0* 8.2*  --  8.2*   PHOSPHORUS mg/dL 3.2  --  3.3 3.3  --  3.6   MAGNESIUM mg/dL 1.5*  --  1.7 1.7  --  1.6   ALBUMIN g/dL 2.8*  --  2.5* 2.9*  --  2.8*    < > = values in this interval not displayed. "       Results from last 7 days   Lab Units 07/10/23  0253   ALK PHOS U/L 52   BILIRUBIN mg/dL 1.1   ALT (SGPT) U/L 10   AST (SGOT) U/L 26       Results from last 7 days   Lab Units 07/07/23  0340   PH, ARTERIAL pH units 7.495*   PO2 ART mm Hg 73.6*   PCO2, ARTERIAL mm Hg 34.5*   HCO3 ART mmol/L 26.6*                 Estimated Creatinine Clearance: 52.9 mL/min (A) (by C-G formula based on SCr of 1.68 mg/dL (H)).         A/P:    ARF: Creatinine may be at a new baseline baseline1.6-1.8 range with continued negative volume.  Urine output nonoliguric..  Previously 1.5-1.6.  For now continue supportive care.  Monitor renal function closely.  Strict I's and O's.      Hypotension: Blood pressure stable.  Watch with diuresis.  Support with albumin as needed.    Hypokalemia: Potassium stable today.  Replace as needed.  Replace magnesium to >2.0 to assist with renal potassium recovery.    Hypomagnesia: Down to 1.5 today.  Getting replacement.    Anemia: Hemoglobin below goal but rising with hemoconcentration.  Transfuse as needed.    Volume: Continues negative daily.  Patient on low-dose Bumex along with spironolactone.  Continues with edema likely has component of third spacing due to low oncotic pressure with albumin at 2.5.  Continue strict I's and O's.    Cirrhosis: Secondary to PAREDES.  15 L paracentesis 7/6.  Likely contributing to low oncotic pressure.    Hematemesis with large prepyloric ulceration post diagnostic EGD 7/5/2023    High risk and complexity patient.    Raymond Shelby MD  07/12/23  13:02 EDT

## 2023-07-12 NOTE — PLAN OF CARE
Goal Outcome Evaluation:  Plan of Care Reviewed With: patient        Progress: improving  Outcome Evaluation: Pt's BLEs continuing with good improvements in reduction of edema and erythema. Pt reports she had to have her Unna boots removed d/t severe itchiness and requests to change to an alternative form of compression, so PT transitioned pt to BLE compressogrips to help further promote venous return and improve skin integrity. Both of pt's L anterior leg and L plantar foot wounds demonstrating improvements in wound dimensions with increasing re-epithelialization of wound edges. Wound bases are fully granulated with minimal necrotic tissue. PT added jacquie Ag to wound base to help promote proliferation. Pt would continue to benefit from debridement, MLW, and advanced wound dressing changes every 2-3 days to promote wound healing.

## 2023-07-13 PROBLEM — N18.32 STAGE 3B CHRONIC KIDNEY DISEASE: Status: ACTIVE | Noted: 2023-04-08

## 2023-07-13 LAB
ALBUMIN SERPL-MCNC: 2.6 G/DL (ref 3.5–5.2)
ANION GAP SERPL CALCULATED.3IONS-SCNC: 10 MMOL/L (ref 5–15)
BUN SERPL-MCNC: 30 MG/DL (ref 8–23)
BUN/CREAT SERPL: 18.9 (ref 7–25)
CALCIUM SPEC-SCNC: 8.4 MG/DL (ref 8.6–10.5)
CHLORIDE SERPL-SCNC: 102 MMOL/L (ref 98–107)
CO2 SERPL-SCNC: 28 MMOL/L (ref 22–29)
CREAT SERPL-MCNC: 1.59 MG/DL (ref 0.57–1)
EGFRCR SERPLBLD CKD-EPI 2021: 36.4 ML/MIN/1.73
GLUCOSE BLDC GLUCOMTR-MCNC: 117 MG/DL (ref 70–130)
GLUCOSE BLDC GLUCOMTR-MCNC: 157 MG/DL (ref 70–130)
GLUCOSE BLDC GLUCOMTR-MCNC: 157 MG/DL (ref 70–130)
GLUCOSE BLDC GLUCOMTR-MCNC: 186 MG/DL (ref 70–130)
GLUCOSE SERPL-MCNC: 130 MG/DL (ref 65–99)
MAGNESIUM SERPL-MCNC: 2.1 MG/DL (ref 1.6–2.4)
PHOSPHATE SERPL-MCNC: 2.8 MG/DL (ref 2.5–4.5)
POTASSIUM SERPL-SCNC: 4.1 MMOL/L (ref 3.5–5.2)
SODIUM SERPL-SCNC: 140 MMOL/L (ref 136–145)

## 2023-07-13 PROCEDURE — 83735 ASSAY OF MAGNESIUM: CPT | Performed by: INTERNAL MEDICINE

## 2023-07-13 PROCEDURE — 92526 ORAL FUNCTION THERAPY: CPT

## 2023-07-13 PROCEDURE — 80069 RENAL FUNCTION PANEL: CPT | Performed by: INTERNAL MEDICINE

## 2023-07-13 PROCEDURE — 99232 SBSQ HOSP IP/OBS MODERATE 35: CPT | Performed by: INTERNAL MEDICINE

## 2023-07-13 PROCEDURE — 63710000001 INSULIN LISPRO (HUMAN) PER 5 UNITS: Performed by: INTERNAL MEDICINE

## 2023-07-13 PROCEDURE — 97116 GAIT TRAINING THERAPY: CPT

## 2023-07-13 PROCEDURE — 82948 REAGENT STRIP/BLOOD GLUCOSE: CPT

## 2023-07-13 PROCEDURE — 97530 THERAPEUTIC ACTIVITIES: CPT

## 2023-07-13 RX ADMIN — INSULIN LISPRO 3 UNITS: 100 INJECTION, SOLUTION INTRAVENOUS; SUBCUTANEOUS at 16:59

## 2023-07-13 RX ADMIN — BUMETANIDE 2 MG: 1 TABLET ORAL at 17:00

## 2023-07-13 RX ADMIN — LEVOTHYROXINE SODIUM 300 MCG: 0.15 TABLET ORAL at 05:35

## 2023-07-13 RX ADMIN — Medication 10 ML: at 21:22

## 2023-07-13 RX ADMIN — PANTOPRAZOLE SODIUM 40 MG: 40 TABLET, DELAYED RELEASE ORAL at 08:07

## 2023-07-13 RX ADMIN — SPIRONOLACTONE 50 MG: 25 TABLET ORAL at 08:07

## 2023-07-13 RX ADMIN — INSULIN LISPRO 3 UNITS: 100 INJECTION, SOLUTION INTRAVENOUS; SUBCUTANEOUS at 21:22

## 2023-07-13 RX ADMIN — PANTOPRAZOLE SODIUM 40 MG: 40 TABLET, DELAYED RELEASE ORAL at 16:59

## 2023-07-13 RX ADMIN — Medication 5 MG: at 21:21

## 2023-07-13 RX ADMIN — BUPROPION HYDROCHLORIDE 150 MG: 150 TABLET, FILM COATED, EXTENDED RELEASE ORAL at 08:07

## 2023-07-13 RX ADMIN — ATORVASTATIN CALCIUM 20 MG: 20 TABLET, FILM COATED ORAL at 08:07

## 2023-07-13 RX ADMIN — BUMETANIDE 2 MG: 1 TABLET ORAL at 08:06

## 2023-07-13 RX ADMIN — DONEPEZIL HYDROCHLORIDE 10 MG: 10 TABLET, FILM COATED ORAL at 21:21

## 2023-07-13 RX ADMIN — INSULIN LISPRO 3 UNITS: 100 INJECTION, SOLUTION INTRAVENOUS; SUBCUTANEOUS at 12:59

## 2023-07-13 RX ADMIN — Medication 10 ML: at 08:07

## 2023-07-13 NOTE — PLAN OF CARE
Goal Outcome Evaluation:  Plan of Care Reviewed With: patient        Progress: improving  Outcome Evaluation: Pt amb forward from recliner with RW and assist x2. Pt given facilitation for weight shifting and cues for sequencing. Recliner in tow. Pt reported fear of falling. VSS on RA. Pt motivated to improve functional mobility independence and has good rehab potential. Continue POC.

## 2023-07-13 NOTE — PROGRESS NOTES
[facility] Medicine Services  PROGRESS NOTE    Patient Name: Ivania Fields  : 1960  MRN: [id number]    Date of Admission: 2023  Primary Care Physician: Kala Beaver MD    Subjective   Subjective     CC:  F/u GI ulcer    HPI:  Does not like current diet. Awaiting rehab    ROS:  Gen- No fevers, chills  CV- No chest pain, palpitations  Resp- No cough, dyspnea  GI- No N/V/D, abd pain     Objective   Objective     Vital Signs:   Temp:  [97.7 °F (36.5 °C)-98.1 °F (36.7 °C)] 97.8 °F (36.6 °C)  Heart Rate:  [62-81] 81  Resp:  [18-19] 19  BP: (104-142)/(52-65) 110/64  Flow (L/min):  [1] 1     Physical Exam:  Constitutional: Awake, alert, chronically ill appearing female, appears older than documented age, not in distress  HENT: NCAT, mucous membranes moist  Respiratory: Clear to auscultation bilaterally, respiratory effort normal   Cardiovascular: RRR, palpable radial pulses  Gastrointestinal: Positive bowel sounds, soft, nontender, nondistended  Psychiatric: Appropriate affect, cooperative  Neurologic: Speech fluent    Results Reviewed:  LAB RESULTS:      Lab 23  0609 23  0726 07/10/23  0253 23  0438 23  0032   WBC 4.95 4.92 4.26 4.75  --    HEMOGLOBIN 9.4* 8.7* 8.4* 8.2* 8.1*   HEMATOCRIT 29.1* 24.9* 26.7* 25.2* 22.5*   PLATELETS 127* 117* 97* 99*  --    NEUTROS ABS  --   --   --  3.30  --    IMMATURE GRANS (ABS)  --   --   --  0.03  --    LYMPHS ABS  --   --   --  0.49*  --    MONOS ABS  --   --   --  0.64  --    EOS ABS  --   --   --  0.26  --    MCV 93.6 96.9 97.1* 95.5  --          Lab 23  0338 23  1336 23  0609 23  2207 23  0726 07/10/23  0253 23  1817 23  0438   SODIUM 140  --  143  --  143 145  --  145   POTASSIUM 4.1 4.1 3.9 3.6 3.4* 3.8   < > 3.4*   CHLORIDE 102  --  103  --  104 109*  --  108*   CO2 28.0  --  30.0*  --  29.0 27.0  --  26.0   ANION GAP 10.0  --  10.0  --  10.0 9.0  --  11.0    BUN 30*  --  27*  --  25* 21  --  21   CREATININE 1.59*  --  1.68*  --  1.75* 1.61*  --  1.75*   EGFR 36.4*  --  34.0*  --  32.4* 35.8*  --  32.4*   GLUCOSE 130*  --  136*  --  128* 135*  --  131*   CALCIUM 8.4*  --  8.3*  --  8.0* 8.2*  --  8.2*   MAGNESIUM 2.1  --  1.5*  --  1.7 1.7  --  1.6   PHOSPHORUS 2.8  --  3.2  --  3.3 3.3  --  3.6    < > = values in this interval not displayed.         Lab 07/13/23  0338 07/12/23  0609 07/11/23  0726 07/10/23  0253 07/09/23  0438 07/08/23  0607 07/07/23  0300   TOTAL PROTEIN  --   --   --  5.2* 5.2* 5.0* 5.4*   ALBUMIN 2.6* 2.8* 2.5* 2.9* 2.8* 2.5* 2.9*   GLOBULIN  --   --   --  2.3 2.4 2.5 2.5   ALT (SGPT)  --   --   --  10 8 5 7   AST (SGOT)  --   --   --  26 17 28 26   BILIRUBIN  --   --   --  1.1 1.2 1.4* 1.6*   ALK PHOS  --   --   --  52 55 50 50                     Lab 07/07/23  0340   PH, ARTERIAL 7.495*   PCO2, ARTERIAL 34.5*   PO2 ART 73.6*   FIO2 35   HCO3 ART 26.6*   BASE EXCESS ART 3.2*   CARBOXYHEMOGLOBIN 1.1     Brief Urine Lab Results  (Last result in the past 365 days)        Color   Clarity   Blood   Leuk Est   Nitrite   Protein   CREAT   Urine HCG        07/05/23 1447 Dark Yellow   Clear   Trace   Small (1+)   Negative   30 mg/dL (1+)                   Microbiology Results Abnormal       Procedure Component Value - Date/Time    Body Fluid Culture - Body Fluid, Peritoneum [[id number]] Collected: 07/06/23 0505    Lab Status: Final result Specimen: Body Fluid from Peritoneum Updated: 07/09/23 0948     Body Fluid Culture No growth at 3 days     Gram Stain Moderate (3+) Red blood cells      Few (2+) WBCs seen      No organisms seen    Respiratory Culture - Sputum, ET Suction [[id number]] Collected: 07/06/23 0839    Lab Status: Final result Specimen: Sputum from ET Suction Updated: 07/08/23 0858     Respiratory Culture Light growth (2+) Normal respiratory deanne. No S. aureus or Pseudomonas aeruginosa detected. Final report.     Gram Stain Many (4+) WBCs per low  power field      Rare (1+) Epithelial cells per low power field      Few (2+) Gram negative bacilli      Rare (1+) Gram positive cocci in pairs            No radiology results from the last 24 hrs    Results for orders placed during the hospital encounter of 07/04/23    Adult Transthoracic Echo Complete W/ Cont if Necessary Per Protocol    Interpretation Summary    Left ventricular systolic function is hyperdynamic (EF > 70%). Calculated left ventricular EF = 73.7% Left ventricular ejection fraction appears to be greater than 70%.    Left ventricular diastolic function was indeterminate.    The right ventricular cavity is mildly dilated.    The left atrial cavity is moderately dilated.    The right atrial cavity is mild to moderately  dilated.    Mild aortic valve stenosis is present.    Moderate tricuspid valve regurgitation is present.    Estimated right ventricular systolic pressure from tricuspid regurgitation is moderately elevated (45-55 mmHg).      Current medications:  Scheduled Meds:atorvastatin, 20 mg, Oral, Daily  bumetanide, 2 mg, Oral, BID  buPROPion XL, 150 mg, Oral, Daily  donepezil, 10 mg, Oral, Nightly  insulin lispro, 3-14 Units, Subcutaneous, 4x Daily AC & at Bedtime  levothyroxine, 300 mcg, Oral, Daily  pantoprazole, 40 mg, Oral, BID AC  sodium chloride, 10 mL, Intravenous, Q12H  spironolactone, 50 mg, Oral, Daily      Continuous Infusions:   PRN Meds:.  acetaminophen    dextrose    dextrose    glucagon (human recombinant)    hydrOXYzine    Magnesium Standard Dose Replacement - Follow Nurse / BPA Driven Protocol    melatonin    ondansetron **OR** ondansetron    polyvinyl alcohol    Potassium Replacement - Follow Nurse / BPA Driven Protocol    sodium chloride    sodium chloride    Assessment & Plan   Assessment & Plan     Active Hospital Problems    Diagnosis  POA    **Hematemesis [K92.0]  Yes    Severe Malnutrition (HCC) [E43]  Yes    Anemia [D64.9]  Yes    Type 2 diabetes mellitus [E11.9]  Yes     Essential hypertension [I10]  Yes    Dyslipidemia [E78.5]  Yes    Hypothyroidism [E03.9]  Yes    Stage 3b chronic kidney disease [N18.32]  Yes    Liver cirrhosis secondary to PAREDES [K75.81, K74.60]  Yes    GI bleed [K92.2]  Yes      Resolved Hospital Problems   No resolved problems to display.        Brief Hospital Course to date:  Ivania Fields is a 62 y.o. female w/ HTN, HLD, DM2, hypothyroidism, cirrhosis of the liver, who had EGD 4/6/23 w/ multiple nonbleeding ulcers; seen at [facility] 7/4 w/ hematemesis, transferred to our facility for urgent GI evaluation; repeat EGD 7/5 demonstraged large 2.5cm prepyloric ulcer w/ pigmented spots; post-procedure she could not be weaned from the vent and was transferred to the ICU; she underwent large volume (15L) paracentesis w/ improvement in respiratory dynamics and was extubated 7/7, transferred to tele 7/11    The following problems are new to me today    Assessment/Plan    UGIB w/ hematemesis  Large prepyloric ulcer (2/5cm)  Reflux esophagitis  -rpt EGD 7/5/23  -strict avoidance of NSAIDS  -PPI BID x30 days followed by daily ; f/u [facility] 8 weeks for repeat scope  -H&H stable last check  -rehab pending    PAREDES cirrhosis w/ large volume ascites  -s/p paracentesis w/ 15L removed  -cont bumex/aldactone    CKD 3b (not SILVINA)  -bline Cr 1.2-1.6; eGFR 30-40's  -s/p transient elevation in Cr  -NAL has followed    DM type 2, A1c 5.2%, w/ long term use of insulin  -can likely de-escalate home regimen  -cont SSI    HTN  HLD  -cont statin, bumex, spironolactone    Morbid obesity, BMI 58.34 kg/m2  -complicates all aspects of care      Expected Discharge Location and Transportation: rehab  Expected Discharge ready once rehab arranged  Expected Discharge Date: 7/15/2023; Expected Discharge Time:      DVT prophylaxis:  Mechanical DVT prophylaxis orders are present.     AM-PAC 6 Clicks Score (PT): 13 (07/12/23 0900)    CODE STATUS:   Code Status and Medical Interventions:    Ordered at: 07/04/23 0308     Code Status (Patient has no pulse and is not breathing):    CPR (Attempt to Resuscitate)     Medical Interventions (Patient has pulse or is breathing):    Full Support       Scotty Muñoz, DO  07/13/23

## 2023-07-13 NOTE — PLAN OF CARE
Goal Outcome Evaluation:  Plan of Care Reviewed With: patient                   SLP treatment completed. Will sign-off dysphagia. Please see note for further details and recommendations.

## 2023-07-13 NOTE — CASE MANAGEMENT/SOCIAL WORK
Continued Stay Note  [facility]     Patient Name: Ivania Fields  MRN: [id number]  Today's Date: 7/13/2023    Admit Date: 7/4/2023    Plan: SNF   Discharge Plan       Row Name 07/13/23 1454       Plan    Plan SNF    Patient/Family in Agreement with Plan yes    Plan Comments CM spoke with the patient at the bedside. CM discussed skilled bed placement. The patient was declined for [facility], [facility], and [facility]. CM discussed expanding the search with [facility] facilities. The patient agreed. CM called Annia and she was able to offer a bed at [facility] in [place]. Patient has accepted the bed. CM waiting for new PT notes. When PT notes are documented in Epic, precert with her insurance will begin. CM following.    Final Discharge Disposition Code 03 - skilled nursing facility (SNF)                   Discharge Codes    No documentation.                 Expected Discharge Date and Time       Expected Discharge Date Expected Discharge Time    Jul 14, 2023               Priscilla Mercado RN

## 2023-07-13 NOTE — PROGRESS NOTES
"   LOS: 9 days    Patient Care Team:  Kala Beaver MD as PCP - General (Internal Medicine)    Subjective     No new events noted distress.  Renal function improved.  Review of system:  Denies any nausea vomiting chest pain shortness of breath.    Objective     Vital Signs:  Blood pressure 104/65, pulse 62, temperature 97.8 °F (36.6 °C), temperature source Oral, resp. rate 19, height 165.1 cm (65\"), weight (!) 159 kg (350 lb 9.6 oz), SpO2 100 %.      Intake/Output Summary (Last 24 hours) at 7/13/2023 1335  Last data filed at 7/13/2023 1100  Gross per 24 hour   Intake 250 ml   Output 3150 ml   Net -2900 ml          07/12 0701 - 07/13 0700  In: 250 [P.O.:250]  Out: 3450 [Urine:3450]    Physical Exam:        GENERAL: Alert awake,  female laying comfortable in bed.  NEURO: Awake and alert, oriented. No focal deficit  PSYCHIATRIC: NMA. Cooperative with PE  EYE: Pupil equal reactive  ENT: Oral mucosa moist hearing intact  NECK: Supple , No JVD discernable,  Trachea midline  CV: + Dependent edema, RRR  LUNGS:  Quiet,  Nonlabored resp.  Symmetrical expansion  ABDOMEN: Nondistended, soft nontender.  : No Petersen, no palp bladder  SKIN: Warm and dry without rash      Labs:  Results from last 7 days   Lab Units 07/12/23  0609 07/11/23  0726 07/10/23  0253   WBC 10*3/mm3 4.95 4.92 4.26   HEMOGLOBIN g/dL 9.4* 8.7* 8.4*   PLATELETS 10*3/mm3 127* 117* 97*       Results from last 7 days   Lab Units 07/13/23  0338 07/12/23  1336 07/12/23  0609 07/11/23  2207 07/11/23  0726 07/10/23  0253   SODIUM mmol/L 140  --  143  --  143 145   POTASSIUM mmol/L 4.1 4.1 3.9 3.6 3.4* 3.8   CHLORIDE mmol/L 102  --  103  --  104 109*   CO2 mmol/L 28.0  --  30.0*  --  29.0 27.0   BUN mg/dL 30*  --  27*  --  25* 21   CREATININE mg/dL 1.59*  --  1.68*  --  1.75* 1.61*   CALCIUM mg/dL 8.4*  --  8.3*  --  8.0* 8.2*   PHOSPHORUS mg/dL 2.8  --  3.2  --  3.3 3.3   MAGNESIUM mg/dL 2.1  --  1.5*  --  1.7 1.7   ALBUMIN g/dL 2.6*  --  2.8*  --  " 2.5* 2.9*       Results from last 7 days   Lab Units 07/10/23  0253   ALK PHOS U/L 52   BILIRUBIN mg/dL 1.1   ALT (SGPT) U/L 10   AST (SGOT) U/L 26       Results from last 7 days   Lab Units 07/07/23  0340   PH, ARTERIAL pH units 7.495*   PO2 ART mm Hg 73.6*   PCO2, ARTERIAL mm Hg 34.5*   HCO3 ART mmol/L 26.6*                 Estimated Creatinine Clearance: 55.9 mL/min (A) (by C-G formula based on SCr of 1.59 mg/dL (H)).         A/P:    1.  ARF: Creatinine may be at a new baseline baseline1.6-1.8 range with continued negative volume.  Urine output nonoliguric..  Previously 1.5-1.6.  For now continue supportive care.  Monitor renal function closely.  Strict I's and O's.      2.  Hypotension: Blood pressure stable.  Watch with diuresis.  Support with albumin as needed.    3.  Hypokalemia: Potassium 4.1 stable today.  Replace as needed.  Replace magnesium to >2.0 to assist with renal potassium recovery.    4.  Hypomagnesia: Down to 2.1 today.  Getting replacement.    5.  Anemia: Hemoglobin below goal but rising with hemoconcentration.  Transfuse as needed.    6.  Volume: Continues negative daily.  Patient on low-dose Bumex along with spironolactone.  Continues with edema likely has component of third spacing due to low oncotic pressure with albumin at 2.5.  Continue strict I's and O's.    7.  Cirrhosis: Secondary to PAREDES.  15 L paracentesis 7/6.  Likely contributing to low oncotic pressure.    8.  Hematemesis with large prepyloric ulceration post diagnostic EGD 7/5/2023    High risk and complexity patient.    Gabo Self MD  07/13/23  13:35 EDT

## 2023-07-13 NOTE — THERAPY TREATMENT NOTE
Patient Name: Ivania Fields  : 1960    MRN: [id number]                              Today's Date: 2023       Admit Date: 2023    Visit Dx:     ICD-10-CM ICD-9-CM   1. Venous stasis of both lower extremities  I87.8 459.81   2. Hematemesis with nausea  K92.0 578.0     787.02     Patient Active Problem List   Diagnosis    Pneumonia of left lung due to infectious organism, unspecified part of lung    GI bleed    Liver cirrhosis secondary to PAREDES    Sleep apnea    Iron deficiency anemia    Stage 3b chronic kidney disease    CKD (chronic kidney disease)    Stasis dermatitis of both legs    Debility    Type 2 diabetes mellitus    Essential hypertension    Dyslipidemia    Hypothyroidism    Hypoalbuminemia    Anemia    Hematemesis    Severe Malnutrition (HCC)     Past Medical History:   Diagnosis Date    Anemia     Diabetes mellitus     Hyperlipidemia     Hypertension     Hypothyroidism     Impaired mobility     Short-term memory loss      Past Surgical History:   Procedure Laterality Date    CHOLECYSTECTOMY      ENDOSCOPY N/A 2023    Procedure: ESOPHAGOGASTRODUODENOSCOPY;  Surgeon: Brunner, Mark I, MD;  [facility] ENDOSCOPY;  Service: Gastroenterology;  Laterality: N/A;    ENDOSCOPY W/ BANDING N/A 2023    Procedure: ESOPHAGOGASTRODUODENOSCOPY WITH BIOPSY;  Surgeon: Jens Mandujano MD;  Location: [facility] ENDOSCOPY;  Service: Gastroenterology;  Laterality: N/A;      General Information       Row Name 23 1513          Physical Therapy Time and Intention    Document Type therapy note (daily note)  -SD     Mode of Treatment physical therapy;individual therapy  -SD       Row Name 23 1513          General Information    Existing Precautions/Restrictions fall;other (see comments)  L plantar wound, offload shoe in room  -SD       Row Name 23 1513          Cognition    Orientation Status (Cognition) oriented x 3  -SD       Row Name 23 1513          Safety Issues,  Functional Mobility    Safety Issues Affecting Function (Mobility) insight into deficits/self-awareness;sequencing abilities;safety precaution awareness;problem-solving  -SD     Impairments Affecting Function (Mobility) balance;coordination;endurance/activity tolerance;strength;pain  -SD     Comment, Safety Issues/Impairments (Mobility) some anxiety with mobility  -SD               User Key  (r) = Recorded By, (t) = Taken By, (c) = Cosigned By      Initials Name Provider Type    Radha Calvert, PT Physical Therapist                   Mobility       Row Name 07/13/23 1652          Bed Mobility    Bed Mobility rolling left;rolling right  -SD     Rolling Left Haakon (Bed Mobility) minimum assist (75% patient effort);1 person assist  -SD     Rolling Right [place]akon (Bed Mobility) minimum assist (75% patient effort);1 person assist  -SD     Assistive Device (Bed Mobility) bed rails;draw sheet  -SD     Comment, (Bed Mobility) Pt rolled side to side several times for pericare, bedpan, and lift sling placement. Extra time and effort needed.  -SD       Row Name 07/13/23 1652          Transfers    Comment, (Transfers) mech lift to recliner then sit <> stand x2 reps with RW with Geoff x2. Pt given cues to correct retrograde balance in standing.  -SD       Row Name 07/13/23 1652          Bed-Chair Transfer    Bed-Chair Haakon (Transfers) dependent (less than 25% patient effort);2 person assist  -SD     Assistive Device (Bed-Chair Transfers) lift device  -SD       Row Name 07/13/23 1652          Sit-Stand Transfer    Sit-Stand Haakon (Transfers) minimum assist (75% patient effort);2 person assist;verbal cues  -SD     Assistive Device (Sit-Stand Transfers) walker, front-wheeled  -SD     Comment, (Sit-Stand Transfer) would benefit from lopez RW  -SD       Row Name 07/13/23 1652          Gait/Stairs (Locomotion)    Haakon Level (Gait) minimum assist (75% patient effort);2 person assist;verbal  cues;nonverbal cues (demo/gesture)  -SD     Assistive Device (Gait) bariatric equipment;walker, front-wheeled  -SD     Distance in Feet (Gait) 3ft  -SD     Deviations/Abnormal Patterns (Gait) base of support, wide;nomi decreased;gait speed decreased;stride length decreased  -SD     Bilateral Gait Deviations forward flexed posture;heel strike decreased  -SD     Comment, (Gait/Stairs) Pt amb forward from recliner with RW and assist x2. Pt given facilitation for weight shifting and cues for sequencing. Recliner in tow. Pt reported fear of falling.  -SD               User Key  (r) = Recorded By, (t) = Taken By, (c) = Cosigned By      Initials Name Provider Type    Radha Calvert PT Physical Therapist                   Obj/Interventions       Row Name 07/13/23 1654          Balance    Balance Assessment sitting static balance;standing static balance  -SD     Static Sitting Balance contact guard  -SD     Position, Sitting Balance unsupported;sitting in chair  -SD     Static Standing Balance minimal assist;supervision  -SD     Position/Device Used, Standing Balance walker, rolling  -SD               User Key  (r) = Recorded By, (t) = Taken By, (c) = Cosigned By      Initials Name Provider Type    Radha Calvert PT Physical Therapist                   Goals/Plan    No documentation.                  Clinical Impression       Row Name 07/13/23 1655          Pain    Pretreatment Pain Rating 0/10 - no pain  -SD     Posttreatment Pain Rating 0/10 - no pain  -SD       Row Name 07/13/23 1655          Plan of Care Review    Plan of Care Reviewed With patient  -SD     Progress improving  -SD     Outcome Evaluation Pt amb forward from recliner with RW and assist x2. Pt given facilitation for weight shifting and cues for sequencing. Recliner in tow. Pt reported fear of falling. VSS on RA. Pt motivated to improve functional mobility independence and has good rehab potential. Continue POC.  -SD       Row Name  07/13/23 1655          Vital Signs    Pre Systolic BP Rehab 110  -SD     Pre Treatment Diastolic BP 64  -SD     Pretreatment Heart Rate (beats/min) 80  -SD     Posttreatment Heart Rate (beats/min) 80  -SD     Pre SpO2 (%) 98  -SD     O2 Delivery Pre Treatment room air  -SD     Post SpO2 (%) 100  -SD     O2 Delivery Post Treatment room air  -SD     Pre Patient Position Supine  -SD     Post Patient Position Sitting  -SD       Row Name 07/13/23 1655          Positioning and Restraints    Pre-Treatment Position in bed  -SD     Post Treatment Position chair  -SD     In Chair notified nsg;reclined;call light within reach;encouraged to call for assist;exit alarm on;waffle cushion;on mechanical lift sling;heels elevated  -SD               User Key  (r) = Recorded By, (t) = Taken By, (c) = Cosigned By      Initials Name Provider Type    Radha Calvert PT Physical Therapist                   Outcome Measures       Row Name 07/13/23 1658          How much help from another person do you currently need...    Turning from your back to your side while in flat bed without using bedrails? 3  -SD     Moving from lying on back to sitting on the side of a flat bed without bedrails? 2  -SD     Moving to and from a bed to a chair (including a wheelchair)? 2  -SD     Standing up from a chair using your arms (e.g., wheelchair, bedside chair)? 2  -SD     Climbing 3-5 steps with a railing? 1  -SD     To walk in hospital room? 1  -SD     AM-PAC 6 Clicks Score (PT) 11  -SD     Highest level of mobility 4 --> Transferred to chair/commode  -SD       Row Name 07/13/23 1658          Functional Assessment    Outcome Measure Options AM-PAC 6 Clicks Basic Mobility (PT)  -SD               User Key  (r) = Recorded By, (t) = Taken By, (c) = Cosigned By      Initials Name Provider Type    Radha Calvert PT Physical Therapist                                 Physical Therapy Education       Title: PT OT SLP Therapies (Done)        Topic: Physical Therapy (Done)       Point: Mobility training (Done)       Learning Progress Summary             Patient Eager, E,D, VU,NR by SD at 7/13/2023 1658    Acceptance, E,D, NR by LS at 7/9/2023 1008                         Point: Home exercise program (Done)       Learning Progress Summary             Patient Eager, E,D, VU,NR by SD at 7/13/2023 1658                         Point: Body mechanics (Done)       Learning Progress Summary             Patient Eager, E,D, VU,NR by SD at 7/13/2023 1658    Acceptance, E,D, NR by LS at 7/9/2023 1008                         Point: Precautions (Done)       Learning Progress Summary             Patient Eager, E,D, VU,NR by SD at 7/13/2023 1658    Acceptance, E,D, NR by LS at 7/9/2023 1008                                         User Key       Initials Effective Dates Name Provider Type Discipline    SD 03/13/23 -  Radha Merchant, PT Physical Therapist PT     02/03/23 -  Gloria Suresh, PT Physical Therapist PT                  PT Recommendation and Plan     Plan of Care Reviewed With: patient  Progress: improving  Outcome Evaluation: Pt amb forward from recliner with RW and assist x2. Pt given facilitation for weight shifting and cues for sequencing. Recliner in tow. Pt reported fear of falling. VSS on RA. Pt motivated to improve functional mobility independence and has good rehab potential. Continue POC.     Time Calculation:    PT Charges       Row Name 07/13/23 1659             Time Calculation    Start Time 1513  -SD      PT Received On 07/13/23  -SD      PT Goal Re-Cert Due Date 07/19/23  -SD         Time Calculation- PT    Total Timed Code Minutes- PT 41 minute(s)  -SD         Timed Charges    80441 - Gait Training Minutes  10  -SD      75421 - PT Therapeutic Activity Minutes 31  -SD         Total Minutes    Timed Charges Total Minutes 41  -SD       Total Minutes 41  -SD                User Key  (r) = Recorded By, (t) = Taken By, (c) = Cosigned By       Initials Name Provider Type    SD Radha Merchant, PT Physical Therapist                  Therapy Charges for Today       Code Description Service Date Service Provider Modifiers Qty    46141727096 HC GAIT TRAINING EA 15 MIN 7/13/2023 Radha Merchant, PT GP 1    70333111543 HC PT THERAPEUTIC ACT EA 15 MIN 7/13/2023 Radha Merchant, PT GP 2            PT G-Codes  Outcome Measure Options: AM-PAC 6 Clicks Basic Mobility (PT)  AM-PAC 6 Clicks Score (PT): 11       Radha Merchant, PT  7/13/2023

## 2023-07-13 NOTE — THERAPY DISCHARGE NOTE
Acute Care - Speech Language Pathology   Swallow Treatment Note/Discharge [facility]     Patient Name: Ivania Fields  : 1960  MRN: [id number]  Today's Date: 2023               Admit Date: 2023    Visit Dx:    ICD-10-CM ICD-9-CM   1. Venous stasis of both lower extremities  I87.8 459.81   2. Hematemesis with nausea  K92.0 578.0     787.02   3. 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    Sleep apnea    Iron deficiency anemia    SILVINA (acute kidney injury)    CKD (chronic kidney disease)    Stasis dermatitis of both legs    Debility    Type 2 diabetes mellitus    Essential hypertension    Dyslipidemia    Hypothyroidism    Hypoalbuminemia    Anemia    Hematemesis    Severe Malnutrition (HCC)     Past Medical History:   Diagnosis Date    Anemia     Diabetes mellitus     Hyperlipidemia     Hypertension     Hypothyroidism     Impaired mobility     Short-term memory loss      Past Surgical History:   Procedure Laterality Date    CHOLECYSTECTOMY      ENDOSCOPY N/A 2023    Procedure: ESOPHAGOGASTRODUODENOSCOPY;  Surgeon: Brunner, Mark I, MD;  Location: [facility] ENDOSCOPY;  Service: Gastroenterology;  Laterality: N/A;    ENDOSCOPY W/ BANDING N/A 2023    Procedure: ESOPHAGOGASTRODUODENOSCOPY WITH BIOPSY;  Surgeon: Jens Mandujano MD;  Location: [facility] ENDOSCOPY;  Service: Gastroenterology;  Laterality: N/A;       SLP Recommendation and Plan  SLP Swallowing Diagnosis: swallow WFL/no suspected pharyngeal impairment (23 1000)  SLP Diet Recommendation: regular textures, thin liquids (23 1000)     Monitor for Signs of Aspiration: yes (23 1000)        Anticipated Discharge Disposition (SLP): skilled nursing facility (23 1000)  Rehab Potential/Prognosis, Swallowing: good, to achieve stated therapy goals  (07/13/23 1000)           Daily Summary of Progress (SLP): prepare for discharge (07/13/23 1000)     Anticipated Discharge Disposition (SLP): skilled nursing facility (07/13/23 1000)                 Treatment Assessment (SLP): improved, oral dysphagia, no clinical signs of, aspiration (07/13/23 1000)  Treatment Assessment Comments (SLP): Significant improvement in overall strength and vocal quality. No issues or s/sxs aspiration w/ thin liquids or solids even when pushed. Given nature of dysphagia being r/t edema, will upgrade to regular textures, thin liquids. Sign off dysphagia (07/13/23 1000)  Plan for Continued Treatment (SLP): treatment no longer indicated as all goals met (07/13/23 1000)    Plan of Care Reviewed With: patient (07/13/23 1031)    SWALLOW EVALUATION (last 72 hours)       SLP Adult Swallow Evaluation       Row Name 07/13/23 1000                   Rehab Evaluation    Document Type therapy note (daily note)  -VO        Subjective Information no complaints  -VO        Patient Observations alert;cooperative  -VO        Patient Effort good  -VO           Pain Scale: Numbers Pre/Post-Treatment    Pretreatment Pain Rating 0/10 - no pain  -VO        Posttreatment Pain Rating 0/10 - no pain  -VO           SLP Evaluation Clinical Impression    SLP Swallowing Diagnosis swallow WFL/no suspected pharyngeal impairment  -VO        Functional Impact no impact on function  -VO        Rehab Potential/Prognosis, Swallowing good, to achieve stated therapy goals  -VO           SLP Treatment Clinical Impressions    Treatment Assessment (SLP) improved;oral dysphagia;no clinical signs of;aspiration  -VO        Treatment Assessment Comments (SLP) Significant improvement in overall strength and vocal quality. No issues or s/sxs aspiration w/ thin liquids or solids even when pushed. Given nature of dysphagia being r/t edema, will upgrade to regular textures, thin liquids. Sign off dysphagia  -VO        Daily Summary of  Progress (SLP) prepare for discharge  -VO        Plan for Continued Treatment (SLP) treatment no longer indicated as all goals met  -VO        Care Plan Review care plan/treatment goals reviewed  -VO           Recommendations    SLP Diet Recommendation regular textures;thin liquids  -VO        Recommended Precautions and Strategies general aspiration precautions  -VO        Oral Care Recommendations Oral Care BID/PRN;Toothbrush  -VO        SLP Rec. for Method of Medication Administration meds whole;with thin liquids;as tolerated  -VO        Monitor for Signs of Aspiration yes  -VO        Anticipated Discharge Disposition (SLP) skilled nursing facility  -VO                  User Key  (r) = Recorded By, (t) = Taken By, (c) = Cosigned By      Initials Name Effective Dates    VO Adenike Regan MA,CCC-SLP 06/16/21 -                     EDUCATION  The patient has been educated in the following areas:   Dysphagia (Swallowing Impairment) Oral Care/Hydration.         SLP GOALS       Row Name 07/13/23 1000             (LTG) Patient will demonstrate functional swallow for    Diet Texture (Demonstrate functional swallow) regular textures  -VO      Liquid viscosity (Demonstrate functional swallow) thin liquids  -VO      [place] (Demonstrate functional swallow) independently (over 90% accuracy)  -VO      Time Frame (Demonstrate functional swallow) by discharge  -VO      Progress/Outcomes (Demonstrate functional swallow) goal met  -VO         (STG) Patient will tolerate therapeutic trials of    Consistencies Trialed (Tolerate therapeutic trials) regular textures;soft to chew (whole) textures;thin liquids  -VO      Desired Outcome (Tolerate therapeutic trials) without signs/symptoms of aspiration;with adequate oral prep/transit/clearance  -VO      [place] (Tolerate therapeutic trials) independently (over 90% accuracy)  -VO      Time Frame (Tolerate therapeutic trials) by discharge  -VO      Progress/Outcomes  (Tolerate therapeutic trials) goal met  -VO         (STG) Pharyngeal Strengthening Exercise Goal 1 (SLP)    Activity (Pharyngeal Strengthening Goal 1, SLP) increase timing;increase closure at entrance to airway/closure of airway at glottis  -VO      Increase Timing prepping - 3 second prep or suck swallow or 3-step swallow  -VO      Increase Closure at Entrance to Airway/Closure of Airway at Glottis super-supraglottic swallow;hard effortful swallow  -VO      [place]/Accuracy (Pharyngeal Strengthening Goal 1, SLP) independently (over 90% accuracy)  -VO      Time Frame (Pharyngeal Strengthening Goal 1, SLP) short term goal (STG)  -VO      Progress/Outcomes (Pharyngeal Strengthening Goal 1, SLP) goal met  -VO                User Key  (r) = Recorded By, (t) = Taken By, (c) = Cosigned By      Initials Name Provider Type    Adenike Rai MA,CCC-SLP Speech and Language Pathologist                         Time Calculation:    Time Calculation- SLP       Row Name 07/13/23 1031             Time Calculation- SLP    SLP Start Time 1000  -VO      SLP Received On 07/13/23  -VO         Untimed Charges    [id number] Treatment Swallow Minutes 38  -VO         Total Minutes    Untimed Charges Total Minutes 38  -VO       Total Minutes 38  -VO                User Key  (r) = Recorded By, (t) = Taken By, (c) = Cosigned By      Initials Name Provider Type    VO Adenike Regan MA,CCC-SLP Speech and Language Pathologist                    Therapy Charges for Today       Code Description Service Date Service Provider Modifiers Qty    83912891916 HC ST TREATMENT SWALLOW 3 7/13/2023 Adenike Regan MA,CCC-SLP GN 1                 SLP Discharge Summary  Anticipated Discharge Disposition (SLP): skilled nursing facility    Adenike Regan MA,CCC-SLP  7/13/2023

## 2023-07-13 NOTE — PLAN OF CARE
Problem: Hypertension Comorbidity  Goal: Blood Pressure in Desired Range  Outcome: Ongoing, Progressing  Intervention: Maintain Blood Pressure Management  Recent Flowsheet Documentation  Taken 7/13/2023 0800 by Gilbert De La Garza RN  Medication Review/Management: medications reviewed     Problem: Skin Injury Risk Increased  Goal: Skin Health and Integrity  Outcome: Ongoing, Progressing  Intervention: Optimize Skin Protection  Recent Flowsheet Documentation  Taken 7/13/2023 1600 by Gilbert De La Garza RN  Pressure Reduction Techniques: frequent weight shift encouraged  Pressure Reduction Devices:   specialty bed utilized   pressure-redistributing mattress utilized  Skin Protection:   adhesive use limited   tubing/devices free from skin contact  Taken 7/13/2023 1400 by Gilbert De La Garza RN  Pressure Reduction Techniques: frequent weight shift encouraged  Head of Bed (HOB) Positioning: HOB elevated  Pressure Reduction Devices:   pressure-redistributing mattress utilized   specialty bed utilized  Skin Protection: adhesive use limited  Taken 7/13/2023 1200 by Gilbert De La Garza RN  Pressure Reduction Techniques: frequent weight shift encouraged  Head of Bed (HOB) Positioning: HOB elevated  Pressure Reduction Devices:   pressure-redistributing mattress utilized   specialty bed utilized  Skin Protection: adhesive use limited  Taken 7/13/2023 1000 by Gilbert De La Garza RN  Pressure Reduction Techniques: frequent weight shift encouraged  Head of Bed (HOB) Positioning: HOB elevated  Pressure Reduction Devices: specialty bed utilized  Skin Protection: adhesive use limited  Taken 7/13/2023 0800 by Gilbert De La Garza RN  Pressure Reduction Techniques: frequent weight shift encouraged  Head of Bed (HOB) Positioning: HOB elevated  Pressure Reduction Devices: positioning supports utilized  Skin Protection:   adhesive use limited   tubing/devices free from skin contact     Problem: Adult Inpatient Plan of Care  Goal: Absence of Hospital-Acquired Illness  or Injury  Intervention: Identify and Manage Fall Risk  Recent Flowsheet Documentation  Taken 7/13/2023 1600 by Gilbert De La Garza RN  Safety Promotion/Fall Prevention: safety round/check completed  Taken 7/13/2023 1400 by Gilbert De La Garza RN  Safety Promotion/Fall Prevention:   activity supervised   assistive device/personal items within reach   clutter free environment maintained   fall prevention program maintained   safety round/check completed  Taken 7/13/2023 1200 by Gilbert De La Garza RN  Safety Promotion/Fall Prevention:   activity supervised   assistive device/personal items within reach   clutter free environment maintained   safety round/check completed  Taken 7/13/2023 1000 by Gilbert De La Garza RN  Safety Promotion/Fall Prevention:   activity supervised   assistive device/personal items within reach   clutter free environment maintained   safety round/check completed   fall prevention program maintained  Taken 7/13/2023 0800 by Gilbert De La Garza RN  Safety Promotion/Fall Prevention:   activity supervised   clutter free environment maintained   assistive device/personal items within reach   fall prevention program maintained   safety round/check completed  Intervention: Prevent Skin Injury  Recent Flowsheet Documentation  Taken 7/13/2023 1600 by Gilbert De La Garza RN  Body Position:   weight shifting   neutral head position  Skin Protection:   adhesive use limited   tubing/devices free from skin contact  Taken 7/13/2023 1400 by Gilbert De La Garza RN  Body Position: weight shifting  Skin Protection: adhesive use limited  Taken 7/13/2023 1200 by Gilbert De La Garza RN  Body Position: weight shifting  Skin Protection: adhesive use limited  Taken 7/13/2023 1000 by Gilbert De La Garza RN  Body Position: weight shifting  Skin Protection: adhesive use limited  Taken 7/13/2023 0800 by Gilbert De La Garza RN  Body Position: sitting up in bed  Skin Protection:   adhesive use limited   tubing/devices free from skin contact  Intervention: Prevent and  Manage VTE (Venous Thromboembolism) Risk  Recent Flowsheet Documentation  Taken 7/13/2023 1600 by Gilbert De La Garza RN  Activity Management: up in chair  VTE Prevention/Management:   patient refused intervention   sequential compression devices off  Taken 7/13/2023 1400 by Gilbert De La Garza RN  Activity Management: activity encouraged  VTE Prevention/Management:   patient refused intervention   sequential compression devices off  Taken 7/13/2023 1200 by Gilbert De La Garza RN  Activity Management: activity encouraged  VTE Prevention/Management:   patient refused intervention   sequential compression devices off  Taken 7/13/2023 1000 by Gilbert De La Garza RN  Activity Management: activity encouraged  VTE Prevention/Management:   patient refused intervention   sequential compression devices off  Taken 7/13/2023 0800 by Gilbert De La Garza RN  Activity Management: activity encouraged  Intervention: Prevent Infection  Recent Flowsheet Documentation  Taken 7/13/2023 0800 by Gilbert De La Garza RN  Infection Prevention: environmental surveillance performed     Problem: Fall Injury Risk  Goal: Absence of Fall and Fall-Related Injury  Intervention: Identify and Manage Contributors  Recent Flowsheet Documentation  Taken 7/13/2023 0800 by Gilbert De La Garza RN  Medication Review/Management: medications reviewed  Intervention: Promote Injury-Free Environment  Recent Flowsheet Documentation  Taken 7/13/2023 1600 by Gilbert De La Garza RN  Safety Promotion/Fall Prevention: safety round/check completed  Taken 7/13/2023 1400 by Gilbert De La Garza RN  Safety Promotion/Fall Prevention:   activity supervised   assistive device/personal items within reach   clutter free environment maintained   fall prevention program maintained   safety round/check completed  Taken 7/13/2023 1200 by Gilbert De La Garza RN  Safety Promotion/Fall Prevention:   activity supervised   assistive device/personal items within reach   clutter free environment maintained   safety round/check  completed  Taken 7/13/2023 1000 by Gilbert De La Garza RN  Safety Promotion/Fall Prevention:   activity supervised   assistive device/personal items within reach   clutter free environment maintained   safety round/check completed   fall prevention program maintained  Taken 7/13/2023 0800 by Gilbert De La Garza RN  Safety Promotion/Fall Prevention:   activity supervised   clutter free environment maintained   assistive device/personal items within reach   fall prevention program maintained   safety round/check completed     Problem: Asthma Comorbidity  Goal: Maintenance of Asthma Control  Intervention: Maintain Asthma Symptom Control  Recent Flowsheet Documentation  Taken 7/13/2023 0800 by Gilbert De La Garza RN  Medication Review/Management: medications reviewed     Problem: Behavioral Health Comorbidity  Goal: Maintenance of Behavioral Health Symptom Control  Intervention: Maintain Behavioral Health Symptom Control  Recent Flowsheet Documentation  Taken 7/13/2023 0800 by Gilbert De La Garza RN  Medication Review/Management: medications reviewed     Problem: COPD (Chronic Obstructive Pulmonary Disease) Comorbidity  Goal: Maintenance of COPD Symptom Control  Intervention: Maintain COPD-Symptom Control  Recent Flowsheet Documentation  Taken 7/13/2023 0800 by Gilbert De La Garza RN  Medication Review/Management: medications reviewed     Problem: Heart Failure Comorbidity  Goal: Maintenance of Heart Failure Symptom Control  Intervention: Maintain Heart Failure-Management  Recent Flowsheet Documentation  Taken 7/13/2023 0800 by Gilbert De La Garza RN  Medication Review/Management: medications reviewed     Problem: Osteoarthritis Comorbidity  Goal: Maintenance of Osteoarthritis Symptom Control  Intervention: Maintain Osteoarthritis Symptom Control  Recent Flowsheet Documentation  Taken 7/13/2023 1600 by Gilbert De La Garza RN  Activity Management: up in chair  Assistive Device Utilized: lift device  Taken 7/13/2023 1400 by Gilbert De La Garza RN  Activity  Management: activity encouraged  Taken 7/13/2023 1200 by Gilbert De La Garza RN  Activity Management: activity encouraged  Taken 7/13/2023 1000 by Gilbert De La Garza RN  Activity Management: activity encouraged  Assistive Device Utilized: lift device  Taken 7/13/2023 0800 by Gilbert De La Garza RN  Activity Management: activity encouraged  Assistive Device Utilized: lift device  Medication Review/Management: medications reviewed     Problem: Pain Chronic (Persistent) (Comorbidity Management)  Goal: Acceptable Pain Control and Functional Ability  Intervention: Manage Persistent Pain  Recent Flowsheet Documentation  Taken 7/13/2023 0800 by Gilbert De La Garza RN  Medication Review/Management: medications reviewed     Problem: Restraint, Nonviolent  Goal: Absence of Harm or Injury  Intervention: Implement Least Restrictive Safety Strategies  Recent Flowsheet Documentation  Taken 7/13/2023 1000 by Gilbert De La Garza RN  Medical Device Protection:   IV pole/bag removed from visual field   tubing secured  Taken 7/13/2023 0800 by Gilbert De La Garza RN  Medical Device Protection:   IV pole/bag removed from visual field   tubing secured  Intervention: Protect Skin and Joint Integrity  Recent Flowsheet Documentation  Taken 7/13/2023 1600 by Gilbert De La Garza RN  Body Position:   weight shifting   neutral head position  Taken 7/13/2023 1400 by Gilbert De La Garza RN  Body Position: weight shifting  Taken 7/13/2023 1200 by Gilbert De La Garza RN  Body Position: weight shifting  Taken 7/13/2023 1000 by Gilbert De La Garza RN  Body Position: weight shifting  Taken 7/13/2023 0800 by Gilbert De La Garza RN  Body Position: sitting up in bed   Goal Outcome Evaluation:goal not met

## 2023-07-14 VITALS
TEMPERATURE: 98.1 F | WEIGHT: 293 LBS | RESPIRATION RATE: 18 BRPM | SYSTOLIC BLOOD PRESSURE: 142 MMHG | BODY MASS INDEX: 48.82 KG/M2 | OXYGEN SATURATION: 98 % | HEIGHT: 65 IN | DIASTOLIC BLOOD PRESSURE: 60 MMHG | HEART RATE: 82 BPM

## 2023-07-14 LAB
ALBUMIN SERPL-MCNC: 2.7 G/DL (ref 3.5–5.2)
ANION GAP SERPL CALCULATED.3IONS-SCNC: 12 MMOL/L (ref 5–15)
BUN SERPL-MCNC: 33 MG/DL (ref 8–23)
BUN/CREAT SERPL: 21.2 (ref 7–25)
CALCIUM SPEC-SCNC: 8.8 MG/DL (ref 8.6–10.5)
CHLORIDE SERPL-SCNC: 100 MMOL/L (ref 98–107)
CO2 SERPL-SCNC: 31 MMOL/L (ref 22–29)
CREAT SERPL-MCNC: 1.56 MG/DL (ref 0.57–1)
EGFRCR SERPLBLD CKD-EPI 2021: 37.2 ML/MIN/1.73
GLUCOSE BLDC GLUCOMTR-MCNC: 123 MG/DL (ref 70–130)
GLUCOSE BLDC GLUCOMTR-MCNC: 138 MG/DL (ref 70–130)
GLUCOSE BLDC GLUCOMTR-MCNC: 145 MG/DL (ref 70–130)
GLUCOSE BLDC GLUCOMTR-MCNC: 171 MG/DL (ref 70–130)
GLUCOSE SERPL-MCNC: 116 MG/DL (ref 65–99)
HCT VFR BLD AUTO: 29.5 % (ref 34–46.6)
HGB BLD-MCNC: 9.6 G/DL (ref 12–15.9)
MAGNESIUM SERPL-MCNC: 2 MG/DL (ref 1.6–2.4)
PHOSPHATE SERPL-MCNC: 3.6 MG/DL (ref 2.5–4.5)
POTASSIUM SERPL-SCNC: 4.2 MMOL/L (ref 3.5–5.2)
SODIUM SERPL-SCNC: 143 MMOL/L (ref 136–145)

## 2023-07-14 PROCEDURE — 80069 RENAL FUNCTION PANEL: CPT | Performed by: INTERNAL MEDICINE

## 2023-07-14 PROCEDURE — 25010000002 ONDANSETRON PER 1 MG: Performed by: INTERNAL MEDICINE

## 2023-07-14 PROCEDURE — 82948 REAGENT STRIP/BLOOD GLUCOSE: CPT

## 2023-07-14 PROCEDURE — 85014 HEMATOCRIT: CPT | Performed by: INTERNAL MEDICINE

## 2023-07-14 PROCEDURE — 85018 HEMOGLOBIN: CPT | Performed by: INTERNAL MEDICINE

## 2023-07-14 PROCEDURE — 99239 HOSP IP/OBS DSCHRG MGMT >30: CPT | Performed by: NURSE PRACTITIONER

## 2023-07-14 PROCEDURE — 83735 ASSAY OF MAGNESIUM: CPT | Performed by: INTERNAL MEDICINE

## 2023-07-14 PROCEDURE — 63710000001 INSULIN LISPRO (HUMAN) PER 5 UNITS: Performed by: INTERNAL MEDICINE

## 2023-07-14 RX ORDER — AMOXICILLIN 250 MG
2 CAPSULE ORAL 2 TIMES DAILY
Status: DISCONTINUED | OUTPATIENT
Start: 2023-07-14 | End: 2023-07-15 | Stop reason: HOSPADM

## 2023-07-14 RX ORDER — BISACODYL 5 MG/1
5 TABLET, DELAYED RELEASE ORAL DAILY PRN
Status: DISCONTINUED | OUTPATIENT
Start: 2023-07-14 | End: 2023-07-15 | Stop reason: HOSPADM

## 2023-07-14 RX ORDER — BISACODYL 10 MG
10 SUPPOSITORY, RECTAL RECTAL DAILY PRN
Status: DISCONTINUED | OUTPATIENT
Start: 2023-07-14 | End: 2023-07-15 | Stop reason: HOSPADM

## 2023-07-14 RX ORDER — SPIRONOLACTONE 50 MG/1
50 TABLET, FILM COATED ORAL DAILY
Qty: 30 TABLET | Refills: 0 | Status: SHIPPED | OUTPATIENT
Start: 2023-07-15

## 2023-07-14 RX ORDER — POLYETHYLENE GLYCOL 3350 17 G/17G
17 POWDER, FOR SOLUTION ORAL DAILY PRN
Status: DISCONTINUED | OUTPATIENT
Start: 2023-07-14 | End: 2023-07-15 | Stop reason: HOSPADM

## 2023-07-14 RX ORDER — BUMETANIDE 2 MG/1
2 TABLET ORAL 2 TIMES DAILY
Qty: 60 TABLET | Refills: 0 | Status: SHIPPED | OUTPATIENT
Start: 2023-07-14

## 2023-07-14 RX ADMIN — DONEPEZIL HYDROCHLORIDE 10 MG: 10 TABLET, FILM COATED ORAL at 20:59

## 2023-07-14 RX ADMIN — SENNOSIDES AND DOCUSATE SODIUM 2 TABLET: 50; 8.6 TABLET ORAL at 08:10

## 2023-07-14 RX ADMIN — ATORVASTATIN CALCIUM 20 MG: 20 TABLET, FILM COATED ORAL at 08:10

## 2023-07-14 RX ADMIN — Medication 10 ML: at 08:16

## 2023-07-14 RX ADMIN — INSULIN LISPRO 3 UNITS: 100 INJECTION, SOLUTION INTRAVENOUS; SUBCUTANEOUS at 17:09

## 2023-07-14 RX ADMIN — SPIRONOLACTONE 50 MG: 25 TABLET ORAL at 08:10

## 2023-07-14 RX ADMIN — PANTOPRAZOLE SODIUM 40 MG: 40 TABLET, DELAYED RELEASE ORAL at 08:10

## 2023-07-14 RX ADMIN — BISACODYL 10 MG: 10 SUPPOSITORY RECTAL at 02:32

## 2023-07-14 RX ADMIN — ACETAMINOPHEN 650 MG: 325 TABLET ORAL at 08:15

## 2023-07-14 RX ADMIN — POLYETHYLENE GLYCOL 3350 17 G: 17 POWDER, FOR SOLUTION ORAL at 04:32

## 2023-07-14 RX ADMIN — BUMETANIDE 2 MG: 1 TABLET ORAL at 08:10

## 2023-07-14 RX ADMIN — BUPROPION HYDROCHLORIDE 150 MG: 150 TABLET, FILM COATED, EXTENDED RELEASE ORAL at 08:10

## 2023-07-14 RX ADMIN — LEVOTHYROXINE SODIUM 300 MCG: 0.15 TABLET ORAL at 08:10

## 2023-07-14 RX ADMIN — HYDROXYZINE HYDROCHLORIDE 25 MG: 25 TABLET, FILM COATED ORAL at 08:15

## 2023-07-14 RX ADMIN — ONDANSETRON 4 MG: 2 INJECTION INTRAMUSCULAR; INTRAVENOUS at 04:32

## 2023-07-14 NOTE — CASE MANAGEMENT/SOCIAL WORK
Continued Stay Note  [facility]     Patient Name: Ivania Fields  MRN: [id number]  Today's Date: 7/14/2023    Admit Date: 7/4/2023    Plan: [facility] for SNF   Discharge Plan       Row Name 07/14/23 0909       Plan    Plan [facility] for SNF    Patient/Family in Agreement with Plan yes    Plan Comments CM discussed pre cert for insuarnce with Charmaine this morning. She will start pre cert today as soon as possible. CM following and will assist as needed.    Final Discharge Disposition Code 03 - skilled nursing facility (SNF)                   Discharge Codes    No documentation.                 Expected Discharge Date and Time       Expected Discharge Date Expected Discharge Time    Jul 15, 2023               Priscilla Mercado RN

## 2023-07-14 NOTE — PROGRESS NOTES
"   LOS: 10 days    Patient Care Team:  Kala Beaver MD as PCP - General (Internal Medicine)    Subjective   No new events no added distress.  Review of system:  Denies nausea vomiting chest pain shortness of breath.    Objective     Vital Signs:  Blood pressure 150/63, pulse 81, temperature 98.4 °F (36.9 °C), temperature source Oral, resp. rate 18, height 165.1 cm (65\"), weight (!) 159 kg (350 lb 9.6 oz), SpO2 98 %.      Intake/Output Summary (Last 24 hours) at 7/14/2023 1538  Last data filed at 7/14/2023 1054  Gross per 24 hour   Intake 828 ml   Output 500 ml   Net 328 ml          07/13 0701 - 07/14 0700  In: 828 [P.O.:828]  Out: 1100 [Urine:1100]    Physical Exam    GENERAL:, Alert, oriented.   female comfortable  NEURO: Awake and alert, oriented. No focal deficit  PSYCHIATRIC: NMA. Cooperative with PE  EYE: Pupil equal reactive  ENT: Oral mucosa moist hearing intact  NECK: Supple , No JVD discernable,  Trachea midline  CV: + Dependent edema, RRR  LUNGS:  Quiet,  Nonlabored resp.  Symmetrical expansion  ABDOMEN: Nondistended, soft nontender.  : No Petersen, no palp bladder  SKIN: Warm and dry without rash      Labs:  Results from last 7 days   Lab Units 07/14/23  0354 07/12/23  0609 07/11/23  0726 07/10/23  0253   WBC 10*3/mm3  --  4.95 4.92 4.26   HEMOGLOBIN g/dL 9.6* 9.4* 8.7* 8.4*   PLATELETS 10*3/mm3  --  127* 117* 97*       Results from last 7 days   Lab Units 07/14/23  0354 07/13/23  0338 07/12/23  1336 07/12/23  0609 07/11/23  2207 07/11/23  0726   SODIUM mmol/L 143 140  --  143  --  143   POTASSIUM mmol/L 4.2 4.1 4.1 3.9   < > 3.4*   CHLORIDE mmol/L 100 102  --  103  --  104   CO2 mmol/L 31.0* 28.0  --  30.0*  --  29.0   BUN mg/dL 33* 30*  --  27*  --  25*   CREATININE mg/dL 1.56* 1.59*  --  1.68*  --  1.75*   CALCIUM mg/dL 8.8 8.4*  --  8.3*  --  8.0*   PHOSPHORUS mg/dL 3.6 2.8  --  3.2  --  3.3   MAGNESIUM mg/dL 2.0 2.1  --  1.5*  --  1.7   ALBUMIN g/dL 2.7* 2.6*  --  2.8*  --  2.5*    < " > = values in this interval not displayed.           Estimated Creatinine Clearance: 57 mL/min (A) (by C-G formula based on SCr of 1.56 mg/dL (H)).         A/P:    1.  ARF: Creatinine 1.56 may be at a new baseline baseline1.6-1.8 range with continued negative volume.  Urine output nonoliguric..  Previously 1.5-1.6.  For now continue supportive care.  Monitor renal function closely.  Strict I's and O's.      2.  Hypotension: Blood pressure stable.  Watch with diuresis.  Support with albumin as needed.    3.  Hypokalemia: Potassium 4.1 stable today.  Replace as needed.  Replace magnesium to >2.0 to assist with renal potassium recovery.    4.  Hypomagnesia: Down to 2.1 today.  Getting replacement.    5.  Anemia: Hemoglobin below goal but rising with hemoconcentration.  Transfuse as needed.    6.  Volume: Continues negative daily.  Patient on low-dose Bumex along with spironolactone.  Continues with edema likely has component of third spacing due to low oncotic pressure with albumin at 2.5.  Continue strict I's and O's.    7.  Cirrhosis: Secondary to PAREDES.  15 L paracentesis 7/6.  Likely contributing to low oncotic pressure.    8.  Hematemesis with large prepyloric ulceration post diagnostic EGD 7/5/2023    Okay to discharge from [facility]    Gabo Self MD  07/14/23  15:38 EDT

## 2023-07-14 NOTE — CASE MANAGEMENT/SOCIAL WORK
Case Management Discharge Note      Final Note: Patient has a bed at [facility]. Transport with  EMS has been arranged for 2045 tonight. Please have the discharge summary available in Epic before 1530 for the facility to collect. Please call report to [phone] prior to discharge. This facility uses [facility] out of [place] as their prefered pharmacy.  has changed this in Epic to reflect their preference.         Selected Continued Care - Admitted Since 7/4/2023       Destination Coordination complete.      Service Provider Selected Services Address Phone Fax Patient Preferred    [facility] [address] [address] [phone] --              Durable Medical Equipment    No services have been selected for the patient.                Dialysis/Infusion    No services have been selected for the patient.                Home Medical Care    No services have been selected for the patient.                Therapy    No services have been selected for the patient.                Community Resources    No services have been selected for the patient.                Community & DME    No services have been selected for the patient.                    Selected Continued Care - Prior Encounters Includes continued care and service providers with selected services from prior encounters from 4/5/2023 to 7/14/2023      Discharged on 4/12/2023 Admission date: 4/4/2023 - Discharge disposition: Intermediate Care       Destination       Service Provider Selected Services Address Phone Fax Patient Preferred    [facility] [address] [address] [phone] --                          Transportation Services  Ambulance: [facility] ([id number])    Final Discharge Disposition Code: 03 - skilled nursing facility (SNF)

## 2023-07-14 NOTE — PROGRESS NOTES
[facility] Medicine Services  PROGRESS NOTE    Patient Name: Ivania Fields  : 1960  MRN: [id number]    Date of Admission: 2023  Primary Care Physician: Kala Beaver MD    Subjective   Subjective     CC:  Follow up GI ulcer    HPI:  Patient seen resting in bed in no apparent distress. No acute events overnight per nursing. She continues to await rehab placement. She was accepted to [facility] but is awaiting insurance precert. No new complaints at this time.     ROS:  Gen- No fevers, chills  CV- No chest pain, palpitations  Resp- No cough, dyspnea  GI- No N/V/D, abd pain    Objective   Objective     Vital Signs:   Temp:  [97.3 °F (36.3 °C)-98 °F (36.7 °C)] 97.6 °F (36.4 °C)  Heart Rate:  [67-81] 81  Resp:  [16-19] 17  BP: (104-146)/(53-90) 146/65     Physical Exam:  Constitutional: No acute distress, awake, alert, chronically ill appearing  HENT: NCAT, mucous membranes moist  Respiratory: Clear to auscultation bilaterally, respiratory effort normal   Cardiovascular: RRR, no murmurs, cap refill brisk   Gastrointestinal: Positive bowel sounds, soft, nontender, nondistended  Musculoskeletal: No BLE edema   Psychiatric: flat affect, cooperative  Neurologic: alert, moves all extremities, speech clear  Skin: warm, dry, no visible rash     Results Reviewed:  LAB RESULTS:      Lab 23  0354 23  0609 23  0726 07/10/23  0253 23  0438   WBC  --  4.95 4.92 4.26 4.75   HEMOGLOBIN 9.6* 9.4* 8.7* 8.4* 8.2*   HEMATOCRIT 29.5* 29.1* 24.9* 26.7* 25.2*   PLATELETS  --  127* 117* 97* 99*   NEUTROS ABS  --   --   --   --  3.30   IMMATURE GRANS (ABS)  --   --   --   --  0.03   LYMPHS ABS  --   --   --   --  0.49*   MONOS ABS  --   --   --   --  0.64   EOS ABS  --   --   --   --  0.26   MCV  --  93.6 96.9 97.1* 95.5         Lab 23  0354 23  0338 23  1336 23  0609 23  2207 23  0726 07/10/23  0253   SODIUM 143 140  --  143  --  143  145   POTASSIUM 4.2 4.1 4.1 3.9 3.6 3.4* 3.8   CHLORIDE 100 102  --  103  --  104 109*   CO2 31.0* 28.0  --  30.0*  --  29.0 27.0   ANION GAP 12.0 10.0  --  10.0  --  10.0 9.0   BUN 33* 30*  --  27*  --  25* 21   CREATININE 1.56* 1.59*  --  1.68*  --  1.75* 1.61*   EGFR 37.2* 36.4*  --  34.0*  --  32.4* 35.8*   GLUCOSE 116* 130*  --  136*  --  128* 135*   CALCIUM 8.8 8.4*  --  8.3*  --  8.0* 8.2*   MAGNESIUM 2.0 2.1  --  1.5*  --  1.7 1.7   PHOSPHORUS 3.6 2.8  --  3.2  --  3.3 3.3         Lab 07/14/23  0354 07/13/23  0338 07/12/23  0609 07/11/23  0726 07/10/23  0253 07/09/23  0438 07/08/23  0607   TOTAL PROTEIN  --   --   --   --  5.2* 5.2* 5.0*   ALBUMIN 2.7* 2.6* 2.8* 2.5* 2.9* 2.8* 2.5*   GLOBULIN  --   --   --   --  2.3 2.4 2.5   ALT (SGPT)  --   --   --   --  10 8 5   AST (SGOT)  --   --   --   --  26 17 28   BILIRUBIN  --   --   --   --  1.1 1.2 1.4*   ALK PHOS  --   --   --   --  52 55 50                     Brief Urine Lab Results  (Last result in the past 365 days)        Color   Clarity   Blood   Leuk Est   Nitrite   Protein   CREAT   Urine HCG        07/05/23 1447 Dark Yellow   Clear   Trace   Small (1+)   Negative   30 mg/dL (1+)                   Microbiology Results Abnormal       Procedure Component Value - Date/Time    Body Fluid Culture - Body Fluid, Peritoneum [[id number]] Collected: 07/06/23 2723    Lab Status: Final result Specimen: Body Fluid from Peritoneum Updated: 07/09/23 0948     Body Fluid Culture No growth at 3 days     Gram Stain Moderate (3+) Red blood cells      Few (2+) WBCs seen      No organisms seen    Respiratory Culture - Sputum, ET Suction [[id number]] Collected: 07/06/23 0839    Lab Status: Final result Specimen: Sputum from ET Suction Updated: 07/08/23 0858     Respiratory Culture Light growth (2+) Normal respiratory deanne. No S. aureus or Pseudomonas aeruginosa detected. Final report.     Gram Stain Many (4+) WBCs per low power field      Rare (1+) Epithelial cells per low  power field      Few (2+) Gram negative bacilli      Rare (1+) Gram positive cocci in pairs            No radiology results from the last 24 hrs    Results for orders placed during the hospital encounter of 07/04/23    Adult Transthoracic Echo Complete W/ Cont if Necessary Per Protocol    Interpretation Summary    Left ventricular systolic function is hyperdynamic (EF > 70%). Calculated left ventricular EF = 73.7% Left ventricular ejection fraction appears to be greater than 70%.    Left ventricular diastolic function was indeterminate.    The right ventricular cavity is mildly dilated.    The left atrial cavity is moderately dilated.    The right atrial cavity is mild to moderately  dilated.    Mild aortic valve stenosis is present.    Moderate tricuspid valve regurgitation is present.    Estimated right ventricular systolic pressure from tricuspid regurgitation is moderately elevated (45-55 mmHg).      Current medications:  Scheduled Meds:atorvastatin, 20 mg, Oral, Daily  bumetanide, 2 mg, Oral, BID  buPROPion XL, 150 mg, Oral, Daily  donepezil, 10 mg, Oral, Nightly  insulin lispro, 3-14 Units, Subcutaneous, 4x Daily AC & at Bedtime  levothyroxine, 300 mcg, Oral, Daily  pantoprazole, 40 mg, Oral, BID AC  senna-docusate sodium, 2 tablet, Oral, BID  sodium chloride, 10 mL, Intravenous, Q12H  spironolactone, 50 mg, Oral, Daily      Continuous Infusions:   PRN Meds:.  acetaminophen    senna-docusate sodium **AND** polyethylene glycol **AND** bisacodyl **AND** bisacodyl    dextrose    dextrose    glucagon (human recombinant)    hydrOXYzine    Magnesium Standard Dose Replacement - Follow Nurse / BPA Driven Protocol    melatonin    ondansetron **OR** ondansetron    polyvinyl alcohol    Potassium Replacement - Follow Nurse / BPA Driven Protocol    sodium chloride    sodium chloride    Assessment & Plan   Assessment & Plan     Active Hospital Problems    Diagnosis  POA    **Hematemesis [K92.0]  Yes    Severe Malnutrition  (HCC) [E43]  Yes    Anemia [D64.9]  Yes    Type 2 diabetes mellitus [E11.9]  Yes    Essential hypertension [I10]  Yes    Dyslipidemia [E78.5]  Yes    Hypothyroidism [E03.9]  Yes    Stage 3b chronic kidney disease [N18.32]  Yes    Liver cirrhosis secondary to PAREDES [K75.81, K74.60]  Yes    GI bleed [K92.2]  Yes      Resolved Hospital Problems   No resolved problems to display.        Brief Hospital Course to date:  Ivania Fields is a 62 y.o. female w/ HTN, HLD, DM2, hypothyroidism, cirrhosis of the liver, who had EGD 4/6/23 w/ multiple nonbleeding ulcers; seen at [facility] 7/4 w/ hematemesis, transferred to our facility for urgent GI evaluation; repeat EGD 7/5 demonstraged large 2.5cm prepyloric ulcer w/ pigmented spots; post-procedure she could not be weaned from the vent and was transferred to the ICU; she underwent large volume (15L) paracentesis w/ improvement in respiratory dynamics and was extubated 7/7, transferred to tele 7/11. PT/OT recommended inpatient rehab. CM is following for placement.      This patient's problems and plans were partially entered by my partner and updated as appropriate by me 07/14/23.     UGIB w/ hematemesis  Large prepyloric ulcer (2/5cm)  Reflux esophagitis  -rpt EGD 7/5/23  -strict avoidance of NSAIDS  -PPI BID x30 days followed by daily ; f/u [facility] 8 weeks for repeat scope  -H&H stable last check  -rehab pending  -AM labs      PAREDES cirrhosis w/ large volume ascites  -s/p paracentesis w/ 15L removed  -cont bumex/aldactone     CKD 3b (not SILVINA)  -bline Cr 1.2-1.6; eGFR 30-40's  -s/p transient elevation in Cr  -NAL has followed     DM type 2, A1c 5.2%, w/ long term use of insulin  -can likely de-escalate home regimen  -cont SSI     HTN  HLD  -cont statin, bumex, spironolactone     Morbid obesity, BMI 58.34 kg/m2  -complicates all aspects of care     Expected Discharge Location and Transportation: rehab  Expected Discharge ready once rehab arranged  Expected Discharge Date:  7/15/2023; Expected Discharge Time:      DVT prophylaxis:  Mechanical DVT prophylaxis orders are present.     AM-PAC 6 Clicks Score (PT): 11 (07/13/23 5812)    CODE STATUS:   Code Status and Medical Interventions:   Ordered at: 07/04/23 0308     Code Status (Patient has no pulse and is not breathing):    CPR (Attempt to Resuscitate)     Medical Interventions (Patient has pulse or is breathing):    Full Support       Sumit Braxton, MITRA  07/14/23

## 2023-07-14 NOTE — DISCHARGE PLACEMENT REQUEST
"Ivania Fields (62 y.o. Female)       Date of Birth   1960    Social Security Number       Address   [address] [place] [address] [facility] [address]    Home Phone   [phone]    MRN   [id number]       Quaker   Nazarene    Marital Status                               Admission Date   23    Admission Type   Urgent    Admitting Provider   Scotty Muñoz DO    Attending Provider   Scotty Muñoz DO    Department, Room/Bed   [facility] 3F, S305/1       Discharge Date       Discharge Disposition       Discharge Destination                                 Attending Provider: Scotty Muñoz DO    Allergies: No Known Allergies    Isolation: None   Infection: None   Code Status: CPR    Ht: 165.1 cm (65\")   Wt: 159 kg (350 lb 9.6 oz)    Admission Cmt: None   Principal Problem: Hematemesis [K92.0]                   Active Insurance as of 2023       Primary Coverage       Payor Plan Insurance Group Employer/Plan Group    HUMANA MEDICARE REPLACEMENT HUMANA MEDICARE REPLACEMENT [id number]       Payor Plan Address Payor Plan Phone Number Payor Plan Fax Number Effective Dates    [address] [address] [phone]  2021 - None Entered    [facility] [id number]         Subscriber Name Subscriber Birth Date Member ID       IVANIA FIELDS 1960 [id number]                     Emergency Contacts        (Rel.) Home Phone Work Phone Mobile Phone    GENARO BUCHANAN (Brother) [phone] -- --                 History & Physical        Day, Cecile PICHARDO MD at 23 0210              [facility] Medicine Services  HISTORY AND PHYSICAL    Patient Name: Ivania Harris  : 1960  MRN: [id number]  Primary Care Physician: Provider, No Known  Date of admission: 2023    Subjective   Subjective     Chief Complaint:  GI Bleed    HPI:  Ivania Harris is a 62 y.o. female with a history of HTN, HLD, T2DM, hypothyroidism, " Liver cirrhosis, and debility who has transferred here from [facility] for higher level of care. Its reported that she had presented to their ED 2 days ago for abdominal discomfort. Workup was unremarkable at that time, though she was also found to have a large open wound on her left foot. She was started on Keflex and was discharged home. She states she began having nausea/vomiting with several episodes of what she describes as coffee-ground emesis. She presented back to their ED several hours ago. Given her history of liver cirrhosis, there was concern for variceal bleed, so she was transferred here. Of note, she underwent a EGD on 4/6 which showed multiple non-bleeding gastric ulcers, but no varices. She is a non-smoker, denies alcohol abuse or illicit drug use.         Review of Systems   Constitutional: Positive for appetite change and fatigue. Negative for chills, fever and unexpected weight change.   HENT: Negative for nosebleeds, sore throat and trouble swallowing.    Eyes: Negative for photophobia and visual disturbance.   Respiratory: Negative for cough, shortness of breath and wheezing.    Cardiovascular: Positive for leg swelling. Negative for chest pain and palpitations.   Gastrointestinal: Positive for abdominal distention, nausea and vomiting. Negative for abdominal pain and diarrhea.   Genitourinary: Negative for dysuria and hematuria.   Musculoskeletal: Negative for arthralgias and myalgias.   Skin: Positive for wound (left foot).   Neurological: Positive for weakness. Negative for tremors, syncope, speech difficulty and headaches.   Psychiatric/Behavioral: Negative for confusion. The patient is not nervous/anxious.           Personal History     Past Medical History:   Diagnosis Date    Anemia     Diabetes mellitus     Hyperlipidemia     Hypertension     Hypothyroidism     Impaired mobility     Short-term memory loss              Past Surgical History:   Procedure Laterality Date     CHOLECYSTECTOMY      ENDOSCOPY W/ BANDING N/A 4/6/2023    Procedure: ESOPHAGOGASTRODUODENOSCOPY WITH BIOPSY;  Surgeon: Jens Mandujano MD;  Location: [facility] ENDOSCOPY;  Service: Gastroenterology;  Laterality: N/A;       Family History: family history includes No Known Problems in her father and mother.     Social History:  reports that she has never smoked. She has never used smokeless tobacco. She reports current alcohol use. She reports that she does not use drugs.  Social History     Social History Narrative    Not on file       Medications:  Insulin Aspart, SITagliptin, albuterol sulfate HFA, aspirin, atorvastatin, buPROPion XL, cephalexin, diphenoxylate-atropine, donepezil, furosemide, insulin aspart, ipratropium-albuterol, iron polysaccharides, levothyroxine, multivitamin with minerals, ondansetron ODT, pantoprazole, and spironolactone    No Known Allergies    Objective   Objective     Vital Signs:   Temp:  [98.2 °F (36.8 °C)-98.4 °F (36.9 °C)] 98.2 °F (36.8 °C)  Heart Rate:  [75-83] 83  Resp:  [18] 18  BP: (130-170)/(53-80) 144/60    Physical Exam  Constitutional:       General: She is not in acute distress.     Appearance: Normal appearance. She is obese.   HENT:      Head: Atraumatic.      Right Ear: External ear normal.      Left Ear: External ear normal.      Nose: Nose normal.      Mouth/Throat:      Comments: Poor dentation  Eyes:      Extraocular Movements: Extraocular movements intact.      Conjunctiva/sclera: Conjunctivae normal.      Pupils: Pupils are equal, round, and reactive to light.   Cardiovascular:      Rate and Rhythm: Normal rate and regular rhythm.      Pulses: Normal pulses.      Heart sounds: Murmur heard.   Pulmonary:      Effort: Pulmonary effort is normal. No respiratory distress.      Breath sounds: Normal breath sounds. No wheezing, rhonchi or rales.   Abdominal:      General: Bowel sounds are normal. There is distension.      Tenderness: There is no abdominal tenderness.  There is no guarding or rebound.   Musculoskeletal:         General: Normal range of motion.      Cervical back: No rigidity.      Right lower leg: Edema present.      Left lower leg: Edema present.   Skin:     General: Skin is warm and dry.      Coloration: Skin is not jaundiced.      Findings: No lesion or rash.      Comments: Stasis dermatitis of lower extremities bilaterally. Ulceration of plantar area of left foot.     Neurological:      General: No focal deficit present.      Mental Status: She is alert and oriented to person, place, and time.   Psychiatric:         Attention and Perception: Attention normal.         Mood and Affect: Mood normal.         Behavior: Behavior normal.         Thought Content: Thought content normal.        Result Review:  I have personally reviewed the results from the time of this admission to 7/4/2023 02:53 EDT and agree with these findings:  [x]  Laboratory list / accordion  []  Microbiology  [x]  Radiology  [x]  EKG/Telemetry   []  Cardiology/Vascular   []  Pathology  [x]  Old records  []  Other:      LAB RESULTS:      Lab 07/03/23  2101 07/03/23 2006 07/02/23  1118   WBC  --  5.39 6.36   HEMOGLOBIN  --  9.3* 9.3*   HEMATOCRIT  --  29.2* 28.7*   PLATELETS  --  155 136*   NEUTROS ABS  --  4.05 4.49   IMMATURE GRANS (ABS)  --  0.02 0.03   LYMPHS ABS  --  0.66* 0.73   MONOS ABS  --  0.40 0.73   EOS ABS  --  0.19 0.30   MCV  --  97.0 91.7   CRP  --  3.21*  --    PROCALCITONIN  --  0.14  --    LACTATE  --  2.0  --    PROTIME 19.3*  --   --          Lab 07/03/23 2006 07/02/23  1029   SODIUM 139 142   POTASSIUM 4.0 3.9   CHLORIDE 106 107   CO2 23.9 21.6*   ANION GAP 9.1 13.4   BUN 19 15   CREATININE 1.37* 1.23*   EGFR 43.5* 49.5*   GLUCOSE 105* 133*   CALCIUM 9.0 9.2   MAGNESIUM 1.9  --          Lab 07/03/23 2006 07/02/23  1029   TOTAL PROTEIN 7.2 7.7   ALBUMIN 1.8* 1.9*   GLOBULIN 5.4 5.8   ALT (SGPT) 13 14   AST (SGOT) 29 35*   BILIRUBIN 1.1 0.9   ALK PHOS 113 134*   LIPASE 16  17         Lab 07/03/23  2217 07/03/23  2101 07/03/23 2006   HSTROP T 169*  --  198*   PROTIME  --  19.3*  --    INR  --  1.57*  --                  Brief Urine Lab Results  (Last result in the past 365 days)        Color   Clarity   Blood   Leuk Est   Nitrite   Protein   CREAT   Urine HCG        07/02/23 1611 Dark Yellow   Clear   Moderate (2+)   Small (1+)   Negative   100 mg/dL (2+)                 Microbiology Results (last 10 days)       Procedure Component Value - Date/Time    COVID-19 and FLU A/B PCR - Swab, Nasopharynx [[id number]]  (Normal) Collected: 07/03/23 2033    Lab Status: Final result Specimen: Swab from Nasopharynx Updated: 07/03/23 2056     COVID19 Not Detected     Influenza A PCR Not Detected     Influenza B PCR Not Detected    Narrative:      Fact sheet for providers: https://www.fda.gov/media/269948/download    Fact sheet for patients: https://www.fda.gov/media/183421/download    Test performed by PCR.            CT Abdomen Pelvis Without Contrast    Result Date: 7/2/2023  PROCEDURE: CT ABDOMEN PELVIS WO CONTRAST-  HISTORY: n/v and lower abdominal discomfort X 3-4 days  COMPARISON: June 9, 2023.  PROCEDURE: Axial images were obtained from the lung bases to the pubic symphysis by computed tomography without intravenous contrast.  FINDINGS: Lack of intravenous contrast limits assessment of the solid organs, the mediastinum, and the vasculature.  ABDOMEN:Anasarca is noted with a large volume of ascites. There are small to moderate-sized bilateral pleural effusions with compressive atelectasis. The heart is enlarged. There is evidence calcified granulomatous disease. Moderate-sized hiatal hernia is present. Atherosclerosis without aortic aneurysm. Cirrhotic appearing liver again noted. The gallbladder is absent. Spleen is upper limits of normal in size. No obvious pancreatic mass. Adrenal glands and left kidney is atrophic. Nonobstructing stone present in the right collecting system. There is a 5  mm right UPJ stone without significant hydronephrosis. No distal ureteral stones were identified. There are no abnormally dilated loops of bowel. No free air was identified.  PELVIS: The appendix is not identified, assessment is limited secondary to significant ascites. The bladder is decompressed. The uterus is present. Significant free fluid again noted in the pelvis. No acute osseous changes.      Impression: Large volume ascites and anasarca with cirrhosis noted.  Small to moderate-sized bilateral pleural effusions with compressive atelectasis.  5 mm right UPJ stone without significant hydronephrosis.     This study was performed with techniques to keep radiation doses as low as reasonably achievable (ALARA). Individualized dose reduction techniques using automated exposure control or adjustment of mA and/or kV according to the patient size were employed.  This report was signed and finalized on 7/2/2023 11:09 AM by Mamadou Domínguez DO.    XR Foot 3+ View Left    Result Date: 7/2/2023  PROCEDURE: XR FOOT 3+ VW LEFT-  History: large ulcer, patient reports unsure what caused it  COMPARISON: None.  FINDINGS:  A 3 view exam demonstrates no displaced fracture or dislocation. Degenerative changes are present. Significant soft tissue swelling about the mid and forefoot. Consider MRI if symptoms persist.      Impression: No displaced fracture.     This report was signed and finalized on 7/2/2023 12:32 PM by Mamadou Domínguez DO.          Assessment & Plan   Assessment & Plan       GI bleed    Cirrhosis of liver with ascites    Anemia    SILVINA (acute kidney injury)    CKD (chronic kidney disease)    Type 2 diabetes mellitus    Essential hypertension    Dyslipidemia    Sleep apnea    Debility    Hypothyroidism (acquired)    Hypoalbuminemia      Ivania MedinaCarlosBladimir is a 62 y.o. female with a history of HTN, HLD, T2DM, hypothyroidism, Liver cirrhosis, and debility who has transferred here from [facility] for higher level of care for  possible upper GI bleed.     Hematemesis  Upper GI Bleed?  Anemia  Liver Cirrhosis w/ ascites  -Hgb 9.3, Hct 29.2. Slightly decreased from prior.  -She underwent a EGD on 4/6 which did show multiple non-bleeding gastric ulcers. No varices at that time.   -Was started on Octreotide at [place] prior to coming here.  -GI consult for the am  -IV Protonix 40mg IV BID  -NPO tonight.   -Zofram for nausea    Wound on Left Foot  -Wound care to see in the am    HTN  HLD  -Takes spironolactone and lasix. Hold for now given Marcela  -Continue statin    T2DM  -Blood glucose 105 tonight.   -Check A1C  -Fingerstick achs. SSI    MARCELA on CKD  -Cr 1.37. Slightly increased from recent results  -eGFR 43.5  -Holding home lasix and spironolactone for now.   -Repeat bmp in the am    Hypothyroidism  -Check TSH, T4  -Continue home synthroid     Hypoalbuminemia  -Alb 1.8  -Nutrition consult         DVT prophylaxis:  scds    CODE STATUS:  Full code       Expected DischargeTBD    This note has been completed as part of a split-shared workflow.     Signature: Electronically signed by Hugo Martinez PA-C, 07/04/23, 3:03 AM EDT      Attending   Admission Attestation       I have performed an independent face-to-face diagnostic evaluation including performing an independent physical examination as documented here.  The documented plan of care above was reviewed and developed with the advanced practice clinician (APC).      Brief Summary Statement:   Ivania Fields is a 62 y.o. female  with hx of PAREDES and reportedly PUD w/ EGD 3/2023 which revealed nonbleeding ulcers who presents now w/ what the [facility] ER physician tells me is multiple episodes of hematemesis today however pt herself tells me she only had one episode of coffee ground emesis and no abd pain. . No blood in stool or dark tarry stool.  No f/c.      Remainder of detailed HPI is as noted by APC and has been reviewed and/or edited by me for completeness.    Attending Physical  Exam:  Temp:  [98.2 °F (36.8 °C)-98.4 °F (36.9 °C)] 98.2 °F (36.8 °C)  Heart Rate:  [75-83] 83  Resp:  [18] 18  BP: (130-170)/(53-80) 144/60     Separate exam performed by me w/ no changes to the above    Brief Assessment/Plan :  See detailed assessment and plan developed with APC which I have reviewed and/or edited for completeness.    61 y/o female w/ hx of PAREDES/cirrhosis, PUD, DM, HTN here now w/  GI BLeed  -pt has only had one episode of self-limiting hematemesis w/ stable h/h but will monitor w/ repeat h/h and continue iv protonix w/ GI consult this a.m.  2. Left foot wound  -wound care to see; pt not aware of extent of wound  -obtain xray now but may need further imaging    Total time spent: 20 min additional time   Time spent includes time reviewing chart, face-to-face time, counseling patient/family/caregiver, ordering medications/tests/procedures, communicating with other health care professionals, documenting clinical information in the electronic health record, and coordination of care.      Electronically signed by Cecile Foreman MD, 23, 5:24 AM EDT.        Cecile Foreman MD  23                      Electronically signed by Cecile Foreman MD at 23 0524          Physician Progress Notes (most recent note)        Sumit Braxton APRN at 23 0752              [facility] Medicine Services  PROGRESS NOTE    Patient Name: Ivania Fields  : 1960  MRN: [id number]    Date of Admission: 2023  Primary Care Physician: Kala Beaver MD    Subjective   Subjective     CC:  Follow up GI ulcer    HPI:  Patient seen resting in bed in no apparent distress. No acute events overnight per nursing. She continues to await rehab placement. [facility] but is awaiting insurance precert. No new complaints at this time.     ROS:  Gen- No fevers, chills  CV- No chest pain, palpitations  Resp- No cough, dyspnea  GI- No N/V/D, abd  pain    Objective   Objective     Vital Signs:   Temp:  [97.3 °F (36.3 °C)-98 °F (36.7 °C)] 97.6 °F (36.4 °C)  Heart Rate:  [67-81] 81  Resp:  [16-19] 17  BP: (104-146)/(53-90) 146/65     Physical Exam:  Constitutional: No acute distress, awake, alert, chronically ill appearing  HENT: NCAT, mucous membranes moist  Respiratory: Clear to auscultation bilaterally, respiratory effort normal   Cardiovascular: RRR, no murmurs, cap refill brisk   Gastrointestinal: Positive bowel sounds, soft, nontender, nondistended  Musculoskeletal: No BLE edema   Psychiatric: flat affect, cooperative  Neurologic: alert, moves all extremities, speech clear  Skin: warm, dry, no visible rash     Results Reviewed:  LAB RESULTS:      Lab 07/14/23  0354 07/12/23  0609 07/11/23  0726 07/10/23  0253 07/09/23  0438   WBC  --  4.95 4.92 4.26 4.75   HEMOGLOBIN 9.6* 9.4* 8.7* 8.4* 8.2*   HEMATOCRIT 29.5* 29.1* 24.9* 26.7* 25.2*   PLATELETS  --  127* 117* 97* 99*   NEUTROS ABS  --   --   --   --  3.30   IMMATURE GRANS (ABS)  --   --   --   --  0.03   LYMPHS ABS  --   --   --   --  0.49*   MONOS ABS  --   --   --   --  0.64   EOS ABS  --   --   --   --  0.26   MCV  --  93.6 96.9 97.1* 95.5         Lab 07/14/23  0354 07/13/23  0338 07/12/23  1336 07/12/23  0609 07/11/23  2207 07/11/23  0726 07/10/23  0253   SODIUM 143 140  --  143  --  143 145   POTASSIUM 4.2 4.1 4.1 3.9 3.6 3.4* 3.8   CHLORIDE 100 102  --  103  --  104 109*   CO2 31.0* 28.0  --  30.0*  --  29.0 27.0   ANION GAP 12.0 10.0  --  10.0  --  10.0 9.0   BUN 33* 30*  --  27*  --  25* 21   CREATININE 1.56* 1.59*  --  1.68*  --  1.75* 1.61*   EGFR 37.2* 36.4*  --  34.0*  --  32.4* 35.8*   GLUCOSE 116* 130*  --  136*  --  128* 135*   CALCIUM 8.8 8.4*  --  8.3*  --  8.0* 8.2*   MAGNESIUM 2.0 2.1  --  1.5*  --  1.7 1.7   PHOSPHORUS 3.6 2.8  --  3.2  --  3.3 3.3         Lab 07/14/23  0354 07/13/23  0338 07/12/23  0609 07/11/23  0726 07/10/23  0253 07/09/23  0438 07/08/23  0607   TOTAL PROTEIN  --    --   --   --  5.2* 5.2* 5.0*   ALBUMIN 2.7* 2.6* 2.8* 2.5* 2.9* 2.8* 2.5*   GLOBULIN  --   --   --   --  2.3 2.4 2.5   ALT (SGPT)  --   --   --   --  10 8 5   AST (SGOT)  --   --   --   --  26 17 28   BILIRUBIN  --   --   --   --  1.1 1.2 1.4*   ALK PHOS  --   --   --   --  52 55 50                     Brief Urine Lab Results  (Last result in the past 365 days)        Color   Clarity   Blood   Leuk Est   Nitrite   Protein   CREAT   Urine HCG        07/05/23 1447 Dark Yellow   Clear   Trace   Small (1+)   Negative   30 mg/dL (1+)                   Microbiology Results Abnormal       Procedure Component Value - Date/Time    Body Fluid Culture - Body Fluid, Peritoneum [[id number]] Collected: 07/06/23 1735    Lab Status: Final result Specimen: Body Fluid from Peritoneum Updated: 07/09/23 0948     Body Fluid Culture No growth at 3 days     Gram Stain Moderate (3+) Red blood cells      Few (2+) WBCs seen      No organisms seen    Respiratory Culture - Sputum, ET Suction [[id number]] Collected: 07/06/23 0839    Lab Status: Final result Specimen: Sputum from ET Suction Updated: 07/08/23 0858     Respiratory Culture Light growth (2+) Normal respiratory deanne. No S. aureus or Pseudomonas aeruginosa detected. Final report.     Gram Stain Many (4+) WBCs per low power field      Rare (1+) Epithelial cells per low power field      Few (2+) Gram negative bacilli      Rare (1+) Gram positive cocci in pairs            No radiology results from the last 24 hrs    Results for orders placed during the hospital encounter of 07/04/23    Adult Transthoracic Echo Complete W/ Cont if Necessary Per Protocol    Interpretation Summary    Left ventricular systolic function is hyperdynamic (EF > 70%). Calculated left ventricular EF = 73.7% Left ventricular ejection fraction appears to be greater than 70%.    Left ventricular diastolic function was indeterminate.    The right ventricular cavity is mildly dilated.    The left atrial cavity is  moderately dilated.    The right atrial cavity is mild to moderately  dilated.    Mild aortic valve stenosis is present.    Moderate tricuspid valve regurgitation is present.    Estimated right ventricular systolic pressure from tricuspid regurgitation is moderately elevated (45-55 mmHg).      Current medications:  Scheduled Meds:atorvastatin, 20 mg, Oral, Daily  bumetanide, 2 mg, Oral, BID  buPROPion XL, 150 mg, Oral, Daily  donepezil, 10 mg, Oral, Nightly  insulin lispro, 3-14 Units, Subcutaneous, 4x Daily AC & at Bedtime  levothyroxine, 300 mcg, Oral, Daily  pantoprazole, 40 mg, Oral, BID AC  senna-docusate sodium, 2 tablet, Oral, BID  sodium chloride, 10 mL, Intravenous, Q12H  spironolactone, 50 mg, Oral, Daily      Continuous Infusions:   PRN Meds:.  acetaminophen    senna-docusate sodium **AND** polyethylene glycol **AND** bisacodyl **AND** bisacodyl    dextrose    dextrose    glucagon (human recombinant)    hydrOXYzine    Magnesium Standard Dose Replacement - Follow Nurse / BPA Driven Protocol    melatonin    ondansetron **OR** ondansetron    polyvinyl alcohol    Potassium Replacement - Follow Nurse / BPA Driven Protocol    sodium chloride    sodium chloride    Assessment & Plan   Assessment & Plan     Active Hospital Problems    Diagnosis  POA    **Hematemesis [K92.0]  Yes    Severe Malnutrition (HCC) [E43]  Yes    Anemia [D64.9]  Yes    Type 2 diabetes mellitus [E11.9]  Yes    Essential hypertension [I10]  Yes    Dyslipidemia [E78.5]  Yes    Hypothyroidism [E03.9]  Yes    Stage 3b chronic kidney disease [N18.32]  Yes    Liver cirrhosis secondary to PAREDES [K75.81, K74.60]  Yes    GI bleed [K92.2]  Yes      Resolved Hospital Problems   No resolved problems to display.        Brief Hospital Course to date:  Ivania Fields is a 62 y.o. female w/ HTN, HLD, DM2, hypothyroidism, cirrhosis of the liver, who had EGD 4/6/23 w/ multiple nonbleeding ulcers; seen at [facility] 7/4 w/ hematemesis, transferred to our  facility for urgent GI evaluation; repeat EGD 7/5 demonstraged large 2.5cm prepyloric ulcer w/ pigmented spots; post-procedure she could not be weaned from the vent and was transferred to the ICU; she underwent large volume (15L) paracentesis w/ improvement in respiratory dynamics and was extubated 7/7, transferred to [facility] 7/11. PT/OT recommended inpatient rehab. CM is following for placement.      This patient's problems and plans were partially entered by my partner and updated as appropriate by me 07/14/23.     UGIB w/ hematemesis  Large prepyloric ulcer (2/5cm)  Reflux esophagitis  -rpt EGD 7/5/23  -strict avoidance of NSAIDS  -PPI BID x30 days followed by daily ; [facility]  -H&H stable last check  -rehab pending  -AM labs      PAREDES cirrhosis w/ large volume ascites  -s/p paracentesis w/ 15L removed  -cont bumex/aldactone     CKD 3b (not SILVINA)  -bline Cr 1.2-1.6; eGFR 30-40's  -s/p transient elevation in Cr  -NAL has followed     DM type 2, A1c 5.2%, w/ long term use of insulin  -can likely de-escalate home regimen  -cont SSI     HTN  HLD  -cont statin, bumex, spironolactone     Morbid obesity, BMI 58.34 kg/m2  -complicates all aspects of care     Expected Discharge Location and Transportation: rehab  Expected Discharge ready once rehab arranged  Expected Discharge Date: 7/15/2023; Expected Discharge Time:      DVT prophylaxis:  Mechanical DVT prophylaxis orders are present.     AM-PAC 6 Clicks Score (PT): 11 (07/13/23 5846)    CODE STATUS:   Code Status and Medical Interventions:   Ordered at: 07/04/23 0308     Code Status (Patient has no pulse and is not breathing):    CPR (Attempt to Resuscitate)     Medical Interventions (Patient has pulse or is breathing):    Full Support       MITRA Grant  07/14/23      Electronically signed by Sumit Braxton APRN at 07/14/23 1033          Physical Therapy Notes (most recent note)        Radha Merchant, PT at 07/13/23 1700   Version 2 of 2         Patient Name: Ivania Fields  : 1960    MRN: [id number]                              Today's Date: 2023       Admit Date: 2023    Visit Dx:     ICD-10-CM ICD-9-CM   1. Venous stasis of both lower extremities  I87.8 459.81   2. Hematemesis with nausea  K92.0 578.0     787.02     Patient Active Problem List   Diagnosis    Pneumonia of left lung due to infectious organism, unspecified part of lung    GI bleed    Liver cirrhosis secondary to PAREDES    Sleep apnea    Iron deficiency anemia    Stage 3b chronic kidney disease    CKD (chronic kidney disease)    Stasis dermatitis of both legs    Debility    Type 2 diabetes mellitus    Essential hypertension    Dyslipidemia    Hypothyroidism    Hypoalbuminemia    Anemia    Hematemesis    Severe Malnutrition (HCC)     Past Medical History:   Diagnosis Date    Anemia     Diabetes mellitus     Hyperlipidemia     Hypertension     Hypothyroidism     Impaired mobility     Short-term memory loss      Past Surgical History:   Procedure Laterality Date    CHOLECYSTECTOMY      ENDOSCOPY N/A 2023    Procedure: ESOPHAGOGASTRODUODENOSCOPY;  Surgeon: Brunner, Mark I, MD;  Location: [facility] ENDOSCOPY;  Service: Gastroenterology;  Laterality: N/A;    ENDOSCOPY W/ BANDING N/A 2023    Procedure: ESOPHAGOGASTRODUODENOSCOPY WITH BIOPSY;  Surgeon: Jens Mandujano MD;  Location: [facility] ENDOSCOPY;  Service: Gastroenterology;  Laterality: N/A;      General Information       Row Name 23 1513          Physical Therapy Time and Intention    Document Type therapy note (daily note)  -SD     Mode of Treatment physical therapy;individual therapy  -SD       Row Name 23          General Information    Existing Precautions/Restrictions fall;other (see comments)  L plantar wound, offload shoe in room  -SD       Row Name 23          Cognition    Orientation Status (Cognition) oriented x 3  -SD       Row Name 23 1513           Safety Issues, Functional Mobility    Safety Issues Affecting Function (Mobility) insight into deficits/self-awareness;sequencing abilities;safety precaution awareness;problem-solving  -SD     Impairments Affecting Function (Mobility) balance;coordination;endurance/activity tolerance;strength;pain  -SD     Comment, Safety Issues/Impairments (Mobility) some anxiety with mobility  -SD               User Key  (r) = Recorded By, (t) = Taken By, (c) = Cosigned By      Initials Name Provider Type    SD Radha Merchant, PT Physical Therapist                   Mobility       Row Name 07/13/23 1652          Bed Mobility    Bed Mobility rolling left;rolling right  -SD     Rolling Left [place] (Bed Mobility) minimum assist (75% patient effort);1 person assist  -SD     Rolling Right [place] (Bed Mobility) minimum assist (75% patient effort);1 person assist  -SD     Assistive Device (Bed Mobility) bed rails;draw sheet  -SD     Comment, (Bed Mobility) Pt rolled side to side several times for pericare, bedpan, and lift sling placement. Extra time and effort needed.  -SD       Row Name 07/13/23 1652          Transfers    Comment, (Transfers) mech lift to recliner then sit <> stand x2 reps with RW with Geoff x2. Pt given cues to correct retrograde balance in standing.  -SD       Row Name 07/13/23 1652          Bed-Chair Transfer    Bed-Chair [place] (Transfers) dependent (less than 25% patient effort);2 person assist  -SD     Assistive Device (Bed-Chair Transfers) lift device  -SD       Row Name 07/13/23 1652          Sit-Stand Transfer    Sit-Stand [place] (Transfers) minimum assist (75% patient effort);2 person assist;verbal cues  -SD     Assistive Device (Sit-Stand Transfers) walker, front-wheeled  -SD     Comment, (Sit-Stand Transfer) would benefit from lopez RW  -SD       Row Name 07/13/23 1652          Gait/Stairs (Locomotion)    [place] Level (Gait) minimum assist (75% patient effort);2  person assist;verbal cues;nonverbal cues (demo/gesture)  -SD     Assistive Device (Gait) bariatric equipment;walker, front-wheeled  -SD     Distance in Feet (Gait) 3ft  -SD     Deviations/Abnormal Patterns (Gait) base of support, wide;nomi decreased;gait speed decreased;stride length decreased  -SD     Bilateral Gait Deviations forward flexed posture;heel strike decreased  -SD     Comment, (Gait/Stairs) Pt amb forward from recliner with RW and assist x2. Pt given facilitation for weight shifting and cues for sequencing. Recliner in tow. Pt reported fear of falling.  -SD               User Key  (r) = Recorded By, (t) = Taken By, (c) = Cosigned By      Initials Name Provider Type    Radha Calvert PT Physical Therapist                   Obj/Interventions       Row Name 07/13/23 1654          Balance    Balance Assessment sitting static balance;standing static balance  -SD     Static Sitting Balance contact guard  -SD     Position, Sitting Balance unsupported;sitting in chair  -SD     Static Standing Balance minimal assist;supervision  -SD     Position/Device Used, Standing Balance walker, rolling  -SD               User Key  (r) = Recorded By, (t) = Taken By, (c) = Cosigned By      Initials Name Provider Type    Radha Calvert PT Physical Therapist                   Goals/Plan    No documentation.                  Clinical Impression       Row Name 07/13/23 1655          Pain    Pretreatment Pain Rating 0/10 - no pain  -SD     Posttreatment Pain Rating 0/10 - no pain  -SD       Row Name 07/13/23 1655          Plan of Care Review    Plan of Care Reviewed With patient  -SD     Progress improving  -SD     Outcome Evaluation Pt amb forward from recliner with RW and assist x2. Pt given facilitation for weight shifting and cues for sequencing. Recliner in tow. Pt reported fear of falling. VSS on RA. Pt motivated to improve functional mobility independence and has good rehab potential. Continue POC.   -SD       Row Name 07/13/23 1655          Vital Signs    Pre Systolic BP Rehab 110  -SD     Pre Treatment Diastolic BP 64  -SD     Pretreatment Heart Rate (beats/min) 80  -SD     Posttreatment Heart Rate (beats/min) 80  -SD     Pre SpO2 (%) 98  -SD     O2 Delivery Pre Treatment room air  -SD     Post SpO2 (%) 100  -SD     O2 Delivery Post Treatment room air  -SD     Pre Patient Position Supine  -SD     Post Patient Position Sitting  -SD       Row Name 07/13/23 1655          Positioning and Restraints    Pre-Treatment Position in bed  -SD     Post Treatment Position chair  -SD     In Chair notified nsg;reclined;call light within reach;encouraged to call for assist;exit alarm on;waffle cushion;on mechanical lift sling;heels elevated  -SD               User Key  (r) = Recorded By, (t) = Taken By, (c) = Cosigned By      Initials Name Provider Type    Radha Calvert PT Physical Therapist                   Outcome Measures       Row Name 07/13/23 1658          How much help from another person do you currently need...    Turning from your back to your side while in flat bed without using bedrails? 3  -SD     Moving from lying on back to sitting on the side of a flat bed without bedrails? 2  -SD     Moving to and from a bed to a chair (including a wheelchair)? 2  -SD     Standing up from a chair using your arms (e.g., wheelchair, bedside chair)? 2  -SD     Climbing 3-5 steps with a railing? 1  -SD     To walk in hospital room? 1  -SD     AM-PAC 6 Clicks Score (PT) 11  -SD     Highest level of mobility 4 --> Transferred to chair/commode  -SD       Row Name 07/13/23 1658          Functional Assessment    Outcome Measure Options AM-PAC 6 Clicks Basic Mobility (PT)  -SD               User Key  (r) = Recorded By, (t) = Taken By, (c) = Cosigned By      Initials Name Provider Type    Radha Calvert PT Physical Therapist                                 Physical Therapy Education       Title: PT OT SLP Therapies  (Done)       Topic: Physical Therapy (Done)       Point: Mobility training (Done)       Learning Progress Summary             Patient Eager, E,D, VU,NR by SD at 7/13/2023 1658    Acceptance, E,D, NR by LS at 7/9/2023 1008                         Point: Home exercise program (Done)       Learning Progress Summary             Patient Eager, E,D, VU,NR by SD at 7/13/2023 1658                         Point: Body mechanics (Done)       Learning Progress Summary             Patient Eager, E,D, VU,NR by SD at 7/13/2023 1658    Acceptance, E,D, NR by LS at 7/9/2023 1008                         Point: Precautions (Done)       Learning Progress Summary             Patient Eager, E,D, VU,NR by SD at 7/13/2023 1658    Acceptance, E,D, NR by LS at 7/9/2023 1008                                         User Key       Initials Effective Dates Name Provider Type Discipline    SD 03/13/23 -  Radha Merchant, PT Physical Therapist PT     02/03/23 -  Gloria Suresh PT Physical Therapist PT                  PT Recommendation and Plan     Plan of Care Reviewed With: patient  Progress: improving  Outcome Evaluation: Pt amb forward from recliner with RW and assist x2. Pt given facilitation for weight shifting and cues for sequencing. Recliner in tow. Pt reported fear of falling. VSS on RA. Pt motivated to improve functional mobility independence and has good rehab potential. Continue POC.     Time Calculation:    PT Charges       Row Name 07/13/23 1659             Time Calculation    Start Time 1513  -SD      PT Received On 07/13/23  -SD      PT Goal Re-Cert Due Date 07/19/23  -SD         Time Calculation- PT    Total Timed Code Minutes- PT 41 minute(s)  -SD         Timed Charges    75384 - Gait Training Minutes  10  -SD      71841 - PT Therapeutic Activity Minutes 31  -SD         Total Minutes    Timed Charges Total Minutes 41  -SD       Total Minutes 41  -SD                User Key  (r) = Recorded By, (t) = Taken By, (c) =  Cosigned By      Initials Name Provider Type    SD Radha Merchant, PT Physical Therapist                  Therapy Charges for Today       Code Description Service Date Service Provider Modifiers Qty    01954186228 HC GAIT TRAINING EA 15 MIN 2023 Radha Merchant, PT GP 1    71845958468 HC PT THERAPEUTIC ACT EA 15 MIN 2023 Radha Merchant, PT GP 2            PT G-Codes  Outcome Measure Options: AM-PAC 6 Clicks Basic Mobility (PT)  AM-PAC 6 Clicks Score (PT): 11       Radha Merchant PT  2023      Electronically signed by Radha Merchant, PT at 23 1714       Radha Merchant, PT at 23 1700  Version 1 of 2         Patient Name: Ivania Fields  : 1960    MRN: [id number]                              Today's Date: 2023       Admit Date: 2023    Visit Dx:     ICD-10-CM ICD-9-CM   1. Venous stasis of both lower extremities  I87.8 459.81   2. Hematemesis with nausea  K92.0 578.0     787.02     Patient Active Problem List   Diagnosis    Pneumonia of left lung due to infectious organism, unspecified part of lung    GI bleed    Liver cirrhosis secondary to PAREDES    Sleep apnea    Iron deficiency anemia    Stage 3b chronic kidney disease    CKD (chronic kidney disease)    Stasis dermatitis of both legs    Debility    Type 2 diabetes mellitus    Essential hypertension    Dyslipidemia    Hypothyroidism    Hypoalbuminemia    Anemia    Hematemesis    Severe Malnutrition (HCC)     Past Medical History:   Diagnosis Date    Anemia     Diabetes mellitus     Hyperlipidemia     Hypertension     Hypothyroidism     Impaired mobility     Short-term memory loss      Past Surgical History:   Procedure Laterality Date    CHOLECYSTECTOMY      ENDOSCOPY N/A 2023    Procedure: ESOPHAGOGASTRODUODENOSCOPY;  Surgeon: Brunner, Mark I, MD;  Location: [facility] ENDOSCOPY;  Service: Gastroenterology;  Laterality: N/A;    ENDOSCOPY W/ BANDING N/A 2023    Procedure:  ESOPHAGOGASTRODUODENOSCOPY WITH BIOPSY;  Surgeon: Jens Mandujano MD;  Location: [facility] ENDOSCOPY;  Service: Gastroenterology;  Laterality: N/A;      General Information       Row Name 07/13/23 1513          Physical Therapy Time and Intention    Document Type therapy note (daily note)  -SD     Mode of Treatment physical therapy;individual therapy  -SD       Row Name 07/13/23 1513          General Information    Existing Precautions/Restrictions fall;other (see comments)  L plantar wound, offload shoe in room  -SD       Row Name 07/13/23 1513          Cognition    Orientation Status (Cognition) oriented x 3  -SD       Row Name 07/13/23 1513          Safety Issues, Functional Mobility    Safety Issues Affecting Function (Mobility) insight into deficits/self-awareness;sequencing abilities;safety precaution awareness;problem-solving  -SD     Impairments Affecting Function (Mobility) balance;coordination;endurance/activity tolerance;strength;pain  -SD     Comment, Safety Issues/Impairments (Mobility) some anxiety with mobility  -SD               User Key  (r) = Recorded By, (t) = Taken By, (c) = Cosigned By      Initials Name Provider Type    SD Radha Merchant, PT Physical Therapist                   Mobility       Row Name 07/13/23 1652          Bed Mobility    Bed Mobility rolling left;rolling right  -SD     Rolling Left [place] (Bed Mobility) minimum assist (75% patient effort);1 person assist  -SD     Rolling Right [place] (Bed Mobility) minimum assist (75% patient effort);1 person assist  -SD     Assistive Device (Bed Mobility) bed rails;draw sheet  -SD     Comment, (Bed Mobility) Pt rolled side to side several times for pericare, bedpan, and lift sling placement. Extra time and effort needed.  -SD       Row Name 07/13/23 1652          Transfers    Comment, (Transfers) mech lift to recliner then sit <> stand x2 reps with RW with Geoff x2. Pt given cues to correct retrograde balance in  standing.  -SD       Row Name 07/13/23 1652          Bed-Chair Transfer    Bed-Chair [place] (Transfers) dependent (less than 25% patient effort);2 person assist  -SD     Assistive Device (Bed-Chair Transfers) lift device  -SD       Row Name 07/13/23 1652          Sit-Stand Transfer    Sit-Stand [place] (Transfers) minimum assist (75% patient effort);2 person assist;verbal cues  -SD     Assistive Device (Sit-Stand Transfers) walker, front-wheeled  -SD     Comment, (Sit-Stand Transfer) would benefit from lopez RW  -SD       Row Name 07/13/23 1652          Gait/Stairs (Locomotion)    [place] Level (Gait) minimum assist (75% patient effort);2 person assist;verbal cues;nonverbal cues (demo/gesture)  -SD     Assistive Device (Gait) bariatric equipment;walker, front-wheeled  -SD     Distance in Feet (Gait) 3ft  -SD     Deviations/Abnormal Patterns (Gait) base of support, wide;nomi decreased;gait speed decreased;stride length decreased  -SD     Bilateral Gait Deviations forward flexed posture;heel strike decreased  -SD     Comment, (Gait/Stairs) Pt amb forward from recliner with RW and assist x2. Pt given facilitation for weight shifting and cues for sequencing. Recliner in tow. Pt reported fear of falling.  -SD               User Key  (r) = Recorded By, (t) = Taken By, (c) = Cosigned By      Initials Name Provider Type    Radha Calvert PT Physical Therapist                   Obj/Interventions       Row Name 07/13/23 1654          Balance    Balance Assessment sitting static balance;standing static balance  -SD     Static Sitting Balance contact guard  -SD     Position, Sitting Balance unsupported;sitting in chair  -SD     Static Standing Balance minimal assist;supervision  -SD     Position/Device Used, Standing Balance walker, rolling  -SD               User Key  (r) = Recorded By, (t) = Taken By, (c) = Cosigned By      Initials Name Provider Type    Radha Calvert PT Physical  Therapist                   Goals/Plan    No documentation.                  Clinical Impression       Row Name 07/13/23 1655          Pain    Pretreatment Pain Rating 0/10 - no pain  -SD     Posttreatment Pain Rating 0/10 - no pain  -SD       Row Name 07/13/23 1655          Plan of Care Review    Plan of Care Reviewed With patient  -SD     Progress improving  -SD     Outcome Evaluation Pt amb forward from recliner with RW and assist x2. Pt given facilitation for weight shifting and cues for sequencing. Recliner in tow. Pt reported fear of falling. VSS on RA. Pt motivated to improve functional mobility independence and has good rehab potential. Continue POC.  -SD       Row Name 07/13/23 1655          Vital Signs    Pre Systolic BP Rehab 110  -SD     Pre Treatment Diastolic BP 64  -SD     Pretreatment Heart Rate (beats/min) 80  -SD     Posttreatment Heart Rate (beats/min) 80  -SD     Pre SpO2 (%) 98  -SD     O2 Delivery Pre Treatment room air  -SD     Post SpO2 (%) 100  -SD     O2 Delivery Post Treatment room air  -SD     Pre Patient Position Supine  -SD     Post Patient Position Sitting  -SD       Row Name 07/13/23 1655          Positioning and Restraints    Pre-Treatment Position in bed  -SD     Post Treatment Position chair  -SD     In Chair notified nsg;reclined;call light within reach;encouraged to call for assist;exit alarm on;waffle cushion;on mechanical lift sling;heels elevated  -SD               User Key  (r) = Recorded By, (t) = Taken By, (c) = Cosigned By      Initials Name Provider Type    Radha Calvert, PT Physical Therapist                   Outcome Measures       Row Name 07/13/23 1658          How much help from another person do you currently need...    Turning from your back to your side while in flat bed without using bedrails? 3  -SD     Moving from lying on back to sitting on the side of a flat bed without bedrails? 2  -SD     Moving to and from a bed to a chair (including a  wheelchair)? 2  -SD     Standing up from a chair using your arms (e.g., wheelchair, bedside chair)? 2  -SD     Climbing 3-5 steps with a railing? 1  -SD     To walk in hospital room? 1  -SD     AM-PAC 6 Clicks Score (PT) 11  -SD     Highest level of mobility 4 --> Transferred to chair/commode  -SD       Row Name 07/13/23 1658          Functional Assessment    Outcome Measure Options AM-PAC 6 Clicks Basic Mobility (PT)  -SD               User Key  (r) = Recorded By, (t) = Taken By, (c) = Cosigned By      Initials Name Provider Type    SD Radha Merchant, PT Physical Therapist                                 Physical Therapy Education       Title: PT OT SLP Therapies (Done)       Topic: Physical Therapy (Done)       Point: Mobility training (Done)       Learning Progress Summary             Patient Eager, E,D, VU,NR by SD at 7/13/2023 1658    Acceptance, E,D, NR by LS at 7/9/2023 1008                         Point: Home exercise program (Done)       Learning Progress Summary             Patient Eager, E,D, VU,NR by SD at 7/13/2023 1658                         Point: Body mechanics (Done)       Learning Progress Summary             Patient Eager, E,D, VU,NR by SD at 7/13/2023 1658    Acceptance, E,D, NR by LS at 7/9/2023 1008                         Point: Precautions (Done)       Learning Progress Summary             Patient Eager, E,D, VU,NR by SD at 7/13/2023 1658    Acceptance, E,D, NR by LS at 7/9/2023 1008                                         User Key       Initials Effective Dates Name Provider Type Discipline    SD 03/13/23 -  Radha Merchant, PT Physical Therapist PT     02/03/23 -  Gloria Suresh PT Physical Therapist PT                  PT Recommendation and Plan     Plan of Care Reviewed With: patient  Progress: improving  Outcome Evaluation: Pt amb forward from recliner with RW and assist x2. Pt given facilitation for weight shifting and cues for sequencing. Recliner in tow. Pt reported  fear of falling. VSS on RA. Pt motivated to improve functional mobility independence and has good rehab potential. Continue POC.     Time Calculation:    PT Charges       Row Name 07/13/23 1659             Time Calculation    Start Time 1513  -SD      PT Received On 07/13/23  -SD      PT Goal Re-Cert Due Date 07/19/23  -SD         Time Calculation- PT    Total Timed Code Minutes- PT 41 minute(s)  -SD         Timed Charges    58632 - Gait Training Minutes  10  -SD      82623 - PT Therapeutic Activity Minutes 31  -SD         Total Minutes    Timed Charges Total Minutes 41  -SD       Total Minutes 41  -SD                User Key  (r) = Recorded By, (t) = Taken By, (c) = Cosigned By      Initials Name Provider Type    SD Radha Merchant, PT Physical Therapist                  Therapy Charges for Today       Code Description Service Date Service Provider Modifiers Qty    94196273623 HC GAIT TRAINING EA 15 MIN 7/13/2023 Radha Merchant, PT GP 1    47403421112 HC PT THERAPEUTIC ACT EA 15 MIN 7/13/2023 Radha Merchant, PT GP 2            PT G-Codes  Outcome Measure Options: AM-PAC 6 Clicks Basic Mobility (PT)  AM-PAC 6 Clicks Score (PT): 11       Radha Merchant PT  7/13/2023      Electronically signed by Radha Merchant PT at 07/13/23 1700       Occupational Therapy Notes (most recent note)    No notes exist for this encounter.

## 2023-07-14 NOTE — DISCHARGE SUMMARY
[facility] Medicine Services  DISCHARGE SUMMARY    Patient Name: Ivania Fields  : 1960  MRN: [id number]    Date of Admission: 2023  1:52 AM  Date of Discharge:  23    Primary Care Physician: Kala Beaver MD    Consults       Date and Time Order Name Status Description    2023 12:33 AM Inpatient Nephrology Consult      2023  3:08 AM Inpatient Gastroenterology Consult Completed             Hospital Course     Presenting Problem: Vomiting blood    Active Hospital Problems    Diagnosis  POA   • **Hematemesis [K92.0]  Yes   • Severe Malnutrition (HCC) [E43]  Yes   • Anemia [D64.9]  Yes   • Type 2 diabetes mellitus [E11.9]  Yes   • Essential hypertension [I10]  Yes   • Dyslipidemia [E78.5]  Yes   • Hypothyroidism [E03.9]  Yes   • Stage 3b chronic kidney disease [N18.32]  Yes   • Liver cirrhosis secondary to PAREDES [K75.81, K74.60]  Yes   • GI bleed [K92.2]  Yes      Resolved Hospital Problems   No resolved problems to display.          Hospital Course:  Ivania Fields is a 62 y.o. female w/ HTN, HLD, DM2, hypothyroidism, cirrhosis of the liver, who had EGD on 23 w/ multiple nonbleeding ulcers; seen at [facility]  w/ hematemesis, transferred to our facility for urgent GI evaluation; repeat EGD  demonstraged large 2.5cm prepyloric ulcer w/ pigmented spots; post-procedure she could not be weaned from the vent and was transferred to the ICU; she underwent large volume (15L) paracentesis w/ improvement in respiratory dynamics and was extubated , transferred to tele . PT/OT recommended inpatient rehab.  has arranged for rehab at [facility] and rehab.  [facility] ambulance will provide transportation.  Patient will be discharged today in stable condition.     UGIB w/ hematemesis  Large prepyloric ulcer (2/5cm)  Reflux esophagitis  -rpt EGD 23  -strict avoidance of NSAIDS  -PPI BID x30 days followed by daily ; f/u [facility] 8 weeks for  repeat scope  -H&H stable last check     PAREDES cirrhosis w/ large volume ascites  -s/p paracentesis w/ 15L removed  -cont bumex/aldactone     CKD 3b (not SILVINA)  -bline Cr 1.2-1.6; eGFR 30-40's  -s/p transient elevation in Cr  -NAL has followed     DM type 2, A1c 5.2%, w/ long term use of insulin  -can likely de-escalate home regimen  -cont SSI     HTN  HLD  -cont statin, bumex, spironolactone     Morbid obesity, BMI 58.34 kg/m2  -complicates all aspects of care      Discharge Follow Up Recommendations for outpatient labs/diagnostics:  Follow-up with PCP in 1 week.  Follow-up with [facility] in 8 weeks.    Day of Discharge     HPI:   Patient seen resting in bed no apparent distress.  No acute vents overnight per nursing.  Case management received approval for inpatient rehab.  The patient will be discharged today to [facility]. She will be discharged today in stable condition.     Review of Systems  Gen- No fevers, chills  CV- No chest pain, palpitations  Resp- No cough, dyspnea  GI- No N/V/D, abd pain    Vital Signs:   Temp:  [97.3 °F (36.3 °C)-98.4 °F (36.9 °C)] 98.4 °F (36.9 °C)  Heart Rate:  [67-81] 81  Resp:  [16-19] 18  BP: (110-150)/(53-90) 150/63      Physical Exam:  Constitutional: No acute distress, awake, alert, chronically ill appearing   HENT: NCAT, mucous membranes moist  Respiratory: Clear to auscultation bilaterally, respiratory effort normal   Cardiovascular: RRR, no murmurs, cap refill brisk   Gastrointestinal: Positive bowel sounds, soft, nontender, nondistended  Musculoskeletal: No BLE edema   Psychiatric: Appropriate affect, cooperative  Neurologic: alert, moves all extremities, speech clear  Skin: warm, dry, no visible rash     Pertinent  and/or Most Recent Results     LAB RESULTS:      Lab 07/14/23  0354 07/12/23  0609 07/11/23  0726 07/10/23  0253 07/09/23  0438   WBC  --  4.95 4.92 4.26 4.75   HEMOGLOBIN 9.6* 9.4* 8.7* 8.4* 8.2*   HEMATOCRIT 29.5* 29.1* 24.9* 26.7* 25.2*   PLATELETS  --  127*  117* 97* 99*   NEUTROS ABS  --   --   --   --  3.30   IMMATURE GRANS (ABS)  --   --   --   --  0.03   LYMPHS ABS  --   --   --   --  0.49*   MONOS ABS  --   --   --   --  0.64   EOS ABS  --   --   --   --  0.26   MCV  --  93.6 96.9 97.1* 95.5         Lab 07/14/23 0354 07/13/23 0338 07/12/23  1336 07/12/23  0609 07/11/23  2207 07/11/23  0726 07/10/23  0253   SODIUM 143 140  --  143  --  143 145   POTASSIUM 4.2 4.1 4.1 3.9 3.6 3.4* 3.8   CHLORIDE 100 102  --  103  --  104 109*   CO2 31.0* 28.0  --  30.0*  --  29.0 27.0   ANION GAP 12.0 10.0  --  10.0  --  10.0 9.0   BUN 33* 30*  --  27*  --  25* 21   CREATININE 1.56* 1.59*  --  1.68*  --  1.75* 1.61*   EGFR 37.2* 36.4*  --  34.0*  --  32.4* 35.8*   GLUCOSE 116* 130*  --  136*  --  128* 135*   CALCIUM 8.8 8.4*  --  8.3*  --  8.0* 8.2*   MAGNESIUM 2.0 2.1  --  1.5*  --  1.7 1.7   PHOSPHORUS 3.6 2.8  --  3.2  --  3.3 3.3         Lab 07/14/23 0354 07/13/23 0338 07/12/23  0609 07/11/23  0726 07/10/23  0253 07/09/23  0438 07/08/23  0607   TOTAL PROTEIN  --   --   --   --  5.2* 5.2* 5.0*   ALBUMIN 2.7* 2.6* 2.8* 2.5* 2.9* 2.8* 2.5*   GLOBULIN  --   --   --   --  2.3 2.4 2.5   ALT (SGPT)  --   --   --   --  10 8 5   AST (SGOT)  --   --   --   --  26 17 28   BILIRUBIN  --   --   --   --  1.1 1.2 1.4*   ALK PHOS  --   --   --   --  52 55 50                     Brief Urine Lab Results  (Last result in the past 365 days)        Color   Clarity   Blood   Leuk Est   Nitrite   Protein   CREAT   Urine HCG        07/05/23 1447 Dark Yellow   Clear   Trace   Small (1+)   Negative   30 mg/dL (1+)                 Microbiology Results (last 10 days)       Procedure Component Value - Date/Time    Body Fluid Culture - Body Fluid, Peritoneum [[id number]] Collected: 07/06/23 0306    Lab Status: Final result Specimen: Body Fluid from Peritoneum Updated: 07/09/23 0931     Body Fluid Culture No growth at 3 days     Gram Stain Moderate (3+) Red blood cells      Few (2+) WBCs seen      No  organisms seen    Respiratory Culture - Sputum, ET Suction [[id number]] Collected: 07/06/23 0839    Lab Status: Final result Specimen: Sputum from ET Suction Updated: 07/08/23 0858     Respiratory Culture Light growth (2+) Normal respiratory deanne. No S. aureus or Pseudomonas aeruginosa detected. Final report.     Gram Stain Many (4+) WBCs per low power field      Rare (1+) Epithelial cells per low power field      Few (2+) Gram negative bacilli      Rare (1+) Gram positive cocci in pairs            Adult Transthoracic Echo Complete W/ Cont if Necessary Per Protocol    Result Date: 7/4/2023  •  Left ventricular systolic function is hyperdynamic (EF > 70%). Calculated left ventricular EF = 73.7% Left ventricular ejection fraction appears to be greater than 70%. •  Left ventricular diastolic function was indeterminate. •  The right ventricular cavity is mildly dilated. •  The left atrial cavity is moderately dilated. •  The right atrial cavity is mild to moderately  dilated. •  Mild aortic valve stenosis is present. •  Moderate tricuspid valve regurgitation is present. •  Estimated right ventricular systolic pressure from tricuspid regurgitation is moderately elevated (45-55 mmHg).     XR Foot 3+ View Left    Result Date: 7/4/2023  XR FOOT 3+ VW LEFT Date of Exam: 7/4/2023 9:45 AM EDT Indication: foot ulceration Comparison: None available. FINDINGS: There is no displaced fracture or dislocation. No focal osseous abnormalities are identified. There is no evidence for osseous erosion or destruction. Marked soft tissue swelling is noted. There is no gas production in the soft tissues.     1.No evidence for displaced fracture or dislocation. 2.No evidence for osteomyelitis. 3.No evidence for gas production in the soft tissues. Electronically Signed: Balbir Bobo  7/4/2023 11:04 AM EDT  Workstation ID: [id number]    SLP FEES - Fiberoptic Endo Eval Swallow    Result Date: 7/8/2023  This procedure was auto-finalized with no  dictation required.    XR Chest 1 View    Result Date: 7/8/2023  XR CHEST 1 VW Date of Exam: 7/8/2023 3:38 AM EDT Indication: Intubated Patient Comparison: Chest x-ray 7/7/2023 Findings: Patient is been extubated. Cardiomegaly. Diffuse groundglass opacity septal thickening lungs. Moderate bilateral pleural effusions. Pleural fluid may be increased particularly in the left. No pneumothorax.     Impression: Patient appears to be extubated. Multiple focal groundglass opacity septal thickening and large pleural effusions. Probable pulmonary edema. Slight increased size of pleural fluid left side since previous study. Electronically Signed: Elke Lobato  7/8/2023 9:02 AM EDT  Workstation ID: [id number]    XR Chest 1 View    Result Date: 7/7/2023  XR CHEST 1 VW Date of Exam: 7/7/2023 3:05 AM EDT Indication: Intubated Patient Comparison: 7/6/2023 Findings: Endotracheal tube in place stable in position There is cardiomegaly and pulmonary congestion. Bilateral patchy reticular and airspace opacities more accentuated in the lower lobes. There are bilateral pleural effusions. No evidence of pneumothorax     Impression: Cardiomegaly with pulmonary congestion and pulmonary edema pattern Stable bilateral lower lung airspace disease and moderate pleural effusions Electronically Signed: Mack Trejo  7/7/2023 7:14 AM EDT  Workstation ID: [id number]    XR Chest 1 View    Result Date: 7/6/2023  XR CHEST 1 VW Date of Exam: 7/6/2023 11:21 AM EDT Indication: adjusted ETT Comparison: Earlier the same day. Findings: Endotracheal tube retracted, now terminating above the level of the ariana. There is otherwise unchanged evidence of pulmonary edema with moderate bilateral pleural effusions. No distinct pneumothorax. Unchanged mild cardiac enlargement.     Impression: Endotracheal tube retracted, now terminating above the level of the ariana. There is otherwise unchanged evidence of pulmonary edema with moderate bilateral pleural effusions. No  distinct pneumothorax. Unchanged mild cardiac enlargement. Electronically Signed: French Avalos  7/6/2023 11:45 AM EDT  Workstation ID: [id number]    XR Chest 1 View    Result Date: 7/6/2023  XR CHEST 1 VW Date of Exam: 7/6/2023 4:02 AM EDT Indication: Intubated Patient Comparison: 7/5/2023. Findings: Endotracheal tube tip appears to be stable in position slightly into the right mainstem bronchus, but a few mm above the ariana. There is persistent hypoinflation of the lungs with hazy opacification in the mid and lower lungs. Probable small bilateral pleural effusions. No pneumothorax. No new lung opacity. Cardiac silhouette and bones appear unchanged.     Impression: 1. Persistent slight right mainstem bronchus intubation with ET tube tip approximately 2 mm above the ariana. Recommend retraction up to 3 cm. 2. Hypoinflated lungs with small pleural effusions and bibasilar atelectasis. Hazy densities may relate to layering effusions or edema. Electronically Signed: Scotty Watkins  7/6/2023 7:47 AM EDT  Workstation ID: [id number]    XR Chest 1 View    Result Date: 7/5/2023  XR CHEST 1 VW Date of Exam: 7/5/2023 12:54 PM EDT Indication: Confirm ET Tube Placement Comparison: April 4, 2023 Findings: An endotracheal tube has its tip in the right mainstem bronchus. There are hazy bilateral airspace opacities. There are small bilateral pleural effusions. The heart and mediastinal contours appear stable. The osseous structures appear intact.     Impression: 1.Endotracheal tube with tip in right mainstem bronchus. Recommend pullback 3 cm. 2.Hazy bilateral airspace opacities, which could reflect pulmonary edema or pneumonia. 3.Small bilateral pleural effusions. 4.Results were called with EMIGDIO Franks at time of dictation. Electronically Signed: Felice Jansen  7/5/2023 1:32 PM EDT  Workstation ID: [id number]    US Paracentesis    Result Date: 7/6/2023  US PARACENTESIS  History: Symptomatic ascites  : Chris Adames MD.   Modality: Ultrasonography                   Sedation: None. Anesthesia: Lidocaine local infiltration. Estimated blood loss:  0 cc.        Technique: A thorough discussion of the risks, benefits, and alternatives of the procedure, and if applicable, moderate sedation, was carried out with the patient. They were encouraged to ask any questions. Any questions were answered. They verbalized understanding. A written informed consent was then signed.  A multi-component timeout was performed prior to starting the procedure using the departmental protocol. The procedure room personnel used personal protective equipment. The operators used sterile gloves and if indicated, sterile gowns. The surgical site was prepped with chlorhexidine gluconate  and draped in the maximal applicable sterile fashion. A preliminary ultrasonography was performed to assess the target and determine a safe access site. It showed ascites. Pertinent ultrasound images were stored to the PACS for documentation. The access site was sterilely prepped and draped. Local anesthesia was administered. A dermatotomy was performed if needed. A catheter over the needle system was advanced into the peritoneal cavity. Straw colored fluid was aspirated and the plastic catheter advanced into the peritoneum. The catheter was then connected to a fluid recovery system. At the end of the procedure, the catheter was withdrawn and an aseptic dressing applied. The patient tolerated the procedure well. After uneventful recovery recovery, the patient was discharged from the department in stable condition. The total amount of fluid recovered is given below. Albumin was infused intravenously if ordered by the referring doctor. Complications: None immediate. Specimen: The specimen was labeled and sent to the lab in appropriate carriers & containers if such was requested by the ordering provider.     Impression:                                                               Successful ultrasound-guided paracentesis using a Right lower quadrant access with recovery of 15.5 liters of fluid as described above. Thank you for the opportunity to assist in the care of your patient. Electronically Signed: Chris Adames  7/6/2023 9:56 PM EDT  Workstation ID: [id number]             Results for orders placed during the hospital encounter of 07/04/23    Adult Transthoracic Echo Complete W/ Cont if Necessary Per Protocol    Interpretation Summary  •  Left ventricular systolic function is hyperdynamic (EF > 70%). Calculated left ventricular EF = 73.7% Left ventricular ejection fraction appears to be greater than 70%.  •  Left ventricular diastolic function was indeterminate.  •  The right ventricular cavity is mildly dilated.  •  The left atrial cavity is moderately dilated.  •  The right atrial cavity is mild to moderately  dilated.  •  Mild aortic valve stenosis is present.  •  Moderate tricuspid valve regurgitation is present.  •  Estimated right ventricular systolic pressure from tricuspid regurgitation is moderately elevated (45-55 mmHg).      Plan for Follow-up of Pending Labs/Results: follow up as directed    Discharge Details        Discharge Medications        New Medications        Instructions Start Date   albuterol sulfate  (90 Base) MCG/ACT inhaler  Commonly known as: PROVENTIL HFA;VENTOLIN HFA;PROAIR HFA   2 puffs, Inhalation, Every 4 Hours PRN      bumetanide 2 MG tablet  Commonly known as: BUMEX   2 mg, Oral, 2 Times Daily      ipratropium-albuterol 0.5-2.5 mg/3 ml nebulizer  Commonly known as: DUO-NEB   Nebulize q 4 h prn wheezing / shortness of breath             Changes to Medications        Instructions Start Date   spironolactone 50 MG tablet  Commonly known as: ALDACTONE  What changed:   medication strength  how much to take   50 mg, Oral, Daily   Start Date: July 15, 2023            Continue These Medications        Instructions Start Date   aspirin 81 MG EC tablet   81  mg, Oral, Daily, May restart in 1 week      atorvastatin 20 MG tablet  Commonly known as: LIPITOR   20 mg, Oral, Daily      buPROPion  MG 24 hr tablet  Commonly known as: WELLBUTRIN XL   150 mg, Oral, Daily      diphenoxylate-atropine 2.5-0.025 MG per tablet  Commonly known as: LOMOTIL   1 tablet, Oral, 4 Times Daily PRN      donepezil 10 MG tablet  Commonly known as: ARICEPT   10 mg, Oral, Nightly      insulin aspart 100 UNIT/ML injection  Commonly known as: novoLOG   Subcutaneous, 3 Times Daily Before Meals, Sliding Scale as needed      Insulin Aspart 100 UNIT/ML injection  Commonly known as: novoLOG   0-14 Units, Subcutaneous, 3 Times Daily Before Meals      iron polysaccharides 150 MG capsule  Commonly known as: NIFEREX   150 mg, Oral, Daily      levothyroxine 100 MCG tablet  Commonly known as: SYNTHROID, LEVOTHROID   300 mcg, Oral, Daily      multivitamin with minerals tablet tablet   1 tablet, Oral, Daily      ondansetron ODT 4 MG disintegrating tablet  Commonly known as: ZOFRAN-ODT   4 mg, Translingual, Every 8 Hours PRN      pantoprazole 40 MG EC tablet  Commonly known as: PROTONIX   40 mg, Oral, Every Early Morning      SITagliptin 50 MG tablet  Commonly known as: JANUVIA   50 mg, Oral, Daily             Stop These Medications      cephalexin 500 MG capsule  Commonly known as: KEFLEX     furosemide 40 MG tablet  Commonly known as: LASIX              No Known Allergies      Discharge Disposition:  Skilled Nursing Facility (DC - External)    Diet:  Hospital:  Diet Order   Procedures   • Diet: Gastrointestinal Diets, Cardiac Diets; Low Sodium (2g); Fiber-Restricted; Texture: Regular Texture (IDDSI 7); Fluid Consistency: Thin (IDDSI 0)       Activity:  Activity Instructions       Activity as Tolerated              CODE STATUS:    Code Status and Medical Interventions:   Ordered at: 07/04/23 0301     Code Status (Patient has no pulse and is not breathing):    CPR (Attempt to Resuscitate)     Medical  Interventions (Patient has pulse or is breathing):    Full Support       Future Appointments   Date Time Provider Department Center   7/14/2023  8:45 PM EMS 1 [facility]   8/1/2023  4:00 PM Jens Mandujano MD [facility]   9/21/2023  9:30 AM Elke Shelley PA-C [facility]  [facility]       Additional Instructions for the Follow-ups that You Need to Schedule       Discharge Follow-up with PCP   As directed       Currently Documented PCP:    Kala Beaver MD    PCP Phone Number:    [phone]     Follow Up Details: Follow up with PCP in 1 week.         Discharge Follow-up with PCP   As directed       Currently Documented PCP:    Kala Beaver MD    PCP Phone Number:    [phone]     Follow Up Details: Follow-up with PCP in 1 week.         Discharge Follow-up with Specified Provider: Follow-up with GI in [place] in 8 weeks.   As directed      To: Follow-up with GI in [place] in 8 weeks.                       Sumit Braxton, MITRA  07/14/23      Time Spent on Discharge:  I spent  41  minutes on this discharge activity which included: face-to-face encounter with the patient, reviewing the data in the system, coordination of the care with the nursing staff as well as consultants, documentation, and entering orders.

## 2023-08-16 ENCOUNTER — HOSPITAL ENCOUNTER (EMERGENCY)
Facility: HOSPITAL | Age: 63
Discharge: HOME | End: 2023-08-16
Attending: STUDENT IN AN ORGANIZED HEALTH CARE EDUCATION/TRAINING PROGRAM
Payer: MEDICARE

## 2023-08-16 VITALS
HEART RATE: 56 BPM | SYSTOLIC BLOOD PRESSURE: 105 MMHG | HEIGHT: 65 IN | OXYGEN SATURATION: 100 % | RESPIRATION RATE: 18 BRPM | DIASTOLIC BLOOD PRESSURE: 41 MMHG | TEMPERATURE: 97.6 F | WEIGHT: 250 LBS | BODY MASS INDEX: 41.65 KG/M2

## 2023-08-16 DIAGNOSIS — R79.89 ELEVATED SERUM CREATININE: Primary | ICD-10-CM

## 2023-08-16 LAB
ALBUMIN SERPL-MCNC: 3 G/DL (ref 3.5–5.2)
ALBUMIN/GLOB SERPL: 0.7 G/DL
ALP SERPL-CCNC: 135 U/L (ref 39–117)
ALT SERPL W P-5'-P-CCNC: 20 U/L (ref 1–33)
ANION GAP SERPL CALCULATED.3IONS-SCNC: 13.4 MMOL/L (ref 5–15)
AST SERPL-CCNC: 41 U/L (ref 1–32)
BASOPHILS # BLD AUTO: 0.06 10*3/MM3 (ref 0–0.2)
BASOPHILS NFR BLD AUTO: 1 % (ref 0–1.5)
BILIRUB SERPL-MCNC: 1.5 MG/DL (ref 0–1.2)
BUN SERPL-MCNC: 45 MG/DL (ref 8–23)
BUN/CREAT SERPL: 20.9 (ref 7–25)
CALCIUM SPEC-SCNC: 8.6 MG/DL (ref 8.6–10.5)
CHLORIDE SERPL-SCNC: 109 MMOL/L (ref 98–107)
CO2 SERPL-SCNC: 21.6 MMOL/L (ref 22–29)
CREAT SERPL-MCNC: 2.15 MG/DL (ref 0.57–1)
DEPRECATED RDW RBC AUTO: 65 FL (ref 37–54)
EGFRCR SERPLBLD CKD-EPI 2021: 25.3 ML/MIN/1.73
EOSINOPHIL # BLD AUTO: 0.23 10*3/MM3 (ref 0–0.4)
EOSINOPHIL NFR BLD AUTO: 3.8 % (ref 0.3–6.2)
ERYTHROCYTE [DISTWIDTH] IN BLOOD BY AUTOMATED COUNT: 18.9 % (ref 12.3–15.4)
GLOBULIN UR ELPH-MCNC: 4.5 GM/DL
GLUCOSE SERPL-MCNC: 170 MG/DL (ref 65–99)
HCT VFR BLD AUTO: 29.1 % (ref 34–46.6)
HGB BLD-MCNC: 9.8 G/DL (ref 12–15.9)
HOLD SPECIMEN: NORMAL
HOLD SPECIMEN: NORMAL
IMM GRANULOCYTES # BLD AUTO: 0.01 10*3/MM3 (ref 0–0.05)
IMM GRANULOCYTES NFR BLD AUTO: 0.2 % (ref 0–0.5)
LYMPHOCYTES # BLD AUTO: 0.51 10*3/MM3 (ref 0.7–3.1)
LYMPHOCYTES NFR BLD AUTO: 8.5 % (ref 19.6–45.3)
MCH RBC QN AUTO: 32.2 PG (ref 26.6–33)
MCHC RBC AUTO-ENTMCNC: 33.7 G/DL (ref 31.5–35.7)
MCV RBC AUTO: 95.7 FL (ref 79–97)
MONOCYTES # BLD AUTO: 0.56 10*3/MM3 (ref 0.1–0.9)
MONOCYTES NFR BLD AUTO: 9.3 % (ref 5–12)
NEUTROPHILS NFR BLD AUTO: 4.66 10*3/MM3 (ref 1.7–7)
NEUTROPHILS NFR BLD AUTO: 77.2 % (ref 42.7–76)
NRBC BLD AUTO-RTO: 0 /100 WBC (ref 0–0.2)
PLATELET # BLD AUTO: 149 10*3/MM3 (ref 140–450)
PMV BLD AUTO: 10.9 FL (ref 6–12)
POTASSIUM SERPL-SCNC: 4.4 MMOL/L (ref 3.5–5.2)
PROT SERPL-MCNC: 7.5 G/DL (ref 6–8.5)
RBC # BLD AUTO: 3.04 10*6/MM3 (ref 3.77–5.28)
SODIUM SERPL-SCNC: 144 MMOL/L (ref 136–145)
WBC NRBC COR # BLD: 6.03 10*3/MM3 (ref 3.4–10.8)
WHOLE BLOOD HOLD COAG: NORMAL
WHOLE BLOOD HOLD SPECIMEN: NORMAL

## 2023-08-16 PROCEDURE — 85025 COMPLETE CBC W/AUTO DIFF WBC: CPT | Performed by: STUDENT IN AN ORGANIZED HEALTH CARE EDUCATION/TRAINING PROGRAM

## 2023-08-16 PROCEDURE — 99283 EMERGENCY DEPT VISIT LOW MDM: CPT

## 2023-08-16 PROCEDURE — 80053 COMPREHEN METABOLIC PANEL: CPT | Performed by: STUDENT IN AN ORGANIZED HEALTH CARE EDUCATION/TRAINING PROGRAM

## 2023-08-16 RX ORDER — SODIUM CHLORIDE 0.9 % (FLUSH) 0.9 %
10 SYRINGE (ML) INJECTION AS NEEDED
Status: DISCONTINUED | OUTPATIENT
Start: 2023-08-16 | End: 2023-08-16 | Stop reason: HOSPADM

## 2023-08-16 RX ORDER — SPIRONOLACTONE 50 MG/1
25 TABLET, FILM COATED ORAL DAILY
Qty: 30 TABLET | Refills: 0
Start: 2023-08-16

## 2023-08-16 NOTE — ED PROVIDER NOTES
"Subjective  History of Present Illness:    Chief Complaint:   Chief Complaint   Patient presents with    Abnormal Lab      History of Present Illness: Ivania Fields is a 62 y.o. female who presents to the emergency department complaining of low hemoglobin.  States has a history of GI bleeds.  Was seen at this facility a couple weeks ago and transferred due to no GI coverage.  She states she was seen at [facility].  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  She had an EGD performed 4/6/2023 that showed multiple nonbleeding ulcers.  7/4 was here with hematemesis and transferred to [facility], had endoscopy there showing bleeding ulcer with cautery.  She was intubated however was able to fortunately be subsequently extubated, had a 15 L removal of fluid paracentesis, she was sent to [facility] and rehab.  Somehow she ended up being transferred to [facility] after [place] and had follow-up today with PCP and was called today with results that her hemoglobin and hematocrit had dropped \"2 points\" in the past week or so since discharge.  Patient has no complaints of dizziness, lightheadedness, shortness of breath.  States her stool is normal without blood.  No hematemesis.  Here at the request of her PCP given change in blood counts and also apparent SILVINA.  Onset: Over the past week  Duration: Unknown  Exacerbating / Alleviating factors: None  Associated symptoms: None, specifically no headaches, lightheadedness, dizziness, weakness, chest pain, shortness of breath, blood in stool or hematemesis.      Nurses Notes reviewed and agree, including vitals, allergies, social history and prior medical history.     Review of Systems   Constitutional: Negative.  Negative for fatigue.   HENT: Negative.     Eyes: Negative.    Respiratory: Negative.  Negative for shortness of breath.    Cardiovascular: Negative.  Negative for chest pain.   Gastrointestinal: " "Negative.  Negative for abdominal pain and blood in stool.   Genitourinary: Negative.    Musculoskeletal: Negative.    Skin: Negative.    Neurological: Negative.  Negative for dizziness and light-headedness.   Psychiatric/Behavioral: Negative.       Past Medical History:   Diagnosis Date    Anemia     Diabetes mellitus     Hyperlipidemia     Hypertension     Hypothyroidism     Impaired mobility     Short-term memory loss        Allergies:    Patient has no known allergies.      Past Surgical History:   Procedure Laterality Date    CHOLECYSTECTOMY      ENDOSCOPY N/A 7/5/2023    Procedure: ESOPHAGOGASTRODUODENOSCOPY;  Surgeon: Brunner, Mark I, MD;  Location:  [facility];  Service: Gastroenterology;  Laterality: N/A;    ENDOSCOPY W/ BANDING N/A 4/6/2023    Procedure: ESOPHAGOGASTRODUODENOSCOPY WITH BIOPSY;  Surgeon: Jens Mandujano MD;  Location: [facility] ENDOSCOPY;  Service: Gastroenterology;  Laterality: N/A;         Social History     Socioeconomic History    Marital status:    Tobacco Use    Smoking status: Never    Smokeless tobacco: Never   Vaping Use    Vaping Use: Never used   Substance and Sexual Activity    Alcohol use: Yes     Comment: a couple margaritas a year    Drug use: Never    Sexual activity: Defer         Family History   Problem Relation Age of Onset    No Known Problems Mother     No Known Problems Father        Objective  Physical Exam:  /41   Pulse 56   Temp 97.6 øF (36.4 øC) (Oral)   Resp 18   Ht 165.1 cm (65\")   Wt 113 kg (250 lb)   SpO2 100%   BMI 41.60 kg/mý      Physical Exam  Constitutional:       General: She is not in acute distress.     Appearance: Normal appearance. She is obese. She is not ill-appearing, toxic-appearing or diaphoretic.   HENT:      Head: Normocephalic and atraumatic.      Nose: Nose normal.   Eyes:      Extraocular Movements: Extraocular movements intact.   Cardiovascular:      Rate and Rhythm: Normal rate and regular rhythm.   Pulmonary: "      Effort: Pulmonary effort is normal.   Abdominal:      General: Abdomen is flat.      Palpations: Abdomen is soft.      Tenderness: There is no abdominal tenderness.   Musculoskeletal:         General: Normal range of motion.      Cervical back: Normal range of motion.   Skin:     General: Skin is warm.   Neurological:      General: No focal deficit present.      Mental Status: She is alert. Mental status is at baseline.   Psychiatric:         Mood and Affect: Mood normal.         Behavior: Behavior normal.         Procedures    ED Course:         Lab Results (last 24 hours)       Procedure Component Value Units Date/Time    CBC & Differential [[id number]]  (Abnormal) Collected: 08/16/23 1709    Specimen: Blood Updated: 08/16/23 1729    Narrative:      The following orders were created for panel order CBC & Differential.  Procedure                               Abnormality         Status                     ---------                               -----------         ------                     CBC Auto Differential[[id number]]        Abnormal            Final result                 Please view results for these tests on the individual orders.    Comprehensive Metabolic Panel [[id number]]  (Abnormal) Collected: 08/16/23 1709    Specimen: Blood Updated: 08/16/23 1756     Glucose 170 mg/dL      BUN 45 mg/dL      Creatinine 2.15 mg/dL      Sodium 144 mmol/L      Potassium 4.4 mmol/L      Comment: Slight hemolysis detected by analyzer. Results may be affected.        Chloride 109 mmol/L      CO2 21.6 mmol/L      Calcium 8.6 mg/dL      Total Protein 7.5 g/dL      Albumin 3.0 g/dL      ALT (SGPT) 20 U/L      AST (SGOT) 41 U/L      Comment: Slight hemolysis detected by analyzer. Results may be affected.        Alkaline Phosphatase 135 U/L      Total Bilirubin 1.5 mg/dL      Globulin 4.5 gm/dL      A/G Ratio 0.7 g/dL      BUN/Creatinine Ratio 20.9     Anion Gap 13.4 mmol/L      eGFR 25.3 mL/min/1.73     Narrative:      GFR  Normal >60  Chronic Kidney Disease <60  Kidney Failure <15      CBC Auto Differential [[id number]]  (Abnormal) Collected: 08/16/23 1709    Specimen: Blood Updated: 08/16/23 1729     WBC 6.03 10*3/mm3      RBC 3.04 10*6/mm3      Hemoglobin 9.8 g/dL      Hematocrit 29.1 %      MCV 95.7 fL      MCH 32.2 pg      MCHC 33.7 g/dL      RDW 18.9 %      RDW-SD 65.0 fl      MPV 10.9 fL      Platelets 149 10*3/mm3      Neutrophil % 77.2 %      Lymphocyte % 8.5 %      Monocyte % 9.3 %      Eosinophil % 3.8 %      Basophil % 1.0 %      Immature Grans % 0.2 %      Neutrophils, Absolute 4.66 10*3/mm3      Lymphocytes, Absolute 0.51 10*3/mm3      Monocytes, Absolute 0.56 10*3/mm3      Eosinophils, Absolute 0.23 10*3/mm3      Basophils, Absolute 0.06 10*3/mm3      Immature Grans, Absolute 0.01 10*3/mm3      nRBC 0.0 /100 WBC              No radiology results from the last 24 hrs       Medical Decision Making  Amount and/or Complexity of Data Reviewed  Labs: ordered.    Risk  Prescription drug management.        Ivania Fields is a 62 y.o. female who presents to the emergency department for evaluation of normal labs    Differential diagnosis includes worsening anemia, SILVINA among other etiologies.    CBC, CMP ordered for further evaluation of the patient's presentation.    Chart review if available included outside testing, previous visits, prior labs, prior imaging, available notes from prior evaluations or visits with specialists, medication list, allergies, past medical history, past surgical history when applicable.    Patient was treated with n/a    It appears that the patient's hemoglobin is stable at 9.8 today, was 9.6 one month ago.  She has no complaint of blood loss.  She does have a mild elevation in her creatinine, today it is 2.15, it appears as though June 9 she had a creatinine of 1.2, during her episode of admission with GI bleeding and her creatinine went as high as 1.83 on 7/8/2023, it downtrended and she was  discharged around 7/14 with a creatinine of 1.56.  I did speak with Dr. Fritz, [facility], did review past renal function testing, course of admission for GI bleeding and medication list.  He recommends decreasing spironolactone in half from 50 mg once daily to 25 mg once daily and follow-up with a repeat creatinine Friday.  Explained all this to the patient and her  they are comfortable with and understanding of the plan.    Plan for disposition is discharge home.  Patient/family comfortable with and understanding of the plan.      Final diagnoses:   Elevated serum creatinine          Gerardo Rogers PA-C  08/16/23 9227

## 2023-08-16 NOTE — DISCHARGE INSTRUCTIONS
We recommend that you decrease your spironolactone dose from 50 mg once daily to 25 mg once daily.  We have supplied you with a pill cutter for this, your pill should be scored and easily cut in half.  Please come to the [facility] outpatient lab on Friday to have your blood drawn, the results should be sent to your family doctor.  Please ensure that she sees these results and is able to follow them and give recommendations.  If you develop any new or worsening symptoms please return to the ER.

## 2023-08-21 ENCOUNTER — TRANSCRIBE ORDERS (OUTPATIENT)
Dept: ADMINISTRATIVE | Facility: HOSPITAL | Age: 63
End: 2023-08-21
Payer: MEDICARE

## 2023-08-21 DIAGNOSIS — R60.0 LOCALIZED EDEMA: Primary | ICD-10-CM

## 2023-08-22 ENCOUNTER — TRANSCRIBE ORDERS (OUTPATIENT)
Dept: ADMINISTRATIVE | Facility: HOSPITAL | Age: 63
End: 2023-08-22
Payer: MEDICARE

## 2023-08-22 DIAGNOSIS — R60.0 LOCALIZED EDEMA: Primary | ICD-10-CM

## 2023-08-26 ENCOUNTER — HOSPITAL ENCOUNTER (EMERGENCY)
Facility: HOSPITAL | Age: 63
Discharge: HOME | End: 2023-08-26
Attending: EMERGENCY MEDICINE
Payer: MEDICARE

## 2023-08-26 VITALS
TEMPERATURE: 98.9 F | DIASTOLIC BLOOD PRESSURE: 46 MMHG | HEART RATE: 58 BPM | BODY MASS INDEX: 41.65 KG/M2 | WEIGHT: 250 LBS | RESPIRATION RATE: 20 BRPM | OXYGEN SATURATION: 98 % | HEIGHT: 65 IN | SYSTOLIC BLOOD PRESSURE: 111 MMHG

## 2023-08-26 DIAGNOSIS — L89.152 PRESSURE INJURY OF SACRAL REGION, STAGE 2: Primary | ICD-10-CM

## 2023-08-26 PROCEDURE — 99283 EMERGENCY DEPT VISIT LOW MDM: CPT

## 2023-08-26 NOTE — ED PROVIDER NOTES
Subjective  History of Present Illness:    Chief Complaint: Buttock irritation, requesting wound check    History of Present Illness: 62-year-old female followed by home health for dressing changes regarding sacral decubitus ulcer, felt some soreness to her left buttock, is unable to see the area.  Home health has not evaluated the left buttock wound yet.  No fever    Nurses Notes reviewed and agree, including vitals, allergies, social history and prior medical history.     REVIEW OF SYSTEMS: All systems reviewed and not pertinent unless noted.  Review of Systems      Positive for: Buttock irritation, patient is requesting wound check    Negative for: Fever    Past Medical History:   Diagnosis Date    Anemia     Diabetes mellitus     Hyperlipidemia     Hypertension     Hypothyroidism     Impaired mobility     Short-term memory loss        Allergies:    Patient has no known allergies.      Past Surgical History:   Procedure Laterality Date    CHOLECYSTECTOMY      ENDOSCOPY N/A 7/5/2023    Procedure: ESOPHAGOGASTRODUODENOSCOPY;  Surgeon: Brunner, Mark I, MD;  Location: [facility] ENDOSCOPY;  Service: Gastroenterology;  Laterality: N/A;    ENDOSCOPY W/ BANDING N/A 4/6/2023    Procedure: ESOPHAGOGASTRODUODENOSCOPY WITH BIOPSY;  Surgeon: Jens Mandujano MD;  Location: [facility] ENDOSCOPY;  Service: Gastroenterology;  Laterality: N/A;         Social History     Socioeconomic History    Marital status:    Tobacco Use    Smoking status: Never    Smokeless tobacco: Never   Vaping Use    Vaping Use: Never used   Substance and Sexual Activity    Alcohol use: Yes     Comment: a couple margaritas a year    Drug use: Never    Sexual activity: Defer         Family History   Problem Relation Age of Onset    No Known Problems Mother     No Known Problems Father        Objective  Physical Exam:  /46 (BP Location: Left arm, Patient Position: Lying)   Pulse 58   Temp 98.9 øF (37.2 øC) (Oral)   Resp 20   Ht 165.1 cm  "(65\")   Wt 113 kg (250 lb)   SpO2 98%   BMI 41.60 kg/mý      Physical Exam    CONSTITUTIONAL: Well developed, nontoxic chronically ill-appearing 62-year-old obese female,  in no acute distress.  VITAL SIGNS: per nursing, reviewed and noted  SKIN: exposed skin with mild bilateral lower extremity edema with Kerlix wraps in place.  Superficial stage II sacral decubitus ulcers bilaterally right greater than left without cellulitis.  Dressing in place and was taken down  EYES: Grossly EOMI, no icterus  ENT: Normal voice.  Moist mucous membranes   RESPIRATORY:  No increased work of breathing. No retractions.   CARDIOVASCULAR:   Extremities pink and warm.  Good cap refill to extremities.   MUSCULOSKELETAL: Age-appropriate bulk and tone, moves all 4 extremities  NEUROLOGIC: Alert, oriented x 3. No gross deficits. GCS 15.   PSYCH: appropriate affect.    Procedures    ED Course:    Lab Results (last 24 hours)       ** No results found for the last 24 hours. **             No radiology results from the last 24 hrs     MDM         Medical Decision Making:    Initial impression of presenting illness: 62-year-old female followed by home health for dressing changes regarding sacral decubitus ulcer, felt some soreness to her left buttock, is unable to see the area.  Home health has not evaluated the left buttock wound yet.  No fever    DDX: includes but is not limited to: Sacral decubitus ulcer         Patient arrives normotensive afebrile no tachycardia sats 98% with vitals interpreted by myself.     Pertinent features from physical exam: exposed skin with mild bilateral lower extremity edema with Kerlix wraps in place.  Superficial stage II sacral decubitus ulcers bilaterally right greater than left without cellulitis.  Dressing in place and was taken down.    Initial diagnostic plan: Examined at the bedside with chaperone.  No indications for antibiotics or imaging or further evaluation.  Continue wound dressing and home " health.  Disposition plan: Patient was discharged in home stable condition.  Counseled on supportive care, outpatient follow-up. Return precaution discussed.  Patient/family was understanding and agreeable with plan    -----      Final diagnoses:   Pressure injury of sacral region, stage 2            Giorgi Raymond, DO  08/26/23 1622

## 2023-08-26 NOTE — ED NOTES
"Pt stating to PCT in registration and pt's in lobby \"I don't know how I'm going to get home\". This RN consulted with pt and pt now requesting transported home. Moved pt to bed 5 and contacted transport at this time. Pt confirms to this RN that pt will be able to take care of herself once home.  "

## 2023-08-29 ENCOUNTER — HOSPITAL ENCOUNTER (OUTPATIENT)
Dept: ULTRASOUND IMAGING | Facility: HOSPITAL | Age: 63
Discharge: HOME | End: 2023-08-29
Admitting: INTERNAL MEDICINE
Payer: MEDICARE

## 2023-08-29 DIAGNOSIS — R60.0 LOCALIZED EDEMA: ICD-10-CM

## 2023-08-29 PROCEDURE — 93923 UPR/LXTR ART STDY 3+ LVLS: CPT

## 2023-09-01 ENCOUNTER — HOSPITAL ENCOUNTER (OUTPATIENT)
Facility: HOSPITAL | Age: 63
Setting detail: OBSERVATION
Discharge: ANOTHER HOSPITAL | End: 2023-09-03
Attending: STUDENT IN AN ORGANIZED HEALTH CARE EDUCATION/TRAINING PROGRAM | Admitting: STUDENT IN AN ORGANIZED HEALTH CARE EDUCATION/TRAINING PROGRAM
Payer: MEDICARE

## 2023-09-01 ENCOUNTER — APPOINTMENT (OUTPATIENT)
Dept: CT IMAGING | Facility: HOSPITAL | Age: 63
End: 2023-09-01
Payer: MEDICARE

## 2023-09-01 DIAGNOSIS — N17.9 ACUTE KIDNEY INJURY: Primary | ICD-10-CM

## 2023-09-01 PROBLEM — N18.9 ACUTE-ON-CHRONIC KIDNEY INJURY: Status: ACTIVE | Noted: 2023-09-01

## 2023-09-01 LAB
ALBUMIN SERPL-MCNC: 2.1 G/DL (ref 3.5–5.2)
ALBUMIN/GLOB SERPL: 0.4 G/DL
ALP SERPL-CCNC: 109 U/L (ref 39–117)
ALT SERPL W P-5'-P-CCNC: 12 U/L (ref 1–33)
ANION GAP SERPL CALCULATED.3IONS-SCNC: 16.3 MMOL/L (ref 5–15)
AST SERPL-CCNC: 25 U/L (ref 1–32)
BACTERIA UR QL AUTO: ABNORMAL /HPF
BASOPHILS # BLD AUTO: 0.04 10*3/MM3 (ref 0–0.2)
BASOPHILS NFR BLD AUTO: 0.6 % (ref 0–1.5)
BILIRUB SERPL-MCNC: 1.7 MG/DL (ref 0–1.2)
BILIRUB UR QL STRIP: NEGATIVE
BUN SERPL-MCNC: 66 MG/DL (ref 8–23)
BUN/CREAT SERPL: 23.7 (ref 7–25)
CALCIUM SPEC-SCNC: 8.5 MG/DL (ref 8.6–10.5)
CHLORIDE SERPL-SCNC: 110 MMOL/L (ref 98–107)
CLARITY UR: ABNORMAL
CO2 SERPL-SCNC: 14.7 MMOL/L (ref 22–29)
COLOR UR: YELLOW
CREAT SERPL-MCNC: 2.79 MG/DL (ref 0.57–1)
DEPRECATED RDW RBC AUTO: 66.4 FL (ref 37–54)
EGFRCR SERPLBLD CKD-EPI 2021: 18.5 ML/MIN/1.73
EOSINOPHIL # BLD AUTO: 0.07 10*3/MM3 (ref 0–0.4)
EOSINOPHIL NFR BLD AUTO: 1.1 % (ref 0.3–6.2)
ERYTHROCYTE [DISTWIDTH] IN BLOOD BY AUTOMATED COUNT: 19.7 % (ref 12.3–15.4)
FLUAV RNA RESP QL NAA+PROBE: NOT DETECTED
FLUBV RNA RESP QL NAA+PROBE: NOT DETECTED
GLOBULIN UR ELPH-MCNC: 4.7 GM/DL
GLUCOSE SERPL-MCNC: 129 MG/DL (ref 65–99)
GLUCOSE UR STRIP-MCNC: NEGATIVE MG/DL
HCT VFR BLD AUTO: 26.7 % (ref 34–46.6)
HGB BLD-MCNC: 8.9 G/DL (ref 12–15.9)
HGB UR QL STRIP.AUTO: ABNORMAL
HOLD SPECIMEN: NORMAL
HOLD SPECIMEN: NORMAL
HYALINE CASTS UR QL AUTO: ABNORMAL /LPF
IMM GRANULOCYTES # BLD AUTO: 0.01 10*3/MM3 (ref 0–0.05)
IMM GRANULOCYTES NFR BLD AUTO: 0.2 % (ref 0–0.5)
KETONES UR QL STRIP: NEGATIVE
LEUKOCYTE ESTERASE UR QL STRIP.AUTO: ABNORMAL
LIPASE SERPL-CCNC: 32 U/L (ref 13–60)
LYMPHOCYTES # BLD AUTO: 0.39 10*3/MM3 (ref 0.7–3.1)
LYMPHOCYTES NFR BLD AUTO: 6.2 % (ref 19.6–45.3)
MCH RBC QN AUTO: 31 PG (ref 26.6–33)
MCHC RBC AUTO-ENTMCNC: 33.3 G/DL (ref 31.5–35.7)
MCV RBC AUTO: 93 FL (ref 79–97)
MONOCYTES # BLD AUTO: 0.3 10*3/MM3 (ref 0.1–0.9)
MONOCYTES NFR BLD AUTO: 4.8 % (ref 5–12)
NEUTROPHILS NFR BLD AUTO: 5.46 10*3/MM3 (ref 1.7–7)
NEUTROPHILS NFR BLD AUTO: 87.1 % (ref 42.7–76)
NITRITE UR QL STRIP: NEGATIVE
NRBC BLD AUTO-RTO: 0 /100 WBC (ref 0–0.2)
PH UR STRIP.AUTO: 5.5 [PH] (ref 5–8)
PLATELET # BLD AUTO: 106 10*3/MM3 (ref 140–450)
PMV BLD AUTO: 11 FL (ref 6–12)
POTASSIUM SERPL-SCNC: 4 MMOL/L (ref 3.5–5.2)
PROT SERPL-MCNC: 6.8 G/DL (ref 6–8.5)
PROT UR QL STRIP: ABNORMAL
RBC # BLD AUTO: 2.87 10*6/MM3 (ref 3.77–5.28)
RBC # UR STRIP: ABNORMAL /HPF
REF LAB TEST METHOD: ABNORMAL
SARS-COV-2 RNA RESP QL NAA+PROBE: NOT DETECTED
SODIUM SERPL-SCNC: 141 MMOL/L (ref 136–145)
SP GR UR STRIP: 1.02 (ref 1–1.03)
SQUAMOUS #/AREA URNS HPF: ABNORMAL /HPF
UROBILINOGEN UR QL STRIP: ABNORMAL
WBC # UR STRIP: ABNORMAL /HPF
WBC NRBC COR # BLD: 6.27 10*3/MM3 (ref 3.4–10.8)
WHOLE BLOOD HOLD COAG: NORMAL
WHOLE BLOOD HOLD SPECIMEN: NORMAL

## 2023-09-01 PROCEDURE — 80053 COMPREHEN METABOLIC PANEL: CPT | Performed by: STUDENT IN AN ORGANIZED HEALTH CARE EDUCATION/TRAINING PROGRAM

## 2023-09-01 PROCEDURE — G0378 HOSPITAL OBSERVATION PER HR: HCPCS

## 2023-09-01 PROCEDURE — 81001 URINALYSIS AUTO W/SCOPE: CPT | Performed by: STUDENT IN AN ORGANIZED HEALTH CARE EDUCATION/TRAINING PROGRAM

## 2023-09-01 PROCEDURE — 87636 SARSCOV2 & INF A&B AMP PRB: CPT | Performed by: STUDENT IN AN ORGANIZED HEALTH CARE EDUCATION/TRAINING PROGRAM

## 2023-09-01 PROCEDURE — 93005 ELECTROCARDIOGRAM TRACING: CPT | Performed by: STUDENT IN AN ORGANIZED HEALTH CARE EDUCATION/TRAINING PROGRAM

## 2023-09-01 PROCEDURE — 83690 ASSAY OF LIPASE: CPT | Performed by: STUDENT IN AN ORGANIZED HEALTH CARE EDUCATION/TRAINING PROGRAM

## 2023-09-01 PROCEDURE — 87086 URINE CULTURE/COLONY COUNT: CPT | Performed by: STUDENT IN AN ORGANIZED HEALTH CARE EDUCATION/TRAINING PROGRAM

## 2023-09-01 PROCEDURE — 25010000002 ONDANSETRON PER 1 MG: Performed by: STUDENT IN AN ORGANIZED HEALTH CARE EDUCATION/TRAINING PROGRAM

## 2023-09-01 PROCEDURE — 85025 COMPLETE CBC W/AUTO DIFF WBC: CPT | Performed by: STUDENT IN AN ORGANIZED HEALTH CARE EDUCATION/TRAINING PROGRAM

## 2023-09-01 PROCEDURE — 74176 CT ABD & PELVIS W/O CONTRAST: CPT

## 2023-09-01 PROCEDURE — 99285 EMERGENCY DEPT VISIT HI MDM: CPT

## 2023-09-01 PROCEDURE — 96375 TX/PRO/DX INJ NEW DRUG ADDON: CPT

## 2023-09-01 PROCEDURE — 99284 EMERGENCY DEPT VISIT MOD MDM: CPT

## 2023-09-01 RX ORDER — SODIUM CHLORIDE 0.9 % (FLUSH) 0.9 %
10 SYRINGE (ML) INJECTION AS NEEDED
Status: DISCONTINUED | OUTPATIENT
Start: 2023-09-01 | End: 2023-09-03 | Stop reason: HOSPADM

## 2023-09-01 RX ORDER — ONDANSETRON 2 MG/ML
4 INJECTION INTRAMUSCULAR; INTRAVENOUS ONCE
Status: COMPLETED | OUTPATIENT
Start: 2023-09-01 | End: 2023-09-01

## 2023-09-01 RX ADMIN — SODIUM CHLORIDE 500 ML: 9 INJECTION, SOLUTION INTRAVENOUS at 19:54

## 2023-09-01 RX ADMIN — ONDANSETRON 4 MG: 2 INJECTION INTRAMUSCULAR; INTRAVENOUS at 19:54

## 2023-09-01 NOTE — ED PROVIDER NOTES
Subjective:  History of Present Illness:    Patient is a 62-year-old female with history of hypertension, hyperlipidemia, diabetes, hypothyroidism who presents today with 2 episodes of nausea and vomiting prior to arrival.  States she was in normal state of health today and then became nauseous, denies any blood in her vomitus.  Denies diarrhea.  No dysuria.  Denies any preceding fevers.  No cough, congestion, rhinorrhea.  Denies any chest pain or shortness of breath.      Nurses Notes reviewed and agree, including vitals, allergies, social history and prior medical history.     REVIEW OF SYSTEMS: All systems reviewed and not pertinent unless noted.  Review of Systems   Constitutional:  Positive for activity change and appetite change. Negative for chills, fatigue and fever.   HENT:  Negative for congestion, rhinorrhea, sinus pressure and sinus pain.    Eyes:  Negative for discharge and itching.   Respiratory:  Negative for cough and shortness of breath.    Cardiovascular:  Negative for chest pain and leg swelling.   Gastrointestinal:  Positive for nausea and vomiting. Negative for abdominal distention, abdominal pain, constipation and diarrhea.   Endocrine: Negative for cold intolerance and heat intolerance.   Genitourinary:  Negative for decreased urine volume, difficulty urinating, flank pain, frequency, urgency, vaginal bleeding, vaginal discharge and vaginal pain.   Musculoskeletal:  Negative for gait problem, neck pain and neck stiffness.   Skin:  Negative for color change.   Allergic/Immunologic: Negative for environmental allergies.   Neurological:  Negative for seizures, syncope, facial asymmetry and speech difficulty.   Psychiatric/Behavioral:  Negative for self-injury and suicidal ideas.      Past Medical History:   Diagnosis Date    Anemia     Diabetes mellitus     Hyperlipidemia     Hypertension     Hypothyroidism     Impaired mobility     Short-term memory loss        Allergies:    Patient has no known  "allergies.      Past Surgical History:   Procedure Laterality Date    CHOLECYSTECTOMY      ENDOSCOPY N/A 7/5/2023    Procedure: ESOPHAGOGASTRODUODENOSCOPY;  Surgeon: Brunner, Mark I, MD;  Location:  [facility];  Service: Gastroenterology;  Laterality: N/A;    ENDOSCOPY W/ BANDING N/A 4/6/2023    Procedure: ESOPHAGOGASTRODUODENOSCOPY WITH BIOPSY;  Surgeon: Jens Mandujano MD;  Location: [facility];  Service: Gastroenterology;  Laterality: N/A;         Social History     Socioeconomic History    Marital status:    Tobacco Use    Smoking status: Never    Smokeless tobacco: Never   Vaping Use    Vaping Use: Never used   Substance and Sexual Activity    Alcohol use: Yes     Comment: a couple margaritas a year    Drug use: Never    Sexual activity: Defer         Family History   Problem Relation Age of Onset    No Known Problems Mother     No Known Problems Father        Objective  Physical Exam:  /51   Pulse 58   Temp 98 °F (36.7 °C) (Oral)   Resp 16   Ht 165.1 cm (65\")   Wt 119 kg (262 lb)   SpO2 100%   BMI 43.60 kg/m²      Physical Exam  Constitutional:       General: She is not in acute distress.     Appearance: Normal appearance. She is obese. She is not ill-appearing.   HENT:      Head: Normocephalic and atraumatic.      Nose: Nose normal. No congestion or rhinorrhea.      Mouth/Throat:      Mouth: Mucous membranes are dry.      Pharynx: Oropharynx is clear. No oropharyngeal exudate or posterior oropharyngeal erythema.   Eyes:      Extraocular Movements: Extraocular movements intact.      Conjunctiva/sclera: Conjunctivae normal.      Pupils: Pupils are equal, round, and reactive to light.   Cardiovascular:      Rate and Rhythm: Normal rate and regular rhythm.      Pulses: Normal pulses.      Heart sounds: Normal heart sounds.   Pulmonary:      Effort: Pulmonary effort is normal. No respiratory distress.      Breath sounds: Normal breath sounds.   Abdominal:      General: Abdomen " is flat. Bowel sounds are normal. There is no distension.      Palpations: Abdomen is soft.      Tenderness: There is no abdominal tenderness. There is no guarding or rebound.   Musculoskeletal:         General: No swelling or tenderness. Normal range of motion.      Cervical back: Normal range of motion and neck supple.   Skin:     General: Skin is warm and dry.      Capillary Refill: Capillary refill takes less than 2 seconds.   Neurological:      General: No focal deficit present.      Mental Status: She is alert and oriented to person, place, and time. Mental status is at baseline.      Cranial Nerves: No cranial nerve deficit.      Sensory: No sensory deficit.      Motor: No weakness.      Coordination: Coordination normal.   Psychiatric:         Mood and Affect: Mood normal.         Behavior: Behavior normal.         Thought Content: Thought content normal.         Judgment: Judgment normal.       Procedures    ED Course:    ED Course as of 09/02/23 0024   Fri Sep 01, 2023   2023 EKG interpreted by me, sinus bradycardia with no concerning ST changes noted, rate of 59 [JE]      ED Course User Index  [JE] Steven Santos MD       Lab Results (last 24 hours)       Procedure Component Value Units Date/Time    Comprehensive Metabolic Panel [[id number]]  (Abnormal) Collected: 09/01/23 1921    Specimen: Blood Updated: 09/01/23 1946     Glucose 129 mg/dL      BUN 66 mg/dL      Creatinine 2.79 mg/dL      Sodium 141 mmol/L      Potassium 4.0 mmol/L      Chloride 110 mmol/L      CO2 14.7 mmol/L      Calcium 8.5 mg/dL      Total Protein 6.8 g/dL      Albumin 2.1 g/dL      ALT (SGPT) 12 U/L      AST (SGOT) 25 U/L      Alkaline Phosphatase 109 U/L      Total Bilirubin 1.7 mg/dL      Globulin 4.7 gm/dL      A/G Ratio 0.4 g/dL      BUN/Creatinine Ratio 23.7     Anion Gap 16.3 mmol/L      eGFR 18.5 mL/min/1.73     Narrative:      GFR Normal >60  Chronic Kidney Disease <60  Kidney Failure <15      Lipase [[id number]]   (Normal) Collected: 09/01/23 1921    Specimen: Blood Updated: 09/01/23 1946     Lipase 32 U/L     CBC & Differential [[id number]]  (Abnormal) Collected: 09/01/23 1949    Specimen: Blood from Arm, Left Updated: 09/01/23 1954    Narrative:      The following orders were created for panel order CBC & Differential.  Procedure                               Abnormality         Status                     ---------                               -----------         ------                     CBC Auto Differential[[id number]]        Abnormal            Final result                 Please view results for these tests on the individual orders.    CBC Auto Differential [[id number]]  (Abnormal) Collected: 09/01/23 1949    Specimen: Blood from Arm, Left Updated: 09/01/23 1954     WBC 6.27 10*3/mm3      RBC 2.87 10*6/mm3      Hemoglobin 8.9 g/dL      Hematocrit 26.7 %      MCV 93.0 fL      MCH 31.0 pg      MCHC 33.3 g/dL      RDW 19.7 %      RDW-SD 66.4 fl      MPV 11.0 fL      Platelets 106 10*3/mm3      Neutrophil % 87.1 %      Lymphocyte % 6.2 %      Monocyte % 4.8 %      Eosinophil % 1.1 %      Basophil % 0.6 %      Immature Grans % 0.2 %      Neutrophils, Absolute 5.46 10*3/mm3      Lymphocytes, Absolute 0.39 10*3/mm3      Monocytes, Absolute 0.30 10*3/mm3      Eosinophils, Absolute 0.07 10*3/mm3      Basophils, Absolute 0.04 10*3/mm3      Immature Grans, Absolute 0.01 10*3/mm3      nRBC 0.0 /100 WBC     COVID-19 and FLU A/B PCR - Swab, Nasopharynx [[id number]]  (Normal) Collected: 09/01/23 1952    Specimen: Swab from Nasopharynx Updated: 09/01/23 2033     COVID19 Not Detected     Influenza A PCR Not Detected     Influenza B PCR Not Detected    Narrative:      Fact sheet for providers: https://www.fda.gov/media/417082/download    Fact sheet for patients: https://www.fda.gov/media/011536/download    Test performed by PCR.    Urinalysis With Culture If Indicated - Urine, Clean Catch [[id number]]  (Abnormal) Collected: 09/01/23 2027     Specimen: Urine, Clean Catch Updated: 09/01/23 2041     Color, UA Yellow     Appearance, UA Cloudy     pH, UA 5.5     Specific Gravity, UA 1.016     Glucose, UA Negative     Ketones, UA Negative     Bilirubin, UA Negative     Blood, UA Moderate (2+)     Protein, UA 30 mg/dL (1+)     Leuk Esterase, UA Moderate (2+)     Nitrite, UA Negative     Urobilinogen, UA 1.0 E.U./dL    Narrative:      In absence of clinical symptoms, the presence of pyuria, bacteria, and/or nitrites on the urinalysis result does not correlate with infection.    Urinalysis, Microscopic Only - Urine, Clean Catch [[id number]]  (Abnormal) Collected: 09/01/23 2027    Specimen: Urine, Clean Catch Updated: 09/01/23 2044     RBC, UA 21-30 /HPF      WBC, UA 21-30 /HPF      Bacteria, UA 1+ /HPF      Squamous Epithelial Cells, UA 13-20 /HPF      Hyaline Casts, UA None Seen /LPF      Methodology Manual Light Microscopy    Urine Culture - Urine, Urine, Clean Catch [[id number]] Collected: 09/01/23 2027    Specimen: Urine, Clean Catch Updated: 09/01/23 2044             CT Abdomen Pelvis Without Contrast    Result Date: 9/1/2023  FINAL REPORT TECHNIQUE: Noncontrast CT exam of the abdomen and pelvis. This study was performed with techniques to keep radiation doses as low as reasonably achievable (ALARA). Individualized dose reduction techniques using automated exposure control or adjustment of mA and/or kV according to the patient''s size were employed. CLINICAL HISTORY: eval obstruction, lulu FINDINGS: Abdomen: There are small bilateral pleural effusions..  There is a cirrhotic appearing liver.  Spleen, pancreas and adrenal glands have a normal CT appearance in their limited unenhanced state.  There are nonobstructing right renal stones.  There is no urinary tract obstruction.  There is mild left renal atrophy. No obvious renal mass is present.  No ureteral stones are present.  There is a large amount of ascites.  There is no bowel obstruction.  There is  moderate adenopathy of the retroperitoneum and enrico hepatis which is probably reactive. Pelvis: No distal ureteral stones are seen.  The uterus and bladder are unremarkable.  There is a large amount of ascites. The appendix is not visualized.     Impression: 1.  No bowel obstruction or free air.  2.  Cirrhosis with large volume ascites.  3.  Small bilateral pleural effusions. Authenticated and Electronically Signed by RAVIN Hicks MD on 09/01/2023 10:26:56 PM        [facility]     Amount and/or Complexity of Data Reviewed  Decide to obtain previous medical records or to obtain history from someone other than the patient: yes        Initial impression of presenting illness: Nausea and vomiting    DDX: includes but is not limited to: Electrolyte derangement, gastroenteritis, COVID-19, viral URI    Patient arrives stable with vitals interpreted by myself.     Pertinent features from physical exam: Third auscultation, regular rate and rhythm, nontender to abdominal palpation.    Initial diagnostic plan: CBC, CMP, lipase, UA    Results from initial plan were reviewed and interpreted by me revealing findings consistent with cirrhosis however, patient has worsening SILVINA from last lab draw, no signs of obstruction on CT    Diagnostic information from other sources: Reviewed past medical records    Interventions / Re-evaluation: Given concerns for symptoms given Zofran, IV fluids    Results/clinical rationale were discussed with patient at bedside    Consultations/Discussion of results with other physicians: Given my concern for continued worsening renal function since last check, discussed with hospitalist admitted to their service for further observation and management    Disposition plan: Admit  -----        Final diagnoses:   Acute kidney injury          Steven Santos MD  09/02/23 0024

## 2023-09-02 LAB
ANION GAP SERPL CALCULATED.3IONS-SCNC: 16.6 MMOL/L (ref 5–15)
BACTERIA SPEC AEROBE CULT: NORMAL
BASOPHILS # BLD AUTO: 0.04 10*3/MM3 (ref 0–0.2)
BASOPHILS NFR BLD AUTO: 0.4 % (ref 0–1.5)
BUN SERPL-MCNC: 66 MG/DL (ref 8–23)
BUN/CREAT SERPL: 24.5 (ref 7–25)
CALCIUM SPEC-SCNC: 8.5 MG/DL (ref 8.6–10.5)
CHLORIDE SERPL-SCNC: 111 MMOL/L (ref 98–107)
CO2 SERPL-SCNC: 16.4 MMOL/L (ref 22–29)
CREAT SERPL-MCNC: 2.69 MG/DL (ref 0.57–1)
D-LACTATE SERPL-SCNC: 2.1 MMOL/L (ref 0.5–2)
D-LACTATE SERPL-SCNC: 2.4 MMOL/L (ref 0.5–2)
D-LACTATE SERPL-SCNC: 3.3 MMOL/L (ref 0.5–2)
DEPRECATED RDW RBC AUTO: 65.2 FL (ref 37–54)
EGFRCR SERPLBLD CKD-EPI 2021: 19.3 ML/MIN/1.73
EOSINOPHIL # BLD AUTO: 0.02 10*3/MM3 (ref 0–0.4)
EOSINOPHIL NFR BLD AUTO: 0.2 % (ref 0.3–6.2)
ERYTHROCYTE [DISTWIDTH] IN BLOOD BY AUTOMATED COUNT: 19.5 % (ref 12.3–15.4)
GLUCOSE BLDC GLUCOMTR-MCNC: 105 MG/DL (ref 70–130)
GLUCOSE BLDC GLUCOMTR-MCNC: 118 MG/DL (ref 70–130)
GLUCOSE BLDC GLUCOMTR-MCNC: 122 MG/DL (ref 70–130)
GLUCOSE BLDC GLUCOMTR-MCNC: 123 MG/DL (ref 70–130)
GLUCOSE BLDC GLUCOMTR-MCNC: 95 MG/DL (ref 70–130)
GLUCOSE SERPL-MCNC: 131 MG/DL (ref 65–99)
HCT VFR BLD AUTO: 27.5 % (ref 34–46.6)
HGB BLD-MCNC: 9.5 G/DL (ref 12–15.9)
IMM GRANULOCYTES # BLD AUTO: 0.03 10*3/MM3 (ref 0–0.05)
IMM GRANULOCYTES NFR BLD AUTO: 0.3 % (ref 0–0.5)
LYMPHOCYTES # BLD AUTO: 0.46 10*3/MM3 (ref 0.7–3.1)
LYMPHOCYTES NFR BLD AUTO: 4.2 % (ref 19.6–45.3)
MCH RBC QN AUTO: 32.8 PG (ref 26.6–33)
MCHC RBC AUTO-ENTMCNC: 34.5 G/DL (ref 31.5–35.7)
MCV RBC AUTO: 94.8 FL (ref 79–97)
MONOCYTES # BLD AUTO: 0.34 10*3/MM3 (ref 0.1–0.9)
MONOCYTES NFR BLD AUTO: 3.1 % (ref 5–12)
NEUTROPHILS NFR BLD AUTO: 10.16 10*3/MM3 (ref 1.7–7)
NEUTROPHILS NFR BLD AUTO: 91.8 % (ref 42.7–76)
NRBC BLD AUTO-RTO: 0 /100 WBC (ref 0–0.2)
PLATELET # BLD AUTO: 133 10*3/MM3 (ref 140–450)
PMV BLD AUTO: 10.8 FL (ref 6–12)
POTASSIUM SERPL-SCNC: 4 MMOL/L (ref 3.5–5.2)
RBC # BLD AUTO: 2.9 10*6/MM3 (ref 3.77–5.28)
SODIUM SERPL-SCNC: 144 MMOL/L (ref 136–145)
WBC NRBC COR # BLD: 11.05 10*3/MM3 (ref 3.4–10.8)

## 2023-09-02 PROCEDURE — 96376 TX/PRO/DX INJ SAME DRUG ADON: CPT

## 2023-09-02 PROCEDURE — 25010000002 HEPARIN (PORCINE) PER 1000 UNITS: Performed by: STUDENT IN AN ORGANIZED HEALTH CARE EDUCATION/TRAINING PROGRAM

## 2023-09-02 PROCEDURE — G0378 HOSPITAL OBSERVATION PER HR: HCPCS

## 2023-09-02 PROCEDURE — 82948 REAGENT STRIP/BLOOD GLUCOSE: CPT

## 2023-09-02 PROCEDURE — 80048 BASIC METABOLIC PNL TOTAL CA: CPT | Performed by: STUDENT IN AN ORGANIZED HEALTH CARE EDUCATION/TRAINING PROGRAM

## 2023-09-02 PROCEDURE — 96366 THER/PROPH/DIAG IV INF ADDON: CPT

## 2023-09-02 PROCEDURE — 96372 THER/PROPH/DIAG INJ SC/IM: CPT

## 2023-09-02 PROCEDURE — P9047 ALBUMIN (HUMAN), 25%, 50ML: HCPCS | Performed by: STUDENT IN AN ORGANIZED HEALTH CARE EDUCATION/TRAINING PROGRAM

## 2023-09-02 PROCEDURE — 96361 HYDRATE IV INFUSION ADD-ON: CPT

## 2023-09-02 PROCEDURE — 25010000002 ALBUMIN HUMAN 25% PER 50 ML: Performed by: NURSE PRACTITIONER

## 2023-09-02 PROCEDURE — 85025 COMPLETE CBC W/AUTO DIFF WBC: CPT | Performed by: STUDENT IN AN ORGANIZED HEALTH CARE EDUCATION/TRAINING PROGRAM

## 2023-09-02 PROCEDURE — 96367 TX/PROPH/DG ADDL SEQ IV INF: CPT

## 2023-09-02 PROCEDURE — 51702 INSERT TEMP BLADDER CATH: CPT

## 2023-09-02 PROCEDURE — P9047 ALBUMIN (HUMAN), 25%, 50ML: HCPCS | Performed by: NURSE PRACTITIONER

## 2023-09-02 PROCEDURE — 83605 ASSAY OF LACTIC ACID: CPT | Performed by: STUDENT IN AN ORGANIZED HEALTH CARE EDUCATION/TRAINING PROGRAM

## 2023-09-02 PROCEDURE — 25010000002 ALBUMIN HUMAN 25% PER 50 ML: Performed by: STUDENT IN AN ORGANIZED HEALTH CARE EDUCATION/TRAINING PROGRAM

## 2023-09-02 PROCEDURE — 87040 BLOOD CULTURE FOR BACTERIA: CPT | Performed by: STUDENT IN AN ORGANIZED HEALTH CARE EDUCATION/TRAINING PROGRAM

## 2023-09-02 PROCEDURE — 25010000002 CEFTRIAXONE SODIUM-DEXTROSE 2-2.22 GM-%(50ML) RECONSTITUTED SOLUTION: Performed by: STUDENT IN AN ORGANIZED HEALTH CARE EDUCATION/TRAINING PROGRAM

## 2023-09-02 PROCEDURE — 96365 THER/PROPH/DIAG IV INF INIT: CPT

## 2023-09-02 PROCEDURE — 25010000002 PROCHLORPERAZINE 10 MG/2ML SOLUTION: Performed by: STUDENT IN AN ORGANIZED HEALTH CARE EDUCATION/TRAINING PROGRAM

## 2023-09-02 PROCEDURE — 96375 TX/PRO/DX INJ NEW DRUG ADDON: CPT

## 2023-09-02 PROCEDURE — 25010000002 ONDANSETRON PER 1 MG: Performed by: STUDENT IN AN ORGANIZED HEALTH CARE EDUCATION/TRAINING PROGRAM

## 2023-09-02 RX ORDER — ACETAMINOPHEN 650 MG/1
650 SUPPOSITORY RECTAL EVERY 4 HOURS PRN
Status: DISCONTINUED | OUTPATIENT
Start: 2023-09-02 | End: 2023-09-03 | Stop reason: HOSPADM

## 2023-09-02 RX ORDER — ALBUMIN (HUMAN) 12.5 G/50ML
25 SOLUTION INTRAVENOUS ONCE
Status: COMPLETED | OUTPATIENT
Start: 2023-09-02 | End: 2023-09-03

## 2023-09-02 RX ORDER — SODIUM CHLORIDE 9 MG/ML
40 INJECTION, SOLUTION INTRAVENOUS AS NEEDED
Status: DISCONTINUED | OUTPATIENT
Start: 2023-09-02 | End: 2023-09-03 | Stop reason: HOSPADM

## 2023-09-02 RX ORDER — ALBUMIN (HUMAN) 12.5 G/50ML
25 SOLUTION INTRAVENOUS ONCE
Status: COMPLETED | OUTPATIENT
Start: 2023-09-03 | End: 2023-09-03

## 2023-09-02 RX ORDER — CHOLECALCIFEROL (VITAMIN D3) 125 MCG
5 CAPSULE ORAL NIGHTLY
Status: DISCONTINUED | OUTPATIENT
Start: 2023-09-02 | End: 2023-09-03 | Stop reason: HOSPADM

## 2023-09-02 RX ORDER — NITROGLYCERIN 0.4 MG/1
0.4 TABLET SUBLINGUAL
Status: DISCONTINUED | OUTPATIENT
Start: 2023-09-02 | End: 2023-09-03 | Stop reason: HOSPADM

## 2023-09-02 RX ORDER — AMOXICILLIN 250 MG
2 CAPSULE ORAL 2 TIMES DAILY
Status: DISCONTINUED | OUTPATIENT
Start: 2023-09-02 | End: 2023-09-03 | Stop reason: HOSPADM

## 2023-09-02 RX ORDER — CEFTRIAXONE 2 G/50ML
2000 INJECTION, SOLUTION INTRAVENOUS EVERY 24 HOURS
Status: DISCONTINUED | OUTPATIENT
Start: 2023-09-02 | End: 2023-09-03 | Stop reason: HOSPADM

## 2023-09-02 RX ORDER — ASPIRIN 81 MG/1
81 TABLET ORAL DAILY
Status: DISCONTINUED | OUTPATIENT
Start: 2023-09-02 | End: 2023-09-03 | Stop reason: HOSPADM

## 2023-09-02 RX ORDER — SODIUM CHLORIDE 0.9 % (FLUSH) 0.9 %
10 SYRINGE (ML) INJECTION AS NEEDED
Status: DISCONTINUED | OUTPATIENT
Start: 2023-09-02 | End: 2023-09-03 | Stop reason: HOSPADM

## 2023-09-02 RX ORDER — MIDODRINE HYDROCHLORIDE 5 MG/1
10 TABLET ORAL
Status: DISCONTINUED | OUTPATIENT
Start: 2023-09-03 | End: 2023-09-02

## 2023-09-02 RX ORDER — PANTOPRAZOLE SODIUM 40 MG/1
40 TABLET, DELAYED RELEASE ORAL
Status: DISCONTINUED | OUTPATIENT
Start: 2023-09-02 | End: 2023-09-03 | Stop reason: HOSPADM

## 2023-09-02 RX ORDER — BUPROPION HYDROCHLORIDE 150 MG/1
150 TABLET ORAL DAILY
Status: DISCONTINUED | OUTPATIENT
Start: 2023-09-02 | End: 2023-09-03 | Stop reason: HOSPADM

## 2023-09-02 RX ORDER — ONDANSETRON 2 MG/ML
4 INJECTION INTRAMUSCULAR; INTRAVENOUS EVERY 6 HOURS PRN
Status: DISCONTINUED | OUTPATIENT
Start: 2023-09-02 | End: 2023-09-03 | Stop reason: HOSPADM

## 2023-09-02 RX ORDER — INSULIN ASPART 100 [IU]/ML
1-200 INJECTION, SOLUTION INTRAVENOUS; SUBCUTANEOUS AS NEEDED
Status: DISCONTINUED | OUTPATIENT
Start: 2023-09-02 | End: 2023-09-03 | Stop reason: HOSPADM

## 2023-09-02 RX ORDER — ACETAMINOPHEN 325 MG/1
650 TABLET ORAL EVERY 4 HOURS PRN
Status: DISCONTINUED | OUTPATIENT
Start: 2023-09-02 | End: 2023-09-03 | Stop reason: HOSPADM

## 2023-09-02 RX ORDER — ACETAMINOPHEN 160 MG/5ML
650 SOLUTION ORAL EVERY 4 HOURS PRN
Status: DISCONTINUED | OUTPATIENT
Start: 2023-09-02 | End: 2023-09-03 | Stop reason: HOSPADM

## 2023-09-02 RX ORDER — PROCHLORPERAZINE EDISYLATE 5 MG/ML
5 INJECTION INTRAMUSCULAR; INTRAVENOUS EVERY 6 HOURS PRN
Status: DISCONTINUED | OUTPATIENT
Start: 2023-09-02 | End: 2023-09-03 | Stop reason: HOSPADM

## 2023-09-02 RX ORDER — HEPARIN SODIUM 5000 [USP'U]/ML
5000 INJECTION, SOLUTION INTRAVENOUS; SUBCUTANEOUS EVERY 8 HOURS SCHEDULED
Status: DISCONTINUED | OUTPATIENT
Start: 2023-09-02 | End: 2023-09-03 | Stop reason: HOSPADM

## 2023-09-02 RX ORDER — BISACODYL 10 MG
10 SUPPOSITORY, RECTAL RECTAL DAILY PRN
Status: DISCONTINUED | OUTPATIENT
Start: 2023-09-02 | End: 2023-09-03 | Stop reason: HOSPADM

## 2023-09-02 RX ORDER — SODIUM CHLORIDE 0.9 % (FLUSH) 0.9 %
10 SYRINGE (ML) INJECTION EVERY 12 HOURS SCHEDULED
Status: DISCONTINUED | OUTPATIENT
Start: 2023-09-02 | End: 2023-09-03 | Stop reason: HOSPADM

## 2023-09-02 RX ORDER — POLYETHYLENE GLYCOL 3350 17 G/17G
17 POWDER, FOR SOLUTION ORAL DAILY PRN
Status: DISCONTINUED | OUTPATIENT
Start: 2023-09-02 | End: 2023-09-03 | Stop reason: HOSPADM

## 2023-09-02 RX ORDER — NICOTINE POLACRILEX 4 MG
15 LOZENGE BUCCAL
Status: DISCONTINUED | OUTPATIENT
Start: 2023-09-02 | End: 2023-09-03 | Stop reason: HOSPADM

## 2023-09-02 RX ORDER — IBUPROFEN 600 MG/1
1 TABLET ORAL
Status: DISCONTINUED | OUTPATIENT
Start: 2023-09-02 | End: 2023-09-03 | Stop reason: HOSPADM

## 2023-09-02 RX ORDER — SPIRONOLACTONE 25 MG/1
25 TABLET ORAL DAILY
Status: DISCONTINUED | OUTPATIENT
Start: 2023-09-02 | End: 2023-09-02

## 2023-09-02 RX ORDER — BISACODYL 5 MG/1
5 TABLET, DELAYED RELEASE ORAL DAILY PRN
Status: DISCONTINUED | OUTPATIENT
Start: 2023-09-02 | End: 2023-09-03 | Stop reason: HOSPADM

## 2023-09-02 RX ORDER — ATORVASTATIN CALCIUM 20 MG/1
20 TABLET, FILM COATED ORAL DAILY
Status: DISCONTINUED | OUTPATIENT
Start: 2023-09-02 | End: 2023-09-03 | Stop reason: HOSPADM

## 2023-09-02 RX ORDER — INSULIN ASPART 100 [IU]/ML
1-200 INJECTION, SOLUTION INTRAVENOUS; SUBCUTANEOUS
Status: DISCONTINUED | OUTPATIENT
Start: 2023-09-02 | End: 2023-09-03 | Stop reason: HOSPADM

## 2023-09-02 RX ORDER — SODIUM CHLORIDE 9 MG/ML
100 INJECTION, SOLUTION INTRAVENOUS CONTINUOUS
Status: DISCONTINUED | OUTPATIENT
Start: 2023-09-02 | End: 2023-09-02

## 2023-09-02 RX ORDER — IPRATROPIUM BROMIDE AND ALBUTEROL SULFATE 2.5; .5 MG/3ML; MG/3ML
3 SOLUTION RESPIRATORY (INHALATION) EVERY 4 HOURS PRN
Status: DISCONTINUED | OUTPATIENT
Start: 2023-09-02 | End: 2023-09-03 | Stop reason: HOSPADM

## 2023-09-02 RX ORDER — DONEPEZIL HYDROCHLORIDE 5 MG/1
10 TABLET, FILM COATED ORAL NIGHTLY
Status: DISCONTINUED | OUTPATIENT
Start: 2023-09-02 | End: 2023-09-03 | Stop reason: HOSPADM

## 2023-09-02 RX ORDER — MIDODRINE HYDROCHLORIDE 5 MG/1
10 TABLET ORAL ONCE
Status: COMPLETED | OUTPATIENT
Start: 2023-09-03 | End: 2023-09-02

## 2023-09-02 RX ORDER — DIPHENOXYLATE HYDROCHLORIDE AND ATROPINE SULFATE 2.5; .025 MG/1; MG/1
1 TABLET ORAL 4 TIMES DAILY PRN
Status: DISCONTINUED | OUTPATIENT
Start: 2023-09-02 | End: 2023-09-03 | Stop reason: HOSPADM

## 2023-09-02 RX ORDER — LEVOTHYROXINE SODIUM 0.15 MG/1
300 TABLET ORAL DAILY
Status: DISCONTINUED | OUTPATIENT
Start: 2023-09-02 | End: 2023-09-03 | Stop reason: HOSPADM

## 2023-09-02 RX ORDER — DEXTROSE MONOHYDRATE 25 G/50ML
10-50 INJECTION, SOLUTION INTRAVENOUS
Status: DISCONTINUED | OUTPATIENT
Start: 2023-09-02 | End: 2023-09-03 | Stop reason: HOSPADM

## 2023-09-02 RX ADMIN — Medication 10 ML: at 03:02

## 2023-09-02 RX ADMIN — HEPARIN SODIUM 5000 UNITS: 5000 INJECTION INTRAVENOUS; SUBCUTANEOUS at 22:07

## 2023-09-02 RX ADMIN — LEVOTHYROXINE SODIUM 300 MCG: 150 TABLET ORAL at 05:37

## 2023-09-02 RX ADMIN — ACETAMINOPHEN 650 MG: 325 TABLET, FILM COATED ORAL at 18:25

## 2023-09-02 RX ADMIN — CEFTRIAXONE 2000 MG: 2 INJECTION, SOLUTION INTRAVENOUS at 02:24

## 2023-09-02 RX ADMIN — MIDODRINE HYDROCHLORIDE 10 MG: 5 TABLET ORAL at 23:20

## 2023-09-02 RX ADMIN — ONDANSETRON 4 MG: 2 INJECTION INTRAMUSCULAR; INTRAVENOUS at 10:51

## 2023-09-02 RX ADMIN — ONDANSETRON 4 MG: 2 INJECTION INTRAMUSCULAR; INTRAVENOUS at 02:44

## 2023-09-02 RX ADMIN — ATORVASTATIN CALCIUM 20 MG: 20 TABLET, FILM COATED ORAL at 08:45

## 2023-09-02 RX ADMIN — CEFTRIAXONE 2000 MG: 2 INJECTION, SOLUTION INTRAVENOUS at 20:42

## 2023-09-02 RX ADMIN — ACETAMINOPHEN 650 MG: 325 TABLET, FILM COATED ORAL at 23:04

## 2023-09-02 RX ADMIN — Medication 10 ML: at 08:47

## 2023-09-02 RX ADMIN — BUPROPION HYDROCHLORIDE 150 MG: 150 TABLET, FILM COATED, EXTENDED RELEASE ORAL at 08:45

## 2023-09-02 RX ADMIN — ALBUMIN (HUMAN) 25 G: 0.25 INJECTION, SOLUTION INTRAVENOUS at 11:06

## 2023-09-02 RX ADMIN — PROCHLORPERAZINE EDISYLATE 5 MG: 5 INJECTION INTRAMUSCULAR; INTRAVENOUS at 05:35

## 2023-09-02 RX ADMIN — ASPIRIN 81 MG: 81 TABLET, COATED ORAL at 08:48

## 2023-09-02 RX ADMIN — ALBUMIN (HUMAN) 25 G: 0.25 INJECTION, SOLUTION INTRAVENOUS at 23:20

## 2023-09-02 RX ADMIN — PANTOPRAZOLE SODIUM 40 MG: 40 TABLET, DELAYED RELEASE ORAL at 05:37

## 2023-09-02 RX ADMIN — ACETAMINOPHEN 650 MG: 325 TABLET, FILM COATED ORAL at 02:51

## 2023-09-02 RX ADMIN — SODIUM CHLORIDE 500 ML: 9 INJECTION, SOLUTION INTRAVENOUS at 16:50

## 2023-09-02 RX ADMIN — Medication 10 ML: at 03:01

## 2023-09-02 RX ADMIN — DONEPEZIL HYDROCHLORIDE 10 MG: 5 TABLET ORAL at 20:42

## 2023-09-02 RX ADMIN — HEPARIN SODIUM 5000 UNITS: 5000 INJECTION INTRAVENOUS; SUBCUTANEOUS at 16:51

## 2023-09-02 RX ADMIN — Medication 10 ML: at 20:43

## 2023-09-02 RX ADMIN — Medication 5 MG: at 22:07

## 2023-09-02 RX ADMIN — HEPARIN SODIUM 5000 UNITS: 5000 INJECTION INTRAVENOUS; SUBCUTANEOUS at 05:36

## 2023-09-02 RX ADMIN — SODIUM CHLORIDE 100 ML/HR: 9 INJECTION, SOLUTION INTRAVENOUS at 03:00

## 2023-09-02 RX ADMIN — DONEPEZIL HYDROCHLORIDE 10 MG: 5 TABLET ORAL at 03:45

## 2023-09-02 NOTE — PLAN OF CARE
Goal Outcome Evaluation: Pt admitted from ED. Pt has been hypotensive. Blood pressure being monitored closely. Pt also was retaining urine. Bladder scan revealed 872 ml. Petersen catheter ordered and placed. PRN tylenol given for pt's back discomfort.

## 2023-09-02 NOTE — ED NOTES
Charge RN made aware of patient hypotension. House Supervisor contacted and notified by Charge RN.

## 2023-09-02 NOTE — H&P
[facility]   HISTORY AND PHYSICAL      Name:  Ivania Harris   Age:  62 y.o.  Sex:  female  :  1960  MRN:  [id number]   Visit Number:  [id number]  Admission Date:  2023  Date Of Service:  23  Primary Care Physician:  Kala Beaver MD    Chief Complaint:     Nausea and vomiting    History Of Presenting Illness:      Patient is a 62-year-old woman with past medical history of Leo cirrhosis, type 2 diabetes, CKD 3, hypertension, dyslipidemia, hypothyroidism, large prepyloric ulcer, reflux esophagitis, morbid obesity BMI 43 with stasis dermatitis bilateral legs, debility, anemia, sacral decubitus ulcer.  Recent hospitalization [facility] with hematemesis EGD found prepyloric ulcer, 15 L removed paracentesis, extubated on , was discharged to [facility].  Presented to [facility] ED on 2023 with concern for 2 episodes of nausea and vomiting day of presentation.  Denied any blood in vomitus.  Denied fevers or chills, shortness of air, cough, chest pain, diarrhea.    ED summary: Afebrile, vital signs stable on room air.  EKG sinus rhythm with supraventricular premature contraction, no ST elevations or depressions.  Creatinine 2.79 most recent 2.15 on .  Lipase not elevated.  No leukocytosis.  Hemoglobin 8.9.  Pattern of infection on urinalysis, may be contaminant although may explain symptoms.  Urine culture collected.  COVID/flu negative.  CT abdomen/pelvis cirrhosis with large volume ascites, small bilateral pleural effusions.  She was provided Zofran, 500 mL bolus.    Review Of Systems:    All systems were reviewed and negative except as mentioned in history of presenting illness, assessment and plan.    Past Medical History: Patient  has a past medical history of Anemia, Diabetes mellitus, Hyperlipidemia, Hypertension, Hypothyroidism, Impaired mobility, and Short-term memory loss.    Past Surgical History: Patient  has a past surgical history that  includes Cholecystectomy; Esophagogastroduodenoscopy w/ banding (N/A, 4/6/2023); and Esophagogastroduodenoscopy (N/A, 7/5/2023).    Social History: Patient  reports that she has never smoked. She has never used smokeless tobacco. She reports current alcohol use. She reports that she does not use drugs.    Family History:  Patient's family history has been reviewed and found to be noncontributory.     Allergies:      Patient has no known allergies.    Home Medications:    Prior to Admission Medications       Prescriptions Last Dose Informant Patient Reported? Taking?    aspirin 81 MG EC tablet 9/1/2023  No Yes    Take 1 tablet by mouth Daily. May restart in 1 week    atorvastatin (LIPITOR) 20 MG tablet 9/1/2023  Yes Yes    Take 1 tablet by mouth Daily.    bumetanide (BUMEX) 2 MG tablet 9/1/2023  No Yes    Take 1 tablet by mouth 2 (Two) Times a Day.    buPROPion XL (WELLBUTRIN XL) 150 MG 24 hr tablet 9/1/2023  Yes Yes    Take 1 tablet by mouth Daily.    diphenoxylate-atropine (LOMOTIL) 2.5-0.025 MG per tablet 9/1/2023  No Yes    Take 1 tablet by mouth 4 (Four) Times a Day As Needed for Diarrhea.    donepezil (ARICEPT) 10 MG tablet 9/1/2023  Yes Yes    Take 1 tablet by mouth Every Night.    insulin aspart (novoLOG) 100 UNIT/ML injection 9/1/2023  Yes Yes    Inject  under the skin into the appropriate area as directed 3 (Three) Times a Day Before Meals. Sliding Scale as needed    Insulin Aspart (novoLOG) 100 UNIT/ML injection 9/1/2023  No Yes    Inject 0-14 Units under the skin into the appropriate area as directed 3 (Three) Times a Day Before Meals.    iron polysaccharides (NIFEREX) 150 MG capsule 9/1/2023  No Yes    Take 1 capsule by mouth Daily.    levothyroxine (SYNTHROID, LEVOTHROID) 100 MCG tablet 9/1/2023  Yes Yes    Take 3 tablets by mouth Daily.    multivitamin with minerals tablet tablet 9/1/2023  Yes Yes    Take 1 tablet by mouth Daily.    pantoprazole (PROTONIX) 40 MG EC tablet 9/1/2023  No Yes    Take 1  "tablet by mouth Every Morning.    SITagliptin (JANUVIA) 50 MG tablet 9/1/2023  Yes Yes    Take 1 tablet by mouth Daily.    spironolactone (ALDACTONE) 50 MG tablet 9/1/2023  No Yes    Take 0.5 tablets by mouth Daily.    ipratropium-albuterol (DUO-NEB) 0.5-2.5 mg/3 ml nebulizer Unknown  No No    Nebulize q 4 h prn wheezing / shortness of breath    ondansetron ODT (ZOFRAN-ODT) 4 MG disintegrating tablet Unknown  No No    Place 1 tablet on the tongue Every 8 (Eight) Hours As Needed for Nausea or Vomiting.          ED Medications:    Medications   sodium chloride 0.9 % flush 10 mL (has no administration in time range)   ondansetron (ZOFRAN) injection 4 mg (4 mg Intravenous Given 9/1/23 1954)   sodium chloride 0.9 % bolus 500 mL (500 mL Intravenous New Bag 9/1/23 1954)     Vital Signs:  Temp:  [98 °F (36.7 °C)] 98 °F (36.7 °C)  Heart Rate:  [50-73] 58  Resp:  [16] 16  BP: (101-145)/(43-64) 126/60        09/01/23 1924   Weight: 119 kg (262 lb)     Body mass index is 43.6 kg/m².    Physical Exam:     Most recent vital Signs: /60 (Patient Position: Lying)   Pulse 58   Temp 98 °F (36.7 °C) (Oral)   Resp 16   Ht 165.1 cm (65\")   Wt 119 kg (262 lb)   SpO2 100%   BMI 43.60 kg/m²     Physical Exam  Constitutional:       General: She is not in acute distress.     Appearance: She is ill-appearing. She is not toxic-appearing.   HENT:      Mouth/Throat:      Mouth: Mucous membranes are moist.   Eyes:      Extraocular Movements: Extraocular movements intact.   Cardiovascular:      Rate and Rhythm: Normal rate and regular rhythm.      Pulses: Normal pulses.      Heart sounds: Normal heart sounds.   Pulmonary:      Effort: Pulmonary effort is normal.      Breath sounds: Normal breath sounds.   Abdominal:      Palpations: Abdomen is soft.      Tenderness: There is abdominal tenderness.      Comments: Mildly tender inferior portion of abdomen   Musculoskeletal:      Right lower leg: Edema present.      Left lower leg: Edema " present.      Comments: Patient deferred on exam of decubitus ulcer due to discomfort with rolling   Neurological:      General: No focal deficit present.      Mental Status: She is alert and oriented to person, place, and time.   Psychiatric:         Mood and Affect: Mood normal.         Thought Content: Thought content normal.       Laboratory data:    I have reviewed the labs done in the emergency room.    Results from last 7 days   Lab Units 09/01/23  1921   SODIUM mmol/L 141   POTASSIUM mmol/L 4.0   CHLORIDE mmol/L 110*   CO2 mmol/L 14.7*   BUN mg/dL 66*   CREATININE mg/dL 2.79*   CALCIUM mg/dL 8.5*   BILIRUBIN mg/dL 1.7*   ALK PHOS U/L 109   ALT (SGPT) U/L 12   AST (SGOT) U/L 25   GLUCOSE mg/dL 129*     Results from last 7 days   Lab Units 09/01/23  1949   WBC 10*3/mm3 6.27   HEMOGLOBIN g/dL 8.9*   HEMATOCRIT % 26.7*   PLATELETS 10*3/mm3 106*                     Results from last 7 days   Lab Units 09/01/23 1921   LIPASE U/L 32         Results from last 7 days   Lab Units 09/01/23 2027   COLOR UA  Yellow   GLUCOSE UA  Negative   KETONES UA  Negative   LEUKOCYTES UA  Moderate (2+)*   PH, URINE  5.5   BILIRUBIN UA  Negative   UROBILINOGEN UA  1.0 E.U./dL   RBC UA /HPF 21-30*   WBC UA /HPF 21-30*       Pain Management Panel  More data may exist         Latest Ref Rng & Units 4/8/2023 4/6/2023   Pain Management Panel   Creatinine, Urine mg/dL 118.1  -   Amphetamine, Urine Qual Negative - Negative    Barbiturates Screen, Urine Negative - Negative    Benzodiazepine Screen, Urine Negative - Negative    Buprenorphine, Screen, Urine Negative - Negative    Cocaine Screen, Urine Negative - Negative    Methadone Screen , Urine Negative - Negative    Methamphetamine, Ur Negative - Negative        EKG:      EKG sinus rhythm with supraventricular premature contraction, no ST elevations or depressions.     Radiology:    CT Abdomen Pelvis Without Contrast    Result Date: 9/1/2023  FINAL REPORT TECHNIQUE: Noncontrast CT exam  of the abdomen and pelvis. This study was performed with techniques to keep radiation doses as low as reasonably achievable (ALARA). Individualized dose reduction techniques using automated exposure control or adjustment of mA and/or kV according to the patient''s size were employed. CLINICAL HISTORY: eval obstruction, silvina FINDINGS: Abdomen: There are small bilateral pleural effusions..  There is a cirrhotic appearing liver.  Spleen, pancreas and adrenal glands have a normal CT appearance in their limited unenhanced state.  There are nonobstructing right renal stones.  There is no urinary tract obstruction.  There is mild left renal atrophy. No obvious renal mass is present.  No ureteral stones are present.  There is a large amount of ascites.  There is no bowel obstruction.  There is moderate adenopathy of the retroperitoneum and enrico hepatis which is probably reactive. Pelvis: No distal ureteral stones are seen.  The uterus and bladder are unremarkable.  There is a large amount of ascites. The appendix is not visualized.     1.  No bowel obstruction or free air.  2.  Cirrhosis with large volume ascites.  3.  Small bilateral pleural effusions. Authenticated and Electronically Signed by RAVIN Hicks MD on 09/01/2023 10:26:56 PM    US Ankle / Brachial Indices Extremity Complete    Result Date: 8/29/2023  PROCEDURE: US ANKLE / BRACHIAL INDICES EXTREMITY COMPLETE-  HISTORY: R60.0; R60.0-Localized edema  PROCEDURE: Brachial and ankle pressures were obtained bilaterally. IRON's were calculated.  FINDINGS: The right IRON measures 1.3.  The left IRON measures 1.3.      Normal left and right IRON.      Images were reviewed, interpreted, and dictated by Dr. Katt Muse MD Transcribed by Elsi Pena PA-C.  This report was signed and finalized on 8/29/2023 1:20 PM by Katt Muse MD.       Assessment/Plan:    Observation admission 9/1/2023 with SILVINA on CKD, also found to have UTI.    At time of admission patient in bed  with moderate discomfort.  Witnessed vomiting.  She does not have an acute abdomen, do not suspect peritonitis.  She does have significant ascites.  Denied any dysuria.  She has not eaten and drinking for the past couple of days due to nausea.  Attempted to roll her to evaluate her decub ulcer, she decided to defer till next day for this due to discomfort.    UTI  Rocephin started 9/2.  May be contaminant, although infection could explain her nausea and vomiting.    Nausea and vomiting  SILVINA on CKD  IVF.      Ascites  Cirrhosis  Would benefit from therapeutic paracentesis.      Sacral decubitus ulcer chronic  Wound care. She has wound care at home coming once a week.     Debility  PT/OT.  It appears she is lost about 50 pounds since her hospitalization here in April 2023.    Chronic:  Leo cirrhosis, type 2 diabetes, CKD 3, hypertension, dyslipidemia, hypothyroidism, large prepyloric ulcer, reflux esophagitis, morbid obesity BMI 43 with stasis dermatitis bilateral legs, debility, anemia, sacral decubitus ulcer.      Continue home medications.    Risk Assessment: High  DVT Prophylaxis: Heparin  Code Status: Full code, she says that she would like to think about it and revisit this conversation.  Diet: Heart healthy/carbohydrate controlled/renal    Advance Care Planning   ACP discussion was held with the patient during this visit. Patient does not have an advance directive, information provided.           Brian Joseph Kerley, DO  09/02/23  00:00 EDT    Dictated utilizing Dragon dictation.

## 2023-09-02 NOTE — CASE MANAGEMENT/SOCIAL WORK
Discharge Planning Assessment   [place]     Patient Name: Ivania Harris  MRN: [id number]  Today's Date: 9/2/2023    Admit Date: 9/1/2023    Plan: DCP   Discharge Needs Assessment       Row Name 09/02/23 1418       Living Environment    People in Home alone    Current Living Arrangements home    Potentially Unsafe Housing Conditions none    Primary Care Provided by self    Provides Primary Care For no one    Family Caregiver if Needed sibling(s)    Quality of Family Relationships supportive;involved;helpful    Able to Return to Prior Arrangements yes       Resource/Environmental Concerns    Resource/Environmental Concerns none    Transportation Concerns none       Food Insecurity    Within the past 12 months, you worried that your food would run out before you got the money to buy more. Never true    Within the past 12 months, the food you bought just didn't last and you didn't have money to get more. Never true       Transition Planning    Patient/Family Anticipates Transition to home    Patient/Family Anticipated Services at Transition none    Transportation Anticipated family or friend will provide       Discharge Needs Assessment    Readmission Within the Last 30 Days no previous admission in last 30 days    Equipment Currently Used at Home walker, rolling;commode;shower chair    Concerns to be Addressed no discharge needs identified    Anticipated Changes Related to Illness none    Equipment Needed After Discharge none    Outpatient/Agency/Support Group Needs homecare agency;inpatient rehabilitation facility    Discharge Facility/Level of Care Needs home with home health;nursing facility, skilled    Provided Post Acute Provider List? N/A    Provided Post Acute Provider Quality & Resource List? N/A                   Discharge Plan       Row Name 09/02/23 1419       Plan    Plan DCP    Patient/Family in Agreement with Plan unable to assess    Provided Post Acute Provider List? N/A    Provided Post  Acute Provider Quality & Resource List? N/A    Plan Comments SW met with Pt. at bedside to complete DCP. Pt. lives at home alone. Pt. has a quad cane that she uses for mobility. Pt. has used [facility] services prior. Pt. has been in a SNF in [place] and at [facility] in the last sveral months. Pt. brother is her POA. Pt. uses [facility] as her pharmacy. Plans for discharge are home with HH services vs STR pending therapy progress. SW/CM will continue to follow for discharge planning.                  Continued Care and Services - Admitted Since 9/1/2023    Coordination has not been started for this encounter.       Selected Continued Care - Prior Encounters Includes continued care and service providers with selected services from prior encounters from 6/3/2023 to 9/2/2023      Discharged on 7/14/2023 Admission date: 7/4/2023 - Discharge disposition: Skilled Nursing Facility (DC - External)      Destination       Service Provider Selected Services Address Phone Fax Patient Preferred    [facility] [address] [id number] [phone] --                             Demographic Summary       Row Name 09/02/23 1416       General Information    Admission Type observation    Arrived From emergency department    Required Notices Provided Observation Status Notice    Referral Source admission list    Reason for Consult discharge planning    Preferred Language English       Contact Information    Permission Granted to Share Info With     Contact Information Obtained for                    Functional Status       Row Name 09/02/23 1416       Functional Status    Usual Activity Tolerance moderate    Current Activity Tolerance fair       Physical Activity    On average, how many days per week do you engage in moderate to strenuous exercise (like a brisk walk)? 0 days    On average, how many minutes do you engage  in exercise at this level? 0 min    Number of minutes of exercise per week 0       Assessment of Health Literacy    How often do you have someone help you read hospital materials? Occasionally    How often do you have problems learning about your medical condition because of difficulty understanding written information? Occasionally    How often do you have a problem understanding what is told to you about your medical condition? Occasionally    How confident are you filling out medical forms by yourself? Quite a bit    Health Literacy Moderate       Functional Status, IADL    Medications independent    Meal Preparation assistive equipment and person    Housekeeping assistive equipment and person    Laundry assistive equipment and person    Shopping assistive equipment and person       Mental Status Summary    Recent Changes in Mental Status/Cognitive Functioning no changes       Employment/    Employment Status retired    Current or Previous Occupation not applicable                   Psychosocial       Row Name 09/02/23 1418       Values/Beliefs    Spiritual, Cultural Beliefs, Baptism Practices, Values that Affect Care no       Coping/Stress    Major Change/Loss/Stressor illness    Patient Personal Strengths resilient    Sources of Support sibling(s)    Techniques to [place] with Loss/Stress/Change not applicable    Reaction to Health Status realistic    Understanding of Condition and Treatment adequate understanding of treatment       Developmental Stage (Eriksson's)    Developmental Stage Stage 7 (35-65 years/Middle Adulthood) Generativity vs. Stagnation       C-SSRS (Recent)    Q1 Wished to be Dead (Past Month) no    Q2 Suicidal Thoughts (Past Month) no    Q6 Suicide Behavior (Lifetime) no       Violence Risk    Feels Like Hurting Others no    Previous Attempt to Harm Others no                   Abuse/Neglect       Row Name 09/02/23 1418       Personal Safety    Feels Unsafe at Home or Work/School no     Feels Threatened by Someone no    Does Anyone Try to Keep You From Having Contact with Others or Doing Things Outside Your Home? no    Physical Signs of Abuse Present no                   Legal    No documentation.                  Substance Abuse    No documentation.                  Patient Forms    No documentation.                     Aman Frost

## 2023-09-02 NOTE — PROGRESS NOTES
[facility] HOSPITALIST    PROGRESS NOTE    Name:  Ivania Harris   Age:  62 y.o.  Sex:  female  :  1960  MRN:  [id number]   Visit Number:  [id number]  Admission Date:  2023  Date Of Service:  23  Primary Care Physician:  Kala Beaver MD     LOS: 0 days :    Chief Complaint:      Nausea and vomiting    Subjective:    Patient seen and examined with RN at bedside.  Hypotension noted.  Patient appears to be asymptomatic and is currently alert and oriented x3.  She does live alone.  Home health has been coming out and dressing her bilateral lower extremities.  Endorsed 2 days of nausea and mild vomiting without blood noted.  Denied any current concerns.    Hospital Course:    Patient is a 62-year-old woman with past medical history of Leo cirrhosis, type 2 diabetes, CKD 3, hypertension, dyslipidemia, hypothyroidism, large prepyloric ulcer, reflux esophagitis, morbid obesity BMI 43 with stasis dermatitis bilateral legs, debility, anemia, sacral decubitus ulcer.  Recent hospitalization [facility] with hematemesis EGD found prepyloric ulcer, 15 L removed paracentesis, extubated on , was discharged to [facility].  Presented to [facility] ED on 2023 with concern for 2 episodes of nausea and vomiting day of presentation.  Denied any blood in vomitus.  Denied fevers or chills, shortness of air, cough, chest pain, diarrhea.     ED summary: Afebrile, vital signs stable on room air.  EKG sinus rhythm with supraventricular premature contraction, no ST elevations or depressions.  Creatinine 2.79 most recent 2.15 on .  Lipase not elevated.  No leukocytosis.  Hemoglobin 8.9.  Pattern of infection on urinalysis, may be contaminant although may explain symptoms.  Urine culture collected.  COVID/flu negative.  CT abdomen/pelvis cirrhosis with large volume ascites, small bilateral pleural effusions.  She was provided Zofran, 500 mL bolus.  Hospitalist consulted for further  medical management.  Continued Rocephin due to possible peritonitis.  No dysuria.  Gentle fluids given as well as albumin for hypotension.  Blood cultures pending.    Review of Systems:     All systems were reviewed and negative except as mentioned in subjective, assessment and plan.    Vital Signs:    Temp:  [98 °F (36.7 °C)-98.3 °F (36.8 °C)] 98.3 °F (36.8 °C)  Heart Rate:  [50-76] 70  Resp:  [16-20] 20  BP: ()/() 91/42    Intake and output:    No intake/output data recorded.  No intake/output data recorded.    Physical Examination:    General Appearance:  Alert and cooperative.  Chronically ill/frail appearing middle-aged female.   Head:  Atraumatic and normocephalic.   Eyes: Conjunctivae and sclerae normal, no icterus. No pallor.   Throat: No oral lesions, no thrush, oral mucosa moist.   Neck: Supple, trachea midline, no thyromegaly.   Lungs:   Breath sounds heard bilaterally equally.  No wheezing or crackles. No Pleural rub or bronchial breathing.  Air unlabored.   Heart:  Normal S1 and S2, no murmur, no gallop, no rub. No JVD.   Abdomen:   Normal bowel sounds, no masses, no organomegaly. Soft with ascites noted.  Mild tenderness noted lower abdominal area.   Extremities: Supple, trace bilateral lower extremity edema, no cyanosis, no clubbing.   Skin: No bleeding or rash.   Neurologic: Alert and oriented x 3. No facial asymmetry. Moves all four limbs. No tremors.      Laboratory results:    Results from last 7 days   Lab Units 09/02/23  0602 09/01/23  1921   SODIUM mmol/L 144 141   POTASSIUM mmol/L 4.0 4.0   CHLORIDE mmol/L 111* 110*   CO2 mmol/L 16.4* 14.7*   BUN mg/dL 66* 66*   CREATININE mg/dL 2.69* 2.79*   CALCIUM mg/dL 8.5* 8.5*   BILIRUBIN mg/dL  --  1.7*   ALK PHOS U/L  --  109   ALT (SGPT) U/L  --  12   AST (SGOT) U/L  --  25   GLUCOSE mg/dL 131* 129*     Results from last 7 days   Lab Units 09/02/23  0602 09/01/23 1949   WBC 10*3/mm3 11.05* 6.27   HEMOGLOBIN g/dL 9.5* 8.9*   HEMATOCRIT %  27.5* 26.7*   PLATELETS 10*3/mm3 133* 106*                 Recent Labs     07/05/23  1309 07/06/23  0407 07/07/23  0340   PHART 7.451* 7.492* 7.495*   [id number] 36.0 33.7* 34.5*   PO2ART 81.3* 70.9* 73.6*   VJC5WTB 25.1 25.7 26.6*   BASEEXCESS 1.1 2.4* 3.2*      I have reviewed the patient's laboratory results.    Radiology results:    CT Abdomen Pelvis Without Contrast    Result Date: 9/1/2023  FINAL REPORT TECHNIQUE: Noncontrast CT exam of the abdomen and pelvis. This study was performed with techniques to keep radiation doses as low as reasonably achievable (ALARA). Individualized dose reduction techniques using automated exposure control or adjustment of mA and/or kV according to the patient''s size were employed. CLINICAL HISTORY: eval obstruction, lulu FINDINGS: Abdomen: There are small bilateral pleural effusions..  There is a cirrhotic appearing liver.  Spleen, pancreas and adrenal glands have a normal CT appearance in their limited unenhanced state.  There are nonobstructing right renal stones.  There is no urinary tract obstruction.  There is mild left renal atrophy. No obvious renal mass is present.  No ureteral stones are present.  There is a large amount of ascites.  There is no bowel obstruction.  There is moderate adenopathy of the retroperitoneum and enrico hepatis which is probably reactive. Pelvis: No distal ureteral stones are seen.  The uterus and bladder are unremarkable.  There is a large amount of ascites. The appendix is not visualized.     Impression: 1.  No bowel obstruction or free air.  2.  Cirrhosis with large volume ascites.  3.  Small bilateral pleural effusions. Authenticated and Electronically Signed by RAVIN Hicks MD on 09/01/2023 10:26:56 PM   I have reviewed the patient's radiology reports.    Medication Review:     I have reviewed the patient's active and prn medications.     Problem List:      Acute-on-chronic kidney injury      Assessment:    Intractable nausea vomiting,  POA  Acute kidney injury on chronic kidney disease stage III, POA  Suspect underlying peritonitis, POA  High anion gap lactic acidosis likely secondary to dehydration, POA  Leo cirrhosis  Chronic sacral decubitus  Diabetes mellitus type 2  Essential hypertension  Large prepyloric ulcer with reflux esophagitis  Bilateral lower extremity stasis dermatitis    Plan:    Intractable nausea and vomiting with SILVINA/suspect peritonitis  -Continue 2 g Rocephin with blood cultures pending.  -Hemoglobin currently stable.  -Perform paracentesis as interventional radiology available and obtain culture.  -Suspect underlying hepatorenal as well.  Baseline creatinine appears to be around 1.5.  -Gentle fluids given current ascites with likely need for paracentesis on Monday.  -Albumin as needed for hypotension.  -Hold diuretics at this time.    Leo cirrhosis  Chronic sacral decubitus  Diabetes mellitus type 2  Essential hypertension  Large prepyloric ulcer with reflux esophagitis  Bilateral lower extremity stasis dermatitis  -Continue home medications as appropriate.  -Consult wound care.  -ACHS fingerstick blood sugars with sliding scale.      DVT Prophylaxis: Heparin  Code Status: Full code  Diet: Cardiac/consistent carb/fluid restriction  Discharge Plan: Appears to have self-care deficit lives alone.  Will consider STRCamacho Dodd, APRN  09/02/23  11:58 EDT    Dictated utilizing Dragon dictation.

## 2023-09-03 VITALS
HEIGHT: 65 IN | TEMPERATURE: 97.8 F | WEIGHT: 234.79 LBS | RESPIRATION RATE: 22 BRPM | OXYGEN SATURATION: 98 % | HEART RATE: 86 BPM | SYSTOLIC BLOOD PRESSURE: 116 MMHG | BODY MASS INDEX: 39.12 KG/M2 | DIASTOLIC BLOOD PRESSURE: 51 MMHG

## 2023-09-03 PROBLEM — I95.9 HYPOTENSION: Status: ACTIVE | Noted: 2023-09-03

## 2023-09-03 PROBLEM — D62 ACUTE BLOOD LOSS ANEMIA: Status: ACTIVE | Noted: 2023-09-03

## 2023-09-03 LAB
ABO GROUP BLD: NORMAL
ANION GAP SERPL CALCULATED.3IONS-SCNC: 19.4 MMOL/L (ref 5–15)
ANISOCYTOSIS BLD QL: NORMAL
BASOPHILS # BLD AUTO: 0.06 10*3/MM3 (ref 0–0.2)
BASOPHILS NFR BLD AUTO: 0.4 % (ref 0–1.5)
BLD GP AB SCN SERPL QL: NEGATIVE
BUN SERPL-MCNC: 69 MG/DL (ref 8–23)
BUN/CREAT SERPL: 23.1 (ref 7–25)
CALCIUM SPEC-SCNC: 8.3 MG/DL (ref 8.6–10.5)
CHLORIDE SERPL-SCNC: 110 MMOL/L (ref 98–107)
CO2 SERPL-SCNC: 12.6 MMOL/L (ref 22–29)
CREAT SERPL-MCNC: 2.99 MG/DL (ref 0.57–1)
D-LACTATE SERPL-SCNC: 4.7 MMOL/L (ref 0.5–2)
DEPRECATED RDW RBC AUTO: 70.6 FL (ref 37–54)
EGFRCR SERPLBLD CKD-EPI 2021: 17 ML/MIN/1.73
EOSINOPHIL # BLD AUTO: 0.02 10*3/MM3 (ref 0–0.4)
EOSINOPHIL NFR BLD AUTO: 0.1 % (ref 0.3–6.2)
ERYTHROCYTE [DISTWIDTH] IN BLOOD BY AUTOMATED COUNT: 20 % (ref 12.3–15.4)
GLUCOSE BLDC GLUCOMTR-MCNC: 113 MG/DL (ref 70–130)
GLUCOSE SERPL-MCNC: 114 MG/DL (ref 65–99)
HCT VFR BLD AUTO: 20 % (ref 34–46.6)
HGB BLD-MCNC: 6.6 G/DL (ref 12–15.9)
IMM GRANULOCYTES # BLD AUTO: 0.15 10*3/MM3 (ref 0–0.05)
IMM GRANULOCYTES NFR BLD AUTO: 1 % (ref 0–0.5)
LYMPHOCYTES # BLD AUTO: 1.04 10*3/MM3 (ref 0.7–3.1)
LYMPHOCYTES NFR BLD AUTO: 6.8 % (ref 19.6–45.3)
MCH RBC QN AUTO: 32.4 PG (ref 26.6–33)
MCHC RBC AUTO-ENTMCNC: 33 G/DL (ref 31.5–35.7)
MCV RBC AUTO: 98 FL (ref 79–97)
MONOCYTES # BLD AUTO: 0.67 10*3/MM3 (ref 0.1–0.9)
MONOCYTES NFR BLD AUTO: 4.4 % (ref 5–12)
NEUTROPHILS NFR BLD AUTO: 13.45 10*3/MM3 (ref 1.7–7)
NEUTROPHILS NFR BLD AUTO: 87.3 % (ref 42.7–76)
NRBC BLD AUTO-RTO: 0.1 /100 WBC (ref 0–0.2)
PLATELET # BLD AUTO: 154 10*3/MM3 (ref 140–450)
PMV BLD AUTO: 11.1 FL (ref 6–12)
POTASSIUM SERPL-SCNC: 4.3 MMOL/L (ref 3.5–5.2)
RBC # BLD AUTO: 2.04 10*6/MM3 (ref 3.77–5.28)
RH BLD: POSITIVE
SMALL PLATELETS BLD QL SMEAR: ADEQUATE
SODIUM SERPL-SCNC: 142 MMOL/L (ref 136–145)
T&S EXPIRATION DATE: NORMAL
WBC MORPH BLD: NORMAL
WBC NRBC COR # BLD: 15.39 10*3/MM3 (ref 3.4–10.8)

## 2023-09-03 PROCEDURE — 85007 BL SMEAR W/DIFF WBC COUNT: CPT | Performed by: STUDENT IN AN ORGANIZED HEALTH CARE EDUCATION/TRAINING PROGRAM

## 2023-09-03 PROCEDURE — P9016 RBC LEUKOCYTES REDUCED: HCPCS

## 2023-09-03 PROCEDURE — 86900 BLOOD TYPING SEROLOGIC ABO: CPT | Performed by: STUDENT IN AN ORGANIZED HEALTH CARE EDUCATION/TRAINING PROGRAM

## 2023-09-03 PROCEDURE — 25010000002 ONDANSETRON PER 1 MG: Performed by: STUDENT IN AN ORGANIZED HEALTH CARE EDUCATION/TRAINING PROGRAM

## 2023-09-03 PROCEDURE — 86920 COMPATIBILITY TEST SPIN: CPT

## 2023-09-03 PROCEDURE — 96361 HYDRATE IV INFUSION ADD-ON: CPT

## 2023-09-03 PROCEDURE — 96368 THER/DIAG CONCURRENT INF: CPT

## 2023-09-03 PROCEDURE — G0378 HOSPITAL OBSERVATION PER HR: HCPCS

## 2023-09-03 PROCEDURE — 85025 COMPLETE CBC W/AUTO DIFF WBC: CPT | Performed by: STUDENT IN AN ORGANIZED HEALTH CARE EDUCATION/TRAINING PROGRAM

## 2023-09-03 PROCEDURE — 82948 REAGENT STRIP/BLOOD GLUCOSE: CPT

## 2023-09-03 PROCEDURE — 86900 BLOOD TYPING SEROLOGIC ABO: CPT

## 2023-09-03 PROCEDURE — 86850 RBC ANTIBODY SCREEN: CPT | Performed by: STUDENT IN AN ORGANIZED HEALTH CARE EDUCATION/TRAINING PROGRAM

## 2023-09-03 PROCEDURE — 96367 TX/PROPH/DG ADDL SEQ IV INF: CPT

## 2023-09-03 PROCEDURE — 96366 THER/PROPH/DIAG IV INF ADDON: CPT

## 2023-09-03 PROCEDURE — 36430 TRANSFUSION BLD/BLD COMPNT: CPT

## 2023-09-03 PROCEDURE — 83605 ASSAY OF LACTIC ACID: CPT | Performed by: STUDENT IN AN ORGANIZED HEALTH CARE EDUCATION/TRAINING PROGRAM

## 2023-09-03 PROCEDURE — 86922 COMPATIBILITY TEST ANTIGLOB: CPT

## 2023-09-03 PROCEDURE — 86901 BLOOD TYPING SEROLOGIC RH(D): CPT | Performed by: STUDENT IN AN ORGANIZED HEALTH CARE EDUCATION/TRAINING PROGRAM

## 2023-09-03 PROCEDURE — 80048 BASIC METABOLIC PNL TOTAL CA: CPT | Performed by: STUDENT IN AN ORGANIZED HEALTH CARE EDUCATION/TRAINING PROGRAM

## 2023-09-03 PROCEDURE — 25010000002 ALBUMIN HUMAN 25% PER 50 ML: Performed by: STUDENT IN AN ORGANIZED HEALTH CARE EDUCATION/TRAINING PROGRAM

## 2023-09-03 PROCEDURE — 96376 TX/PRO/DX INJ SAME DRUG ADON: CPT

## 2023-09-03 PROCEDURE — P9047 ALBUMIN (HUMAN), 25%, 50ML: HCPCS | Performed by: STUDENT IN AN ORGANIZED HEALTH CARE EDUCATION/TRAINING PROGRAM

## 2023-09-03 RX ORDER — SODIUM CHLORIDE 0.9 % (FLUSH) 0.9 %
10 SYRINGE (ML) INJECTION AS NEEDED
Status: DISCONTINUED | OUTPATIENT
Start: 2023-09-03 | End: 2023-09-03 | Stop reason: HOSPADM

## 2023-09-03 RX ORDER — HYDROCODONE BITARTRATE AND ACETAMINOPHEN 5; 325 MG/1; MG/1
1 TABLET ORAL ONCE AS NEEDED
Status: COMPLETED | OUTPATIENT
Start: 2023-09-03 | End: 2023-09-03

## 2023-09-03 RX ORDER — ALBUMIN (HUMAN) 12.5 G/50ML
25 SOLUTION INTRAVENOUS ONCE
Status: COMPLETED | OUTPATIENT
Start: 2023-09-03 | End: 2023-09-03

## 2023-09-03 RX ORDER — MIDODRINE HYDROCHLORIDE 5 MG/1
10 TABLET ORAL ONCE
Status: COMPLETED | OUTPATIENT
Start: 2023-09-03 | End: 2023-09-03

## 2023-09-03 RX ORDER — SODIUM CHLORIDE 0.9 % (FLUSH) 0.9 %
10 SYRINGE (ML) INJECTION EVERY 12 HOURS SCHEDULED
Status: DISCONTINUED | OUTPATIENT
Start: 2023-09-03 | End: 2023-09-03 | Stop reason: HOSPADM

## 2023-09-03 RX ORDER — SODIUM CHLORIDE 9 MG/ML
40 INJECTION, SOLUTION INTRAVENOUS AS NEEDED
Status: DISCONTINUED | OUTPATIENT
Start: 2023-09-03 | End: 2023-09-03 | Stop reason: HOSPADM

## 2023-09-03 RX ORDER — SODIUM CHLORIDE 0.9 % (FLUSH) 0.9 %
20 SYRINGE (ML) INJECTION AS NEEDED
Status: DISCONTINUED | OUTPATIENT
Start: 2023-09-03 | End: 2023-09-03 | Stop reason: HOSPADM

## 2023-09-03 RX ORDER — NOREPINEPHRINE BITARTRATE/D5W 8 MG/250ML
.02-.3 PLASTIC BAG, INJECTION (ML) INTRAVENOUS
Status: DISCONTINUED | OUTPATIENT
Start: 2023-09-03 | End: 2023-09-03 | Stop reason: HOSPADM

## 2023-09-03 RX ADMIN — PANTOPRAZOLE SODIUM 40 MG: 40 TABLET, DELAYED RELEASE ORAL at 06:05

## 2023-09-03 RX ADMIN — ONDANSETRON 4 MG: 2 INJECTION INTRAMUSCULAR; INTRAVENOUS at 11:27

## 2023-09-03 RX ADMIN — Medication 20 ML: at 06:18

## 2023-09-03 RX ADMIN — LEVOTHYROXINE SODIUM 300 MCG: 150 TABLET ORAL at 06:05

## 2023-09-03 RX ADMIN — NOREPINEPHRINE BITARTRATE 0.02 MCG/KG/MIN: 8 INJECTION, SOLUTION INTRAVENOUS at 02:03

## 2023-09-03 RX ADMIN — Medication 10 ML: at 06:05

## 2023-09-03 RX ADMIN — HYDROCODONE BITARTRATE AND ACETAMINOPHEN 1 TABLET: 5; 325 TABLET ORAL at 09:31

## 2023-09-03 RX ADMIN — MIDODRINE HYDROCHLORIDE 10 MG: 5 TABLET ORAL at 03:20

## 2023-09-03 RX ADMIN — ALBUMIN (HUMAN) 25 G: 0.25 INJECTION, SOLUTION INTRAVENOUS at 02:17

## 2023-09-03 RX ADMIN — SODIUM CHLORIDE 1000 ML: 9 INJECTION, SOLUTION INTRAVENOUS at 03:30

## 2023-09-03 RX ADMIN — SODIUM CHLORIDE 500 ML: 9 INJECTION, SOLUTION INTRAVENOUS at 00:45

## 2023-09-03 RX ADMIN — Medication 10 ML: at 02:17

## 2023-09-03 RX ADMIN — ALBUMIN (HUMAN) 25 G: 0.25 INJECTION, SOLUTION INTRAVENOUS at 02:46

## 2023-09-03 RX ADMIN — BUPROPION HYDROCHLORIDE 150 MG: 150 TABLET, FILM COATED, EXTENDED RELEASE ORAL at 09:31

## 2023-09-03 NOTE — CASE MANAGEMENT/SOCIAL WORK
Case Management Discharge Note      Final Note: Pt. will be discharging to [facility]. EMS transport was used.    Provided Post Acute Provider List?: N/A  Provided Post Acute Provider Quality & Resource List?: N/A    Selected Continued Care - Discharged on 9/3/2023 Admission date: 9/1/2023 - Discharge disposition: Short Term Hospital (DC - External)      Destination Coordination complete.      Service Provider Selected Services Address Phone Fax Patient Preferred    [facility] [address] [facility] [address] [phone] -- --              Durable Medical Equipment    No services have been selected for the patient.                Dialysis/Infusion    No services have been selected for the patient.                Home Medical Care    No services have been selected for the patient.                Therapy    No services have been selected for the patient.                Community Resources    No services have been selected for the patient.                Community & DME    No services have been selected for the patient.                    Selected Continued Care - Prior Encounters Includes continued care and service providers with selected services from prior encounters from 6/3/2023 to 9/3/2023      Discharged on 7/14/2023 Admission date: 7/4/2023 - Discharge disposition: Skilled Nursing Facility (DC - External)      Destination       Service Provider Selected Services Address Phone Fax Patient Preferred    [facility] Nursing [address] [facility] [address] [phone] --                          Transportation Services  Ambulance: [facility]    Final Discharge Disposition Code: 02 - [facility] for [facility]

## 2023-09-03 NOTE — NURSING NOTE
Pt [facility]. Report given to Geri nurse at [phone] and Hi Torres EMT at bedside. Pt on levo 0.12 and with 1unit PRBC transfusing at rate of 150ml/hr. Pump ID [id number], channel 1 [phone], Channel 2 [id number].

## 2023-09-03 NOTE — DISCHARGE SUMMARY
[facility]   DISCHARGE SUMMARY      Name:  Ivania Harris   Age:  62 y.o.  Sex:  female  :  1960  MRN:  [id number]   Visit Number:  [id number]    Admission Date:  2023  Date of Discharge:  9/3/2023  Primary Care Physician:  Kala Beaver MD    Important issues to note:    -Patient admitted due to intractable nausea and vomiting without any signs of hematemesis  -Unfortunately patient continued to have worsening hypotension with fluid resuscitation/albumin.  - Blood cultures remain negative to date. Receiving Rocephin.  -Ultimately transferred to ICU and initiated on Levophed with left femoral central line placed.  -Repeat hemoglobin 6.6 without any overt bleeding noted.  -Patient to be transferred to [facility] with current bed available accepting physician Dr. Brunner/Nael.  - Currently receiving 1 unit PRBC's.    Discharge Diagnoses:     Intractable nausea vomiting, POA  Acute kidney injury on chronic kidney disease stage III, POA  Suspect underlying peritonitis, POA  Acute blood loss anemia likely secondary to GI bleed  Severe hypotension secondary to above  High anion gap metabolic acidosis 2/2 above, POA  Leo cirrhosis  Chronic sacral decubitus  Diabetes mellitus type 2  Essential hypertension  Large prepyloric ulcer with reflux esophagitis  Bilateral lower extremity stasis dermatitis    Problem List:     Active Hospital Problems    Diagnosis  POA    **Acute-on-chronic kidney injury [N17.9, N18.9]  Yes    Hypotension [I95.9]  Yes    Acute blood loss anemia [D62]  Yes    Liver cirrhosis secondary to LEO [K75.81, K74.60]  Yes      Resolved Hospital Problems   No resolved problems to display.     Presenting Problem:    Chief Complaint   Patient presents with    Nausea      Consults:     Consulting Physician(s)               None            Procedures Performed:        History of presenting illness/Hospital Course:    Patient is a 62-year-old  woman with past medical history of Leo cirrhosis, type 2 diabetes, CKD 3, hypertension, dyslipidemia, hypothyroidism, large prepyloric ulcer, reflux esophagitis, morbid obesity BMI 43 with stasis dermatitis bilateral legs, debility, anemia, sacral decubitus ulcer.  Recent hospitalization [facility] with hematemesis EGD found prepyloric ulcer, 15 L removed paracentesis, extubated on 7/7, was discharged to [facility].  Presented to [facility] ED on 9/1/2023 with concern for 2 episodes of nausea and vomiting day of presentation.  Denied any blood in vomitus.  Denied fevers or chills, shortness of air, cough, chest pain, diarrhea.     ED summary: Afebrile, vital signs stable on room air.  EKG sinus rhythm with supraventricular premature contraction, no ST elevations or depressions.  Creatinine 2.79 most recent 2.15 on 8/16.  Lipase not elevated.  No leukocytosis.  Hemoglobin 8.9.  Pattern of infection on urinalysis, may be contaminant although may explain symptoms.  Urine culture collected.  COVID/flu negative.  CT abdomen/pelvis cirrhosis with large volume ascites, small bilateral pleural effusions.  She was provided Zofran, 500 mL bolus.  Hospitalist consulted for further medical management.  Continued Rocephin due to possible peritonitis.  No dysuria.  Gentle fluids given as well as albumin for hypotension.  Blood cultures pending without growth to date.  Despite albumin and fluid resuscitation patient continued to be hypotensive.  Repeat hemoglobin with morning labs noted drop to 6.6.  No overt bleeding noted.  Given severe hypotension patient transferred to ICU with left femoral line placed per ED provider and initiated on Levophed.  Given overall worsening acuity [facility] and the [facility] contacted for transfer.  Fortunately [facility] does have current bed available.  Patient currently receiving 1 unit PRBCs.  Otherwise currently alert and oriented x3 with reassuring vitals  noted.    Vital Signs:    Temp:  [96.1 °F (35.6 °C)-98.1 °F (36.7 °C)] 97.7 °F (36.5 °C)  Heart Rate:  [] 76  Resp:  [16-24] 23  BP: ()/(21-56) 108/41    Physical Exam:    General Appearance:  Alert and cooperative.  Chronically ill/frail appearing 62-year-old female.  Pale.   Head:  Atraumatic and normocephalic.   Eyes: Conjunctivae and sclerae normal, no icterus. No pallor.   Ears:  Ears with no abnormalities noted.   Throat: No oral lesions, no thrush, oral mucosa moist.   Neck: Supple, trachea midline, no thyromegaly.   Back:   No kyphoscoliosis present. No tenderness to palpation.   Lungs:   Breath sounds heard bilaterally equally.  No crackles or wheezing. No Pleural rub or bronchial breathing.  On room air unlabored.   Heart:  Normal S1 and S2, no murmur, no gallop, no rub. No JVD.   Abdomen:   Normal bowel sounds, no masses, no organomegaly.  Ascites noted with generalized tenderness.   Extremities: Supple, bilateral lower extremity edema, no cyanosis, no clubbing.   Pulses: Pulses palpable bilaterally.   Skin: No bleeding or rash.   Neurologic: Alert and oriented x 3. No facial asymmetry. Moves all four limbs. No tremors.  Severe generalized weakness.     Pertinent Lab Results:     Results from last 7 days   Lab Units 09/03/23 0408 09/02/23  0602 09/01/23  1921   SODIUM mmol/L 142 144 141   POTASSIUM mmol/L 4.3 4.0 4.0   CHLORIDE mmol/L 110* 111* 110*   CO2 mmol/L 12.6* 16.4* 14.7*   BUN mg/dL 69* 66* 66*   CREATININE mg/dL 2.99* 2.69* 2.79*   CALCIUM mg/dL 8.3* 8.5* 8.5*   BILIRUBIN mg/dL  --   --  1.7*   ALK PHOS U/L  --   --  109   ALT (SGPT) U/L  --   --  12   AST (SGOT) U/L  --   --  25   GLUCOSE mg/dL 114* 131* 129*     Results from last 7 days   Lab Units 09/03/23  0408 09/02/23  0602 09/01/23 1949   WBC 10*3/mm3 15.39* 11.05* 6.27   HEMOGLOBIN g/dL 6.6* 9.5* 8.9*   HEMATOCRIT % 20.0* 27.5* 26.7*   PLATELETS 10*3/mm3 154 133* 106*                     Results from last 7 days   Lab Units  09/01/23  1921   LIPASE U/L 32         Results from last 7 days   Lab Units 09/02/23  0600 09/02/23  0220 09/01/23 2027   BLOODCX  No growth at 24 hours No growth at 24 hours  --    URINECX   --   --  50,000 CFU/mL Mixed Zamzam Isolated       Pertinent Radiology Results:    Imaging Results (All)       Procedure Component Value Units Date/Time    CT Abdomen Pelvis Without Contrast [[id number]] Collected: 09/01/23 2226     Updated: 09/01/23 2227    Narrative:      FINAL REPORT    TECHNIQUE:  Noncontrast CT exam of the abdomen and pelvis. This study was  performed with techniques to keep radiation doses as low as  reasonably achievable (ALARA). Individualized dose reduction  techniques using automated exposure control or adjustment of mA  and/or kV according to the patient''s size were employed.    CLINICAL HISTORY:  eval obstruction, lulu    FINDINGS:  Abdomen: There are small bilateral pleural effusions..  There is  a cirrhotic appearing liver.  Spleen, pancreas and adrenal  glands have a normal CT appearance in their limited unenhanced  state.  There are nonobstructing right renal stones.  There is  no urinary tract obstruction.  There is mild left renal atrophy.  No obvious renal mass is present.  No ureteral stones are  present.  There is a large amount of ascites.  There is no bowel  obstruction.  There is moderate adenopathy of the  retroperitoneum and enrico hepatis which is probably reactive.  Pelvis: No distal ureteral stones are seen.  The uterus and  bladder are unremarkable.  There is a large amount of ascites.  The appendix is not visualized.      Impression:      1.  No bowel obstruction or free air.  2.  Cirrhosis with large  volume ascites.  3.  Small bilateral pleural effusions.    Authenticated and Electronically Signed by RAVIN Hicks MD on  09/01/2023 10:26:56 PM            Echo:    Results for orders placed during the hospital encounter of 07/04/23    Adult Transthoracic Echo Complete W/ Cont if  Necessary Per Protocol    Interpretation Summary    Left ventricular systolic function is hyperdynamic (EF > 70%). Calculated left ventricular EF = 73.7% Left ventricular ejection fraction appears to be greater than 70%.    Left ventricular diastolic function was indeterminate.    The right ventricular cavity is mildly dilated.    The left atrial cavity is moderately dilated.    The right atrial cavity is mild to moderately  dilated.    Mild aortic valve stenosis is present.    Moderate tricuspid valve regurgitation is present.    Estimated right ventricular systolic pressure from tricuspid regurgitation is moderately elevated (45-55 mmHg).    Condition on Discharge:      Stable.    Code status during the hospital stay:    Code Status and Medical Interventions:   Ordered at: 09/02/23 0018     Code Status (Patient has no pulse and is not breathing):    CPR (Attempt to Resuscitate)     Medical Interventions (Patient has pulse or is breathing):    Full Support     Discharge Disposition:    Short Term Hospital ([place] - External)    Discharge Medications:       Discharge Medications        Continue These Medications        Instructions Start Date   atorvastatin 20 MG tablet  Commonly known as: LIPITOR   20 mg, Oral, Daily      buPROPion  MG 24 hr tablet  Commonly known as: WELLBUTRIN XL   150 mg, Oral, Daily      diphenoxylate-atropine 2.5-0.025 MG per tablet  Commonly known as: LOMOTIL   1 tablet, Oral, 4 Times Daily PRN      donepezil 10 MG tablet  Commonly known as: ARICEPT   10 mg, Oral, Nightly      insulin aspart 100 UNIT/ML injection  Commonly known as: novoLOG   Subcutaneous, 3 Times Daily Before Meals, Sliding Scale as needed      Insulin Aspart 100 UNIT/ML injection  Commonly known as: novoLOG   0-14 Units, Subcutaneous, 3 Times Daily Before Meals      ipratropium-albuterol 0.5-2.5 mg/3 ml nebulizer  Commonly known as: DUO-NEB   Nebulize q 4 h prn wheezing / shortness of breath      iron polysaccharides 150  MG capsule  Commonly known as: NIFEREX   150 mg, Oral, Daily      levothyroxine 100 MCG tablet  Commonly known as: SYNTHROID, LEVOTHROID   300 mcg, Oral, Daily      multivitamin with minerals tablet tablet   1 tablet, Oral, Daily      ondansetron ODT 4 MG disintegrating tablet  Commonly known as: ZOFRAN-ODT   4 mg, Translingual, Every 8 Hours PRN      pantoprazole 40 MG EC tablet  Commonly known as: PROTONIX   40 mg, Oral, Every Early Morning             Stop These Medications      aspirin 81 MG EC tablet     bumetanide 2 MG tablet  Commonly known as: BUMEX     SITagliptin 50 MG tablet  Commonly known as: JANUVIA     spironolactone 50 MG tablet  Commonly known as: ALDACTONE            Discharge Diet:     Diet Instructions       Diet: Nothing By Mouth      Discharge Diet: Nothing By Mouth          Activity at Discharge:       Follow-up Appointments:     Follow-up Information       Kala Beaver MD .    Specialty: Internal Medicine  Contact information:  [address] [facility] [address]  [phone]                           Future Appointments   Date Time Provider Department Center   9/21/2023  9:30 AM Elke Shelley PA-C [facility]   2/1/2024  2:45 PM Jens Mandujano MD [facility]     Test Results Pending at Discharge:    Pending Labs       Order Current Status    Blood Culture - Blood, Arm, Right Preliminary result    Blood Culture - Blood, Arm, Right Preliminary result               Susanna Dodd, MITRA  09/03/23  09:01 EDT    Time: I spent 45 minutes on this discharge activity which included: face-to-face encounter with the patient, reviewing the data in the system, coordination of the care with the nursing staff as well as consultants, documentation, and entering orders.     Dictated utilizing Dragon dictation.

## 2023-09-03 NOTE — PLAN OF CARE
Central Line At Bedside    Date/Time: 9/3/2023 6:56 AM  Performed by: Steven Santos MD  Authorized by: Kerley, Brian Joseph, DO     Consent:     Consent obtained:  Verbal    Consent given by:  Patient  Universal protocol:     Patient identity confirmed:  Verbally with patient and arm band  Pre-procedure details:     Indication(s): central venous access      Hand hygiene: Hand hygiene performed prior to insertion      Sterile barrier technique: All elements of maximal sterile technique followed      Skin preparation:  Chlorhexidine    Skin preparation agent: Skin preparation agent completely dried prior to procedure    Anesthesia:     Anesthesia method:  Local infiltration    Local anesthetic:  Lidocaine 1% w/o epi  Procedure details:     Location:  L femoral    Patient position:  Supine    Procedural supplies:  Triple lumen    Catheter size:  7 Fr    Landmarks identified: yes      Ultrasound guidance: yes      Ultrasound guidance timing: real time      Sterile ultrasound techniques: Sterile gel and sterile probe covers were used      Number of attempts:  1    Successful placement: yes    Post-procedure details:     Post-procedure:  Dressing applied and line sutured    Assessment:  Blood return through all ports    Procedure completion:  Tolerated well, no immediate complications

## 2023-09-03 NOTE — PROGRESS NOTES
Continued to be intermittently hypotensive, provided further albumin and midodrine without resolution.     Required transfer to ICU, left femoral CVC placed by Dr. Santos, started norepinephrine.    Morning labs hemoglobin 6.6, will provide 1 unit PRBC.  No evidence of devin bleed.  Ordered FOBT.

## 2023-09-03 NOTE — NURSING NOTE
Patient transferred to the ICU for further evaluation and monitoring of blood pressure. Report given to Guillaume BEAL.

## 2023-09-03 NOTE — PLAN OF CARE
Goal Outcome Evaluation:               Pt transferred to  for higher level of care         Problem: Fall Injury Risk  Goal: Absence of Fall and Fall-Related Injury  Outcome: Unable to Meet, Plan Revised     Problem: Skin Injury Risk Increased  Goal: Skin Health and Integrity  Outcome: Unable to Meet, Plan Revised     Problem: Pain Acute  Goal: Acceptable Pain Control and Functional Ability  Outcome: Unable to Meet, Plan Revised     Problem: Diabetes Comorbidity  Goal: Blood Glucose Level Within Targeted Range  Outcome: Unable to Meet, Plan Revised     Problem: Impaired Wound Healing  Goal: Optimal Wound Healing  Outcome: Unable to Meet, Plan Revised     Problem: Adult Inpatient Plan of Care  Goal: Plan of Care Review  Outcome: Unable to Meet, Plan Revised  Goal: Patient-Specific Goal (Individualized)  Outcome: Unable to Meet, Plan Revised  Goal: Absence of Hospital-Acquired Illness or Injury  Outcome: Unable to Meet, Plan Revised  Goal: Optimal Comfort and Wellbeing  Outcome: Unable to Meet, Plan Revised  Goal: Readiness for Transition of Care  Outcome: Unable to Meet, Plan Revised     Problem: Adjustment to Illness (Liver Failure)  Goal: Optimal Coping with Liver Failure  Outcome: Unable to Meet, Plan Revised     Problem: Fluid and Electrolyte Imbalance (Liver Failure)  Goal: Fluid and Electrolyte Balance  Outcome: Unable to Meet, Plan Revised     Problem: Gastrointestinal Complications (Liver Failure)  Goal: Optimal Gastrointestinal Function  Outcome: Unable to Meet, Plan Revised     Problem: Impaired Coagulation (Liver Failure)  Goal: Optimal Coagulation Function  Outcome: Unable to Meet, Plan Revised     Problem: Infection (Liver Failure)  Goal: Absence of Infection Signs and Symptoms  Outcome: Unable to Meet, Plan Revised     Problem: Neurologic Function Impaired (Liver Failure)  Goal: Optimal Neurologic Function  Outcome: Unable to Meet, Plan Revised     Problem: Oral Intake Inadequate (Liver Failure)  Goal:  Improved Oral Intake  Outcome: Unable to Meet, Plan Revised     Problem: Pain (Liver Failure)  Goal: Optimal Pain Control  Outcome: Unable to Meet, Plan Revised     Problem: Renal Dysfunction (Liver Failure)  Goal: Optimize Renal Function  Outcome: Unable to Meet, Plan Revised     Problem: Respiratory Compromise (Liver Failure)  Goal: Effective Oxygenation and Ventilation  Outcome: Unable to Meet, Plan Revised

## 2023-09-04 LAB
BH BB BLOOD EXPIRATION DATE: NORMAL
BH BB BLOOD TYPE BARCODE: 6200
BH BB DISPENSE STATUS: NORMAL
BH BB PRODUCT CODE: NORMAL
BH BB UNIT NUMBER: NORMAL
CROSSMATCH INTERPRETATION: NORMAL
UNIT  ABO: NORMAL
UNIT  RH: NORMAL

## 2023-09-07 LAB
BACTERIA SPEC AEROBE CULT: NORMAL
BACTERIA SPEC AEROBE CULT: NORMAL
BH BB BLOOD EXPIRATION DATE: NORMAL
BH BB BLOOD TYPE BARCODE: 6200
BH BB DISPENSE STATUS: NORMAL
BH BB PRODUCT CODE: NORMAL
BH BB UNIT NUMBER: NORMAL
CROSSMATCH INTERPRETATION: NORMAL
UNIT  ABO: NORMAL
UNIT  RH: NORMAL

## 2025-03-03 NOTE — DISCHARGE INSTRUCTIONS
Take antibiotics as prescribed.  Take Zofran as prescribed as needed for nausea and vomiting.  Follow-up with Dr. Mandujano, gastroenterologist, for further outpatient evaluation and to discuss paracentesis given large amount of ascites on your CT scan.  Follow-up with Dr. Hastings, Urology, for further outpatient evaluation and definitive care of stone in your ureteropelvic junction.  Return to the ER for new or worsening symptoms or acute concerns.   Warm

## (undated) DEVICE — FIRST STEP BEDSIDE ADD WATER KIT - RESEALABLE STAND-UP POUCH, ENDOSCOPIC CLEANING PAD - 1 POUCH: Brand: FIRST STEP BEDSIDE ADD WATER KIT - RESEALABLE STAND-UP POUCH, ENDOSCOPIC CLEANIN

## (undated) DEVICE — INTRO ACCSR BLNT TP

## (undated) DEVICE — HYBRID CO2 TUBING/CAP SET FOR OLYMPUS® SCOPES & CO2 SOURCE: Brand: ERBE

## (undated) DEVICE — CONTN GRAD MEAS TRIANG 32OZ BLK

## (undated) DEVICE — SOL IRR H2O BTL 1000ML STRL

## (undated) DEVICE — KT ORCA ORCAPOD DISP STRL

## (undated) DEVICE — VLV SXN AIR/H2O ORCAPOD3 1P/U STRL

## (undated) DEVICE — CONMED SCOPE SAVER BITE BLOCK, 20X27 MM: Brand: SCOPE SAVER

## (undated) DEVICE — TUBING, SUCTION, 1/4" X 10', STRAIGHT: Brand: MEDLINE

## (undated) DEVICE — THE BITE BLOCK MAXI, LATEX FREE STRAP IS USED TO PROTECT THE ENDOSCOPE INSERTION TUBE FROM BEING BITTEN BY THE PATIENT.

## (undated) DEVICE — ADAPT CLN LUM OLYMP AIR/H20

## (undated) DEVICE — ST LINER SAFECAP GRN RED CP STRL

## (undated) DEVICE — SOLIDIFIER LIQ PREMISORB 1500CC

## (undated) DEVICE — SYR LUERLOK 50ML

## (undated) DEVICE — HYBRID TUBING/CAP SET FOR OLYMPUS® SCOPES: Brand: ERBE

## (undated) DEVICE — LUBE JELLY FOIL PACK 1.4 OZ: Brand: MEDLINE INDUSTRIES, INC.

## (undated) DEVICE — ENDOSCOPY PORT CONNECTOR FOR OLYMPUS® SCOPES: Brand: ERBE

## (undated) DEVICE — Device

## (undated) DEVICE — FRCP BX RADJAW4 NDL 2.8 240 STD OG